# Patient Record
Sex: MALE | Race: WHITE | NOT HISPANIC OR LATINO | Employment: OTHER | URBAN - METROPOLITAN AREA
[De-identification: names, ages, dates, MRNs, and addresses within clinical notes are randomized per-mention and may not be internally consistent; named-entity substitution may affect disease eponyms.]

---

## 2017-03-29 RX ORDER — TESTOSTERONE CYPIONATE 200 MG/ML
300 INJECTION, SOLUTION INTRAMUSCULAR
Qty: 10 ML | Refills: 0 | Status: SHIPPED | OUTPATIENT
Start: 2017-03-29 | End: 2017-05-30 | Stop reason: SDUPTHER

## 2017-05-01 ENCOUNTER — LAB VISIT (OUTPATIENT)
Dept: LAB | Facility: HOSPITAL | Age: 67
End: 2017-05-01
Attending: INTERNAL MEDICINE
Payer: MEDICARE

## 2017-05-01 ENCOUNTER — OFFICE VISIT (OUTPATIENT)
Dept: INTERNAL MEDICINE | Facility: CLINIC | Age: 67
End: 2017-05-01
Payer: MEDICARE

## 2017-05-01 VITALS
OXYGEN SATURATION: 97 % | WEIGHT: 287.69 LBS | HEIGHT: 69 IN | TEMPERATURE: 98 F | DIASTOLIC BLOOD PRESSURE: 107 MMHG | HEART RATE: 58 BPM | BODY MASS INDEX: 42.61 KG/M2 | SYSTOLIC BLOOD PRESSURE: 157 MMHG

## 2017-05-01 DIAGNOSIS — M54.31 BILATERAL SCIATICA: ICD-10-CM

## 2017-05-01 DIAGNOSIS — M54.32 BILATERAL SCIATICA: ICD-10-CM

## 2017-05-01 DIAGNOSIS — M54.31 BILATERAL SCIATICA: Primary | ICD-10-CM

## 2017-05-01 DIAGNOSIS — M54.32 BILATERAL SCIATICA: Primary | ICD-10-CM

## 2017-05-01 LAB
CREAT SERPL-MCNC: 1.2 MG/DL
EST. GFR  (AFRICAN AMERICAN): >60 ML/MIN/1.73 M^2
EST. GFR  (NON AFRICAN AMERICAN): >60 ML/MIN/1.73 M^2

## 2017-05-01 PROCEDURE — 36415 COLL VENOUS BLD VENIPUNCTURE: CPT | Mod: PO

## 2017-05-01 PROCEDURE — 82565 ASSAY OF CREATININE: CPT

## 2017-05-01 PROCEDURE — 1160F RVW MEDS BY RX/DR IN RCRD: CPT | Mod: S$GLB,,, | Performed by: INTERNAL MEDICINE

## 2017-05-01 PROCEDURE — 99213 OFFICE O/P EST LOW 20 MIN: CPT | Mod: S$GLB,,, | Performed by: INTERNAL MEDICINE

## 2017-05-01 PROCEDURE — 3077F SYST BP >= 140 MM HG: CPT | Mod: S$GLB,,, | Performed by: INTERNAL MEDICINE

## 2017-05-01 PROCEDURE — 99999 PR PBB SHADOW E&M-EST. PATIENT-LVL III: CPT | Mod: PBBFAC,,, | Performed by: INTERNAL MEDICINE

## 2017-05-01 PROCEDURE — 99499 UNLISTED E&M SERVICE: CPT | Mod: S$GLB,,, | Performed by: INTERNAL MEDICINE

## 2017-05-01 PROCEDURE — 3080F DIAST BP >= 90 MM HG: CPT | Mod: S$GLB,,, | Performed by: INTERNAL MEDICINE

## 2017-05-01 RX ORDER — AMLODIPINE BESYLATE 5 MG/1
5 TABLET ORAL DAILY
Qty: 30 TABLET | Refills: 11 | Status: SHIPPED | OUTPATIENT
Start: 2017-05-01 | End: 2017-05-30 | Stop reason: SDUPTHER

## 2017-05-01 RX ORDER — HYDROCODONE BITARTRATE AND ACETAMINOPHEN 10; 325 MG/1; MG/1
1 TABLET ORAL EVERY 8 HOURS PRN
Qty: 90 TABLET | Refills: 0 | Status: SHIPPED | OUTPATIENT
Start: 2017-05-01 | End: 2017-09-06 | Stop reason: SDUPTHER

## 2017-05-01 NOTE — MR AVS SNAPSHOT
[facility]  [id number] [place] [address]  Phone: [phone]                  Enoc Collado   2017 1:20 PM   Office Visit    Description:  Male : 1950   Provider:  Frantz Mason MD   Department:  [facility]           Reason for Visit     Hypertension           Diagnoses this Visit        Comments    Bilateral sciatica    -  Primary            To Do List           Future Appointments        Provider Department Dept Phone    2017 4:50 PM LABORATORY, [facility] - Laboratory [phone]    2017 7:00 AM [facility] MRI1 [facility] -  [phone]    2017 8:30 AM LABORATORY, [facility]     2017 2:20 PM Frantz Mason MD [facility] Internal Medicine [phone]    2017 10:00 AM [facility] [phone]      Goals (5 Years of Data)     None       These Medications        Disp Refills Start End    amlodipine (NORVASC) 5 MG tablet 30 tablet 11 2017    Take 1 tablet (5 mg total) by mouth once daily. - Oral    Pharmacy: [facility] - [address] #: [phone]         [facility]     [facility] Care [facility] -  Assistance  Unless otherwise directed by your provider, please contact [facility], our nurse care line that is available for  assistance.     Registered nurses in the [facility] provide: appointment scheduling, clinical advisement, health education, and other advisory services.  Call: [phone] (toll free)               Medications           Message regarding Medications     Verify the changes and/or additions to your medication regime listed below are the same as discussed with your clinician today.  If any of these changes or additions are incorrect, please notify your healthcare provider.        START taking these NEW medications        Refills     "amlodipine (NORVASC) 5 MG tablet 11    Sig: Take 1 tablet (5 mg total) by mouth once daily.    Class: Normal    Route: Oral      STOP taking these medications     hydrocodone-acetaminophen  (LORTAB)  mg per tablet Take 1 tablet by mouth every 8 (eight) hours as needed for Pain.    hydrocodone-acetaminophen 10-325mg (NORCO)  mg Tab            Verify that the below list of medications is an accurate representation of the medications you are currently taking.  If none reported, the list may be blank. If incorrect, please contact your healthcare provider. Carry this list with you in case of emergency.           Current Medications     allopurinol (ZYLOPRIM) 300 MG tablet Take 1 tablet (300 mg total) by mouth once daily.    amlodipine (NORVASC) 5 MG tablet Take 1 tablet (5 mg total) by mouth once daily.    aspirin 325 MG tablet Take 325 mg by mouth once daily.    carvedilol (COREG) 25 MG tablet Take 1 tablet (25 mg total) by mouth 2 (two) times daily with meals.    cyclobenzaprine (FLEXERIL) 10 MG tablet Take 1 tablet (10 mg total) by mouth 3 (three) times daily as needed for Muscle spasms.    desonide (DESOWEN) 0.05 % lotion Apply twice a day as needed    diclofenac (VOLTAREN) 75 MG EC tablet Take 1 tablet (75 mg total) by mouth 2 (two) times daily.    hydrochlorothiazide (HYDRODIURIL) 25 MG tablet Take 1 tablet (25 mg total) by mouth once daily.    lisinopril (PRINIVIL,ZESTRIL) 40 MG tablet Take 1 tablet (40 mg total) by mouth once daily.    pravastatin (PRAVACHOL) 20 MG tablet Take 1 tablet (20 mg total) by mouth once daily.    sildenafil (REVATIO) 20 mg Tab 2-4 tablets as needed one hour prior to intercourse    syringe with needle, safety 3 mL 21 x 1 1/2" Syrg 1 Syringe by Misc.(Non-Drug; Combo Route) route every 21 days.    testosterone cypionate (DEPOTESTOTERONE CYPIONATE) 200 mg/mL injection Inject 1.5 mLs (300 mg total) into the muscle every 21 days.           Clinical Reference Information    " "       Your Vitals Were     BP Pulse Temp Height    157/107 (BP Location: Left arm, Patient Position: Sitting, BP Method: Doppler) 58 97.5 °F (36.4 °C) (Tympanic) 5' 9" (1.753 m)    Weight SpO2 BMI    130.5 kg (287 lb 11.2 oz) 97% 42.49 kg/m2      Blood Pressure          Most Recent Value    BP  (!)  157/107      Allergies as of 5/1/2017     No Known Drug Allergies      Immunizations Administered on Date of Encounter - 5/1/2017     None      Orders Placed During Today's Visit     Future Labs/Procedures Expected by Expires    Creatinine, serum  5/1/2017 6/30/2018    MRI Lumbar Spine W WO Contrast  5/1/2017 5/1/2018      Language Assistance Services     ATTENTION: Language assistance services are available, free of charge. Please call [phone].      ATENCIÓN: Si habla chandan, tiene a jo disposición servicios gratuitos de asistencia lingüística. Llame al [phone].     CHÚ Ý: N?u b?n nói Ti?ng Vi?t, có các d?ch v? h? tr? ngôn ng? mi?n phí dành cho b?n. G?i s? [phone].         [facility] complies with applicable Federal civil rights laws and does not discriminate on the basis of race, color, national origin, age, disability, or sex.        "

## 2017-05-01 NOTE — PROGRESS NOTES
"HPI:  Patient is a 66-year-old man who comes in today with complaints of sharp stabbing pain that originates in his lower back and then goes into both buttocks.  He denies any pain down into the lower quadrant.  Extremities.  He states most the time.  It occurs when he stands up.  It is not always there.  When it does flare up, if he sits back down It usually abates.  He denies any recent injury or trauma.  He's been taking meloxicam and intermittently taken hydrocodone.    Current meds have been verified and updated per the EMR  Exam:BP (!) 157/107 (BP Location: Left arm, Patient Position: Sitting, BP Method: Doppler)  Pulse (!) 58  Temp 97.5 °F (36.4 °C) (Tympanic)   Ht 5' 9" (1.753 m)  Wt 130.5 kg (287 lb 11.2 oz)  SpO2 97%  BMI 42.49 kg/m2  Straight-leg raises are negative.  Reflexes 1+ at both knees  Motor shows equal strength poor dorsal and plantar flexion.  He has equal strength in his thighs with both flexion and extension    Lab Results   Component Value Date    WBC 6.18 12/06/2016    HGB 15.3 12/06/2016    HCT 46.1 12/06/2016     12/06/2016    CHOL 173 12/06/2016    TRIG 129 12/06/2016    HDL 25 (L) 12/06/2016    ALT 60 (H) 12/06/2016    AST 34 12/06/2016     12/06/2016    K 4.5 12/06/2016     12/06/2016    CREATININE 1.2 12/06/2016    BUN 26 (H) 12/06/2016    CO2 25 12/06/2016    TSH 0.551 12/06/2016    PSA 1.8 12/06/2016    HGBA1C 5.3 05/05/2015       Impression:  Hypertension  Back pain with symptoms consistent with sciatica, bilateral, suggestive more of a spinal stenosis problem  Patient Active Problem List   Diagnosis    Essential hypertension    Hyperlipidemia    Hypertrophy of prostate without urinary obstruction and other lower urinary tract symptoms (LUTS)    Obesity, unspecified    Generalized osteoarthrosis, involving multiple sites    Hypogonadism male    Pain in limb    History of gout       Plan:  Orders Placed This Encounter    MRI Lumbar Spine W WO " Contrast    Creatinine, serum    amlodipine (NORVASC) 5 MG tablet     He will have an MRI of his lower lumbar spine.  He was started on Norvasc for his blood pressure.  He'll see me in 3 weeks for rechecking his blood pressure.

## 2017-05-02 ENCOUNTER — TELEPHONE (OUTPATIENT)
Dept: INTERNAL MEDICINE | Facility: CLINIC | Age: 67
End: 2017-05-02

## 2017-05-02 ENCOUNTER — PATIENT MESSAGE (OUTPATIENT)
Dept: INTERNAL MEDICINE | Facility: CLINIC | Age: 67
End: 2017-05-02

## 2017-05-02 ENCOUNTER — HOSPITAL ENCOUNTER (OUTPATIENT)
Dept: RADIOLOGY | Facility: HOSPITAL | Age: 67
Discharge: HOME | End: 2017-05-02
Attending: INTERNAL MEDICINE
Payer: MEDICARE

## 2017-05-02 DIAGNOSIS — M54.32 BILATERAL SCIATICA: Primary | ICD-10-CM

## 2017-05-02 DIAGNOSIS — M54.31 BILATERAL SCIATICA: ICD-10-CM

## 2017-05-02 DIAGNOSIS — M54.32 BILATERAL SCIATICA: ICD-10-CM

## 2017-05-02 DIAGNOSIS — M54.31 BILATERAL SCIATICA: Primary | ICD-10-CM

## 2017-05-02 PROCEDURE — A9585 GADOBUTROL INJECTION: HCPCS | Performed by: INTERNAL MEDICINE

## 2017-05-02 PROCEDURE — 25500020 PHARM REV CODE 255: Performed by: INTERNAL MEDICINE

## 2017-05-02 PROCEDURE — 72158 MRI LUMBAR SPINE W/O & W/DYE: CPT | Mod: TC

## 2017-05-02 RX ORDER — GADOBUTROL 604.72 MG/ML
10 INJECTION INTRAVENOUS
Status: COMPLETED | OUTPATIENT
Start: 2017-05-02 | End: 2017-05-02

## 2017-05-02 RX ADMIN — GADOBUTROL 10 ML: 604.72 INJECTION INTRAVENOUS at 08:05

## 2017-05-02 NOTE — TELEPHONE ENCOUNTER
Your MRI shows some focal nerve root impingements. I think you should consider Epidural Steroid injections. I want you to see Dr Swift to get his opinion.  Referral put in

## 2017-05-09 ENCOUNTER — OFFICE VISIT (OUTPATIENT)
Dept: PAIN MEDICINE | Facility: CLINIC | Age: 67
End: 2017-05-09
Payer: MEDICARE

## 2017-05-09 VITALS
HEIGHT: 69 IN | BODY MASS INDEX: 41.47 KG/M2 | DIASTOLIC BLOOD PRESSURE: 80 MMHG | WEIGHT: 280 LBS | HEART RATE: 65 BPM | RESPIRATION RATE: 18 BRPM | SYSTOLIC BLOOD PRESSURE: 122 MMHG

## 2017-05-09 DIAGNOSIS — M54.16 RIGHT LUMBAR RADICULOPATHY: Primary | ICD-10-CM

## 2017-05-09 DIAGNOSIS — M54.16 LUMBAR RADICULOPATHY: Primary | ICD-10-CM

## 2017-05-09 DIAGNOSIS — M54.16 LEFT LUMBAR RADICULOPATHY: ICD-10-CM

## 2017-05-09 DIAGNOSIS — M47.817 SPONDYLOSIS OF LUMBOSACRAL REGION WITHOUT MYELOPATHY OR RADICULOPATHY: ICD-10-CM

## 2017-05-09 PROCEDURE — 3079F DIAST BP 80-89 MM HG: CPT | Mod: S$GLB,,, | Performed by: ANESTHESIOLOGY

## 2017-05-09 PROCEDURE — 99999 PR PBB SHADOW E&M-EST. PATIENT-LVL III: CPT | Mod: PBBFAC,,, | Performed by: ANESTHESIOLOGY

## 2017-05-09 PROCEDURE — 3074F SYST BP LT 130 MM HG: CPT | Mod: S$GLB,,, | Performed by: ANESTHESIOLOGY

## 2017-05-09 PROCEDURE — 99203 OFFICE O/P NEW LOW 30 MIN: CPT | Mod: S$GLB,,, | Performed by: ANESTHESIOLOGY

## 2017-05-09 PROCEDURE — 1160F RVW MEDS BY RX/DR IN RCRD: CPT | Mod: S$GLB,,, | Performed by: ANESTHESIOLOGY

## 2017-05-09 RX ORDER — GABAPENTIN 300 MG/1
300 CAPSULE ORAL 3 TIMES DAILY
Qty: 60 CAPSULE | Refills: 0 | Status: SHIPPED | OUTPATIENT
Start: 2017-05-09 | End: 2017-05-30 | Stop reason: DRUGHIGH

## 2017-05-09 NOTE — LETTER
May 9, 2017      Frantz Mason MD  [address]  [address]  [facility] [address]           [facility]  [address] [place] [address]  Phone: [phone]  Fax: [phone]          Patient: Enoc Collado   MR Number: [id number]   YOB: 1950   Date of Visit: 5/9/2017       Dear Dr. Frantz Mason:    Thank you for referring Enoc Collado to me for evaluation. Attached you will find relevant portions of my assessment and plan of care.    If you have questions, please do not hesitate to call me. I look forward to following Enoc Collado along with you.    Sincerely,    Rodolfo Swift MD    Enclosure  CC:  No Recipients    If you would like to receive this communication electronically, please contact [email or web address] or [phone] to request more information on [facility] Link access.    For providers and/or their staff who would like to refer a patient to [facility], please contact us through our one-stop-shop provider referral line, [facility], at [phone].    If you feel you have received this communication in error or would no longer like to receive these types of communications, please e-mail [email or web address]

## 2017-05-09 NOTE — PROGRESS NOTES
Chief Pain Complaint:  Lower back pain, buttock and leg pain b/l    History of Present Illness:   This patient is a 66 y.o. male who presents today complaining of the above noted pain/s. The patient describes the pain as follows.    - duration of pain: 3 months   - timing: constant   - character: aching, sharp  - radiating, dermatomal: extends into bilateral lower extremities posteriorly  - antecedent trauma, prior spinal surgery: no prior trauma, no prior spinal surgery   - pertinent negatives: No fever, No chills, No weight loss, No bladder dysfunction, No bowel dysfunction, No extremity weakness, No saddle anesthesia  - pertinent positives: none    - medications, other therapies tried (physical therapy, injections):     >> Tylenol, Norco, flexeril    >> Has NOT previously undergone Physical Therapy    >> Has NOT previously undergone spinal injection/s      Imaging / Labs / Studies (reviewed on 5/9/2017):      Results for orders placed during the hospital encounter of 05/02/17   MRI Lumbar Spine W WO Contrast    Narrative EXAM: [id number] LUMBAR SPINE W WO CONTRAST  CLINICAL HISTORY: Low back pain with radiation to buttocks region.  TECHNIQUE: MR Lumbar spine with contrast. Sagittal T1, T2, STIR. Axial T1, T2. Post contrast Sagittal and Axial T1.  COMPARISON: None available.  FINDINGS:  There is straightening of the lumbar lordosis. There is fatty degenerative Modic type II endplate change at L2-L3 and L3-L4 relating to disc space height loss.  There is also fatty degenerative Modic type II endplate change toward the right at L5-S1 due to disc space height loss. There is an enhancing annular fissure involving the far right lateral L5-S1 disc space.  There is also enhancement of a posterior annular fissure at L3-L4.  This is an expected finding in the setting of annular fissures. The conus medullaris terminates at the level of L1. No abnormal signal within the conus. Intervertebral disc levels are as  follows:  T12-L1 disc: No significant disc pathology. No spinal canal or neural foraminal stenosis.  L1-L2 disc : Minimal circumferential disc bulge.  The thecal sac measures 13 mm AP.  No significant foraminal stenosis.  L2-L3 disc: There is disc desiccation and disc space height loss with fatty degenerative endplate change as well as circumferentially protruding disc material with marginal osteophytes.  The thecal sac measures 12 mm AP.  Disc and osteophytic material encroach into the floors of the exit foramina bilaterally causing mild bilateral foraminal stenosis.  L3-L4 disc: Severe disc space height loss with fatty degenerative endplate change.  There is moderate degenerative facet changes bilaterally.  The thecal sac measures 12 mm AP.  Disc and osteophytic material encroach into the exit foramina, left greater than right.  There is severe left foraminal stenosis could affect the left L3 nerve.  There is mild to moderate right foraminal stenosis.  L4-L5 disc: Partial disc desiccation with a small central disc protrusion.  There is mild degenerative facet hypertrophy bilaterally.  The thecal sac measures 9 mm AP.  There is mild to moderate bilateral foraminal stenosis, right greater than left.  L5-S1 disc: Partial disc desiccation with disc space height loss toward the right where there are marginal osteophytes as well as an annular fissure.  Disc and osteophytic material encroach into the right exit foramen.  There is mild to moderate degenerative facet hypertrophy bilaterally.  There is moderate to severe right foraminal stenosis could affect the right L5 nerve.  Mild left foraminal stenosis.  No significant spinal stenosis.                Review of Systems:  CONSTITUTIONAL: patient denies any fever, chills, or weight loss  SKIN: patient denies any rash or itching  RESPIRATORY: patient denies having any shortness of breath  GASTROINTESTINAL: patient denies having any diarrhea, constipation, or bowel  "incontinence  GENITOURINARY: patient denies having any abnormal bladder function    MUSCULOSKELETAL:  - patient complains of the above noted pain/s (see chief pain complaint)    NEUROLOGICAL:   - pain as above  - strength in Lower extremities is intact, BILATERALLY  - sensation in Lower extremities is abnormal, BILATERALLY  - patient denies any loss of bowel or bladder control      PSYCHIATRIC: patient denies any change in mood    Other:  All other systems reviewed and are negative      Physical Exam:  /80 (BP Location: Right arm, Patient Position: Sitting, BP Method: Automatic)  Pulse 65  Resp 18  Ht 5' 9" (1.753 m)  Wt 127 kg (280 lb)  BMI 41.35 kg/m2 (reviewed on 5/9/2017)  General: alert and oriented, in no apparent distress  Gait: normal gait  Skin: No rashes, No discoloration, No obvious lesions  HEENT: EOMI  Cardiovascular: no significant peripheral edema present  Respiratory: respirations nonlabored    Musculoskeletal:  - Any pain on flexion, extension, rotation:    >> pain on extension and rotation  - Straight Leg Raise:     >> LEFT :: negative    >> RIGHT :: negative    - Any tenderness to palpation across paraspinal muscles, joints, bursae:     >> across lumbar paraspinals    Neuro:  - Extremity Strength:     >> LEFT :: 5/5    >> RIGHT :: 5/5  - Extremity Reflexes:    >> LEFT  :: 2+    >> RIGHT :: 2+  - Sensory (sensation to light touch):    >> LEFT :: intact    >> RIGHT :: intact     Psych:  Mood and affect is appropriate      Assessment:  Lumbar Radiculopathy  Lumbar Spondylosis      Plan:  Patient has b/l buttock pain, pain into the legs posteriorly.  Multilevel degenerative changes throughout the l-spine, detailed above.  I will trial gabapentin and order left L3 TF and right L5 TF CHRISTINA vs b/l L4 TF CHRISTINA.  Imaging / studies reviewed, detailed above.  I discussed in detail the risks, benefits, and alternatives to any and all potential treatment options.  All questions and concerns were fully " addressed today in clinic.      Disclaimer:  This note may have been prepared using voice recognition software, it may have not been extensively proofed, as such there could be errors within the text such as sound alike errors.

## 2017-05-16 ENCOUNTER — TELEPHONE (OUTPATIENT)
Dept: PAIN MEDICINE | Facility: CLINIC | Age: 67
End: 2017-05-16

## 2017-05-16 NOTE — TELEPHONE ENCOUNTER
Patient contacted to confirm procedure.  Reminded of instructions listed below.     1. Procedure is scheduled for 05/17/17 at [facility] at [address] [place], [place] [address] ([place] of [place] [address] [facility] [address]).  2. Nothing by mouth for 6 hours prior to your injection.  If you take blood pressure medication, please continue this, take this with sips of water.  3. If you are running a fever, have an active infection, or if you are taking antibiotics, the day of the procedure, please call and reschedule.  4. You must have someone accompany you to your procedure.  5. If you are on blood thinners (e.g. Coumadin, Plavix, Ticlid, Aggrenox, Xarelto, Aspirin), have you stopped them as previously advised?        Patient verbalized understanding.

## 2017-05-17 ENCOUNTER — HOSPITAL ENCOUNTER (OUTPATIENT)
Facility: HOSPITAL | Age: 67
Discharge: HOME | End: 2017-05-17
Attending: ANESTHESIOLOGY | Admitting: ANESTHESIOLOGY
Payer: MEDICARE

## 2017-05-17 ENCOUNTER — SURGERY (OUTPATIENT)
Age: 67
End: 2017-05-17

## 2017-05-17 ENCOUNTER — HOSPITAL ENCOUNTER (OUTPATIENT)
Dept: RADIOLOGY | Facility: HOSPITAL | Age: 67
Discharge: HOME | End: 2017-05-17
Attending: ANESTHESIOLOGY
Payer: MEDICARE

## 2017-05-17 VITALS
TEMPERATURE: 98 F | OXYGEN SATURATION: 96 % | WEIGHT: 280 LBS | SYSTOLIC BLOOD PRESSURE: 146 MMHG | BODY MASS INDEX: 41.47 KG/M2 | RESPIRATION RATE: 16 BRPM | HEIGHT: 69 IN | HEART RATE: 62 BPM | DIASTOLIC BLOOD PRESSURE: 102 MMHG

## 2017-05-17 DIAGNOSIS — M54.16 LEFT LUMBAR RADICULOPATHY: Primary | ICD-10-CM

## 2017-05-17 DIAGNOSIS — M54.16 LEFT LUMBAR RADICULOPATHY: ICD-10-CM

## 2017-05-17 DIAGNOSIS — M54.16 LUMBAR RADICULOPATHY: ICD-10-CM

## 2017-05-17 DIAGNOSIS — M54.16 RIGHT LUMBAR RADICULOPATHY: ICD-10-CM

## 2017-05-17 PROCEDURE — 25000003 PHARM REV CODE 250: Performed by: ANESTHESIOLOGY

## 2017-05-17 PROCEDURE — 63600175 PHARM REV CODE 636 W HCPCS

## 2017-05-17 PROCEDURE — 64483 NJX AA&/STRD TFRM EPI L/S 1: CPT | Mod: 50,,, | Performed by: ANESTHESIOLOGY

## 2017-05-17 PROCEDURE — 64483 NJX AA&/STRD TFRM EPI L/S 1: CPT | Mod: 50,RT,LT

## 2017-05-17 PROCEDURE — 63600175 PHARM REV CODE 636 W HCPCS: Performed by: ANESTHESIOLOGY

## 2017-05-17 RX ORDER — LIDOCAINE HYDROCHLORIDE 20 MG/ML
INJECTION, SOLUTION INFILTRATION; PERINEURAL
Status: DISCONTINUED | OUTPATIENT
Start: 2017-05-17 | End: 2017-05-17 | Stop reason: HOSPADM

## 2017-05-17 RX ORDER — DEXAMETHASONE SODIUM PHOSPHATE 4 MG/ML
INJECTION, SOLUTION INTRA-ARTICULAR; INTRALESIONAL; INTRAMUSCULAR; INTRAVENOUS; SOFT TISSUE
Status: DISCONTINUED | OUTPATIENT
Start: 2017-05-17 | End: 2017-05-17 | Stop reason: HOSPADM

## 2017-05-17 RX ADMIN — LIDOCAINE HYDROCHLORIDE 10 ML: 20 INJECTION, SOLUTION INFILTRATION; PERINEURAL at 11:05

## 2017-05-17 RX ADMIN — DEXAMETHASONE SODIUM PHOSPHATE 20 MG: 4 INJECTION, SOLUTION INTRA-ARTICULAR; INTRALESIONAL; INTRAMUSCULAR; INTRAVENOUS; SOFT TISSUE at 11:05

## 2017-05-17 NOTE — DISCHARGE SUMMARY
[facility]  Discharge Note       Description of Procedure: Lumbar Transforaminal Epidural Steroid Injection under Fluoroscopic Guidance    Procedure Date: 5/17/2017    Admit Date: 5/17/2017  Discharge Date: 5/17/2017     Attending Physician: Rodolfo Swift   Discharge Provider: Rodolfo Swift    Preoperative Diagnosis:   Active Hospital Problems    Diagnosis  POA    Lumbar radiculopathy [M54.16]  Yes     Priority: High      Resolved Hospital Problems    Diagnosis Date Resolved POA   No resolved problems to display.        Postoperative Diagnosis: as above, same as preoperative diagnosis    Discharged Condition: Stable    Hospital Course: Patient was admitted for an outpatient procedure. The procedure was tolerated well with no complications.    Final Diagnoses: Same as principal problem.    Disposition: Home, self-care.    Follow up/Patient Instructions:  Follow-up in clinic in 2-3 weeks.    Medications: No medications were prescribed today. The patient was advised to resume normal medication regimen without change.  Specific information was provided regarding restarting any anticoagulant/s.    Discharge Procedure Orders (must include Diet, Follow-up, Activity):  Light activity for the remainder of the day, resume normal activity tomorrow. Resume normal diet. Follow-up in clinic in 2-3 weeks.

## 2017-05-17 NOTE — PLAN OF CARE
Problem: Patient Care Overview  Goal: Plan of Care Review  Outcome: Outcome(s) achieved Date Met:  05/17/17  Patient d/c home in stable condition via wheelchair with ride. Verbalized understanding of d/c instructions. Patient voiced no complaints at this time. Patient stood at side of bed, walked steps with no new motor deficits. Neurologically intact.

## 2017-05-17 NOTE — H&P (VIEW-ONLY)
Chief Pain Complaint:  Lower back pain, buttock and leg pain b/l    History of Present Illness:   This patient is a 66 y.o. male who presents today complaining of the above noted pain/s. The patient describes the pain as follows.    - duration of pain: 3 months   - timing: constant   - character: aching, sharp  - radiating, dermatomal: extends into bilateral lower extremities posteriorly  - antecedent trauma, prior spinal surgery: no prior trauma, no prior spinal surgery   - pertinent negatives: No fever, No chills, No weight loss, No bladder dysfunction, No bowel dysfunction, No extremity weakness, No saddle anesthesia  - pertinent positives: none    - medications, other therapies tried (physical therapy, injections):     >> Tylenol, Norco, flexeril    >> Has NOT previously undergone Physical Therapy    >> Has NOT previously undergone spinal injection/s      Imaging / Labs / Studies (reviewed on 5/9/2017):      Results for orders placed during the hospital encounter of 05/02/17   MRI Lumbar Spine W WO Contrast    Narrative EXAM: [id number] LUMBAR SPINE W WO CONTRAST  CLINICAL HISTORY: Low back pain with radiation to buttocks region.  TECHNIQUE: MR Lumbar spine with contrast. Sagittal T1, T2, STIR. Axial T1, T2. Post contrast Sagittal and Axial T1.  COMPARISON: None available.  FINDINGS:  There is straightening of the lumbar lordosis. There is fatty degenerative Modic type II endplate change at L2-L3 and L3-L4 relating to disc space height loss.  There is also fatty degenerative Modic type II endplate change toward the right at L5-S1 due to disc space height loss. There is an enhancing annular fissure involving the far right lateral L5-S1 disc space.  There is also enhancement of a posterior annular fissure at L3-L4.  This is an expected finding in the setting of annular fissures. The conus medullaris terminates at the level of L1. No abnormal signal within the conus. Intervertebral disc levels are as  follows:  T12-L1 disc: No significant disc pathology. No spinal canal or neural foraminal stenosis.  L1-L2 disc : Minimal circumferential disc bulge.  The thecal sac measures 13 mm AP.  No significant foraminal stenosis.  L2-L3 disc: There is disc desiccation and disc space height loss with fatty degenerative endplate change as well as circumferentially protruding disc material with marginal osteophytes.  The thecal sac measures 12 mm AP.  Disc and osteophytic material encroach into the floors of the exit foramina bilaterally causing mild bilateral foraminal stenosis.  L3-L4 disc: Severe disc space height loss with fatty degenerative endplate change.  There is moderate degenerative facet changes bilaterally.  The thecal sac measures 12 mm AP.  Disc and osteophytic material encroach into the exit foramina, left greater than right.  There is severe left foraminal stenosis could affect the left L3 nerve.  There is mild to moderate right foraminal stenosis.  L4-L5 disc: Partial disc desiccation with a small central disc protrusion.  There is mild degenerative facet hypertrophy bilaterally.  The thecal sac measures 9 mm AP.  There is mild to moderate bilateral foraminal stenosis, right greater than left.  L5-S1 disc: Partial disc desiccation with disc space height loss toward the right where there are marginal osteophytes as well as an annular fissure.  Disc and osteophytic material encroach into the right exit foramen.  There is mild to moderate degenerative facet hypertrophy bilaterally.  There is moderate to severe right foraminal stenosis could affect the right L5 nerve.  Mild left foraminal stenosis.  No significant spinal stenosis.                Review of Systems:  CONSTITUTIONAL: patient denies any fever, chills, or weight loss  SKIN: patient denies any rash or itching  RESPIRATORY: patient denies having any shortness of breath  GASTROINTESTINAL: patient denies having any diarrhea, constipation, or bowel  "incontinence  GENITOURINARY: patient denies having any abnormal bladder function    MUSCULOSKELETAL:  - patient complains of the above noted pain/s (see chief pain complaint)    NEUROLOGICAL:   - pain as above  - strength in Lower extremities is intact, BILATERALLY  - sensation in Lower extremities is abnormal, BILATERALLY  - patient denies any loss of bowel or bladder control      PSYCHIATRIC: patient denies any change in mood    Other:  All other systems reviewed and are negative      Physical Exam:  /80 (BP Location: Right arm, Patient Position: Sitting, BP Method: Automatic)  Pulse 65  Resp 18  Ht 5' 9" (1.753 m)  Wt 127 kg (280 lb)  BMI 41.35 kg/m2 (reviewed on 5/9/2017)  General: alert and oriented, in no apparent distress  Gait: normal gait  Skin: No rashes, No discoloration, No obvious lesions  HEENT: EOMI  Cardiovascular: no significant peripheral edema present  Respiratory: respirations nonlabored    Musculoskeletal:  - Any pain on flexion, extension, rotation:    >> pain on extension and rotation  - Straight Leg Raise:     >> LEFT :: negative    >> RIGHT :: negative    - Any tenderness to palpation across paraspinal muscles, joints, bursae:     >> across lumbar paraspinals    Neuro:  - Extremity Strength:     >> LEFT :: 5/5    >> RIGHT :: 5/5  - Extremity Reflexes:    >> LEFT  :: 2+    >> RIGHT :: 2+  - Sensory (sensation to light touch):    >> LEFT :: intact    >> RIGHT :: intact     Psych:  Mood and affect is appropriate      Assessment:  Lumbar Radiculopathy  Lumbar Spondylosis      Plan:  Patient has b/l buttock pain, pain into the legs posteriorly.  Multilevel degenerative changes throughout the l-spine, detailed above.  I will trial gabapentin and order left L3 TF and right L5 TF CHRISTINA vs b/l L4 TF CHRISTINA.  Imaging / studies reviewed, detailed above.  I discussed in detail the risks, benefits, and alternatives to any and all potential treatment options.  All questions and concerns were fully " addressed today in clinic.      Disclaimer:  This note may have been prepared using voice recognition software, it may have not been extensively proofed, as such there could be errors within the text such as sound alike errors.

## 2017-05-17 NOTE — OP NOTE
"Procedure: Lumbar Transforaminal Epidural Steroid Injection under Fluoroscopic Guidance (supraneural approach)    Level: L4/5     Side: Bilateral    PROCEDURE DATE: 5/17/2017    Pre-operative Diagnosis: Lumbar Radiculopathy  Post-operative Diagnosis: Lumbar Radiculopathy    Provider: Rodolfo Swift MD  Assistant(s): None    Anesthesia: Local, No Sedation    >> 0 mg of VERSED    >> 0 mcg of FENTANYL     Indication: Low back pain with radiculopathy consistent with distribution of targeted nerve. Symptoms unresponsive to conservative treatments. Fluoroscopy was used to optimize visualization of needle placement and to maximize safety.     Procedure Description / Technique:  The patient was seen and identified in the preoperative area. Risks, benefits, complications, and alternatives were discussed with the patient. The patient agreed to proceed with the procedure and signed the consent. The site and side of the procedure was identified and marked. An IV was not placed for this procedure. The patient was taken to the procedural suite.    The patient was positioned in prone orientation on procedure table and a pillow was placed under the abdomen to reduce lumbar lordosis. A time out was performed prior to any intervention. The procedure, site, side, and allergies were stated and agreed to by all present. The lumbosacral area was widely prepped with ChloraPrep. The procedural site was draped in usual sterile fashion. Vital signs were closely monitored throughout this procedure. Conscious sedation was not used for this procedure.    The target area was visualized under fluoroscopy. The cephalocaudal angle of the fluoroscope was adjusted as to align the vertebral end plates. The fluoroscopic arm was rotated ipsilaterally to an angle of approximately 30 degrees until the "hay dog" outline came into view and the tip of the inferior superior articular process pointed towards the midline, 6:00 position of the above pedicle. " "A 22 gauge 5 inch spinal needle was directed towards the "chin" of the "hay dog" (adjacent to the pars interarticularis and inferior to the pedicle). The needle was advanced until OS was met at the inferior border of the pedicle / pars interface. The needle was adjusted so that it would pass inferior to the osseous border. The fluoroscope was then placed in the lateral position and the needle was slowly advanced until it rested in the posterior 1/3rd of the vertebral foramen. AP fluoroscopy was checked and the needle tip rested at the 6:00 position under the pedicle. No paresthesia was elicited during needle placement. With the needle tip in its final position, gentle aspiration was negative for blood and CSF. Omnipaque 240 (1 to 2 mL) was injected under live fluoroscopy. Microbore tubing was used for injection. There was no pain or paresthesia on injection. The contrast clearly delineated the targeted nerve root on AP fluoroscopy. No vascular uptake was seen. A solution containing 2 mL of 1% PF Lidocaine and 2 mL of Dexamethasone (10 mg/mL) was mixed and 2 mL was injected slowly at each level targeted. There was minimal resistance on injection. No pain or paresthesia was elicited on injection. The stylet was replaced and the needle was withdrawn intact. This procedure was performed for each of the above indicated levels.     Description of Findings: Not applicable    Prosthetic devices, grafts, tissues, or devices implanted: None    Specimen Removed: No    Estimated Blood Loss: minimal    COMPLICATIONS: None    DISPOSITION / PLANS: The patient was transferred to the recovery area in a stable condition for observation. The patient was reexamined prior to discharge. There was no evidence of acute neurologic injury following the procedure.  Patient was discharged from the recovery room after meeting discharge criteria. Home discharge instructions were given to the patient by the staff.    "

## 2017-05-22 ENCOUNTER — LAB VISIT (OUTPATIENT)
Dept: LAB | Facility: HOSPITAL | Age: 67
End: 2017-05-22
Attending: INTERNAL MEDICINE
Payer: MEDICARE

## 2017-05-22 DIAGNOSIS — E29.1 HYPOGONADISM MALE: ICD-10-CM

## 2017-05-22 DIAGNOSIS — Z87.39 HISTORY OF GOUT: ICD-10-CM

## 2017-05-22 DIAGNOSIS — I10 ESSENTIAL HYPERTENSION: ICD-10-CM

## 2017-05-22 LAB
ALBUMIN SERPL BCP-MCNC: 3.9 G/DL
ALP SERPL-CCNC: 68 U/L
ALT SERPL W/O P-5'-P-CCNC: 48 U/L
ANION GAP SERPL CALC-SCNC: 7 MMOL/L
AST SERPL-CCNC: 35 U/L
BILIRUB SERPL-MCNC: 1.1 MG/DL
BUN SERPL-MCNC: 26 MG/DL
CALCIUM SERPL-MCNC: 9.9 MG/DL
CHLORIDE SERPL-SCNC: 101 MMOL/L
CHOLEST/HDLC SERPL: 5.6 {RATIO}
CO2 SERPL-SCNC: 30 MMOL/L
CREAT SERPL-MCNC: 1.1 MG/DL
EST. GFR  (AFRICAN AMERICAN): >60 ML/MIN/1.73 M^2
EST. GFR  (NON AFRICAN AMERICAN): >60 ML/MIN/1.73 M^2
GLUCOSE SERPL-MCNC: 87 MG/DL
HDL/CHOLESTEROL RATIO: 17.9 %
HDLC SERPL-MCNC: 162 MG/DL
HDLC SERPL-MCNC: 29 MG/DL
LDLC SERPL CALC-MCNC: 104.2 MG/DL
NONHDLC SERPL-MCNC: 133 MG/DL
POTASSIUM SERPL-SCNC: 4.8 MMOL/L
PROT SERPL-MCNC: 7.3 G/DL
SODIUM SERPL-SCNC: 138 MMOL/L
TRIGL SERPL-MCNC: 144 MG/DL
URATE SERPL-MCNC: 6.1 MG/DL

## 2017-05-22 PROCEDURE — 84550 ASSAY OF BLOOD/URIC ACID: CPT

## 2017-05-22 PROCEDURE — 80061 LIPID PANEL: CPT

## 2017-05-22 PROCEDURE — 80053 COMPREHEN METABOLIC PANEL: CPT

## 2017-05-22 PROCEDURE — 36415 COLL VENOUS BLD VENIPUNCTURE: CPT | Mod: PO

## 2017-05-22 PROCEDURE — 84270 ASSAY OF SEX HORMONE GLOBUL: CPT

## 2017-05-26 LAB
ALBUMIN SERPL-MCNC: 4.4 G/DL (ref 3.6–5.1)
SHBG SERPL-SCNC: 23 NMOL/L (ref 22–77)
TESTOST FREE SERPL-MCNC: 68.6 PG/ML (ref 46–224)
TESTOST SERPL-MCNC: 393 NG/DL (ref 250–1100)
TESTOSTERONE.FREE+WB SERPL-MCNC: 138.1 NG/DL (ref 110–575)

## 2017-05-30 ENCOUNTER — OFFICE VISIT (OUTPATIENT)
Dept: INTERNAL MEDICINE | Facility: CLINIC | Age: 67
End: 2017-05-30
Payer: MEDICARE

## 2017-05-30 VITALS
WEIGHT: 296.06 LBS | BODY MASS INDEX: 43.85 KG/M2 | HEART RATE: 66 BPM | OXYGEN SATURATION: 99 % | SYSTOLIC BLOOD PRESSURE: 136 MMHG | TEMPERATURE: 98 F | DIASTOLIC BLOOD PRESSURE: 88 MMHG | HEIGHT: 69 IN

## 2017-05-30 DIAGNOSIS — Z87.39 HISTORY OF GOUT: ICD-10-CM

## 2017-05-30 DIAGNOSIS — I10 ESSENTIAL HYPERTENSION: Primary | ICD-10-CM

## 2017-05-30 DIAGNOSIS — Z12.5 SCREENING FOR PROSTATE CANCER: ICD-10-CM

## 2017-05-30 DIAGNOSIS — E78.00 PURE HYPERCHOLESTEROLEMIA: ICD-10-CM

## 2017-05-30 DIAGNOSIS — E29.1 HYPOGONADISM MALE: ICD-10-CM

## 2017-05-30 PROCEDURE — 99213 OFFICE O/P EST LOW 20 MIN: CPT | Mod: S$GLB,,, | Performed by: INTERNAL MEDICINE

## 2017-05-30 PROCEDURE — 99999 PR PBB SHADOW E&M-EST. PATIENT-LVL III: CPT | Mod: PBBFAC,,, | Performed by: INTERNAL MEDICINE

## 2017-05-30 PROCEDURE — 99499 UNLISTED E&M SERVICE: CPT | Mod: S$GLB,,, | Performed by: INTERNAL MEDICINE

## 2017-05-30 RX ORDER — TESTOSTERONE CYPIONATE 200 MG/ML
300 INJECTION, SOLUTION INTRAMUSCULAR
Qty: 10 ML | Refills: 0 | Status: SHIPPED | OUTPATIENT
Start: 2017-05-30 | End: 2017-06-26 | Stop reason: SDUPTHER

## 2017-05-30 RX ORDER — GABAPENTIN 300 MG/1
300 CAPSULE ORAL NIGHTLY
Qty: 180 CAPSULE | Refills: 3 | Status: SHIPPED | OUTPATIENT
Start: 2017-05-30 | End: 2017-10-04 | Stop reason: SDUPTHER

## 2017-05-30 RX ORDER — PRAVASTATIN SODIUM 20 MG/1
20 TABLET ORAL DAILY
Qty: 90 TABLET | Refills: 3 | Status: SHIPPED | OUTPATIENT
Start: 2017-05-30 | End: 2018-03-14 | Stop reason: SDUPTHER

## 2017-05-30 RX ORDER — LISINOPRIL 40 MG/1
40 TABLET ORAL DAILY
Qty: 90 TABLET | Refills: 3 | Status: SHIPPED | OUTPATIENT
Start: 2017-05-30 | End: 2018-05-30 | Stop reason: SDUPTHER

## 2017-05-30 RX ORDER — CARVEDILOL 25 MG/1
25 TABLET ORAL 2 TIMES DAILY WITH MEALS
Qty: 180 TABLET | Refills: 3 | Status: SHIPPED | OUTPATIENT
Start: 2017-05-30 | End: 2018-03-14 | Stop reason: SDUPTHER

## 2017-05-30 RX ORDER — CYCLOBENZAPRINE HCL 10 MG
10 TABLET ORAL 3 TIMES DAILY PRN
Qty: 180 TABLET | Refills: 3 | Status: SHIPPED | OUTPATIENT
Start: 2017-05-30 | End: 2022-01-20

## 2017-05-30 RX ORDER — GABAPENTIN 300 MG/1
300 CAPSULE ORAL NIGHTLY
COMMUNITY
End: 2017-05-30 | Stop reason: SDUPTHER

## 2017-05-30 RX ORDER — AMLODIPINE BESYLATE 5 MG/1
5 TABLET ORAL DAILY
Qty: 90 TABLET | Refills: 3 | Status: SHIPPED | OUTPATIENT
Start: 2017-05-30 | End: 2018-03-14 | Stop reason: SDUPTHER

## 2017-05-30 RX ORDER — SILDENAFIL CITRATE 20 MG/1
TABLET ORAL
Qty: 40 TABLET | Refills: 11 | Status: SHIPPED | OUTPATIENT
Start: 2017-05-30 | End: 2019-01-21 | Stop reason: SDUPTHER

## 2017-05-30 RX ORDER — HYDROCHLOROTHIAZIDE 25 MG/1
25 TABLET ORAL DAILY
Qty: 90 TABLET | Refills: 3 | Status: SHIPPED | OUTPATIENT
Start: 2017-05-30 | End: 2018-05-30 | Stop reason: SDUPTHER

## 2017-05-30 RX ORDER — DICLOFENAC SODIUM 75 MG/1
75 TABLET, DELAYED RELEASE ORAL 2 TIMES DAILY
Qty: 180 TABLET | Refills: 3 | Status: SHIPPED | OUTPATIENT
Start: 2017-05-30 | End: 2018-03-14 | Stop reason: SDUPTHER

## 2017-05-30 RX ORDER — ALLOPURINOL 300 MG/1
300 TABLET ORAL DAILY
Qty: 90 TABLET | Refills: 3 | Status: SHIPPED | OUTPATIENT
Start: 2017-05-30 | End: 2018-05-30 | Stop reason: SDUPTHER

## 2017-05-30 NOTE — PROGRESS NOTES
"HPI:  Patient is 66-year-old man who comes in today for follow-up of his hypertension, lipids.  He's had a couple steroid injections for his lumbar sciatica this done very well.  At this time, he is doing fine and has no other problems or complaints.  His blood pressures been well-controlled.    Current meds have been verified and updated per the EMR  Exam:/88   Pulse 66   Temp 98.4 °F (36.9 °C) (Tympanic)   Ht 5' 9" (1.753 m)   Wt 134.3 kg (296 lb 1.2 oz)   SpO2 99%   BMI 43.72 kg/m²   Exam deferred    Lab Results   Component Value Date    WBC 6.18 12/06/2016    HGB 15.3 12/06/2016    HCT 46.1 12/06/2016     12/06/2016    CHOL 162 05/22/2017    TRIG 144 05/22/2017    HDL 29 (L) 05/22/2017    ALT 48 (H) 05/22/2017    AST 35 05/22/2017     05/22/2017    K 4.8 05/22/2017     05/22/2017    CREATININE 1.1 05/22/2017    BUN 26 (H) 05/22/2017    CO2 30 (H) 05/22/2017    TSH 0.551 12/06/2016    PSA 1.8 12/06/2016    HGBA1C 5.3 05/05/2015    testosterone levels were normal    Impression:  Multiple problems below, stable  Patient Active Problem List   Diagnosis    Essential hypertension    Hyperlipidemia    Hypertrophy of prostate without urinary obstruction and other lower urinary tract symptoms (LUTS)    Obesity, unspecified    Generalized osteoarthrosis, involving multiple sites    Hypogonadism male    History of gout    Lumbar radiculopathy       Plan:  Orders Placed This Encounter    Comprehensive metabolic panel    Lipid panel    TSH    CBC auto differential    PSA, Screening    Uric acid    Testosterone Panel     His medications remain the same.  He'll be seen again in 6 months with above lab work.    "

## 2017-06-08 ENCOUNTER — OFFICE VISIT (OUTPATIENT)
Dept: PAIN MEDICINE | Facility: CLINIC | Age: 67
End: 2017-06-08
Payer: MEDICARE

## 2017-06-08 VITALS
HEIGHT: 69 IN | BODY MASS INDEX: 41.47 KG/M2 | HEART RATE: 69 BPM | DIASTOLIC BLOOD PRESSURE: 94 MMHG | WEIGHT: 280 LBS | SYSTOLIC BLOOD PRESSURE: 133 MMHG

## 2017-06-08 DIAGNOSIS — M47.817 SPONDYLOSIS OF LUMBOSACRAL REGION WITHOUT MYELOPATHY OR RADICULOPATHY: ICD-10-CM

## 2017-06-08 DIAGNOSIS — M54.16 LUMBAR RADICULOPATHY: Primary | ICD-10-CM

## 2017-06-08 DIAGNOSIS — M51.36 DDD (DEGENERATIVE DISC DISEASE), LUMBAR: ICD-10-CM

## 2017-06-08 DIAGNOSIS — M54.16 RIGHT LUMBAR RADICULOPATHY: ICD-10-CM

## 2017-06-08 DIAGNOSIS — M54.16 LEFT LUMBAR RADICULOPATHY: ICD-10-CM

## 2017-06-08 PROCEDURE — 99999 PR PBB SHADOW E&M-EST. PATIENT-LVL III: CPT | Mod: PBBFAC,,, | Performed by: PHYSICIAN ASSISTANT

## 2017-06-08 PROCEDURE — 99214 OFFICE O/P EST MOD 30 MIN: CPT | Mod: S$GLB,,, | Performed by: PHYSICIAN ASSISTANT

## 2017-06-08 NOTE — PROGRESS NOTES
Chief Pain Complaint:  Lower back pain, Buttock Pain (R>L    History of Present Illness:   This patient is a 66 y.o. male who presents today complaining of the above noted pain/s. The patient describes the pain as follows.    - duration of pain: ~ 3 months   - timing: intermittant (only with walking Mostly)   - character: shocking pain light  - radiating, dermatomal: numbness in right foot and toes at times   - antecedent trauma, prior spinal surgery: no prior trauma, no prior spinal surgery   - pertinent negatives: No fever, No chills, No weight loss, No bladder dysfunction, No bowel dysfunction, No extremity weakness, No saddle anesthesia  - pertinent positives: none    - medications, other therapies tried (physical therapy, injections):     >> Tylenol, Norco, flexeril    >> Has NOT previously undergone Physical Therapy    >> Has previously undergone injections: bilateral L4/L5 TF CHRISTINA on 5-17-17      Imaging / Labs / Studies (reviewed on 6/8/2017):    5/02/17 MRI Lumbar Spine W WO Contrast    Narrative CLINICAL HISTORY: Low back pain with radiation to buttocks region.  TECHNIQUE: MR Lumbar spine with contrast. Sagittal T1, T2, STIR. Axial T1, T2. Post contrast Sagittal and Axial T1.  COMPARISON: None available.  FINDINGS:  There is straightening of the lumbar lordosis. There is fatty degenerative Modic type II endplate change at L2-L3 and L3-L4 relating to disc space height loss.  There is also fatty degenerative Modic type II endplate change toward the right at L5-S1 due to disc space height loss. There is an enhancing annular fissure involving the far right lateral L5-S1 disc space.  There is also enhancement of a posterior annular fissure at L3-L4.  This is an expected finding in the setting of annular fissures. The conus medullaris terminates at the level of L1. No abnormal signal within the conus. Intervertebral disc levels are as follows:  T12-L1 disc: No significant disc pathology. No spinal canal or neural  foraminal stenosis.  L1-L2 disc : Minimal circumferential disc bulge.  The thecal sac measures 13 mm AP.  No significant foraminal stenosis.  L2-L3 disc: There is disc desiccation and disc space height loss with fatty degenerative endplate change as well as circumferentially protruding disc material with marginal osteophytes.  The thecal sac measures 12 mm AP.  Disc and osteophytic material encroach into the floors of the exit foramina bilaterally causing mild bilateral foraminal stenosis.  L3-L4 disc: Severe disc space height loss with fatty degenerative endplate change.  There is moderate degenerative facet changes bilaterally.  The thecal sac measures 12 mm AP.  Disc and osteophytic material encroach into the exit foramina, left greater than right.  There is severe left foraminal stenosis could affect the left L3 nerve.  There is mild to moderate right foraminal stenosis.  L4-L5 disc: Partial disc desiccation with a small central disc protrusion.  There is mild degenerative facet hypertrophy bilaterally.  The thecal sac measures 9 mm AP.  There is mild to moderate bilateral foraminal stenosis, right greater than left.  L5-S1 disc: Partial disc desiccation with disc space height loss toward the right where there are marginal osteophytes as well as an annular fissure.  Disc and osteophytic material encroach into the right exit foramen.  There is mild to moderate degenerative facet hypertrophy bilaterally.  There is moderate to severe right foraminal stenosis could affect the right L5 nerve.  Mild left foraminal stenosis.  No significant spinal stenosis.       Review of Systems:  CONSTITUTIONAL: patient denies any fever, chills, or weight loss  SKIN: patient denies any rash or itching  RESPIRATORY: patient denies having any shortness of breath  GASTROINTESTINAL: patient denies having any diarrhea, constipation, or bowel incontinence  GENITOURINARY: patient denies having any abnormal bladder  "function    MUSCULOSKELETAL:  - patient complains of the above noted pain/s (see chief pain complaint)    NEUROLOGICAL:   - pain as above  - strength in Lower extremities is intact, BILATERALLY  - sensation in Lower extremities is abnormal, BILATERALLY  - patient denies any loss of bowel or bladder control      PSYCHIATRIC: patient denies any change in mood    Other:  All other systems reviewed and are negative      Physical Exam:    Vitals:  BP (!) 133/94   Pulse 69   Ht 5' 9" (1.753 m)   Wt 127 kg (280 lb)   BMI 41.35 kg/m²    (reviewed on 6/8/2017)    General: alert and oriented, in no apparent distress  Gait: normal gait  Skin: No rashes, No discoloration, No obvious lesions  HEENT: EOMI  Cardiovascular: no significant peripheral edema present  Respiratory: respirations nonlabored    Musculoskeletal:  - Any pain on flexion, extension, rotation:    >> pain on extension and rotation  - Straight Leg Raise:     >> LEFT :: negative    >> RIGHT :: negative  - Any tenderness to palpation across paraspinal muscles, joints, bursae:     >> across lumbar paraspinals    Neuro:  - Extremity Strength:     >> LEFT :: 5/5    >> RIGHT :: 5/5  - Extremity Reflexes:    >> LEFT  :: 2+    >> RIGHT :: 2+  - Sensory (sensation to light touch):    >> LEFT :: intact    >> RIGHT :: intact     Psych:  Mood and affect is appropriate      Assessment:  Lumbar Radiculopathy  Lumbar Spondylosis      Plan:  Patient returns for follow-up. He complains of bilateral buttock pain and pain into the legs posteriorly. MRI shows multilevel degenerative changes throughout the l-spine, detailed above.   - S/p bilateral L4/L5 TF CHRISTINA on 5-17-17 with excellent relief. He is only reporting 2/10 pain today.   - Continue gabapentin 600mg QHS. He does not need a refill yet.  - Can consider repeating bilateral L4 TF CHRISTINA for returning pain. (Also, left L3 + right L5 TF CHRISTINA was mentioned previously).  RTC PRN. I discussed the risks, benefits, and alternatives " to potential treatment options. All questions and concerns were fully addressed today in clinic. Dr. Swift was consulted regarding the patient plan and agrees.          >> Pain Disability Index:  6/8/2017 :: 18    >>Opioid Risk Tool:  6/8/2017 :: 0

## 2017-06-26 RX ORDER — TESTOSTERONE CYPIONATE 200 MG/ML
300 INJECTION, SOLUTION INTRAMUSCULAR
Qty: 10 ML | Refills: 0 | Status: SHIPPED | OUTPATIENT
Start: 2017-06-26 | End: 2018-01-11 | Stop reason: SDUPTHER

## 2017-09-05 ENCOUNTER — TELEPHONE (OUTPATIENT)
Dept: PAIN MEDICINE | Facility: CLINIC | Age: 67
End: 2017-09-05

## 2017-09-05 NOTE — TELEPHONE ENCOUNTER
----- Message from Silva Patel sent at 9/5/2017  4:42 PM CDT -----  Contact: Annel/wife  Annel called and stated she left a message earlier today about setting up his procedure for his second set of shots. She stated she hasn't heard back as of yet. She can be contacted at [phone].    Thanks,  Silva

## 2017-09-06 ENCOUNTER — OFFICE VISIT (OUTPATIENT)
Dept: PAIN MEDICINE | Facility: CLINIC | Age: 67
End: 2017-09-06
Payer: MEDICARE

## 2017-09-06 VITALS
RESPIRATION RATE: 16 BRPM | WEIGHT: 280 LBS | HEIGHT: 69 IN | BODY MASS INDEX: 41.47 KG/M2 | DIASTOLIC BLOOD PRESSURE: 84 MMHG | SYSTOLIC BLOOD PRESSURE: 126 MMHG

## 2017-09-06 DIAGNOSIS — M47.817 SPONDYLOSIS OF LUMBOSACRAL REGION WITHOUT MYELOPATHY OR RADICULOPATHY: ICD-10-CM

## 2017-09-06 DIAGNOSIS — M51.36 DDD (DEGENERATIVE DISC DISEASE), LUMBAR: ICD-10-CM

## 2017-09-06 DIAGNOSIS — M48.061 LUMBAR FORAMINAL STENOSIS: ICD-10-CM

## 2017-09-06 DIAGNOSIS — M54.16 LUMBAR RADICULOPATHY: Primary | ICD-10-CM

## 2017-09-06 PROCEDURE — 99214 OFFICE O/P EST MOD 30 MIN: CPT | Mod: S$GLB,,, | Performed by: PHYSICIAN ASSISTANT

## 2017-09-06 PROCEDURE — 99999 PR PBB SHADOW E&M-EST. PATIENT-LVL III: CPT | Mod: PBBFAC,,, | Performed by: PHYSICIAN ASSISTANT

## 2017-09-06 PROCEDURE — 3008F BODY MASS INDEX DOCD: CPT | Mod: S$GLB,,, | Performed by: PHYSICIAN ASSISTANT

## 2017-09-06 PROCEDURE — 1159F MED LIST DOCD IN RCRD: CPT | Mod: S$GLB,,, | Performed by: PHYSICIAN ASSISTANT

## 2017-09-06 PROCEDURE — 3079F DIAST BP 80-89 MM HG: CPT | Mod: S$GLB,,, | Performed by: PHYSICIAN ASSISTANT

## 2017-09-06 PROCEDURE — 3074F SYST BP LT 130 MM HG: CPT | Mod: S$GLB,,, | Performed by: PHYSICIAN ASSISTANT

## 2017-09-06 PROCEDURE — 1125F AMNT PAIN NOTED PAIN PRSNT: CPT | Mod: S$GLB,,, | Performed by: PHYSICIAN ASSISTANT

## 2017-09-06 RX ORDER — HYDROCODONE BITARTRATE AND ACETAMINOPHEN 10; 325 MG/1; MG/1
1 TABLET ORAL EVERY 8 HOURS PRN
Qty: 90 TABLET | Refills: 0 | Status: SHIPPED | OUTPATIENT
Start: 2017-09-06 | End: 2019-03-22 | Stop reason: SDUPTHER

## 2017-09-06 NOTE — PROGRESS NOTES
Chief Pain Complaint:  Lower back pain, Buttock Pain (R>L    History of Present Illness:   This patient is a 67 y.o. male who presents today complaining of the above noted pain/s. The patient describes the pain as follows.    - duration of pain:  pain for months   - timing: intermittant (only with walking Mostly)   - character: shocking pain light  - radiating, dermatomal: numbness in right foot and toes at times   - antecedent trauma, prior spinal surgery: no prior trauma, no prior spinal surgery   - pertinent negatives: No fever, No chills, No weight loss, No bladder dysfunction, No bowel dysfunction, No extremity weakness, No saddle anesthesia  - pertinent positives: none    - medications, other therapies tried (physical therapy, injections):     >> Tylenol, Norco, flexeril    >> Has NOT previously undergone Physical Therapy    >> Has previously undergone injections: bilateral L4/L5 TF CHRISTINA on 5-17-17 with great relief      Imaging / Labs / Studies (reviewed on 9/6/2017):    5/02/17 MRI Lumbar Spine W WO Contrast    Narrative CLINICAL HISTORY: Low back pain with radiation to buttocks region.  TECHNIQUE: MR Lumbar spine with contrast. Sagittal T1, T2, STIR. Axial T1, T2. Post contrast Sagittal and Axial T1.  COMPARISON: None available.  FINDINGS:  There is straightening of the lumbar lordosis. There is fatty degenerative Modic type II endplate change at L2-L3 and L3-L4 relating to disc space height loss.  There is also fatty degenerative Modic type II endplate change toward the right at L5-S1 due to disc space height loss. There is an enhancing annular fissure involving the far right lateral L5-S1 disc space.  There is also enhancement of a posterior annular fissure at L3-L4.  This is an expected finding in the setting of annular fissures. The conus medullaris terminates at the level of L1. No abnormal signal within the conus. Intervertebral disc levels are as follows:  T12-L1 disc: No significant disc pathology.  No spinal canal or neural foraminal stenosis.  L1-L2 disc : Minimal circumferential disc bulge.  The thecal sac measures 13 mm AP.  No significant foraminal stenosis.  L2-L3 disc: There is disc desiccation and disc space height loss with fatty degenerative endplate change as well as circumferentially protruding disc material with marginal osteophytes.  The thecal sac measures 12 mm AP.  Disc and osteophytic material encroach into the floors of the exit foramina bilaterally causing mild bilateral foraminal stenosis.  L3-L4 disc: Severe disc space height loss with fatty degenerative endplate change.  There is moderate degenerative facet changes bilaterally.  The thecal sac measures 12 mm AP.  Disc and osteophytic material encroach into the exit foramina, left greater than right.  There is severe left foraminal stenosis could affect the left L3 nerve.  There is mild to moderate right foraminal stenosis.  L4-L5 disc: Partial disc desiccation with a small central disc protrusion.  There is mild degenerative facet hypertrophy bilaterally.  The thecal sac measures 9 mm AP.  There is mild to moderate bilateral foraminal stenosis, right greater than left.  L5-S1 disc: Partial disc desiccation with disc space height loss toward the right where there are marginal osteophytes as well as an annular fissure.  Disc and osteophytic material encroach into the right exit foramen.  There is mild to moderate degenerative facet hypertrophy bilaterally.  There is moderate to severe right foraminal stenosis could affect the right L5 nerve.  Mild left foraminal stenosis.  No significant spinal stenosis.       Review of Systems:  CONSTITUTIONAL: patient denies any fever, chills, or weight loss  SKIN: patient denies any rash or itching  RESPIRATORY: patient denies having any shortness of breath  GASTROINTESTINAL: patient denies having any diarrhea, constipation, or bowel incontinence  GENITOURINARY: patient denies having any abnormal bladder  "function    MUSCULOSKELETAL:  - patient complains of the above noted pain/s (see chief pain complaint)    NEUROLOGICAL:   - pain as above  - strength in Lower extremities is intact, BILATERALLY  - sensation in Lower extremities is abnormal, BILATERALLY  - patient denies any loss of bowel or bladder control      PSYCHIATRIC: patient denies any change in mood    Other:  All other systems reviewed and are negative      Physical Exam:  Vitals:  /84 (BP Location: Right arm, Patient Position: Sitting, BP Method: Large (Automatic))   Resp 16   Ht 5' 9" (1.753 m)   Wt 127 kg (280 lb)   BMI 41.35 kg/m²    (reviewed on 9/6/2017)    General: alert and oriented, in no apparent distress  Gait: normal gait  Skin: No rashes, No discoloration, No obvious lesions  HEENT: EOMI  Cardiovascular: no significant peripheral edema present  Respiratory: respirations nonlabored    Musculoskeletal:  - Any pain on flexion, extension, rotation:    >> pain on extension and rotation  - Straight Leg Raise:     >> LEFT :: negative    >> RIGHT :: negative  - Any tenderness to palpation across paraspinal muscles, joints, bursae:     >> across lumbar paraspinals    Neuro:  - Extremity Strength:     >> LEFT :: 5/5    >> RIGHT :: 5/5  - Extremity Reflexes:    >> LEFT  :: 2+    >> RIGHT :: 2+  - Sensory (sensation to light touch):    >> LEFT :: intact    >> RIGHT :: intact     Psych:  Mood and affect is appropriate      Assessment:  Lumbar Radiculopathy  Lumbar Spondylosis      Plan:  Patient returns for follow-up. He complains of bilateral buttock pain and pain into the legs posteriorly. MRI shows multilevel degenerative changes throughout the l-spine, detailed above. He had a bilateral L4/L5 TF CHRISTINA on 5-17-17 with excellent relief for >3 months.   - Schedule bilateral L4/L5 TF CHRISTINA.   - Continue gabapentin 600mg QD. He had been taking it at night, but it keeps him up. He will try to take it in the morning.  - Will give him a Rx of Norco 10mg " #90 PRN. He was given this Rx in May from Dr. Mason, but he would like to have some before he goes on a hunting trip in November.  - LA  reviewed and appropriate. Only pain medication listed was filled on 5-5-17 from Dr. Mason.  RTC PRN. I discussed the risks, benefits, and alternatives to potential treatment options. All questions and concerns were fully addressed today in clinic. Dr. Swift was consulted regarding the patient plan and agrees.          >> Pain Disability Index:  6/8/2017 :: 18  9/6/2017 :: 58    >>Opioid Risk Tool:  6/8/2017 :: 0

## 2017-09-07 ENCOUNTER — TELEPHONE (OUTPATIENT)
Dept: PAIN MEDICINE | Facility: CLINIC | Age: 67
End: 2017-09-07

## 2017-09-07 NOTE — TELEPHONE ENCOUNTER
----- Message from Jennifer Batista sent at 9/7/2017 11:26 AM CDT -----  Contact: Patient  Patient is returning a call, please call them back at [phone]. Thank you

## 2017-09-07 NOTE — TELEPHONE ENCOUNTER
Spoke with patient who confirmed his injection. I informed him that I would contact him to confirm the day before. He acknowledged.

## 2017-09-19 DIAGNOSIS — M54.16 LUMBAR RADICULOPATHY: Primary | ICD-10-CM

## 2017-09-20 ENCOUNTER — HOSPITAL ENCOUNTER (OUTPATIENT)
Facility: HOSPITAL | Age: 67
Discharge: HOME | End: 2017-09-20
Attending: ANESTHESIOLOGY | Admitting: ANESTHESIOLOGY
Payer: MEDICARE

## 2017-09-20 ENCOUNTER — HOSPITAL ENCOUNTER (OUTPATIENT)
Dept: RADIOLOGY | Facility: HOSPITAL | Age: 67
Discharge: HOME | End: 2017-09-20
Attending: PHYSICIAN ASSISTANT
Payer: MEDICARE

## 2017-09-20 VITALS
HEART RATE: 61 BPM | DIASTOLIC BLOOD PRESSURE: 90 MMHG | RESPIRATION RATE: 16 BRPM | TEMPERATURE: 98 F | OXYGEN SATURATION: 97 % | BODY MASS INDEX: 40.09 KG/M2 | SYSTOLIC BLOOD PRESSURE: 148 MMHG | HEIGHT: 70 IN | WEIGHT: 280 LBS

## 2017-09-20 DIAGNOSIS — M54.16 LUMBAR RADICULOPATHY: ICD-10-CM

## 2017-09-20 DIAGNOSIS — M54.16 LUMBAR RADICULOPATHY: Primary | ICD-10-CM

## 2017-09-20 PROCEDURE — 64483 NJX AA&/STRD TFRM EPI L/S 1: CPT | Mod: 50

## 2017-09-20 PROCEDURE — 64483 NJX AA&/STRD TFRM EPI L/S 1: CPT | Mod: 50,,, | Performed by: ANESTHESIOLOGY

## 2017-09-20 PROCEDURE — 63600175 PHARM REV CODE 636 W HCPCS

## 2017-09-20 PROCEDURE — 25000003 PHARM REV CODE 250: Performed by: ANESTHESIOLOGY

## 2017-09-20 PROCEDURE — 63600175 PHARM REV CODE 636 W HCPCS: Performed by: ANESTHESIOLOGY

## 2017-09-20 PROCEDURE — 25000003 PHARM REV CODE 250

## 2017-09-20 RX ORDER — LIDOCAINE HYDROCHLORIDE 20 MG/ML
INJECTION, SOLUTION INFILTRATION; PERINEURAL
Status: DISCONTINUED | OUTPATIENT
Start: 2017-09-20 | End: 2017-09-20 | Stop reason: HOSPADM

## 2017-09-20 RX ORDER — DEXAMETHASONE SODIUM PHOSPHATE 4 MG/ML
INJECTION, SOLUTION INTRA-ARTICULAR; INTRALESIONAL; INTRAMUSCULAR; INTRAVENOUS; SOFT TISSUE
Status: DISCONTINUED | OUTPATIENT
Start: 2017-09-20 | End: 2017-09-20 | Stop reason: HOSPADM

## 2017-09-20 NOTE — OP NOTE
"Procedure: Lumbar Transforaminal Epidural Steroid Injection under Fluoroscopic Guidance (supraneural approach)    Level: L4/5     Side: Bilateral    PROCEDURE DATE: 9/20/2017    Pre-operative Diagnosis: Lumbar Radiculopathy  Post-operative Diagnosis: Lumbar Radiculopathy    Provider: Rodolfo Swift MD  Assistant(s): None    Anesthesia: Local, No Sedation    >> 0 mg of VERSED    >> 0 mcg of FENTANYL     Indication: Low back pain with radiculopathy consistent with distribution of targeted nerve. Symptoms unresponsive to conservative treatments. Fluoroscopy was used to optimize visualization of needle placement and to maximize safety.     Procedure Description / Technique:  The patient was seen and identified in the preoperative area. Risks, benefits, complications, and alternatives were discussed with the patient. The patient agreed to proceed with the procedure and signed the consent. The site and side of the procedure was identified and marked. An IV was not placed for this procedure. The patient was taken to the procedural suite.    The patient was positioned in prone orientation on procedure table and a pillow was placed under the abdomen to reduce lumbar lordosis. A time out was performed prior to any intervention. The procedure, site, side, and allergies were stated and agreed to by all present. The lumbosacral area was widely prepped with ChloraPrep. The procedural site was draped in usual sterile fashion. Vital signs were closely monitored throughout this procedure. Conscious sedation was not used for this procedure.    The target area was visualized under fluoroscopy. The cephalocaudal angle of the fluoroscope was adjusted as to align the vertebral end plates. The fluoroscopic arm was rotated ipsilaterally to an angle of approximately 30 degrees until the "hay dog" outline came into view and the tip of the inferior superior articular process pointed towards the midline, 6:00 position of the above pedicle. " "A 22 gauge 5 inch spinal needle was directed towards the "chin" of the "hay dog" (adjacent to the pars interarticularis and inferior to the pedicle). The needle was advanced until OS was met at the inferior border of the pedicle / pars interface. The needle was adjusted so that it would pass inferior to the osseous border. The fluoroscope was then placed in the lateral position and the needle was slowly advanced until it rested in the posterior 1/3rd of the vertebral foramen. AP fluoroscopy was checked and the needle tip rested at the 6:00 position under the pedicle. No paresthesia was elicited during needle placement. With the needle tip in its final position, gentle aspiration was negative for blood and CSF. Omnipaque 240 (1 to 2 mL) was injected under live fluoroscopy. Microbore tubing was used for injection. There was no pain or paresthesia on injection. The contrast clearly delineated the targeted nerve root on AP fluoroscopy. No vascular uptake was seen. A solution containing 3 mL of 1% PF Lidocaine and 1.5 mL of Dexamethasone (10 mg/mL) was mixed and 2 mL was injected slowly at each level targeted. There was minimal resistance on injection. No pain or paresthesia was elicited on injection. The stylet was replaced and the needle was withdrawn intact. This procedure was performed for each of the above indicated levels.     Description of Findings: Not applicable    Prosthetic devices, grafts, tissues, or devices implanted: None    Specimen Removed: No    Estimated Blood Loss: minimal    COMPLICATIONS: None    DISPOSITION / PLANS: The patient was transferred to the recovery area in a stable condition for observation. The patient was reexamined prior to discharge. There was no evidence of acute neurologic injury following the procedure.  Patient was discharged from the recovery room after meeting discharge criteria. Home discharge instructions were given to the patient by the staff.    "

## 2017-09-20 NOTE — PLAN OF CARE
Problem: Patient Care Overview  Goal: Plan of Care Review  Outcome: Outcome(s) achieved Date Met: 09/20/17  Patient discharged home in stable condition via wheelchair with ride. Verbalized understanding of discharge instructions. Patient voiced no complaints at this time. Patient stood at side of bed, walked steps with no new motor or sensory deficits. Neurologically intact.

## 2017-09-20 NOTE — DISCHARGE SUMMARY
[facility]  Discharge Note       Description of Procedure: Lumbar Transforaminal Epidural Steroid Injection under Fluoroscopic Guidance    Procedure Date: 9/20/2017    Admit Date: 9/20/2017  Discharge Date: 9/20/2017     Attending Physician: Rodolfo Swift   Discharge Provider: Rodolfo Swift    Preoperative Diagnosis:   Active Hospital Problems    Diagnosis  POA    Lumbar radiculopathy [M54.16]  Yes     Priority: Low      Resolved Hospital Problems    Diagnosis Date Resolved POA   No resolved problems to display.        Postoperative Diagnosis: as above, same as preoperative diagnosis    Discharged Condition: Stable    Hospital Course: Patient was admitted for an outpatient procedure. The procedure was tolerated well with no complications.    Final Diagnoses: Same as principal problem.    Disposition: Home, self-care.    Follow up/Patient Instructions:  Follow-up in clinic in 2-3 weeks.    Medications: No medications were prescribed today. The patient was advised to resume normal medication regimen without change.  Specific information was provided regarding restarting any anticoagulant/s.    Discharge Procedure Orders (must include Diet, Follow-up, Activity):  Light activity for the remainder of the day, resume normal activity tomorrow. Resume normal diet. Follow-up in clinic in 2-3 weeks.

## 2017-09-20 NOTE — H&P (VIEW-ONLY)
Chief Pain Complaint:  Lower back pain, Buttock Pain (R>L    History of Present Illness:   This patient is a 67 y.o. male who presents today complaining of the above noted pain/s. The patient describes the pain as follows.    - duration of pain:  pain for months   - timing: intermittant (only with walking Mostly)   - character: shocking pain light  - radiating, dermatomal: numbness in right foot and toes at times   - antecedent trauma, prior spinal surgery: no prior trauma, no prior spinal surgery   - pertinent negatives: No fever, No chills, No weight loss, No bladder dysfunction, No bowel dysfunction, No extremity weakness, No saddle anesthesia  - pertinent positives: none    - medications, other therapies tried (physical therapy, injections):     >> Tylenol, Norco, flexeril    >> Has NOT previously undergone Physical Therapy    >> Has previously undergone injections: bilateral L4/L5 TF CHRISTINA on 5-17-17 with great relief      Imaging / Labs / Studies (reviewed on 9/6/2017):    5/02/17 MRI Lumbar Spine W WO Contrast    Narrative CLINICAL HISTORY: Low back pain with radiation to buttocks region.  TECHNIQUE: MR Lumbar spine with contrast. Sagittal T1, T2, STIR. Axial T1, T2. Post contrast Sagittal and Axial T1.  COMPARISON: None available.  FINDINGS:  There is straightening of the lumbar lordosis. There is fatty degenerative Modic type II endplate change at L2-L3 and L3-L4 relating to disc space height loss.  There is also fatty degenerative Modic type II endplate change toward the right at L5-S1 due to disc space height loss. There is an enhancing annular fissure involving the far right lateral L5-S1 disc space.  There is also enhancement of a posterior annular fissure at L3-L4.  This is an expected finding in the setting of annular fissures. The conus medullaris terminates at the level of L1. No abnormal signal within the conus. Intervertebral disc levels are as follows:  T12-L1 disc: No significant disc pathology.  No spinal canal or neural foraminal stenosis.  L1-L2 disc : Minimal circumferential disc bulge.  The thecal sac measures 13 mm AP.  No significant foraminal stenosis.  L2-L3 disc: There is disc desiccation and disc space height loss with fatty degenerative endplate change as well as circumferentially protruding disc material with marginal osteophytes.  The thecal sac measures 12 mm AP.  Disc and osteophytic material encroach into the floors of the exit foramina bilaterally causing mild bilateral foraminal stenosis.  L3-L4 disc: Severe disc space height loss with fatty degenerative endplate change.  There is moderate degenerative facet changes bilaterally.  The thecal sac measures 12 mm AP.  Disc and osteophytic material encroach into the exit foramina, left greater than right.  There is severe left foraminal stenosis could affect the left L3 nerve.  There is mild to moderate right foraminal stenosis.  L4-L5 disc: Partial disc desiccation with a small central disc protrusion.  There is mild degenerative facet hypertrophy bilaterally.  The thecal sac measures 9 mm AP.  There is mild to moderate bilateral foraminal stenosis, right greater than left.  L5-S1 disc: Partial disc desiccation with disc space height loss toward the right where there are marginal osteophytes as well as an annular fissure.  Disc and osteophytic material encroach into the right exit foramen.  There is mild to moderate degenerative facet hypertrophy bilaterally.  There is moderate to severe right foraminal stenosis could affect the right L5 nerve.  Mild left foraminal stenosis.  No significant spinal stenosis.       Review of Systems:  CONSTITUTIONAL: patient denies any fever, chills, or weight loss  SKIN: patient denies any rash or itching  RESPIRATORY: patient denies having any shortness of breath  GASTROINTESTINAL: patient denies having any diarrhea, constipation, or bowel incontinence  GENITOURINARY: patient denies having any abnormal bladder  "function    MUSCULOSKELETAL:  - patient complains of the above noted pain/s (see chief pain complaint)    NEUROLOGICAL:   - pain as above  - strength in Lower extremities is intact, BILATERALLY  - sensation in Lower extremities is abnormal, BILATERALLY  - patient denies any loss of bowel or bladder control      PSYCHIATRIC: patient denies any change in mood    Other:  All other systems reviewed and are negative      Physical Exam:  Vitals:  /84 (BP Location: Right arm, Patient Position: Sitting, BP Method: Large (Automatic))   Resp 16   Ht 5' 9" (1.753 m)   Wt 127 kg (280 lb)   BMI 41.35 kg/m²    (reviewed on 9/6/2017)    General: alert and oriented, in no apparent distress  Gait: normal gait  Skin: No rashes, No discoloration, No obvious lesions  HEENT: EOMI  Cardiovascular: no significant peripheral edema present  Respiratory: respirations nonlabored    Musculoskeletal:  - Any pain on flexion, extension, rotation:    >> pain on extension and rotation  - Straight Leg Raise:     >> LEFT :: negative    >> RIGHT :: negative  - Any tenderness to palpation across paraspinal muscles, joints, bursae:     >> across lumbar paraspinals    Neuro:  - Extremity Strength:     >> LEFT :: 5/5    >> RIGHT :: 5/5  - Extremity Reflexes:    >> LEFT  :: 2+    >> RIGHT :: 2+  - Sensory (sensation to light touch):    >> LEFT :: intact    >> RIGHT :: intact     Psych:  Mood and affect is appropriate      Assessment:  Lumbar Radiculopathy  Lumbar Spondylosis      Plan:  Patient returns for follow-up. He complains of bilateral buttock pain and pain into the legs posteriorly. MRI shows multilevel degenerative changes throughout the l-spine, detailed above. He had a bilateral L4/L5 TF CHRISTINA on 5-17-17 with excellent relief for >3 months.   - Schedule bilateral L4/L5 TF CHRISTINA.   - Continue gabapentin 600mg QD. He had been taking it at night, but it keeps him up. He will try to take it in the morning.  - Will give him a Rx of Norco 10mg " #90 PRN. He was given this Rx in May from Dr. Maosn, but he would like to have some before he goes on a hunting trip in November.  - LA  reviewed and appropriate. Only pain medication listed was filled on 5-5-17 from Dr. Mason.  RTC PRN. I discussed the risks, benefits, and alternatives to potential treatment options. All questions and concerns were fully addressed today in clinic. Dr. Swift was consulted regarding the patient plan and agrees.          >> Pain Disability Index:  6/8/2017 :: 18  9/6/2017 :: 58    >>Opioid Risk Tool:  6/8/2017 :: 0

## 2017-09-20 NOTE — INTERVAL H&P NOTE
The patient has been examined and the H&P has been reviewed:    I concur with the findings and no changes have occurred since H&P was written.    Anesthesia/Surgery risks, benefits and alternative options discussed and understood by patient/family.          Active Hospital Problems    Diagnosis  POA    Lumbar radiculopathy [M54.16]  Yes     Priority: Low      Resolved Hospital Problems    Diagnosis Date Resolved POA   No resolved problems to display.

## 2017-10-04 RX ORDER — GABAPENTIN 300 MG/1
CAPSULE ORAL
Qty: 270 CAPSULE | Refills: 3 | Status: SHIPPED | OUTPATIENT
Start: 2017-10-04 | End: 2018-03-21 | Stop reason: SDUPTHER

## 2017-11-20 ENCOUNTER — LAB VISIT (OUTPATIENT)
Dept: LAB | Facility: HOSPITAL | Age: 67
End: 2017-11-20
Attending: INTERNAL MEDICINE
Payer: MEDICARE

## 2017-11-20 DIAGNOSIS — E29.1 HYPOGONADISM MALE: ICD-10-CM

## 2017-11-20 DIAGNOSIS — Z12.5 SCREENING FOR PROSTATE CANCER: ICD-10-CM

## 2017-11-20 DIAGNOSIS — I10 ESSENTIAL HYPERTENSION: ICD-10-CM

## 2017-11-20 DIAGNOSIS — Z87.39 HISTORY OF GOUT: ICD-10-CM

## 2017-11-20 LAB
ALBUMIN SERPL BCP-MCNC: 3.9 G/DL
ALP SERPL-CCNC: 63 U/L
ALT SERPL W/O P-5'-P-CCNC: 63 U/L
ANION GAP SERPL CALC-SCNC: 8 MMOL/L
AST SERPL-CCNC: 49 U/L
BASOPHILS # BLD AUTO: 0.07 K/UL
BASOPHILS NFR BLD: 1 %
BILIRUB SERPL-MCNC: 0.7 MG/DL
BUN SERPL-MCNC: 37 MG/DL
CALCIUM SERPL-MCNC: 9.4 MG/DL
CHLORIDE SERPL-SCNC: 104 MMOL/L
CHOLEST SERPL-MCNC: 167 MG/DL
CHOLEST/HDLC SERPL: 7.6 {RATIO}
CO2 SERPL-SCNC: 25 MMOL/L
COMPLEXED PSA SERPL-MCNC: 2.6 NG/ML
CREAT SERPL-MCNC: 1.4 MG/DL
DIFFERENTIAL METHOD: ABNORMAL
EOSINOPHIL # BLD AUTO: 0.3 K/UL
EOSINOPHIL NFR BLD: 3.6 %
ERYTHROCYTE [DISTWIDTH] IN BLOOD BY AUTOMATED COUNT: 13.8 %
EST. GFR  (AFRICAN AMERICAN): >60 ML/MIN/1.73 M^2
EST. GFR  (NON AFRICAN AMERICAN): 52 ML/MIN/1.73 M^2
GLUCOSE SERPL-MCNC: 90 MG/DL
HCT VFR BLD AUTO: 45.3 %
HDLC SERPL-MCNC: 22 MG/DL
HDLC SERPL: 13.2 %
HGB BLD-MCNC: 15.2 G/DL
IMM GRANULOCYTES # BLD AUTO: 0.02 K/UL
IMM GRANULOCYTES NFR BLD AUTO: 0.3 %
LDLC SERPL CALC-MCNC: 117.2 MG/DL
LYMPHOCYTES # BLD AUTO: 1.6 K/UL
LYMPHOCYTES NFR BLD: 21.9 %
MCH RBC QN AUTO: 31.5 PG
MCHC RBC AUTO-ENTMCNC: 33.6 G/DL
MCV RBC AUTO: 94 FL
MONOCYTES # BLD AUTO: 0.8 K/UL
MONOCYTES NFR BLD: 10.6 %
NEUTROPHILS # BLD AUTO: 4.5 K/UL
NEUTROPHILS NFR BLD: 62.6 %
NONHDLC SERPL-MCNC: 145 MG/DL
NRBC BLD-RTO: 0 /100 WBC
PLATELET # BLD AUTO: 272 K/UL
PMV BLD AUTO: 11.5 FL
POTASSIUM SERPL-SCNC: 4.9 MMOL/L
PROT SERPL-MCNC: 7.3 G/DL
RBC # BLD AUTO: 4.82 M/UL
SODIUM SERPL-SCNC: 137 MMOL/L
TRIGL SERPL-MCNC: 139 MG/DL
TSH SERPL DL<=0.005 MIU/L-ACNC: 0.65 UIU/ML
URATE SERPL-MCNC: 6.8 MG/DL
WBC # BLD AUTO: 7.17 K/UL

## 2017-11-20 PROCEDURE — 36415 COLL VENOUS BLD VENIPUNCTURE: CPT | Mod: PO

## 2017-11-20 PROCEDURE — 84270 ASSAY OF SEX HORMONE GLOBUL: CPT

## 2017-11-20 PROCEDURE — 80053 COMPREHEN METABOLIC PANEL: CPT

## 2017-11-20 PROCEDURE — 80061 LIPID PANEL: CPT

## 2017-11-20 PROCEDURE — 85025 COMPLETE CBC W/AUTO DIFF WBC: CPT

## 2017-11-20 PROCEDURE — 84153 ASSAY OF PSA TOTAL: CPT

## 2017-11-20 PROCEDURE — 84550 ASSAY OF BLOOD/URIC ACID: CPT

## 2017-11-20 PROCEDURE — 84443 ASSAY THYROID STIM HORMONE: CPT

## 2017-11-25 LAB
ALBUMIN SERPL-MCNC: 4.6 G/DL (ref 3.6–5.1)
SHBG SERPL-SCNC: 21 NMOL/L (ref 22–77)
TESTOST FREE SERPL-MCNC: 119.1 PG/ML (ref 46–224)
TESTOST SERPL-MCNC: 604 NG/DL (ref 250–1100)
TESTOSTERONE.FREE+WB SERPL-MCNC: 250 NG/DL (ref 110–575)

## 2017-11-30 ENCOUNTER — OFFICE VISIT (OUTPATIENT)
Dept: INTERNAL MEDICINE | Facility: CLINIC | Age: 67
End: 2017-11-30
Payer: MEDICARE

## 2017-11-30 VITALS
BODY MASS INDEX: 40.05 KG/M2 | TEMPERATURE: 97 F | WEIGHT: 279.75 LBS | HEART RATE: 53 BPM | SYSTOLIC BLOOD PRESSURE: 118 MMHG | HEIGHT: 70 IN | DIASTOLIC BLOOD PRESSURE: 72 MMHG

## 2017-11-30 DIAGNOSIS — E29.1 HYPOGONADISM MALE: ICD-10-CM

## 2017-11-30 DIAGNOSIS — M54.16 LUMBAR RADICULOPATHY: ICD-10-CM

## 2017-11-30 DIAGNOSIS — I10 ESSENTIAL HYPERTENSION: ICD-10-CM

## 2017-11-30 DIAGNOSIS — Z12.5 SCREENING FOR PROSTATE CANCER: ICD-10-CM

## 2017-11-30 DIAGNOSIS — Z00.00 ROUTINE GENERAL MEDICAL EXAMINATION AT A HEALTH CARE FACILITY: Primary | ICD-10-CM

## 2017-11-30 DIAGNOSIS — E78.00 PURE HYPERCHOLESTEROLEMIA: ICD-10-CM

## 2017-11-30 DIAGNOSIS — Z87.39 HISTORY OF GOUT: ICD-10-CM

## 2017-11-30 DIAGNOSIS — N40.0 BENIGN PROSTATIC HYPERPLASIA, UNSPECIFIED WHETHER LOWER URINARY TRACT SYMPTOMS PRESENT: ICD-10-CM

## 2017-11-30 PROCEDURE — 99397 PER PM REEVAL EST PAT 65+ YR: CPT | Mod: 25,S$GLB,, | Performed by: INTERNAL MEDICINE

## 2017-11-30 PROCEDURE — 99499 UNLISTED E&M SERVICE: CPT | Mod: S$GLB,,, | Performed by: INTERNAL MEDICINE

## 2017-11-30 PROCEDURE — 99999 PR PBB SHADOW E&M-EST. PATIENT-LVL III: CPT | Mod: PBBFAC,,, | Performed by: INTERNAL MEDICINE

## 2017-11-30 PROCEDURE — 90732 PPSV23 VACC 2 YRS+ SUBQ/IM: CPT | Mod: S$GLB,,, | Performed by: INTERNAL MEDICINE

## 2017-11-30 PROCEDURE — G0009 ADMIN PNEUMOCOCCAL VACCINE: HCPCS | Mod: S$GLB,,, | Performed by: INTERNAL MEDICINE

## 2017-11-30 NOTE — PROGRESS NOTES
"HPI:  Patient is a 67-year-old man comes in today for follow-up of his hypertension, lipids, hypergonadism, morbid obesity and for his annual physical. Pt continues to have sciatica problems. He has been seen by two different surgeons. Has been recommended he lose a lot of weight prior to having surgery.   He o/w is doing well.     Current MEDS: medcard review, verified and update  Allergies: Per the electronic medical record    Past Medical History:   Diagnosis Date    BPH (benign prostatic hyperplasia)     Generalized osteoarthrosis, involving multiple sites     History of gout     Hyperlipidemia     Hypertension     Hypogonadism male     Morbid obesity with BMI of 40.0-44.9, adult        Past Surgical History:   Procedure Laterality Date    EYE SURGERY      KNEE SCOPE      open globe      right eye 1980    ROTATOR CUFF REPAIR         SHx: per the electronic medical record    FHx: recorded in the electronic medical record    ROS:    denies any chest pains or shortness of breath. Denies any nausea, vomiting or diarrhea. Denies any fever, chills or sweats. Denies any change in weight, voice, stool, skin or hair. Denies any dysuria, dyspepsia or dysphagia. Denies any change in vision, hearing or headaches. Denies any swollen lymph nodes or loss of memory.    PE:  /72   Pulse (!) 53   Temp 97.2 °F (36.2 °C) (Tympanic)   Ht 5' 10" (1.778 m)   Wt 126.9 kg (279 lb 12.2 oz)   BMI 40.14 kg/m²   Gen: Well-developed, well-nourished, male, in no acute distress, oriented x3  HEENT: neck is supple, no adenopathy, carotids 2+ equal without bruits, thyroid exam normal size without nodules.  CHEST: clear to auscultation and percussion  CVS: regular rate and rhythm without significant murmur, gallop, or rubs  ABD: soft, benign, no rebound no guarding, no distention.  Bowel sounds are normal.     nontender.  No palpable masses.  No organomegaly and no audible bruits.  RECTAL: no masses.  Prostate 30  Grams " without nodules.  EXT: no clubbing, cyanosis, or edema  LYMPH: no cervical, inguinal, or axillary adenopathy  FEET: no loss of sensation.  No ulcers or pressure sores.  NEURO: gait normal.  Cranial nerves II- XII intact. No nystagmus.  Speech normal.   Gross motor and sensory unremarkable.    Lab Results   Component Value Date    WBC 7.17 11/20/2017    HGB 15.2 11/20/2017    HCT 45.3 11/20/2017     11/20/2017    CHOL 167 11/20/2017    TRIG 139 11/20/2017    HDL 22 (L) 11/20/2017    ALT 63 (H) 11/20/2017    AST 49 (H) 11/20/2017     11/20/2017    K 4.9 11/20/2017     11/20/2017    CREATININE 1.4 11/20/2017    BUN 37 (H) 11/20/2017    CO2 25 11/20/2017    TSH 0.650 11/20/2017    PSA 2.6 11/20/2017    HGBA1C 5.3 05/05/2015       Impression:  Stable problems below  Patient Active Problem List   Diagnosis    Essential hypertension    Hyperlipidemia    Obesity, unspecified    Generalized osteoarthrosis, involving multiple sites    Hypogonadism male    History of gout    Lumbar radiculopathy    BPH (benign prostatic hyperplasia)       Plan:   Orders Placed This Encounter    (In Office Administered) Pneumococcal Polysaccharide Vaccine (23 Valent) (SQ/IM)    Comprehensive metabolic panel    Lipid panel    TSH    Testosterone Panel    Uric acid     Meds the same. See me in six mths. Given pneumovax 23

## 2017-12-04 NOTE — PROGRESS NOTES
Patient, Enoc Collado (MRN #[id number]), presented with a recorded BMI of 40.14 kg/m^2 consistent with the definition of morbid obesity (ICD-10 E66.01). The patient's morbid obesity was monitored, evaluated, addressed and/or treated. This addendum to the medical record is made on 12/03/2017.

## 2018-01-11 RX ORDER — TESTOSTERONE CYPIONATE 200 MG/ML
300 INJECTION, SOLUTION INTRAMUSCULAR
Qty: 10 ML | Refills: 0 | Status: SHIPPED | OUTPATIENT
Start: 2018-01-11 | End: 2018-03-21 | Stop reason: SDUPTHER

## 2018-03-14 ENCOUNTER — PES CALL (OUTPATIENT)
Dept: ADMINISTRATIVE | Facility: CLINIC | Age: 68
End: 2018-03-14

## 2018-03-14 RX ORDER — PRAVASTATIN SODIUM 20 MG/1
TABLET ORAL
Qty: 90 TABLET | Refills: 3 | Status: SHIPPED | OUTPATIENT
Start: 2018-03-14 | End: 2018-12-19 | Stop reason: SDUPTHER

## 2018-03-14 RX ORDER — CARVEDILOL 25 MG/1
TABLET ORAL
Qty: 180 TABLET | Refills: 3 | Status: SHIPPED | OUTPATIENT
Start: 2018-03-14 | End: 2018-12-19 | Stop reason: SDUPTHER

## 2018-03-14 RX ORDER — DICLOFENAC SODIUM 75 MG/1
TABLET, DELAYED RELEASE ORAL
Qty: 180 TABLET | Refills: 3 | Status: SHIPPED | OUTPATIENT
Start: 2018-03-14 | End: 2018-12-19 | Stop reason: SDUPTHER

## 2018-03-14 RX ORDER — AMLODIPINE BESYLATE 5 MG/1
TABLET ORAL
Qty: 90 TABLET | Refills: 3 | Status: SHIPPED | OUTPATIENT
Start: 2018-03-14 | End: 2018-12-19 | Stop reason: SDUPTHER

## 2018-03-21 ENCOUNTER — OFFICE VISIT (OUTPATIENT)
Dept: INTERNAL MEDICINE | Facility: CLINIC | Age: 68
End: 2018-03-21
Payer: MEDICARE

## 2018-03-21 VITALS
RESPIRATION RATE: 16 BRPM | DIASTOLIC BLOOD PRESSURE: 76 MMHG | BODY MASS INDEX: 38.72 KG/M2 | HEART RATE: 56 BPM | SYSTOLIC BLOOD PRESSURE: 110 MMHG | TEMPERATURE: 98 F | HEIGHT: 69 IN | WEIGHT: 261.44 LBS

## 2018-03-21 DIAGNOSIS — E66.01 SEVERE OBESITY (BMI 35.0-39.9): ICD-10-CM

## 2018-03-21 DIAGNOSIS — E78.00 PURE HYPERCHOLESTEROLEMIA: ICD-10-CM

## 2018-03-21 DIAGNOSIS — N40.0 BENIGN PROSTATIC HYPERPLASIA, UNSPECIFIED WHETHER LOWER URINARY TRACT SYMPTOMS PRESENT: ICD-10-CM

## 2018-03-21 DIAGNOSIS — N18.30 CKD (CHRONIC KIDNEY DISEASE) STAGE 3, GFR 30-59 ML/MIN: ICD-10-CM

## 2018-03-21 DIAGNOSIS — I10 ESSENTIAL HYPERTENSION: ICD-10-CM

## 2018-03-21 DIAGNOSIS — Z87.39 HISTORY OF GOUT: ICD-10-CM

## 2018-03-21 DIAGNOSIS — M54.16 LUMBAR RADICULOPATHY: ICD-10-CM

## 2018-03-21 DIAGNOSIS — Z00.00 ENCOUNTER FOR PREVENTIVE HEALTH EXAMINATION: Primary | ICD-10-CM

## 2018-03-21 DIAGNOSIS — E29.1 HYPOGONADISM MALE: ICD-10-CM

## 2018-03-21 PROCEDURE — 99999 PR PBB SHADOW E&M-EST. PATIENT-LVL IV: CPT | Mod: PBBFAC,,, | Performed by: NURSE PRACTITIONER

## 2018-03-21 PROCEDURE — 99499 UNLISTED E&M SERVICE: CPT | Mod: S$GLB,,, | Performed by: NURSE PRACTITIONER

## 2018-03-21 PROCEDURE — G0439 PPPS, SUBSEQ VISIT: HCPCS | Mod: S$GLB,,, | Performed by: NURSE PRACTITIONER

## 2018-03-21 RX ORDER — TESTOSTERONE CYPIONATE 200 MG/ML
300 INJECTION, SOLUTION INTRAMUSCULAR
Qty: 10 ML | Refills: 1 | Status: SHIPPED | OUTPATIENT
Start: 2018-03-21 | End: 2018-04-02 | Stop reason: SDUPTHER

## 2018-03-21 RX ORDER — GABAPENTIN 300 MG/1
CAPSULE ORAL
Qty: 270 CAPSULE | Refills: 3
Start: 2018-03-21 | End: 2018-11-26 | Stop reason: SDUPTHER

## 2018-03-21 NOTE — PROGRESS NOTES
"Enoc Collado presented for a  Medicare AWV and comprehensive Health Risk Assessment today. The following components were reviewed and updated:    · Medical history  · Family History  · Social history  · Allergies and Current Medications  · Health Risk Assessment  · Health Maintenance  · Care Team     ** See Completed Assessments for Annual Wellness Visit within the encounter summary.**       The following assessments were completed:  · Living Situation  · CAGE  · Depression Screening  · Timed Get Up and Go  · Whisper Test  · Cognitive Function Screening  · Nutrition Screening  · ADL Screening  · PAQ Screening    Vitals:    03/21/18 1023   BP: 110/76   Pulse: (!) 56   Resp: 16   Temp: 98 °F (36.7 °C)   Weight: 118.6 kg (261 lb 7.5 oz)   Height: 5' 9" (1.753 m)   PF: 98 L/min     Body mass index is 38.61 kg/m².  Physical Exam   Constitutional: He is oriented to person, place, and time. He appears well-developed and well-nourished. No distress.   HENT:   Head: Normocephalic and atraumatic.   Nose: Nose normal.   Mouth/Throat: Oropharynx is clear and moist. No oropharyngeal exudate.   Eyes: Conjunctivae are normal.   Neck: Normal range of motion. Neck supple. No JVD present. No tracheal deviation present. No thyromegaly present.   No carotid bruits   Cardiovascular: Normal rate, regular rhythm, normal heart sounds and intact distal pulses.  Exam reveals no gallop and no friction rub.    No murmur heard.  Pulmonary/Chest: Effort normal and breath sounds normal. No respiratory distress. He has no wheezes. He has no rales. He exhibits no tenderness.   Abdominal: Soft. Bowel sounds are normal. He exhibits no distension and no mass. There is no tenderness. There is no rebound and no guarding. No hernia.   No abd bruits   Musculoskeletal: Normal range of motion. He exhibits no edema or tenderness.   Lymphadenopathy:     He has no cervical adenopathy.   Neurological: He is alert and oriented to person, place, and time. No " cranial nerve deficit.   Skin: Skin is warm and dry. He is not diaphoretic.   Psychiatric: He has a normal mood and affect. His behavior is normal.         Diagnoses and health risks identified today and associated recommendations/orders:    1. Encounter for preventive health examination  Screenings performed, as noted above.  Personal preventative testing needs reviewed.     2. Essential hypertension  Monitored/treated on meds, continue the same tx, stable    3. Severe obesity (BMI 35.0-39.9)  Discussed weight loss and exercise, has already lost 40 lbs     4. CKD (chronic kidney disease) stage 3, GFR 30-59 ml/min  Monitored/treated on meds, continue the same tx, stable    5. Lumbar radiculopathy  Monitored/treated on meds, continue the same tx, stable, no pain today, says weight loss has helped    6. Pure hypercholesterolemia  Monitored/treated on meds, continue the same tx, stable    7. Benign prostatic hyperplasia, unspecified whether lower urinary tract symptoms present  Monitored/treated on meds, continue the same tx, stable    8. Hypogonadism male  Monitored/treated on meds, continue the same tx, stable    9. History of gout  Monitored/treated on meds, continue the same tx, stable      Provided Enoc with a 5-10 year written screening schedule and personal prevention plan. Recommendations were developed using the USPSTF age appropriate recommendations. Education, counseling, and referrals were provided as needed. After Visit Summary printed and given to patient which includes a list of additional screenings\tests needed.    No Follow-up on file.    Miguel Angel Phillip NP    I offered to discuss end of life issues, including information on how to make advance directives that the patient could use to name someone who would make medical decisions on their behalf if they became too ill to make themselves.    ___Patient declined  _X_Patient is interested, I provided paper work and offered to discuss.

## 2018-03-21 NOTE — PATIENT INSTRUCTIONS
Counseling and Referral of Other Preventative  (Italic type indicates deductible and co-insurance are waived)    Patient Name: Enoc Collado  Today's Date: 3/21/2018    Health Maintenance       Date Due Completion Date    Influenza Vaccine 08/01/2017 ---    PROSTATE-SPECIFIC ANTIGEN 11/20/2018 11/20/2017    Lipid Panel 11/20/2018 11/20/2017    Colonoscopy 02/05/2020 2/5/2015    Override on 4/27/2009: Done (per [facility] records recommendation every 10yrs)    TETANUS VACCINE 01/13/2027 1/13/2017        No orders of the defined types were placed in this encounter.    The following information is provided to all patients.  This information is to help you find resources for any of the problems found today that may be affecting your health:                Living healthy guide: www.[facility].[place].[facility]      Understanding Diabetes: www.diabetes.org      Eating healthy: www.cdc.gov/healthyweight      [facility] home safety checklist: www.cdc.gov/steadi/patient.html      Agency on Aging: www.goea.[place]      Alcoholics anonymous (AA): www.aa.org      Physical Activity: www.marifer.nih.gov/ur0jcsu      Tobacco use: www.quitwithusla.org       Counseling and Referral of Other Preventative  (Italic type indicates deductible and co-insurance are waived)    Patient Name: Enoc Collado  Today's Date: 3/21/2018    Health Maintenance       Date Due Completion Date    Influenza Vaccine 08/01/2017 ---    PROSTATE-SPECIFIC ANTIGEN 11/20/2018 11/20/2017    Lipid Panel 11/20/2018 11/20/2017    Colonoscopy 02/05/2020 2/5/2015    Override on 4/27/2009: Done (per [facility] records recommendation every 10yrs)    TETANUS VACCINE 01/13/2027 1/13/2017        No orders of the defined types were placed in this encounter.    The following information is provided to all patients.  This information is to help you find resources for any of the problems found today that may be affecting your health:                Living healthy guide: www.[facility].[place]       Understanding Diabetes: www.diabetes.org      Eating healthy: www.cdc.gov/healthyweight      CDC home safety checklist: www.cdc.gov/steadi/patient.html      Agency on Aging: www.goea.[place].[facility]      Alcoholics anonymous (AA): www.aa.org      Physical Activity: www.marifer.nih.gov/yg5gmzd      Tobacco use: www.quitwithusla.org

## 2018-03-21 NOTE — PROGRESS NOTES
"Subjective:      Patient ID: Enoc Collado is a 67 y.o. male.    Chief Complaint: Follow-up (AWV)    HPI:    Past Medical History:   Diagnosis Date    BPH (benign prostatic hyperplasia)     Generalized osteoarthrosis, involving multiple sites     History of gout     Hyperlipidemia     Hypertension     Hypogonadism male     Morbid obesity with BMI of 40.0-44.9, adult        Past Surgical History:   Procedure Laterality Date    EYE SURGERY      JOINT REPLACEMENT      KNEE SCOPE      open globe      right eye 1980    ROTATOR CUFF REPAIR      SHOULDER SURGERY Right        Lab Results   Component Value Date    WBC 7.17 11/20/2017    HGB 15.2 11/20/2017    HCT 45.3 11/20/2017     11/20/2017    CHOL 167 11/20/2017    TRIG 139 11/20/2017    HDL 22 (L) 11/20/2017    ALT 63 (H) 11/20/2017    AST 49 (H) 11/20/2017     11/20/2017    K 4.9 11/20/2017     11/20/2017    CREATININE 1.4 11/20/2017    BUN 37 (H) 11/20/2017    CO2 25 11/20/2017    TSH 0.650 11/20/2017    PSA 2.6 11/20/2017    HGBA1C 5.3 05/05/2015       /76   Pulse (!) 56   Temp 98 °F (36.7 °C)   Resp 16   Ht 5' 9" (1.753 m)   Wt 118.6 kg (261 lb 7.5 oz)   PF 98 L/min   BMI 38.61 kg/m²       Review of Systems   Objective:     Physical Exam  Assessment:      1. Encounter for preventive health examination    2. Essential hypertension    3. Severe obesity (BMI 35.0-39.9)    4. CKD (chronic kidney disease) stage 3, GFR 30-59 ml/min    5. Lumbar radiculopathy    6. Pure hypercholesterolemia    7. Benign prostatic hyperplasia, unspecified whether lower urinary tract symptoms present    8. Hypogonadism male    9. History of gout      Plan:   Encounter for preventive health examination    Essential hypertension    Severe obesity (BMI 35.0-39.9)    CKD (chronic kidney disease) stage 3, GFR 30-59 ml/min    Lumbar radiculopathy    Pure hypercholesterolemia    Benign prostatic hyperplasia, unspecified whether lower urinary tract " "symptoms present    Hypogonadism male    History of gout    Other orders  -     testosterone cypionate (DEPOTESTOTERONE CYPIONATE) 200 mg/mL injection; Inject 1.5 mLs (300 mg total) into the muscle every 21 days.  Dispense: 10 mL; Refill: 1  -     gabapentin (NEURONTIN) 300 MG capsule; Take two in the morning  Dispense: 270 capsule; Refill: 3          Current Outpatient Prescriptions:     allopurinol (ZYLOPRIM) 300 MG tablet, Take 1 tablet (300 mg total) by mouth once daily., Disp: 90 tablet, Rfl: 3    amLODIPine (NORVASC) 5 MG tablet, TAKE 1 TABLET EVERY DAY, Disp: 90 tablet, Rfl: 3    aspirin 325 MG tablet, Take 325 mg by mouth once daily., Disp: , Rfl:     carvedilol (COREG) 25 MG tablet, TAKE 1 TABLET TWICE DAILY WITH MEALS, Disp: 180 tablet, Rfl: 3    cyclobenzaprine (FLEXERIL) 10 MG tablet, Take 1 tablet (10 mg total) by mouth 3 (three) times daily as needed for Muscle spasms., Disp: 180 tablet, Rfl: 3    diclofenac (VOLTAREN) 75 MG EC tablet, TAKE 1 TABLET TWICE DAILY, Disp: 180 tablet, Rfl: 3    gabapentin (NEURONTIN) 300 MG capsule, Take two in the morning, Disp: 270 capsule, Rfl: 3    hydrochlorothiazide (HYDRODIURIL) 25 MG tablet, Take 1 tablet (25 mg total) by mouth once daily., Disp: 90 tablet, Rfl: 3    hydrocodone-acetaminophen 10-325mg (NORCO)  mg Tab, Take 1 tablet by mouth every 8 (eight) hours as needed for Pain., Disp: 90 tablet, Rfl: 0    lisinopril (PRINIVIL,ZESTRIL) 40 MG tablet, Take 1 tablet (40 mg total) by mouth once daily., Disp: 90 tablet, Rfl: 3    pravastatin (PRAVACHOL) 20 MG tablet, TAKE 1 TABLET EVERY DAY, Disp: 90 tablet, Rfl: 3    sildenafil (REVATIO) 20 mg Tab, 2-4 tablets as needed one hour prior to intercourse, Disp: 40 tablet, Rfl: 11    syringe with needle, safety 3 mL 21 gauge x 1 1/2" Syrg, 1 Syringe by Misc.(Non-Drug; Combo Route) route every 21 days., Disp: 10 Syringe, Rfl: 1    testosterone cypionate (DEPOTESTOTERONE CYPIONATE) 200 mg/mL injection, " Inject 1.5 mLs (300 mg total) into the muscle every 21 days., Disp: 10 mL, Rfl: 1

## 2018-04-02 RX ORDER — TESTOSTERONE CYPIONATE 200 MG/ML
300 INJECTION, SOLUTION INTRAMUSCULAR
Qty: 10 ML | Refills: 1 | Status: SHIPPED | OUTPATIENT
Start: 2018-04-02 | End: 2018-09-13 | Stop reason: SDUPTHER

## 2018-05-23 ENCOUNTER — LAB VISIT (OUTPATIENT)
Dept: LAB | Facility: HOSPITAL | Age: 68
End: 2018-05-23
Attending: INTERNAL MEDICINE
Payer: MEDICARE

## 2018-05-23 DIAGNOSIS — I10 ESSENTIAL HYPERTENSION: ICD-10-CM

## 2018-05-23 DIAGNOSIS — E29.1 HYPOGONADISM MALE: ICD-10-CM

## 2018-05-23 DIAGNOSIS — Z87.39 HISTORY OF GOUT: ICD-10-CM

## 2018-05-23 LAB
ALBUMIN SERPL BCP-MCNC: 3.7 G/DL
ALP SERPL-CCNC: 74 U/L
ALT SERPL W/O P-5'-P-CCNC: 31 U/L
ANION GAP SERPL CALC-SCNC: 9 MMOL/L
AST SERPL-CCNC: 20 U/L
BILIRUB SERPL-MCNC: 0.5 MG/DL
BUN SERPL-MCNC: 24 MG/DL
CALCIUM SERPL-MCNC: 9.6 MG/DL
CHLORIDE SERPL-SCNC: 105 MMOL/L
CHOLEST SERPL-MCNC: 138 MG/DL
CHOLEST/HDLC SERPL: 4.2 {RATIO}
CO2 SERPL-SCNC: 22 MMOL/L
CREAT SERPL-MCNC: 1 MG/DL
EST. GFR  (AFRICAN AMERICAN): >60 ML/MIN/1.73 M^2
EST. GFR  (NON AFRICAN AMERICAN): >60 ML/MIN/1.73 M^2
GLUCOSE SERPL-MCNC: 104 MG/DL
HDLC SERPL-MCNC: 33 MG/DL
HDLC SERPL: 23.9 %
LDLC SERPL CALC-MCNC: 93.4 MG/DL
NONHDLC SERPL-MCNC: 105 MG/DL
POTASSIUM SERPL-SCNC: 4.8 MMOL/L
PROT SERPL-MCNC: 6.7 G/DL
SODIUM SERPL-SCNC: 136 MMOL/L
T4 FREE SERPL-MCNC: 0.94 NG/DL
TRIGL SERPL-MCNC: 58 MG/DL
TSH SERPL DL<=0.005 MIU/L-ACNC: 0.28 UIU/ML
URATE SERPL-MCNC: 4.9 MG/DL

## 2018-05-23 PROCEDURE — 84550 ASSAY OF BLOOD/URIC ACID: CPT

## 2018-05-23 PROCEDURE — 80053 COMPREHEN METABOLIC PANEL: CPT

## 2018-05-23 PROCEDURE — 84439 ASSAY OF FREE THYROXINE: CPT

## 2018-05-23 PROCEDURE — 80061 LIPID PANEL: CPT

## 2018-05-23 PROCEDURE — 84443 ASSAY THYROID STIM HORMONE: CPT

## 2018-05-23 PROCEDURE — 82040 ASSAY OF SERUM ALBUMIN: CPT

## 2018-05-27 LAB
ALBUMIN SERPL-MCNC: 4 G/DL (ref 3.6–5.1)
SHBG SERPL-SCNC: 17 NMOL/L (ref 22–77)
TESTOST FREE SERPL-MCNC: 188.4 PG/ML (ref 46–224)
TESTOST SERPL-MCNC: 734 NG/DL (ref 250–1100)
TESTOSTERONE.FREE+WB SERPL-MCNC: 346.4 NG/DL (ref 110–575)

## 2018-05-29 ENCOUNTER — PATIENT OUTREACH (OUTPATIENT)
Dept: ADMINISTRATIVE | Facility: HOSPITAL | Age: 68
End: 2018-05-29

## 2018-05-30 RX ORDER — LISINOPRIL 40 MG/1
TABLET ORAL
Qty: 90 TABLET | Refills: 3 | Status: SHIPPED | OUTPATIENT
Start: 2018-05-30 | End: 2019-03-05 | Stop reason: SDUPTHER

## 2018-05-30 RX ORDER — HYDROCHLOROTHIAZIDE 25 MG/1
TABLET ORAL
Qty: 90 TABLET | Refills: 3 | Status: SHIPPED | OUTPATIENT
Start: 2018-05-30 | End: 2019-03-05 | Stop reason: SDUPTHER

## 2018-05-30 RX ORDER — ALLOPURINOL 300 MG/1
TABLET ORAL
Qty: 90 TABLET | Refills: 3 | Status: SHIPPED | OUTPATIENT
Start: 2018-05-30 | End: 2019-03-05 | Stop reason: SDUPTHER

## 2018-06-05 ENCOUNTER — OFFICE VISIT (OUTPATIENT)
Dept: INTERNAL MEDICINE | Facility: CLINIC | Age: 68
End: 2018-06-05
Payer: MEDICARE

## 2018-06-05 VITALS
OXYGEN SATURATION: 97 % | HEART RATE: 60 BPM | HEIGHT: 68 IN | TEMPERATURE: 99 F | WEIGHT: 248.88 LBS | BODY MASS INDEX: 37.72 KG/M2 | SYSTOLIC BLOOD PRESSURE: 121 MMHG | DIASTOLIC BLOOD PRESSURE: 77 MMHG

## 2018-06-05 DIAGNOSIS — I10 ESSENTIAL HYPERTENSION: ICD-10-CM

## 2018-06-05 DIAGNOSIS — E29.1 HYPOGONADISM MALE: ICD-10-CM

## 2018-06-05 DIAGNOSIS — E78.00 PURE HYPERCHOLESTEROLEMIA: Primary | ICD-10-CM

## 2018-06-05 DIAGNOSIS — Z12.5 PROSTATE CANCER SCREENING: ICD-10-CM

## 2018-06-05 DIAGNOSIS — Z87.39 HISTORY OF GOUT: ICD-10-CM

## 2018-06-05 PROCEDURE — 3078F DIAST BP <80 MM HG: CPT | Mod: CPTII,S$GLB,, | Performed by: INTERNAL MEDICINE

## 2018-06-05 PROCEDURE — 99999 PR PBB SHADOW E&M-EST. PATIENT-LVL III: CPT | Mod: PBBFAC,,, | Performed by: INTERNAL MEDICINE

## 2018-06-05 PROCEDURE — 3074F SYST BP LT 130 MM HG: CPT | Mod: CPTII,S$GLB,, | Performed by: INTERNAL MEDICINE

## 2018-06-05 PROCEDURE — 99499 UNLISTED E&M SERVICE: CPT | Mod: S$PBB,,, | Performed by: INTERNAL MEDICINE

## 2018-06-05 PROCEDURE — 99213 OFFICE O/P EST LOW 20 MIN: CPT | Mod: S$GLB,,, | Performed by: INTERNAL MEDICINE

## 2018-06-05 NOTE — PROGRESS NOTES
"HPI:  Patient is a 67-year-old man who comes today for follow-up of his hypertension lipids.  He has been doing much better.  He has lost about 50 lb of weight.  His back problems had responded well to radiofrequency ablation therapy.    Current meds have been verified and updated per the EMR  Exam:/77 (BP Location: Left arm, Patient Position: Sitting, BP Method: Medium (Automatic))   Pulse 60   Temp 99 °F (37.2 °C) (Tympanic)   Ht 5' 8" (1.727 m)   Wt 112.9 kg (248 lb 14.4 oz)   SpO2 97%   BMI 37.85 kg/m²   Exam deferred    Lab Results   Component Value Date    WBC 7.17 11/20/2017    HGB 15.2 11/20/2017    HCT 45.3 11/20/2017     11/20/2017    CHOL 138 05/23/2018    TRIG 58 05/23/2018    HDL 33 (L) 05/23/2018    ALT 31 05/23/2018    AST 20 05/23/2018     05/23/2018    K 4.8 05/23/2018     05/23/2018    CREATININE 1.0 05/23/2018    BUN 24 (H) 05/23/2018    CO2 22 (L) 05/23/2018    TSH 0.280 (L) 05/23/2018    PSA 2.6 11/20/2017    HGBA1C 5.3 05/05/2015    Testosterone levels were normal      Impression:  Multiple medical problems below test, stable  Patient Active Problem List   Diagnosis    Essential hypertension    Hyperlipidemia    Severe obesity (BMI 35.0-39.9)    Generalized osteoarthrosis, involving multiple sites    Hypogonadism male    History of gout    Lumbar radiculopathy    BPH (benign prostatic hyperplasia)    CKD (chronic kidney disease) stage 3, GFR 30-59 ml/min       Plan:  Orders Placed This Encounter    Comprehensive metabolic panel    Lipid panel    TSH    CBC auto differential    Testosterone Panel    PSA, Screening    he will see me in 6 months with above lab work.  Medications remain the same    "

## 2018-09-13 ENCOUNTER — PATIENT MESSAGE (OUTPATIENT)
Dept: INTERNAL MEDICINE | Facility: CLINIC | Age: 68
End: 2018-09-13

## 2018-09-13 RX ORDER — TESTOSTERONE CYPIONATE 200 MG/ML
300 INJECTION, SOLUTION INTRAMUSCULAR
Qty: 10 ML | Refills: 1 | Status: SHIPPED | OUTPATIENT
Start: 2018-09-13 | End: 2018-12-06 | Stop reason: SDUPTHER

## 2018-11-26 ENCOUNTER — PATIENT MESSAGE (OUTPATIENT)
Dept: CARDIOLOGY | Facility: CLINIC | Age: 68
End: 2018-11-26

## 2018-11-26 RX ORDER — GABAPENTIN 300 MG/1
CAPSULE ORAL
Qty: 270 CAPSULE | Refills: 3 | Status: SHIPPED | OUTPATIENT
Start: 2018-11-26 | End: 2018-11-29 | Stop reason: SDUPTHER

## 2018-11-26 RX ORDER — GABAPENTIN 300 MG/1
CAPSULE ORAL
Qty: 270 CAPSULE | Refills: 3
Start: 2018-11-26 | End: 2018-11-26 | Stop reason: SDUPTHER

## 2018-11-29 ENCOUNTER — LAB VISIT (OUTPATIENT)
Dept: LAB | Facility: HOSPITAL | Age: 68
End: 2018-11-29
Attending: INTERNAL MEDICINE
Payer: MEDICARE

## 2018-11-29 ENCOUNTER — TELEPHONE (OUTPATIENT)
Dept: INTERNAL MEDICINE | Facility: CLINIC | Age: 68
End: 2018-11-29

## 2018-11-29 DIAGNOSIS — I10 ESSENTIAL HYPERTENSION: ICD-10-CM

## 2018-11-29 DIAGNOSIS — Z12.5 PROSTATE CANCER SCREENING: ICD-10-CM

## 2018-11-29 DIAGNOSIS — E29.1 HYPOGONADISM MALE: ICD-10-CM

## 2018-11-29 LAB
ALBUMIN SERPL BCP-MCNC: 4 G/DL
ALP SERPL-CCNC: 64 U/L
ALT SERPL W/O P-5'-P-CCNC: 38 U/L
ANION GAP SERPL CALC-SCNC: 9 MMOL/L
AST SERPL-CCNC: 33 U/L
BASOPHILS # BLD AUTO: 0.08 K/UL
BASOPHILS NFR BLD: 1.1 %
BILIRUB SERPL-MCNC: 1.4 MG/DL
BUN SERPL-MCNC: 33 MG/DL
CALCIUM SERPL-MCNC: 9.4 MG/DL
CHLORIDE SERPL-SCNC: 105 MMOL/L
CHOLEST SERPL-MCNC: 195 MG/DL
CHOLEST/HDLC SERPL: 5.9 {RATIO}
CO2 SERPL-SCNC: 24 MMOL/L
COMPLEXED PSA SERPL-MCNC: 2.1 NG/ML
CREAT SERPL-MCNC: 1 MG/DL
DIFFERENTIAL METHOD: ABNORMAL
EOSINOPHIL # BLD AUTO: 0.3 K/UL
EOSINOPHIL NFR BLD: 3.9 %
ERYTHROCYTE [DISTWIDTH] IN BLOOD BY AUTOMATED COUNT: 13.7 %
EST. GFR  (AFRICAN AMERICAN): >60 ML/MIN/1.73 M^2
EST. GFR  (NON AFRICAN AMERICAN): >60 ML/MIN/1.73 M^2
GLUCOSE SERPL-MCNC: 89 MG/DL
HCT VFR BLD AUTO: 47.1 %
HDLC SERPL-MCNC: 33 MG/DL
HDLC SERPL: 16.9 %
HGB BLD-MCNC: 15.8 G/DL
IMM GRANULOCYTES # BLD AUTO: 0.02 K/UL
IMM GRANULOCYTES NFR BLD AUTO: 0.3 %
LDLC SERPL CALC-MCNC: 136.6 MG/DL
LYMPHOCYTES # BLD AUTO: 1.9 K/UL
LYMPHOCYTES NFR BLD: 25.1 %
MCH RBC QN AUTO: 32 PG
MCHC RBC AUTO-ENTMCNC: 33.5 G/DL
MCV RBC AUTO: 95 FL
MONOCYTES # BLD AUTO: 0.7 K/UL
MONOCYTES NFR BLD: 9.2 %
NEUTROPHILS # BLD AUTO: 4.6 K/UL
NEUTROPHILS NFR BLD: 60.4 %
NONHDLC SERPL-MCNC: 162 MG/DL
NRBC BLD-RTO: 0 /100 WBC
PLATELET # BLD AUTO: 302 K/UL
PMV BLD AUTO: 10.8 FL
POTASSIUM SERPL-SCNC: 4.4 MMOL/L
PROT SERPL-MCNC: 7.4 G/DL
RBC # BLD AUTO: 4.94 M/UL
SODIUM SERPL-SCNC: 138 MMOL/L
TRIGL SERPL-MCNC: 127 MG/DL
TSH SERPL DL<=0.005 MIU/L-ACNC: 0.76 UIU/ML
WBC # BLD AUTO: 7.52 K/UL

## 2018-11-29 PROCEDURE — 84153 ASSAY OF PSA TOTAL: CPT | Mod: HCNC

## 2018-11-29 PROCEDURE — 82040 ASSAY OF SERUM ALBUMIN: CPT | Mod: HCNC,59

## 2018-11-29 PROCEDURE — 84443 ASSAY THYROID STIM HORMONE: CPT | Mod: HCNC

## 2018-11-29 PROCEDURE — 80061 LIPID PANEL: CPT | Mod: HCNC

## 2018-11-29 PROCEDURE — 36415 COLL VENOUS BLD VENIPUNCTURE: CPT | Mod: HCNC,PO

## 2018-11-29 PROCEDURE — 85025 COMPLETE CBC W/AUTO DIFF WBC: CPT | Mod: HCNC

## 2018-11-29 PROCEDURE — 84270 ASSAY OF SEX HORMONE GLOBUL: CPT | Mod: HCNC

## 2018-11-29 PROCEDURE — 80053 COMPREHEN METABOLIC PANEL: CPT | Mod: HCNC

## 2018-11-29 RX ORDER — GABAPENTIN 300 MG/1
CAPSULE ORAL
Qty: 270 CAPSULE | Refills: 3 | Status: SHIPPED | OUTPATIENT
Start: 2018-11-29 | End: 2019-03-18 | Stop reason: SDUPTHER

## 2018-11-29 NOTE — TELEPHONE ENCOUNTER
----- Message from Yajaira Cali sent at 11/29/2018 11:03 AM CST -----  Need gavapenton called in to [facility]  Patient is out completely have not taken any today  Annel/wife [phone]

## 2018-12-05 LAB
ALBUMIN SERPL-MCNC: 4.5 G/DL (ref 3.6–5.1)
SHBG SERPL-SCNC: 25 NMOL/L (ref 22–77)
TESTOST FREE SERPL-MCNC: 329 PG/ML (ref 46–224)
TESTOST SERPL-MCNC: 1445 NG/DL (ref 250–1100)
TESTOSTERONE.FREE+WB SERPL-MCNC: 676.5 NG/DL (ref 110–575)

## 2018-12-06 ENCOUNTER — OFFICE VISIT (OUTPATIENT)
Dept: INTERNAL MEDICINE | Facility: CLINIC | Age: 68
End: 2018-12-06
Payer: MEDICARE

## 2018-12-06 VITALS
RESPIRATION RATE: 20 BRPM | SYSTOLIC BLOOD PRESSURE: 154 MMHG | BODY MASS INDEX: 41.6 KG/M2 | WEIGHT: 274.5 LBS | DIASTOLIC BLOOD PRESSURE: 88 MMHG | HEART RATE: 71 BPM | OXYGEN SATURATION: 95 % | HEIGHT: 68 IN | TEMPERATURE: 99 F

## 2018-12-06 DIAGNOSIS — Z00.00 ROUTINE GENERAL MEDICAL EXAMINATION AT A HEALTH CARE FACILITY: Primary | ICD-10-CM

## 2018-12-06 DIAGNOSIS — M54.16 LUMBAR RADICULOPATHY: ICD-10-CM

## 2018-12-06 DIAGNOSIS — Z87.39 HISTORY OF GOUT: ICD-10-CM

## 2018-12-06 DIAGNOSIS — E29.1 HYPOGONADISM MALE: ICD-10-CM

## 2018-12-06 DIAGNOSIS — E78.00 PURE HYPERCHOLESTEROLEMIA: ICD-10-CM

## 2018-12-06 DIAGNOSIS — M15.9 GENERALIZED OSTEOARTHROSIS, INVOLVING MULTIPLE SITES: ICD-10-CM

## 2018-12-06 DIAGNOSIS — M51.16 LUMBAR DISC DISEASE WITH RADICULOPATHY: ICD-10-CM

## 2018-12-06 DIAGNOSIS — I10 ESSENTIAL HYPERTENSION: ICD-10-CM

## 2018-12-06 DIAGNOSIS — E66.01 MORBID OBESITY WITH BMI OF 40.0-44.9, ADULT: ICD-10-CM

## 2018-12-06 DIAGNOSIS — N40.0 BENIGN PROSTATIC HYPERPLASIA, UNSPECIFIED WHETHER LOWER URINARY TRACT SYMPTOMS PRESENT: ICD-10-CM

## 2018-12-06 PROCEDURE — 3079F DIAST BP 80-89 MM HG: CPT | Mod: CPTII,HCNC,S$GLB, | Performed by: INTERNAL MEDICINE

## 2018-12-06 PROCEDURE — 3077F SYST BP >= 140 MM HG: CPT | Mod: CPTII,HCNC,S$GLB, | Performed by: INTERNAL MEDICINE

## 2018-12-06 PROCEDURE — 99397 PER PM REEVAL EST PAT 65+ YR: CPT | Mod: HCNC,S$GLB,, | Performed by: INTERNAL MEDICINE

## 2018-12-06 PROCEDURE — 99999 PR PBB SHADOW E&M-EST. PATIENT-LVL III: CPT | Mod: PBBFAC,HCNC,, | Performed by: INTERNAL MEDICINE

## 2018-12-06 RX ORDER — TESTOSTERONE CYPIONATE 200 MG/ML
200 INJECTION, SOLUTION INTRAMUSCULAR
Qty: 10 ML | Refills: 1 | Status: SHIPPED | OUTPATIENT
Start: 2018-12-06 | End: 2019-06-13 | Stop reason: SDUPTHER

## 2018-12-06 NOTE — PROGRESS NOTES
"HPI:  Patient is a 68-year-old man who comes today for follow-up of his hypertension, lipids, hypogonadism, and for his annual physical.  He has been doing fairly well.  He has had very minimal back problems since having radiofrequency ablation procedures done about 6-8 months ago.  He does not monitor his blood pressure at home.    Current MEDS: medcard review, verified and update  Allergies: Per the electronic medical record    Past Medical History:   Diagnosis Date    BPH (benign prostatic hyperplasia)     Generalized osteoarthrosis, involving multiple sites     History of gout     Hyperlipidemia     Hypertension     Hypogonadism male     Lumbar disc disease with radiculopathy     Morbid obesity with BMI of 40.0-44.9, adult        Past Surgical History:   Procedure Laterality Date    Colonoscopy N/A 2/5/2015    Performed by Johan Mckeon [facility] at [facility] ENDO    EYE SURGERY      JOINT REPLACEMENT      KNEE SCOPE      open globe      right eye 1980    ROTATOR CUFF REPAIR      SHOULDER SURGERY Right        SHx: per the electronic medical record    FHx: recorded in the electronic medical record    ROS:    denies any chest pains or shortness of breath. Denies any nausea, vomiting or diarrhea. Denies any fever, chills or sweats. Denies any change in weight, voice, stool, skin or hair. Denies any dysuria, dyspepsia or dysphagia. Denies any change in vision, hearing or headaches. Denies any swollen lymph nodes or loss of memory.    PE:  BP (!) 154/88 (BP Location: Left arm, Patient Position: Sitting, BP Method: Large (Manual))   Pulse 71   Temp 98.7 °F (37.1 °C) (Tympanic)   Resp 20   Ht 5' 8" (1.727 m)   Wt 124.5 kg (274 lb 7.6 oz)   SpO2 95%   BMI 41.73 kg/m²   Gen: Well-developed, well-nourished, male, in no acute distress, oriented x3  HEENT: neck is supple, no adenopathy, carotids 2+ equal without bruits, thyroid exam normal size without nodules.  CHEST: clear to auscultation and " percussion  CVS: regular rate and rhythm without significant murmur, gallop, or rubs  ABD: soft, benign, no rebound no guarding, no distention.  Bowel sounds are normal.     nontender.  No palpable masses.  No organomegaly and no audible bruits.  RECTAL: no masses.  Prostate 30  Grams without nodules.  EXT: no clubbing, cyanosis, or edema  LYMPH: no cervical, inguinal, or axillary adenopathy  FEET: no loss of sensation.  No ulcers or pressure sores.  NEURO: gait normal.  Cranial nerves II- XII intact. No nystagmus.  Speech normal.   Gross motor and sensory unremarkable.    Lab Results   Component Value Date    WBC 7.52 11/29/2018    HGB 15.8 11/29/2018    HCT 47.1 11/29/2018     11/29/2018    CHOL 195 11/29/2018    TRIG 127 11/29/2018    HDL 33 (L) 11/29/2018    ALT 38 11/29/2018    AST 33 11/29/2018     11/29/2018    K 4.4 11/29/2018     11/29/2018    CREATININE 1.0 11/29/2018    BUN 33 (H) 11/29/2018    CO2 24 11/29/2018    TSH 0.763 11/29/2018    PSA 2.1 11/29/2018    HGBA1C 5.3 05/05/2015    Total testosterone was 1445    Impression:  Hypogonadism, on too much testosterone replacement therapy  Hypertension, most likely elevated due to his excessive testosterone levels.  Other medical problems below, stable  Patient Active Problem List   Diagnosis    Essential hypertension    Hyperlipidemia    Generalized osteoarthrosis, involving multiple sites    Hypogonadism male    History of gout    Lumbar radiculopathy    BPH (benign prostatic hyperplasia)       Plan:   Orders Placed This Encounter    Testosterone Panel    testosterone cypionate (DEPOTESTOTERONE CYPIONATE) 200 mg/mL injection    He will decrease the testosterone to 200 mg every 3 weeks.  He will be seen again in 3 months with a testosterone lab work.  He will begin to check his blood pressures weekly.

## 2018-12-19 RX ORDER — PRAVASTATIN SODIUM 20 MG/1
TABLET ORAL
Qty: 90 TABLET | Refills: 3 | Status: SHIPPED | OUTPATIENT
Start: 2018-12-19 | End: 2019-10-03 | Stop reason: SDUPTHER

## 2018-12-19 RX ORDER — DICLOFENAC SODIUM 75 MG/1
TABLET, DELAYED RELEASE ORAL
Qty: 180 TABLET | Refills: 3 | Status: SHIPPED | OUTPATIENT
Start: 2018-12-19 | End: 2019-06-13

## 2018-12-19 RX ORDER — AMLODIPINE BESYLATE 5 MG/1
TABLET ORAL
Qty: 90 TABLET | Refills: 3 | Status: SHIPPED | OUTPATIENT
Start: 2018-12-19 | End: 2019-10-03 | Stop reason: SDUPTHER

## 2018-12-19 RX ORDER — CARVEDILOL 25 MG/1
TABLET ORAL
Qty: 180 TABLET | Refills: 3 | Status: SHIPPED | OUTPATIENT
Start: 2018-12-19 | End: 2019-10-03 | Stop reason: SDUPTHER

## 2019-01-04 ENCOUNTER — TELEPHONE (OUTPATIENT)
Dept: INTERNAL MEDICINE | Facility: CLINIC | Age: 69
End: 2019-01-04

## 2019-01-04 NOTE — TELEPHONE ENCOUNTER
The only testing needs is the testosterone blood work.  The order is in there from December the 6th.  Please book that lab

## 2019-01-04 NOTE — TELEPHONE ENCOUNTER
----- Message from Katty Romeo sent at 1/4/2019 10:47 AM CST -----  pls input physical bloodwork order & lynn for 5/30...[phone] ([place])

## 2019-01-21 RX ORDER — SILDENAFIL CITRATE 20 MG/1
TABLET ORAL
Qty: 40 TABLET | Refills: 11 | Status: SHIPPED | OUTPATIENT
Start: 2019-01-21 | End: 2020-07-06 | Stop reason: SDUPTHER

## 2019-03-05 RX ORDER — HYDROCHLOROTHIAZIDE 25 MG/1
TABLET ORAL
Qty: 90 TABLET | Refills: 3 | Status: SHIPPED | OUTPATIENT
Start: 2019-03-05 | End: 2019-12-18 | Stop reason: SDUPTHER

## 2019-03-05 RX ORDER — LISINOPRIL 40 MG/1
TABLET ORAL
Qty: 90 TABLET | Refills: 3 | Status: SHIPPED | OUTPATIENT
Start: 2019-03-05 | End: 2019-12-18 | Stop reason: SDUPTHER

## 2019-03-05 RX ORDER — ALLOPURINOL 300 MG/1
TABLET ORAL
Qty: 90 TABLET | Refills: 3 | Status: SHIPPED | OUTPATIENT
Start: 2019-03-05 | End: 2019-12-18 | Stop reason: SDUPTHER

## 2019-03-15 ENCOUNTER — APPOINTMENT (OUTPATIENT)
Dept: LAB | Facility: HOSPITAL | Age: 69
End: 2019-03-15
Attending: INTERNAL MEDICINE
Payer: MEDICARE

## 2019-03-18 RX ORDER — GABAPENTIN 300 MG/1
CAPSULE ORAL
Qty: 270 CAPSULE | Refills: 3 | Status: SHIPPED | OUTPATIENT
Start: 2019-03-18 | End: 2019-03-18 | Stop reason: SDUPTHER

## 2019-03-18 RX ORDER — GABAPENTIN 300 MG/1
CAPSULE ORAL
Qty: 270 CAPSULE | Refills: 3 | Status: SHIPPED | OUTPATIENT
Start: 2019-03-18 | End: 2019-03-25 | Stop reason: SDUPTHER

## 2019-03-22 ENCOUNTER — OFFICE VISIT (OUTPATIENT)
Dept: INTERNAL MEDICINE | Facility: CLINIC | Age: 69
End: 2019-03-22
Payer: MEDICARE

## 2019-03-22 VITALS
HEIGHT: 69 IN | WEIGHT: 280 LBS | OXYGEN SATURATION: 97 % | HEART RATE: 83 BPM | BODY MASS INDEX: 41.47 KG/M2 | RESPIRATION RATE: 16 BRPM | TEMPERATURE: 98 F

## 2019-03-22 VITALS
HEIGHT: 69 IN | WEIGHT: 281.75 LBS | OXYGEN SATURATION: 97 % | HEART RATE: 83 BPM | SYSTOLIC BLOOD PRESSURE: 120 MMHG | DIASTOLIC BLOOD PRESSURE: 78 MMHG | TEMPERATURE: 98 F | BODY MASS INDEX: 41.73 KG/M2 | RESPIRATION RATE: 16 BRPM

## 2019-03-22 DIAGNOSIS — E78.00 PURE HYPERCHOLESTEROLEMIA: ICD-10-CM

## 2019-03-22 DIAGNOSIS — Z87.39 HISTORY OF GOUT: ICD-10-CM

## 2019-03-22 DIAGNOSIS — M51.16 LUMBAR DISC DISEASE WITH RADICULOPATHY: ICD-10-CM

## 2019-03-22 DIAGNOSIS — M15.9 GENERALIZED OSTEOARTHROSIS, INVOLVING MULTIPLE SITES: ICD-10-CM

## 2019-03-22 DIAGNOSIS — E66.01 MORBID OBESITY WITH BMI OF 40.0-44.9, ADULT: ICD-10-CM

## 2019-03-22 DIAGNOSIS — E29.1 HYPOGONADISM MALE: ICD-10-CM

## 2019-03-22 DIAGNOSIS — Z13.6 SCREENING FOR AAA (ABDOMINAL AORTIC ANEURYSM): ICD-10-CM

## 2019-03-22 DIAGNOSIS — I10 ESSENTIAL HYPERTENSION: ICD-10-CM

## 2019-03-22 DIAGNOSIS — I10 ESSENTIAL HYPERTENSION: Primary | ICD-10-CM

## 2019-03-22 DIAGNOSIS — N40.0 BENIGN PROSTATIC HYPERPLASIA, UNSPECIFIED WHETHER LOWER URINARY TRACT SYMPTOMS PRESENT: ICD-10-CM

## 2019-03-22 DIAGNOSIS — Z00.00 ENCOUNTER FOR PREVENTIVE HEALTH EXAMINATION: Primary | ICD-10-CM

## 2019-03-22 PROCEDURE — 99213 OFFICE O/P EST LOW 20 MIN: CPT | Mod: HCNC,S$GLB,, | Performed by: INTERNAL MEDICINE

## 2019-03-22 PROCEDURE — 3078F DIAST BP <80 MM HG: CPT | Mod: HCNC,CPTII,S$GLB, | Performed by: INTERNAL MEDICINE

## 2019-03-22 PROCEDURE — 99999 PR PBB SHADOW E&M-EST. PATIENT-LVL III: ICD-10-PCS | Mod: PBBFAC,HCNC,, | Performed by: NURSE PRACTITIONER

## 2019-03-22 PROCEDURE — 3078F PR MOST RECENT DIASTOLIC BLOOD PRESSURE < 80 MM HG: ICD-10-PCS | Mod: HCNC,CPTII,S$GLB, | Performed by: NURSE PRACTITIONER

## 2019-03-22 PROCEDURE — 99999 PR PBB SHADOW E&M-EST. PATIENT-LVL III: CPT | Mod: PBBFAC,HCNC,, | Performed by: NURSE PRACTITIONER

## 2019-03-22 PROCEDURE — 3074F SYST BP LT 130 MM HG: CPT | Mod: HCNC,CPTII,S$GLB, | Performed by: INTERNAL MEDICINE

## 2019-03-22 PROCEDURE — 99213 PR OFFICE/OUTPT VISIT, EST, LEVL III, 20-29 MIN: ICD-10-PCS | Mod: HCNC,S$GLB,, | Performed by: INTERNAL MEDICINE

## 2019-03-22 PROCEDURE — 3074F PR MOST RECENT SYSTOLIC BLOOD PRESSURE < 130 MM HG: ICD-10-PCS | Mod: HCNC,CPTII,S$GLB, | Performed by: INTERNAL MEDICINE

## 2019-03-22 PROCEDURE — 99999 PR PBB SHADOW E&M-EST. PATIENT-LVL III: ICD-10-PCS | Mod: PBBFAC,HCNC,, | Performed by: INTERNAL MEDICINE

## 2019-03-22 PROCEDURE — 99999 PR PBB SHADOW E&M-EST. PATIENT-LVL III: CPT | Mod: PBBFAC,HCNC,, | Performed by: INTERNAL MEDICINE

## 2019-03-22 PROCEDURE — G0439 PPPS, SUBSEQ VISIT: HCPCS | Mod: HCNC,S$GLB,, | Performed by: NURSE PRACTITIONER

## 2019-03-22 PROCEDURE — 1101F PR PT FALLS ASSESS DOC 0-1 FALLS W/OUT INJ PAST YR: ICD-10-PCS | Mod: HCNC,CPTII,S$GLB, | Performed by: INTERNAL MEDICINE

## 2019-03-22 PROCEDURE — 3078F DIAST BP <80 MM HG: CPT | Mod: HCNC,CPTII,S$GLB, | Performed by: NURSE PRACTITIONER

## 2019-03-22 PROCEDURE — G0439 PR MEDICARE ANNUAL WELLNESS SUBSEQUENT VISIT: ICD-10-PCS | Mod: HCNC,S$GLB,, | Performed by: NURSE PRACTITIONER

## 2019-03-22 PROCEDURE — 1101F PT FALLS ASSESS-DOCD LE1/YR: CPT | Mod: HCNC,CPTII,S$GLB, | Performed by: INTERNAL MEDICINE

## 2019-03-22 PROCEDURE — 3074F PR MOST RECENT SYSTOLIC BLOOD PRESSURE < 130 MM HG: ICD-10-PCS | Mod: HCNC,CPTII,S$GLB, | Performed by: NURSE PRACTITIONER

## 2019-03-22 PROCEDURE — 3078F PR MOST RECENT DIASTOLIC BLOOD PRESSURE < 80 MM HG: ICD-10-PCS | Mod: HCNC,CPTII,S$GLB, | Performed by: INTERNAL MEDICINE

## 2019-03-22 PROCEDURE — 3074F SYST BP LT 130 MM HG: CPT | Mod: HCNC,CPTII,S$GLB, | Performed by: NURSE PRACTITIONER

## 2019-03-22 RX ORDER — HYDROCODONE BITARTRATE AND ACETAMINOPHEN 10; 325 MG/1; MG/1
1 TABLET ORAL EVERY 8 HOURS PRN
Qty: 90 TABLET | Refills: 0 | Status: SHIPPED | OUTPATIENT
Start: 2019-03-22 | End: 2019-12-31 | Stop reason: SDUPTHER

## 2019-03-22 NOTE — PATIENT INSTRUCTIONS
Counseling and Referral of Other Preventative  (Italic type indicates deductible and co-insurance are waived)    Patient Name: Enoc Collado  Today's Date: 3/22/2019    Health Maintenance       Date Due Completion Date    Abdominal Aortic Aneurysm Screening 07/05/2016 ---    Influenza Vaccine 05/10/2019 (Originally 8/1/2018) ---    PROSTATE-SPECIFIC ANTIGEN 11/29/2019 11/29/2018    Lipid Panel 11/29/2019 11/29/2018    Colonoscopy 02/05/2020 2/5/2015    Override on 4/27/2009: Done (per [facility] records recommendation every 10yrs)    TETANUS VACCINE 01/13/2027 1/13/2017        Orders Placed This Encounter   Procedures    US Abdominal Aorta    Hypertension Digital Medicine (HDMP) Enrollment Order     The following information is provided to all patients.  This information is to help you find resources for any of the problems found today that may be affecting your health:                Living healthy guide: www.[facility].[place].gov      Understanding Diabetes: www.diabetes.org      Eating healthy: www.cdc.gov/healthyweight      CDC home safety checklist: www.cdc.gov/steadi/patient.html      Agency on Aging: www.goea.[place].[facility]      Alcoholics anonymous (AA): www.aa.org      Physical Activity: www.marifer.nih.gov/et3xxuo      Tobacco use: www.quitwithusla.org

## 2019-03-22 NOTE — PROGRESS NOTES
"Enoc Collado presented for a  Medicare AWV and comprehensive Health Risk Assessment today. The following components were reviewed and updated:    · Medical history  · Family History  · Social history  · Allergies and Current Medications  · Health Risk Assessment  · Health Maintenance  · Care Team     ** See Completed Assessments for Annual Wellness Visit within the encounter summary.**       The following assessments were completed:  · Living Situation  · CAGE  · Depression Screening  · Timed Get Up and Go  · Whisper Test  · Cognitive Function Screening  · Nutrition Screening  · ADL Screening  · PAQ Screening    Vitals:    03/22/19 0842   BP: 120/78   Pulse: 83   Resp: 16   Temp: 98.3 °F (36.8 °C)   SpO2: 97%   Weight: 127.8 kg (281 lb 12 oz)   Height: 5' 8.5" (1.74 m)     Body mass index is 42.21 kg/m².  Physical Exam   Constitutional: He is oriented to person, place, and time. He appears well-developed and well-nourished. No distress.   HENT:   Head: Normocephalic and atraumatic.   Mouth/Throat: Oropharynx is clear and moist. No oropharyngeal exudate.   Bilateral TMs normal pearly gray   Eyes: Conjunctivae are normal.   Neck: Normal range of motion. Neck supple. No JVD present. No tracheal deviation present. No thyromegaly present.   No carotid bruits   Cardiovascular: Normal rate, regular rhythm, normal heart sounds and intact distal pulses. Exam reveals no gallop and no friction rub.   No murmur heard.  Pulmonary/Chest: Effort normal and breath sounds normal. No respiratory distress. He has no wheezes. He has no rales. He exhibits no tenderness.   Abdominal: Soft. Bowel sounds are normal. He exhibits no distension and no mass. There is no tenderness. There is no rebound and no guarding.   No abd bruits   Musculoskeletal: Normal range of motion. He exhibits no edema or tenderness.   Lymphadenopathy:     He has no cervical adenopathy.   Neurological: He is alert and oriented to person, place, and time. No cranial " nerve deficit.   Skin: Skin is warm and dry. He is not diaphoretic.   Psychiatric: He has a normal mood and affect. His behavior is normal.         Diagnoses and health risks identified today and associated recommendations/orders:    1. Screening for AAA (abdominal aortic aneurysm)  - US Abdominal Aorta; Future    2. Encounter for preventive health examination  Screenings performed, as noted above.  Personal preventative testing needs reviewed.     3. Essential hypertension  Monitored/treated on meds, continue the same tx, stable    4. Pure hypercholesterolemia  Monitored/treated on meds, continue the same tx, stable    5. Benign prostatic hyperplasia, unspecified whether lower urinary tract symptoms present  Monitored/treated on meds, continue the same tx, stable    6. Hypogonadism male  Monitored/treated on meds, continue the same tx, stable    7. Morbid obesity with BMI of 40.0-44.9, adult  Monitored/treated on meds, continue the same tx, stable, discussed diet and exercise    8. Generalized osteoarthrosis, involving multiple sites  Monitored/treated on meds, continue the same tx, stable    9. History of gout  Monitored/treated on meds, continue the same tx, stable    10. Lumbar disc disease with radiculopathy  Monitored/treated on meds, continue the same tx, stable      Provided Enoc with a 5-10 year written screening schedule and personal prevention plan. Recommendations were developed using the USPSTF age appropriate recommendations. Education, counseling, and referrals were provided as needed. After Visit Summary printed and given to patient which includes a list of additional screenings\tests needed.    No Follow-up on file.    Miguel Angel Phillip NP

## 2019-03-22 NOTE — PROGRESS NOTES
"HPI:  Patient is a 68-year-old man who comes today for follow-up of his testosterone levels.  We decreased the dosage due to his levels being excessively high 3 months ago.  He is doing well.  He has no complaints.    Current meds have been verified and updated per the EMR  Exam:Pulse 83   Temp 98.3 °F (36.8 °C)   Resp 16   Ht 5' 8.5" (1.74 m)   Wt 127 kg (279 lb 15.8 oz)   SpO2 97%   BMI 41.95 kg/m²   Exam deferred    Lab Results   Component Value Date    WBC 7.52 11/29/2018    HGB 15.8 11/29/2018    HCT 47.1 11/29/2018     11/29/2018    CHOL 195 11/29/2018    TRIG 127 11/29/2018    HDL 33 (L) 11/29/2018    ALT 38 11/29/2018    AST 33 11/29/2018     11/29/2018    K 4.4 11/29/2018     11/29/2018    CREATININE 1.0 11/29/2018    BUN 33 (H) 11/29/2018    CO2 24 11/29/2018    TSH 0.763 11/29/2018    PSA 2.1 11/29/2018    HGBA1C 5.3 05/05/2015    Repeat testosterone level was 355, markedly improved    Impression:  Hypogonadism, on adequate dosage  Patient Active Problem List   Diagnosis    Essential hypertension    Hyperlipidemia    Generalized osteoarthrosis, involving multiple sites    Hypogonadism male    History of gout    BPH (benign prostatic hyperplasia)    Lumbar disc disease with radiculopathy    Morbid obesity with BMI of 40.0-44.9, adult       Plan:  Orders Placed This Encounter    US Abdominal Aorta    Lipid panel    Comprehensive metabolic panel    TSH    CBC auto differential    Testosterone Panel    HYDROcodone-acetaminophen (NORCO)  mg per tablet    He will see me in 3 months with above lab work.  He has also will have an ultrasound done for screening of abdominal aortic aneurysm.      This note is generated with speech recognition software and is subject to transcription error and sound alike phrases that may be missed by proofreading.    "

## 2019-03-25 RX ORDER — GABAPENTIN 300 MG/1
CAPSULE ORAL
Qty: 270 CAPSULE | Refills: 3 | Status: SHIPPED | OUTPATIENT
Start: 2019-03-25 | End: 2019-12-31 | Stop reason: SDUPTHER

## 2019-05-23 ENCOUNTER — PATIENT OUTREACH (OUTPATIENT)
Dept: ADMINISTRATIVE | Facility: HOSPITAL | Age: 69
End: 2019-05-23

## 2019-05-30 ENCOUNTER — HOSPITAL ENCOUNTER (OUTPATIENT)
Dept: RADIOLOGY | Facility: HOSPITAL | Age: 69
Discharge: HOME | End: 2019-05-30
Attending: INTERNAL MEDICINE
Payer: MEDICARE

## 2019-05-30 DIAGNOSIS — Z13.6 SCREENING FOR AAA (ABDOMINAL AORTIC ANEURYSM): ICD-10-CM

## 2019-05-30 PROCEDURE — 76775 US EXAM ABDO BACK WALL LIM: CPT | Mod: TC,HCNC

## 2019-05-30 PROCEDURE — 76775 US EXAM ABDO BACK WALL LIM: CPT | Mod: 26,HCNC,, | Performed by: RADIOLOGY

## 2019-05-30 PROCEDURE — 76775 US ABDOMINAL AORTA: ICD-10-PCS | Mod: 26,HCNC,, | Performed by: RADIOLOGY

## 2019-06-13 ENCOUNTER — OFFICE VISIT (OUTPATIENT)
Dept: INTERNAL MEDICINE | Facility: CLINIC | Age: 69
End: 2019-06-13
Payer: MEDICARE

## 2019-06-13 VITALS
HEART RATE: 60 BPM | WEIGHT: 281.06 LBS | TEMPERATURE: 99 F | DIASTOLIC BLOOD PRESSURE: 74 MMHG | SYSTOLIC BLOOD PRESSURE: 122 MMHG | BODY MASS INDEX: 41.63 KG/M2 | HEIGHT: 69 IN | OXYGEN SATURATION: 97 %

## 2019-06-13 DIAGNOSIS — E29.1 HYPOGONADISM MALE: ICD-10-CM

## 2019-06-13 DIAGNOSIS — I10 ESSENTIAL HYPERTENSION: Primary | ICD-10-CM

## 2019-06-13 DIAGNOSIS — Z87.39 HISTORY OF GOUT: ICD-10-CM

## 2019-06-13 DIAGNOSIS — E78.00 PURE HYPERCHOLESTEROLEMIA: ICD-10-CM

## 2019-06-13 DIAGNOSIS — E66.01 MORBID OBESITY WITH BMI OF 40.0-44.9, ADULT: ICD-10-CM

## 2019-06-13 DIAGNOSIS — N18.30 CKD (CHRONIC KIDNEY DISEASE), STAGE III: ICD-10-CM

## 2019-06-13 PROCEDURE — 3074F SYST BP LT 130 MM HG: CPT | Mod: HCNC,CPTII,S$GLB, | Performed by: INTERNAL MEDICINE

## 2019-06-13 PROCEDURE — 3078F PR MOST RECENT DIASTOLIC BLOOD PRESSURE < 80 MM HG: ICD-10-PCS | Mod: HCNC,CPTII,S$GLB, | Performed by: INTERNAL MEDICINE

## 2019-06-13 PROCEDURE — 99999 PR PBB SHADOW E&M-EST. PATIENT-LVL III: ICD-10-PCS | Mod: PBBFAC,HCNC,, | Performed by: INTERNAL MEDICINE

## 2019-06-13 PROCEDURE — 99499 UNLISTED E&M SERVICE: CPT | Mod: HCNC,S$GLB,, | Performed by: INTERNAL MEDICINE

## 2019-06-13 PROCEDURE — 3074F PR MOST RECENT SYSTOLIC BLOOD PRESSURE < 130 MM HG: ICD-10-PCS | Mod: HCNC,CPTII,S$GLB, | Performed by: INTERNAL MEDICINE

## 2019-06-13 PROCEDURE — 1101F PR PT FALLS ASSESS DOC 0-1 FALLS W/OUT INJ PAST YR: ICD-10-PCS | Mod: HCNC,CPTII,S$GLB, | Performed by: INTERNAL MEDICINE

## 2019-06-13 PROCEDURE — 3078F DIAST BP <80 MM HG: CPT | Mod: HCNC,CPTII,S$GLB, | Performed by: INTERNAL MEDICINE

## 2019-06-13 PROCEDURE — 99213 PR OFFICE/OUTPT VISIT, EST, LEVL III, 20-29 MIN: ICD-10-PCS | Mod: HCNC,S$GLB,, | Performed by: INTERNAL MEDICINE

## 2019-06-13 PROCEDURE — 1101F PT FALLS ASSESS-DOCD LE1/YR: CPT | Mod: HCNC,CPTII,S$GLB, | Performed by: INTERNAL MEDICINE

## 2019-06-13 PROCEDURE — 99999 PR PBB SHADOW E&M-EST. PATIENT-LVL III: CPT | Mod: PBBFAC,HCNC,, | Performed by: INTERNAL MEDICINE

## 2019-06-13 PROCEDURE — 99213 OFFICE O/P EST LOW 20 MIN: CPT | Mod: HCNC,S$GLB,, | Performed by: INTERNAL MEDICINE

## 2019-06-13 PROCEDURE — 99499 RISK ADDL DX/OHS AUDIT: ICD-10-PCS | Mod: HCNC,S$GLB,, | Performed by: INTERNAL MEDICINE

## 2019-06-13 RX ORDER — TESTOSTERONE CYPIONATE 200 MG/ML
300 INJECTION, SOLUTION INTRAMUSCULAR
Qty: 10 ML | Refills: 1 | Status: SHIPPED | OUTPATIENT
Start: 2019-06-13 | End: 2019-09-19

## 2019-06-13 NOTE — PROGRESS NOTES
"HPI:  Patient is a 68-year-old man who comes today for follow-up of his hypogonadism, hypertension, and lipids.  He is doing fairly well.  He has no complaints at this time    Current meds have been verified and updated per the EMR  Exam:/74 (BP Location: Right arm, Patient Position: Sitting, BP Method: Large (Manual))   Pulse 60   Temp 98.7 °F (37.1 °C) (Tympanic)   Ht 5' 8.5" (1.74 m)   Wt 127.5 kg (281 lb 1.4 oz)   SpO2 97%   BMI 42.12 kg/m²   Exam deferred    Lab Results   Component Value Date    WBC 6.43 05/30/2019    HGB 14.9 05/30/2019    HCT 46.6 05/30/2019     05/30/2019    CHOL 172 05/30/2019    TRIG 165 (H) 05/30/2019    HDL 26 (L) 05/30/2019    ALT 47 (H) 05/30/2019    AST 39 05/30/2019     05/30/2019    K 4.7 05/30/2019     05/30/2019    CREATININE 1.4 05/30/2019    BUN 49 (H) 05/30/2019    CO2 20 (L) 05/30/2019    TSH 0.401 05/30/2019    PSA 2.1 11/29/2018    HGBA1C 5.3 05/05/2015       Impression:  Hypogonadism, slightly under dosed  Chronic kidney disease, stage III, he needs to stop the anti-inflammatories  Other medical problems below, stable  Patient Active Problem List   Diagnosis    Essential hypertension    Hyperlipidemia    Generalized osteoarthrosis, involving multiple sites    Hypogonadism male    History of gout    BPH (benign prostatic hyperplasia)    Lumbar disc disease with radiculopathy    Morbid obesity with BMI of 40.0-44.9, adult    CKD (chronic kidney disease), stage III       Plan:  Orders Placed This Encounter    Basic metabolic panel    Testosterone Panel    CBC auto differential    testosterone cypionate (DEPOTESTOTERONE CYPIONATE) 200 mg/mL injection    syringe with needle, safety 3 mL 21 gauge x 1 1/2" Syrg     He will stop diclofenac and use Tylenol for his arthritis.  He will increase the Depo testosterone to 0.5 mL every 3 weeks.  He will be seen again in 3 months.    This note is generated with speech recognition software and is " subject to transcription error and sound alike phrases that may be missed by proofreading.

## 2019-07-01 ENCOUNTER — OFFICE VISIT (OUTPATIENT)
Dept: INTERNAL MEDICINE | Facility: CLINIC | Age: 69
End: 2019-07-01
Payer: MEDICARE

## 2019-07-01 VITALS
BODY MASS INDEX: 42.45 KG/M2 | OXYGEN SATURATION: 96 % | HEART RATE: 77 BPM | WEIGHT: 286.63 LBS | HEIGHT: 69 IN | SYSTOLIC BLOOD PRESSURE: 112 MMHG | DIASTOLIC BLOOD PRESSURE: 60 MMHG | TEMPERATURE: 99 F

## 2019-07-01 DIAGNOSIS — M17.12 PRIMARY OSTEOARTHRITIS OF ONE KNEE, LEFT: ICD-10-CM

## 2019-07-01 DIAGNOSIS — M19.012 PRIMARY OSTEOARTHRITIS, LEFT SHOULDER: Primary | ICD-10-CM

## 2019-07-01 PROCEDURE — 99213 PR OFFICE/OUTPT VISIT, EST, LEVL III, 20-29 MIN: ICD-10-PCS | Mod: 25,HCNC,S$GLB, | Performed by: INTERNAL MEDICINE

## 2019-07-01 PROCEDURE — 3074F PR MOST RECENT SYSTOLIC BLOOD PRESSURE < 130 MM HG: ICD-10-PCS | Mod: HCNC,CPTII,S$GLB, | Performed by: INTERNAL MEDICINE

## 2019-07-01 PROCEDURE — 99999 PR PBB SHADOW E&M-EST. PATIENT-LVL III: CPT | Mod: PBBFAC,HCNC,, | Performed by: INTERNAL MEDICINE

## 2019-07-01 PROCEDURE — 99999 PR PBB SHADOW E&M-EST. PATIENT-LVL III: ICD-10-PCS | Mod: PBBFAC,HCNC,, | Performed by: INTERNAL MEDICINE

## 2019-07-01 PROCEDURE — 3078F PR MOST RECENT DIASTOLIC BLOOD PRESSURE < 80 MM HG: ICD-10-PCS | Mod: HCNC,CPTII,S$GLB, | Performed by: INTERNAL MEDICINE

## 2019-07-01 PROCEDURE — 3078F DIAST BP <80 MM HG: CPT | Mod: HCNC,CPTII,S$GLB, | Performed by: INTERNAL MEDICINE

## 2019-07-01 PROCEDURE — 1101F PT FALLS ASSESS-DOCD LE1/YR: CPT | Mod: HCNC,CPTII,S$GLB, | Performed by: INTERNAL MEDICINE

## 2019-07-01 PROCEDURE — 20610 DRAIN/INJ JOINT/BURSA W/O US: CPT | Mod: HCNC,LT,S$GLB, | Performed by: INTERNAL MEDICINE

## 2019-07-01 PROCEDURE — 1101F PR PT FALLS ASSESS DOC 0-1 FALLS W/OUT INJ PAST YR: ICD-10-PCS | Mod: HCNC,CPTII,S$GLB, | Performed by: INTERNAL MEDICINE

## 2019-07-01 PROCEDURE — 3074F SYST BP LT 130 MM HG: CPT | Mod: HCNC,CPTII,S$GLB, | Performed by: INTERNAL MEDICINE

## 2019-07-01 PROCEDURE — 20610 PR DRAIN/INJECT LARGE JOINT/BURSA: ICD-10-PCS | Mod: HCNC,LT,S$GLB, | Performed by: INTERNAL MEDICINE

## 2019-07-01 PROCEDURE — 99213 OFFICE O/P EST LOW 20 MIN: CPT | Mod: 25,HCNC,S$GLB, | Performed by: INTERNAL MEDICINE

## 2019-07-01 RX ORDER — METHYLPREDNISOLONE ACETATE 80 MG/ML
80 INJECTION, SUSPENSION INTRA-ARTICULAR; INTRALESIONAL; INTRAMUSCULAR; SOFT TISSUE ONCE
Status: COMPLETED | OUTPATIENT
Start: 2019-07-01 | End: 2019-07-01

## 2019-07-01 RX ADMIN — METHYLPREDNISOLONE ACETATE 80 MG: 80 INJECTION, SUSPENSION INTRA-ARTICULAR; INTRALESIONAL; INTRAMUSCULAR; SOFT TISSUE at 03:07

## 2019-07-01 NOTE — PROGRESS NOTES
"HPI:  Patient is a 68-year-old man who comes in today for problems with his arthritis.  He had to be taken off of the diclofenac due to problems with his kidney function.  Since then he has had significant increased problems with pain in his left knee and left shoulder.  He does not like taking hydrocodone.  He is amenable to taking shot of steroid and see how he responds    Current meds have been verified and updated per the EMR  Exam:/60   Pulse 77   Temp 98.6 °F (37 °C) (Tympanic)   Ht 5' 9" (1.753 m)   Wt 130 kg (286 lb 9.6 oz)   SpO2 96%   BMI 42.32 kg/m²   Left shoulder has significant loss of range of motion due to osteoarthritis.  Left knee with pain on range of motion as well.  No signs of infection.  No signs of inflammation        Impression:  Osteoarthritis of multiple joints  Patient Active Problem List   Diagnosis    Essential hypertension    Hyperlipidemia    Generalized osteoarthrosis, involving multiple sites    Hypogonadism male    History of gout    BPH (benign prostatic hyperplasia)    Lumbar disc disease with radiculopathy    Morbid obesity with BMI of 40.0-44.9, adult    CKD (chronic kidney disease), stage III       Plan:  Orders Placed This Encounter    methylPREDNISolone acetate injection 80 mg    methylPREDNISolone acetate injection 80 mg    Both the left shoulder in the left knee were injected with 1 cc of Depo-Medrol 80 and 2 cc of Sensorcaine.      This note is generated with speech recognition software and is subject to transcription error and sound alike phrases that may be missed by proofreading.    "

## 2019-09-13 ENCOUNTER — LAB VISIT (OUTPATIENT)
Dept: LAB | Facility: HOSPITAL | Age: 69
End: 2019-09-13
Attending: INTERNAL MEDICINE
Payer: MEDICARE

## 2019-09-13 DIAGNOSIS — E29.1 HYPOGONADISM MALE: ICD-10-CM

## 2019-09-13 DIAGNOSIS — I10 ESSENTIAL HYPERTENSION: ICD-10-CM

## 2019-09-13 LAB
ANION GAP SERPL CALC-SCNC: 9 MMOL/L (ref 8–16)
BASOPHILS # BLD AUTO: 0.07 K/UL (ref 0–0.2)
BASOPHILS NFR BLD: 1 % (ref 0–1.9)
BUN SERPL-MCNC: 33 MG/DL (ref 8–23)
CALCIUM SERPL-MCNC: 9.7 MG/DL (ref 8.7–10.5)
CHLORIDE SERPL-SCNC: 103 MMOL/L (ref 95–110)
CO2 SERPL-SCNC: 27 MMOL/L (ref 23–29)
CREAT SERPL-MCNC: 1.3 MG/DL (ref 0.5–1.4)
DIFFERENTIAL METHOD: ABNORMAL
EOSINOPHIL # BLD AUTO: 0.1 K/UL (ref 0–0.5)
EOSINOPHIL NFR BLD: 1.2 % (ref 0–8)
ERYTHROCYTE [DISTWIDTH] IN BLOOD BY AUTOMATED COUNT: 13.9 % (ref 11.5–14.5)
EST. GFR  (AFRICAN AMERICAN): >60 ML/MIN/1.73 M^2
EST. GFR  (NON AFRICAN AMERICAN): 56.1 ML/MIN/1.73 M^2
GLUCOSE SERPL-MCNC: 104 MG/DL (ref 70–110)
HCT VFR BLD AUTO: 52.5 % (ref 40–54)
HGB BLD-MCNC: 16.6 G/DL (ref 14–18)
IMM GRANULOCYTES # BLD AUTO: 0.04 K/UL (ref 0–0.04)
IMM GRANULOCYTES NFR BLD AUTO: 0.5 % (ref 0–0.5)
LYMPHOCYTES # BLD AUTO: 2 K/UL (ref 1–4.8)
LYMPHOCYTES NFR BLD: 26.9 % (ref 18–48)
MCH RBC QN AUTO: 31.6 PG (ref 27–31)
MCHC RBC AUTO-ENTMCNC: 31.6 G/DL (ref 32–36)
MCV RBC AUTO: 100 FL (ref 82–98)
MONOCYTES # BLD AUTO: 0.7 K/UL (ref 0.3–1)
MONOCYTES NFR BLD: 9.8 % (ref 4–15)
NEUTROPHILS # BLD AUTO: 4.5 K/UL (ref 1.8–7.7)
NEUTROPHILS NFR BLD: 60.6 % (ref 38–73)
NRBC BLD-RTO: 0 /100 WBC
PLATELET # BLD AUTO: 280 K/UL (ref 150–350)
PMV BLD AUTO: 10.9 FL (ref 9.2–12.9)
POTASSIUM SERPL-SCNC: 5.1 MMOL/L (ref 3.5–5.1)
RBC # BLD AUTO: 5.25 M/UL (ref 4.6–6.2)
SODIUM SERPL-SCNC: 139 MMOL/L (ref 136–145)
WBC # BLD AUTO: 7.36 K/UL (ref 3.9–12.7)

## 2019-09-13 PROCEDURE — 36415 COLL VENOUS BLD VENIPUNCTURE: CPT | Mod: HCNC,PO

## 2019-09-13 PROCEDURE — 85025 COMPLETE CBC W/AUTO DIFF WBC: CPT | Mod: HCNC

## 2019-09-13 PROCEDURE — 80048 BASIC METABOLIC PNL TOTAL CA: CPT | Mod: HCNC

## 2019-09-13 PROCEDURE — 82040 ASSAY OF SERUM ALBUMIN: CPT | Mod: HCNC

## 2019-09-18 ENCOUNTER — OFFICE VISIT (OUTPATIENT)
Dept: INTERNAL MEDICINE | Facility: CLINIC | Age: 69
End: 2019-09-18
Payer: MEDICARE

## 2019-09-18 VITALS
SYSTOLIC BLOOD PRESSURE: 118 MMHG | DIASTOLIC BLOOD PRESSURE: 78 MMHG | TEMPERATURE: 98 F | BODY MASS INDEX: 41.76 KG/M2 | HEIGHT: 69 IN | HEART RATE: 64 BPM | WEIGHT: 281.94 LBS | RESPIRATION RATE: 19 BRPM | OXYGEN SATURATION: 95 %

## 2019-09-18 DIAGNOSIS — E29.1 HYPOGONADISM MALE: ICD-10-CM

## 2019-09-18 DIAGNOSIS — Z00.00 ROUTINE GENERAL MEDICAL EXAMINATION AT A HEALTH CARE FACILITY: Primary | ICD-10-CM

## 2019-09-18 DIAGNOSIS — N18.30 CKD (CHRONIC KIDNEY DISEASE), STAGE III: ICD-10-CM

## 2019-09-18 DIAGNOSIS — Z12.5 PROSTATE CANCER SCREENING: ICD-10-CM

## 2019-09-18 DIAGNOSIS — E78.00 PURE HYPERCHOLESTEROLEMIA: ICD-10-CM

## 2019-09-18 DIAGNOSIS — N40.0 BENIGN PROSTATIC HYPERPLASIA, UNSPECIFIED WHETHER LOWER URINARY TRACT SYMPTOMS PRESENT: ICD-10-CM

## 2019-09-18 DIAGNOSIS — I10 ESSENTIAL HYPERTENSION: ICD-10-CM

## 2019-09-18 DIAGNOSIS — E66.01 MORBID OBESITY WITH BMI OF 40.0-44.9, ADULT: ICD-10-CM

## 2019-09-18 DIAGNOSIS — Z87.39 HISTORY OF GOUT: ICD-10-CM

## 2019-09-18 LAB
ALBUMIN SERPL-MCNC: 4.4 G/DL (ref 3.6–5.1)
SHBG SERPL-SCNC: 27 NMOL/L (ref 22–77)
TESTOST FREE SERPL-MCNC: 36.6 PG/ML (ref 46–224)
TESTOST SERPL-MCNC: 249 NG/DL (ref 250–1100)
TESTOSTERONE.FREE+WB SERPL-MCNC: 73.7 NG/DL (ref 110–575)

## 2019-09-18 PROCEDURE — 3078F PR MOST RECENT DIASTOLIC BLOOD PRESSURE < 80 MM HG: ICD-10-PCS | Mod: HCNC,CPTII,S$GLB, | Performed by: INTERNAL MEDICINE

## 2019-09-18 PROCEDURE — 3078F DIAST BP <80 MM HG: CPT | Mod: HCNC,CPTII,S$GLB, | Performed by: INTERNAL MEDICINE

## 2019-09-18 PROCEDURE — 1101F PR PT FALLS ASSESS DOC 0-1 FALLS W/OUT INJ PAST YR: ICD-10-PCS | Mod: HCNC,CPTII,S$GLB, | Performed by: INTERNAL MEDICINE

## 2019-09-18 PROCEDURE — 3074F SYST BP LT 130 MM HG: CPT | Mod: HCNC,CPTII,S$GLB, | Performed by: INTERNAL MEDICINE

## 2019-09-18 PROCEDURE — 99213 PR OFFICE/OUTPT VISIT, EST, LEVL III, 20-29 MIN: ICD-10-PCS | Mod: HCNC,S$GLB,, | Performed by: INTERNAL MEDICINE

## 2019-09-18 PROCEDURE — 99999 PR PBB SHADOW E&M-EST. PATIENT-LVL III: CPT | Mod: PBBFAC,HCNC,, | Performed by: INTERNAL MEDICINE

## 2019-09-18 PROCEDURE — 99213 OFFICE O/P EST LOW 20 MIN: CPT | Mod: HCNC,S$GLB,, | Performed by: INTERNAL MEDICINE

## 2019-09-18 PROCEDURE — 3074F PR MOST RECENT SYSTOLIC BLOOD PRESSURE < 130 MM HG: ICD-10-PCS | Mod: HCNC,CPTII,S$GLB, | Performed by: INTERNAL MEDICINE

## 2019-09-18 PROCEDURE — 1101F PT FALLS ASSESS-DOCD LE1/YR: CPT | Mod: HCNC,CPTII,S$GLB, | Performed by: INTERNAL MEDICINE

## 2019-09-18 PROCEDURE — 99999 PR PBB SHADOW E&M-EST. PATIENT-LVL III: ICD-10-PCS | Mod: PBBFAC,HCNC,, | Performed by: INTERNAL MEDICINE

## 2019-09-18 NOTE — PROGRESS NOTES
"HPI:  Patient is a 69-year-old man who comes today for recheck of his chronic kidney disease.  He has been off the anti-inflammatories now for 3-4 months.  His creatinine has improved.  He is now 1.3.  He states the steroid injections in his joints did help.  Unfortunately, he still has a lot of back issue problems which is unrelated to his shoulder and knee.    Current meds have been verified and updated per the EMR  Exam:/78 (BP Location: Right arm, Patient Position: Sitting, BP Method: Large (Automatic))   Pulse 64   Temp 98.2 °F (36.8 °C) (Tympanic)   Resp 19   Ht 5' 9" (1.753 m)   Wt 127.9 kg (281 lb 15.5 oz)   SpO2 95%   BMI 41.64 kg/m²   Carotids 2+ equal without bruits  Chest clear  Cardiovascular regular rate and rhythm without murmur gallop or rub    Lab Results   Component Value Date    WBC 7.36 09/13/2019    HGB 16.6 09/13/2019    HCT 52.5 09/13/2019     09/13/2019    CHOL 172 05/30/2019    TRIG 165 (H) 05/30/2019    HDL 26 (L) 05/30/2019    ALT 47 (H) 05/30/2019    AST 39 05/30/2019     09/13/2019    K 5.1 09/13/2019     09/13/2019    CREATININE 1.3 09/13/2019    BUN 33 (H) 09/13/2019    CO2 27 09/13/2019    TSH 0.401 05/30/2019    PSA 2.1 11/29/2018    HGBA1C 5.3 05/05/2015       Impression:  Multiple medical problems below, stable  Patient Active Problem List   Diagnosis    Essential hypertension    Hyperlipidemia    Generalized osteoarthrosis, involving multiple sites    Hypogonadism male    History of gout    BPH (benign prostatic hyperplasia)    Lumbar disc disease with radiculopathy    Morbid obesity with BMI of 40.0-44.9, adult    CKD (chronic kidney disease), stage III       Plan:  Orders Placed This Encounter    Basic metabolic panel    CBC auto differential    Testosterone Panel    TSH    PSA, Screening    Lipid panel     He will have above lab work and see me in December.  His medications remain the same    This note is generated with speech " recognition software and is subject to transcription error and sound alike phrases that may be missed by proofreading.

## 2019-09-19 ENCOUNTER — PATIENT MESSAGE (OUTPATIENT)
Dept: INTERNAL MEDICINE | Facility: CLINIC | Age: 69
End: 2019-09-19

## 2019-09-19 RX ORDER — TESTOSTERONE CYPIONATE 200 MG/ML
300 INJECTION, SOLUTION INTRAMUSCULAR
Qty: 10 ML | Refills: 1 | Status: SHIPPED | OUTPATIENT
Start: 2019-09-19 | End: 2019-12-31 | Stop reason: SDUPTHER

## 2019-10-03 RX ORDER — PRAVASTATIN SODIUM 20 MG/1
20 TABLET ORAL DAILY
Qty: 90 TABLET | Refills: 3 | Status: SHIPPED | OUTPATIENT
Start: 2019-10-03 | End: 2019-12-31 | Stop reason: SDUPTHER

## 2019-10-03 RX ORDER — AMLODIPINE BESYLATE 5 MG/1
5 TABLET ORAL DAILY
Qty: 90 TABLET | Refills: 3 | Status: SHIPPED | OUTPATIENT
Start: 2019-10-03 | End: 2019-12-31 | Stop reason: SDUPTHER

## 2019-10-03 RX ORDER — CARVEDILOL 25 MG/1
25 TABLET ORAL 2 TIMES DAILY WITH MEALS
Qty: 180 TABLET | Refills: 3 | Status: SHIPPED | OUTPATIENT
Start: 2019-10-03 | End: 2019-12-31 | Stop reason: SDUPTHER

## 2019-10-03 NOTE — TELEPHONE ENCOUNTER
Last visit 9-18-19  Last refill Prevastatin 12-19-18  Last refill Carvedilol 12-19-18  Last refill amlodiopine 12-19-18

## 2019-12-12 ENCOUNTER — LAB VISIT (OUTPATIENT)
Dept: LAB | Facility: HOSPITAL | Age: 69
End: 2019-12-12
Attending: INTERNAL MEDICINE
Payer: MEDICARE

## 2019-12-12 DIAGNOSIS — Z12.5 PROSTATE CANCER SCREENING: ICD-10-CM

## 2019-12-12 DIAGNOSIS — I10 ESSENTIAL HYPERTENSION: ICD-10-CM

## 2019-12-12 DIAGNOSIS — E29.1 HYPOGONADISM MALE: ICD-10-CM

## 2019-12-12 LAB
ANION GAP SERPL CALC-SCNC: 11 MMOL/L (ref 8–16)
BASOPHILS # BLD AUTO: 0.04 K/UL (ref 0–0.2)
BASOPHILS NFR BLD: 0.7 % (ref 0–1.9)
BUN SERPL-MCNC: 20 MG/DL (ref 8–23)
CALCIUM SERPL-MCNC: 9.8 MG/DL (ref 8.7–10.5)
CHLORIDE SERPL-SCNC: 104 MMOL/L (ref 95–110)
CHOLEST SERPL-MCNC: 173 MG/DL (ref 120–199)
CHOLEST/HDLC SERPL: 5.6 {RATIO} (ref 2–5)
CO2 SERPL-SCNC: 25 MMOL/L (ref 23–29)
CREAT SERPL-MCNC: 1.1 MG/DL (ref 0.5–1.4)
DIFFERENTIAL METHOD: ABNORMAL
EOSINOPHIL # BLD AUTO: 0.2 K/UL (ref 0–0.5)
EOSINOPHIL NFR BLD: 3 % (ref 0–8)
ERYTHROCYTE [DISTWIDTH] IN BLOOD BY AUTOMATED COUNT: 14.5 % (ref 11.5–14.5)
EST. GFR  (AFRICAN AMERICAN): >60 ML/MIN/1.73 M^2
EST. GFR  (NON AFRICAN AMERICAN): >60 ML/MIN/1.73 M^2
GLUCOSE SERPL-MCNC: 96 MG/DL (ref 70–110)
HCT VFR BLD AUTO: 52.4 % (ref 40–54)
HDLC SERPL-MCNC: 31 MG/DL (ref 40–75)
HDLC SERPL: 17.9 % (ref 20–50)
HGB BLD-MCNC: 16.4 G/DL (ref 14–18)
IMM GRANULOCYTES # BLD AUTO: 0.01 K/UL (ref 0–0.04)
IMM GRANULOCYTES NFR BLD AUTO: 0.2 % (ref 0–0.5)
LDLC SERPL CALC-MCNC: 120.6 MG/DL (ref 63–159)
LYMPHOCYTES # BLD AUTO: 1.7 K/UL (ref 1–4.8)
LYMPHOCYTES NFR BLD: 27.2 % (ref 18–48)
MCH RBC QN AUTO: 30.8 PG (ref 27–31)
MCHC RBC AUTO-ENTMCNC: 31.3 G/DL (ref 32–36)
MCV RBC AUTO: 99 FL (ref 82–98)
MONOCYTES # BLD AUTO: 0.6 K/UL (ref 0.3–1)
MONOCYTES NFR BLD: 10 % (ref 4–15)
NEUTROPHILS # BLD AUTO: 3.6 K/UL (ref 1.8–7.7)
NEUTROPHILS NFR BLD: 58.9 % (ref 38–73)
NONHDLC SERPL-MCNC: 142 MG/DL
NRBC BLD-RTO: 0 /100 WBC
PLATELET # BLD AUTO: 265 K/UL (ref 150–350)
PMV BLD AUTO: 10.7 FL (ref 9.2–12.9)
POTASSIUM SERPL-SCNC: 4.6 MMOL/L (ref 3.5–5.1)
RBC # BLD AUTO: 5.32 M/UL (ref 4.6–6.2)
SODIUM SERPL-SCNC: 140 MMOL/L (ref 136–145)
T4 FREE SERPL-MCNC: 1.06 NG/DL (ref 0.71–1.51)
TRIGL SERPL-MCNC: 107 MG/DL (ref 30–150)
TSH SERPL DL<=0.005 MIU/L-ACNC: 0.29 UIU/ML (ref 0.4–4)
WBC # BLD AUTO: 6.1 K/UL (ref 3.9–12.7)

## 2019-12-12 PROCEDURE — 36415 COLL VENOUS BLD VENIPUNCTURE: CPT | Mod: HCNC,PO

## 2019-12-12 PROCEDURE — 84443 ASSAY THYROID STIM HORMONE: CPT | Mod: HCNC

## 2019-12-12 PROCEDURE — 80061 LIPID PANEL: CPT | Mod: HCNC

## 2019-12-12 PROCEDURE — 82040 ASSAY OF SERUM ALBUMIN: CPT | Mod: HCNC

## 2019-12-12 PROCEDURE — 84439 ASSAY OF FREE THYROXINE: CPT | Mod: HCNC

## 2019-12-12 PROCEDURE — 84153 ASSAY OF PSA TOTAL: CPT | Mod: HCNC

## 2019-12-12 PROCEDURE — 85025 COMPLETE CBC W/AUTO DIFF WBC: CPT | Mod: HCNC

## 2019-12-12 PROCEDURE — 80048 BASIC METABOLIC PNL TOTAL CA: CPT | Mod: HCNC

## 2019-12-13 LAB — COMPLEXED PSA SERPL-MCNC: 2.1 NG/ML (ref 0–4)

## 2019-12-18 LAB
ALBUMIN SERPL-MCNC: 4.3 G/DL (ref 3.6–5.1)
SHBG SERPL-SCNC: 39 NMOL/L (ref 22–77)
TESTOST FREE SERPL-MCNC: 18.7 PG/ML (ref 46–224)
TESTOST SERPL-MCNC: 174 NG/DL (ref 250–1100)
TESTOSTERONE.FREE+WB SERPL-MCNC: 36.9 NG/DL (ref 110–575)

## 2019-12-18 RX ORDER — LISINOPRIL 40 MG/1
TABLET ORAL
Qty: 90 TABLET | Refills: 0 | Status: SHIPPED | OUTPATIENT
Start: 2019-12-18 | End: 2019-12-31 | Stop reason: SDUPTHER

## 2019-12-18 RX ORDER — ALLOPURINOL 300 MG/1
TABLET ORAL
Qty: 90 TABLET | Refills: 0 | Status: SHIPPED | OUTPATIENT
Start: 2019-12-18 | End: 2019-12-31 | Stop reason: SDUPTHER

## 2019-12-18 RX ORDER — HYDROCHLOROTHIAZIDE 25 MG/1
TABLET ORAL
Qty: 90 TABLET | Refills: 0 | Status: SHIPPED | OUTPATIENT
Start: 2019-12-18 | End: 2020-02-27

## 2019-12-31 ENCOUNTER — OFFICE VISIT (OUTPATIENT)
Dept: INTERNAL MEDICINE | Facility: CLINIC | Age: 69
End: 2019-12-31
Payer: MEDICARE

## 2019-12-31 VITALS
TEMPERATURE: 99 F | WEIGHT: 291.25 LBS | HEIGHT: 69 IN | DIASTOLIC BLOOD PRESSURE: 76 MMHG | SYSTOLIC BLOOD PRESSURE: 112 MMHG | HEART RATE: 64 BPM | BODY MASS INDEX: 43.14 KG/M2 | OXYGEN SATURATION: 94 %

## 2019-12-31 DIAGNOSIS — E29.1 HYPOGONADISM MALE: ICD-10-CM

## 2019-12-31 DIAGNOSIS — M15.9 GENERALIZED OSTEOARTHROSIS, INVOLVING MULTIPLE SITES: ICD-10-CM

## 2019-12-31 DIAGNOSIS — M51.16 LUMBAR DISC DISEASE WITH RADICULOPATHY: ICD-10-CM

## 2019-12-31 DIAGNOSIS — E78.00 PURE HYPERCHOLESTEROLEMIA: ICD-10-CM

## 2019-12-31 DIAGNOSIS — I10 ESSENTIAL HYPERTENSION: ICD-10-CM

## 2019-12-31 DIAGNOSIS — Z00.00 ROUTINE GENERAL MEDICAL EXAMINATION AT A HEALTH CARE FACILITY: Primary | ICD-10-CM

## 2019-12-31 DIAGNOSIS — Z86.010 HISTORY OF COLONIC POLYPS: ICD-10-CM

## 2019-12-31 DIAGNOSIS — E66.01 MORBID OBESITY WITH BMI OF 40.0-44.9, ADULT: ICD-10-CM

## 2019-12-31 DIAGNOSIS — M19.012 OSTEOARTHRITIS OF LEFT SHOULDER, UNSPECIFIED OSTEOARTHRITIS TYPE: ICD-10-CM

## 2019-12-31 DIAGNOSIS — N18.30 CKD (CHRONIC KIDNEY DISEASE), STAGE III: ICD-10-CM

## 2019-12-31 DIAGNOSIS — Z87.39 HISTORY OF GOUT: ICD-10-CM

## 2019-12-31 DIAGNOSIS — M17.12 OSTEOARTHRITIS OF LEFT KNEE, UNSPECIFIED OSTEOARTHRITIS TYPE: ICD-10-CM

## 2019-12-31 PROCEDURE — 3078F DIAST BP <80 MM HG: CPT | Mod: HCNC,CPTII,S$GLB, | Performed by: INTERNAL MEDICINE

## 2019-12-31 PROCEDURE — 99397 PR PREVENTIVE VISIT,EST,65 & OVER: ICD-10-PCS | Mod: 25,HCNC,S$GLB, | Performed by: INTERNAL MEDICINE

## 2019-12-31 PROCEDURE — 99397 PER PM REEVAL EST PAT 65+ YR: CPT | Mod: 25,HCNC,S$GLB, | Performed by: INTERNAL MEDICINE

## 2019-12-31 PROCEDURE — 3074F SYST BP LT 130 MM HG: CPT | Mod: HCNC,CPTII,S$GLB, | Performed by: INTERNAL MEDICINE

## 2019-12-31 PROCEDURE — 3078F PR MOST RECENT DIASTOLIC BLOOD PRESSURE < 80 MM HG: ICD-10-PCS | Mod: HCNC,CPTII,S$GLB, | Performed by: INTERNAL MEDICINE

## 2019-12-31 PROCEDURE — 20610 PR DRAIN/INJECT LARGE JOINT/BURSA: ICD-10-PCS | Mod: HCNC,LT,S$GLB, | Performed by: INTERNAL MEDICINE

## 2019-12-31 PROCEDURE — 3074F PR MOST RECENT SYSTOLIC BLOOD PRESSURE < 130 MM HG: ICD-10-PCS | Mod: HCNC,CPTII,S$GLB, | Performed by: INTERNAL MEDICINE

## 2019-12-31 PROCEDURE — 99999 PR PBB SHADOW E&M-EST. PATIENT-LVL III: CPT | Mod: PBBFAC,HCNC,, | Performed by: INTERNAL MEDICINE

## 2019-12-31 PROCEDURE — 99999 PR PBB SHADOW E&M-EST. PATIENT-LVL III: ICD-10-PCS | Mod: PBBFAC,HCNC,, | Performed by: INTERNAL MEDICINE

## 2019-12-31 PROCEDURE — 20610 DRAIN/INJ JOINT/BURSA W/O US: CPT | Mod: HCNC,LT,S$GLB, | Performed by: INTERNAL MEDICINE

## 2019-12-31 RX ORDER — AMLODIPINE BESYLATE 5 MG/1
5 TABLET ORAL DAILY
Qty: 90 TABLET | Refills: 3 | Status: SHIPPED | OUTPATIENT
Start: 2019-12-31 | End: 2020-08-24

## 2019-12-31 RX ORDER — HYDROCODONE BITARTRATE AND ACETAMINOPHEN 10; 325 MG/1; MG/1
1 TABLET ORAL EVERY 8 HOURS PRN
Qty: 90 TABLET | Refills: 0 | Status: SHIPPED | OUTPATIENT
Start: 2019-12-31 | End: 2020-08-24 | Stop reason: SDUPTHER

## 2019-12-31 RX ORDER — CARVEDILOL 25 MG/1
25 TABLET ORAL 2 TIMES DAILY WITH MEALS
Qty: 180 TABLET | Refills: 3 | Status: SHIPPED | OUTPATIENT
Start: 2019-12-31 | End: 2020-08-24 | Stop reason: SDUPTHER

## 2019-12-31 RX ORDER — ALLOPURINOL 300 MG/1
300 TABLET ORAL DAILY
Qty: 90 TABLET | Refills: 3 | Status: SHIPPED | OUTPATIENT
Start: 2019-12-31 | End: 2020-08-24 | Stop reason: SDUPTHER

## 2019-12-31 RX ORDER — GABAPENTIN 300 MG/1
CAPSULE ORAL
Qty: 270 CAPSULE | Refills: 3 | Status: SHIPPED | OUTPATIENT
Start: 2019-12-31 | End: 2020-08-24 | Stop reason: SDUPTHER

## 2019-12-31 RX ORDER — METHYLPREDNISOLONE ACETATE 80 MG/ML
80 INJECTION, SUSPENSION INTRA-ARTICULAR; INTRALESIONAL; INTRAMUSCULAR; SOFT TISSUE ONCE
Status: COMPLETED | OUTPATIENT
Start: 2019-12-31 | End: 2019-12-31

## 2019-12-31 RX ORDER — LISINOPRIL 40 MG/1
40 TABLET ORAL DAILY
Qty: 90 TABLET | Refills: 3 | Status: SHIPPED | OUTPATIENT
Start: 2019-12-31 | End: 2020-08-24 | Stop reason: SDUPTHER

## 2019-12-31 RX ORDER — TESTOSTERONE CYPIONATE 200 MG/ML
300 INJECTION, SOLUTION INTRAMUSCULAR
Qty: 10 ML | Refills: 1 | Status: SHIPPED | OUTPATIENT
Start: 2019-12-31 | End: 2020-04-06 | Stop reason: SDUPTHER

## 2019-12-31 RX ORDER — PRAVASTATIN SODIUM 20 MG/1
20 TABLET ORAL DAILY
Qty: 90 TABLET | Refills: 3 | Status: SHIPPED | OUTPATIENT
Start: 2019-12-31 | End: 2020-08-24 | Stop reason: SDUPTHER

## 2019-12-31 RX ADMIN — METHYLPREDNISOLONE ACETATE 80 MG: 80 INJECTION, SUSPENSION INTRA-ARTICULAR; INTRALESIONAL; INTRAMUSCULAR; SOFT TISSUE at 11:12

## 2019-12-31 NOTE — PROGRESS NOTES
"HPI:  Patient is a 69-year-old man who comes today for follow-up of his hypertension, lipids, hypogonadism, and for his annual physical.  He has been having more problems with his left shoulder and left knee.  He had them injected last July and did very well.  He would like to get on rejected today.  He also would like to reconsider surgical options for his lumbar disc disease. He was last evaluated about 2 and half years ago.  He is willing to consider surgical options.  Patient has no other new complaints at this time.      Current MEDS: medcard review, verified and update  Allergies: Per the electronic medical record    Past Medical History:   Diagnosis Date    BPH (benign prostatic hyperplasia)     CKD (chronic kidney disease), stage III     Generalized osteoarthrosis, involving multiple sites     History of gout     Hyperlipidemia     Hypertension     Hypogonadism male     Lumbar disc disease with radiculopathy     Morbid obesity with BMI of 40.0-44.9, adult        Past Surgical History:   Procedure Laterality Date    EYE SURGERY      JOINT REPLACEMENT      KNEE SCOPE      open globe      right eye 1980    ROTATOR CUFF REPAIR      SHOULDER SURGERY Right        SHx: per the electronic medical record    FHx: recorded in the electronic medical record    ROS:    denies any chest pains or shortness of breath. Denies any nausea, vomiting or diarrhea. Denies any fever, chills or sweats. Denies any change in weight, voice, stool, skin or hair. Denies any dysuria, dyspepsia or dysphagia. Denies any change in vision, hearing or headaches. Denies any swollen lymph nodes or loss of memory.    PE:  /76 (BP Location: Right arm)   Pulse 64   Temp 98.6 °F (37 °C) (Tympanic)   Ht 5' 9" (1.753 m)   Wt 132.1 kg (291 lb 3.6 oz)   SpO2 (!) 94%   BMI 43.01 kg/m²   Gen: Well-developed, well-nourished, male, in no acute distress, oriented x3  HEENT: neck is supple, no adenopathy, carotids 2+ equal without " bruits, thyroid exam normal size without nodules.  CHEST: clear to auscultation and percussion  CVS: regular rate and rhythm without significant murmur, gallop, or rubs  ABD: soft, benign, no rebound no guarding, no distention.  Bowel sounds are normal.     nontender.  No palpable masses.  No organomegaly and no audible bruits.  RECTAL:  Deferred.  EXT: no clubbing, cyanosis, or edema  LYMPH: no cervical, inguinal, or axillary adenopathy  FEET: no loss of sensation.  No ulcers or pressure sores.  NEURO: gait normal.  Cranial nerves II- XII intact. No nystagmus.  Speech normal.   Gross motor and sensory unremarkable.    Lab Results   Component Value Date    WBC 6.10 12/12/2019    HGB 16.4 12/12/2019    HCT 52.4 12/12/2019     12/12/2019    CHOL 173 12/12/2019    TRIG 107 12/12/2019    HDL 31 (L) 12/12/2019    ALT 47 (H) 05/30/2019    AST 39 05/30/2019     12/12/2019    K 4.6 12/12/2019     12/12/2019    CREATININE 1.1 12/12/2019    BUN 20 12/12/2019    CO2 25 12/12/2019    TSH 0.292 (L) 12/12/2019    PSA 2.1 12/12/2019    HGBA1C 5.3 05/05/2015       Impression:  Osteoarthritis of the left shoulder and left knee  Other medical problems below, stable  Patient Active Problem List   Diagnosis    Essential hypertension    Hyperlipidemia    Generalized osteoarthrosis, involving multiple sites    Hypogonadism male    History of gout    BPH (benign prostatic hyperplasia)    Lumbar disc disease with radiculopathy    Morbid obesity with BMI of 40.0-44.9, adult    CKD (chronic kidney disease), stage III       Plan:   Orders Placed This Encounter    MRI Lumbar Spine Without Contrast    Comprehensive metabolic panel    Lipid panel    TSH    CBC auto differential    Testosterone Panel    HYDROcodone-acetaminophen (NORCO)  mg per tablet    gabapentin (NEURONTIN) 300 MG capsule    testosterone cypionate (DEPOTESTOTERONE CYPIONATE) 200 mg/mL injection    allopurinol (ZYLOPRIM) 300 MG tablet     amLODIPine (NORVASC) 5 MG tablet    carvedilol (COREG) 25 MG tablet    lisinopril (PRINIVIL,ZESTRIL) 40 MG tablet    pravastatin (PRAVACHOL) 20 MG tablet    Case request GI: COLONOSCOPY    The left shoulder and left knee were both injected with 2 cc of Marcaine and 1 cc of Depo-Medrol 80.  Patient will have a repeat MRI done.  He will otherwise see me in 6 months with above lab work.  He is due for his colonoscopy screening.    This note is generated with speech recognition software and is subject to transcription error and sound alike phrases that may be missed by proofreading.

## 2020-01-02 ENCOUNTER — TELEPHONE (OUTPATIENT)
Dept: ENDOSCOPY | Facility: HOSPITAL | Age: 70
End: 2020-01-02

## 2020-01-07 ENCOUNTER — TELEPHONE (OUTPATIENT)
Dept: INTERNAL MEDICINE | Facility: CLINIC | Age: 70
End: 2020-01-07

## 2020-01-07 RX ORDER — ALPRAZOLAM 0.5 MG/1
0.5 TABLET ORAL 3 TIMES DAILY PRN
Qty: 20 TABLET | Refills: 0 | Status: SHIPPED | OUTPATIENT
Start: 2020-01-07 | End: 2020-08-24

## 2020-01-07 NOTE — TELEPHONE ENCOUNTER
Patient called to state that he is having a MRI tomorrow and he is nervous. He is asking if you can send him Xanax to [facility].

## 2020-01-07 NOTE — TELEPHONE ENCOUNTER
----- Message from Yajaira Clai sent at 1/7/2020 10:31 AM CST -----  Pt has mri scd for tomorrow would like to know if dr Mason would call one xanax to [facility]  He is very nervous

## 2020-01-08 ENCOUNTER — PATIENT MESSAGE (OUTPATIENT)
Dept: INTERNAL MEDICINE | Facility: CLINIC | Age: 70
End: 2020-01-08

## 2020-01-08 ENCOUNTER — HOSPITAL ENCOUNTER (OUTPATIENT)
Dept: RADIOLOGY | Facility: HOSPITAL | Age: 70
Discharge: HOME | End: 2020-01-08
Attending: INTERNAL MEDICINE
Payer: MEDICARE

## 2020-01-08 DIAGNOSIS — M51.16 LUMBAR DISC DISEASE WITH RADICULOPATHY: ICD-10-CM

## 2020-01-08 DIAGNOSIS — M54.16 LUMBAR RADICULOPATHY: Primary | ICD-10-CM

## 2020-01-08 DIAGNOSIS — M48.061 SPINAL STENOSIS OF LUMBAR REGION WITHOUT NEUROGENIC CLAUDICATION: ICD-10-CM

## 2020-01-08 PROCEDURE — 72148 MRI LUMBAR SPINE W/O DYE: CPT | Mod: TC,HCNC

## 2020-01-08 NOTE — TELEPHONE ENCOUNTER
Your MRI shows significant degenerative changes in the lower lumbar spine.  He has areas where it is encroaching upon some nerve root as well as causing narrowing of the spinal cord itself.  I think he should see a neurosurgeon to get an opinion about surgery.  Referral put in

## 2020-01-14 ENCOUNTER — OFFICE VISIT (OUTPATIENT)
Dept: NEUROSURGERY | Facility: CLINIC | Age: 70
End: 2020-01-14
Payer: MEDICARE

## 2020-01-14 VITALS
SYSTOLIC BLOOD PRESSURE: 137 MMHG | DIASTOLIC BLOOD PRESSURE: 88 MMHG | HEIGHT: 69 IN | WEIGHT: 286.63 LBS | RESPIRATION RATE: 18 BRPM | BODY MASS INDEX: 42.45 KG/M2

## 2020-01-14 DIAGNOSIS — M51.36 DEGENERATIVE DISC DISEASE, LUMBAR: Primary | ICD-10-CM

## 2020-01-14 DIAGNOSIS — M48.062 LUMBAR STENOSIS WITH NEUROGENIC CLAUDICATION: ICD-10-CM

## 2020-01-14 PROCEDURE — 1159F MED LIST DOCD IN RCRD: CPT | Mod: HCNC,S$GLB,, | Performed by: NEUROLOGICAL SURGERY

## 2020-01-14 PROCEDURE — 3288F FALL RISK ASSESSMENT DOCD: CPT | Mod: HCNC,CPTII,S$GLB, | Performed by: NEUROLOGICAL SURGERY

## 2020-01-14 PROCEDURE — 1125F AMNT PAIN NOTED PAIN PRSNT: CPT | Mod: HCNC,S$GLB,, | Performed by: NEUROLOGICAL SURGERY

## 2020-01-14 PROCEDURE — 1100F PTFALLS ASSESS-DOCD GE2>/YR: CPT | Mod: HCNC,CPTII,S$GLB, | Performed by: NEUROLOGICAL SURGERY

## 2020-01-14 PROCEDURE — 99999 PR PBB SHADOW E&M-EST. PATIENT-LVL III: CPT | Mod: PBBFAC,HCNC,, | Performed by: NEUROLOGICAL SURGERY

## 2020-01-14 PROCEDURE — 3075F SYST BP GE 130 - 139MM HG: CPT | Mod: HCNC,CPTII,S$GLB, | Performed by: NEUROLOGICAL SURGERY

## 2020-01-14 PROCEDURE — 3079F DIAST BP 80-89 MM HG: CPT | Mod: HCNC,CPTII,S$GLB, | Performed by: NEUROLOGICAL SURGERY

## 2020-01-14 PROCEDURE — 1100F PR PT FALLS ASSESS DOC 2+ FALLS/FALL W/INJURY/YR: ICD-10-PCS | Mod: HCNC,CPTII,S$GLB, | Performed by: NEUROLOGICAL SURGERY

## 2020-01-14 PROCEDURE — 99203 OFFICE O/P NEW LOW 30 MIN: CPT | Mod: HCNC,S$GLB,, | Performed by: NEUROLOGICAL SURGERY

## 2020-01-14 PROCEDURE — 99999 PR PBB SHADOW E&M-EST. PATIENT-LVL III: ICD-10-PCS | Mod: PBBFAC,HCNC,, | Performed by: NEUROLOGICAL SURGERY

## 2020-01-14 PROCEDURE — 1125F PR PAIN SEVERITY QUANTIFIED, PAIN PRESENT: ICD-10-PCS | Mod: HCNC,S$GLB,, | Performed by: NEUROLOGICAL SURGERY

## 2020-01-14 PROCEDURE — 3075F PR MOST RECENT SYSTOLIC BLOOD PRESS GE 130-139MM HG: ICD-10-PCS | Mod: HCNC,CPTII,S$GLB, | Performed by: NEUROLOGICAL SURGERY

## 2020-01-14 PROCEDURE — 3079F PR MOST RECENT DIASTOLIC BLOOD PRESSURE 80-89 MM HG: ICD-10-PCS | Mod: HCNC,CPTII,S$GLB, | Performed by: NEUROLOGICAL SURGERY

## 2020-01-14 PROCEDURE — 99203 PR OFFICE/OUTPT VISIT, NEW, LEVL III, 30-44 MIN: ICD-10-PCS | Mod: HCNC,S$GLB,, | Performed by: NEUROLOGICAL SURGERY

## 2020-01-14 PROCEDURE — 1159F PR MEDICATION LIST DOCUMENTED IN MEDICAL RECORD: ICD-10-PCS | Mod: HCNC,S$GLB,, | Performed by: NEUROLOGICAL SURGERY

## 2020-01-14 PROCEDURE — 3288F PR FALLS RISK ASSESSMENT DOCUMENTED: ICD-10-PCS | Mod: HCNC,CPTII,S$GLB, | Performed by: NEUROLOGICAL SURGERY

## 2020-01-14 NOTE — LETTER
January 14, 2020      Frantz Mason MD  [address]  [address]  [place] [address]           [facility]  [address] [address] [facility] [place] [address]  Phone: [phone]  Fax: [phone]          Patient: Enoc Collado   MR Number: [id number]   YOB: 1950   Date of Visit: 1/14/2020       Dear Dr. Frantz Mason:    Thank you for referring Enoc Collado to me for evaluation. Attached you will find relevant portions of my assessment and plan of care.    If you have questions, please do not hesitate to call me. I look forward to following Enoc Collado along with you.    Sincerely,    Juan David Helm MD    Enclosure  CC:  No Recipients    If you would like to receive this communication electronically, please contact [email or web address] or [phone] to request more information on [facility] access.    For providers and/or their staff who would like to refer a patient to [facility], please contact us through our one-stop-shop provider referral line, [facility] , at [phone].    If you feel you have received this communication in error or would no longer like to receive these types of communications, please e-mail [email or web address]

## 2020-01-14 NOTE — PROGRESS NOTES
Subjective:      Patient ID: Enoc Collado is a 69 y.o. male.    Chief Complaint: Lumbar Spine Pain (L-Spine) ( Lumbar radiculopathy)    Here for eval lower back pain new patient with an MRI of the lumbar spine for review    States he has problems for years with no inciting event  Begins in back and radiates into pelvis   No leg pain   No n/t   Has tried PT, weight loss,  as well as CHRISTINA without relief   Tried neurontin and hydrocodone with some relief   Previously has seen a spine surgeon who did not recommend surgery at that time    Has had a right knee replacement in past   States he needs a L knee replacement   Ambulates unassisted  Denies any bowel bladder symptoms  State that the symptoms affect his ADLs  No previous spine surgery    Review of Systems   Constitutional: Negative for activity change, appetite change and chills.   HENT: Negative for hearing loss, sore throat and tinnitus.    Eyes: Negative for pain, discharge and itching.   Cardiovascular: Negative for chest pain.   Gastrointestinal: Negative for abdominal pain.   Endocrine: Negative for cold intolerance and heat intolerance.   Genitourinary: Negative for difficulty urinating and dysuria.   Musculoskeletal: Positive for back pain and gait problem.   Allergic/Immunologic: Negative for environmental allergies.   Neurological: Positive for weakness. Negative for dizziness, tremors, light-headedness and headaches.   Hematological: Negative for adenopathy.   Psychiatric/Behavioral: Negative for agitation, behavioral problems and confusion.         Objective:       Neurosurgery Physical Exam  Ortho/SPM Exam    Nursing note and vitals reviewed  Gen:Oriented to person, place, and time.             Appears stated age   Skin: no rashes or lesions identified   Head:Normocephalic and atraumatic.  Nose: Nose normal.    Eyes: EOM are normal. Pupils are equal, round, and reactive to light.   Neck: Neck supple. No masses or lesions palpated  Cardiovascular:  Intact distal pulses.    Abdominal: Soft.   Neurological: Alert and oriented to person, place, and time.  No cranial nerve deficit.  Coordination normal. Normal muscle tone  Psychiatric: Normal mood and affect. Behavior is normal.      Back:   Tenderness bilaterally Paraspinal muscle spasms     Pain with flexion and extention    Limited secondary to pain Range of motion    Neg  Straight leg raise     Motor   Right Right Left Left  Level Group   5  5  L2 Hip flexor (Psoas)   5  5  L3 Leg extension (Quads)   5  5  L4 Dorsiflexion & foot inversion (Tibialis Anterior)   5  5  L5 Great toe extension ( EHL)   5  5  S1 Foot eversion (Gastroc, PL & PB)     Sensation  NL Decreased (R/L/BL) Level Sensation    X  L2 Anterio-medial thigh   X  L3 Medial thigh around knee   X  L4 Medial foot   X  L5 Dorsum foot   X  S1 Lateral foot     Reflex  2+  Patellar tendon (L4)   2+  Achilles tendon (S1)     MRI lumbar spine  Desiccation disc with circumferential disc bulge at the L1-2 and L2-3 levels.  Marked disc space narrowing at the L3-L4 level with degenerative changes of the vertebral endplates and moderate central spinal stenosis.  Desiccation disc at the L4-L5 level with bilateral degenerative changes of the facet joints, moderate central spinal stenosis, bilateral bony neural foraminal narrowing and possible L4 nerve root impingement.    Assessment:     1. Degenerative disc disease, lumbar    2. Lumbar stenosis with neurogenic claudication      Plan:     Degenerative disc disease, lumbar    Lumbar stenosis with neurogenic claudication        Patient has degenerative disc disease at multiple levels with a loss of the normal lordotic curvature of the lumbar spine.  There are no focal areas of stenosis or foraminal compromise identified.  Given the alignment of his spine in order to treat him surgically he would need a very large operation involving both anterior as well as posterior approach.  I have explained that this would be in  intensive procedure and at this time would not recommended based on his MRI findings.  He agrees with me and understands the nature of the treatment recommended  He states that this is what the spine surgeon told him several years ago when he went in for evaluation  Will continue conservative management  Recommended weight loss  If his symptoms change can repeat imaging and see him back if he has any other concerns or questions    Thank you for the referral   Please call with any questions    Juan David Helm MD  Neurosurgery

## 2020-01-21 NOTE — PROGRESS NOTES
Patient, Enoc Collado (MRN #[id number]), presented with a recorded BMI of 42.32 kg/m^2 consistent with the definition of morbid obesity (ICD-10 E66.01). The patient's morbid obesity was monitored, evaluated, addressed and/or treated. This addendum to the medical record is made on 01/21/2020.

## 2020-02-27 RX ORDER — HYDROCHLOROTHIAZIDE 25 MG/1
TABLET ORAL
Qty: 90 TABLET | Refills: 3 | Status: SHIPPED | OUTPATIENT
Start: 2020-02-27 | End: 2020-08-24 | Stop reason: SDUPTHER

## 2020-03-02 DIAGNOSIS — I10 ESSENTIAL HYPERTENSION: Primary | ICD-10-CM

## 2020-03-04 ENCOUNTER — PATIENT OUTREACH (OUTPATIENT)
Dept: ADMINISTRATIVE | Facility: OTHER | Age: 70
End: 2020-03-04

## 2020-03-09 ENCOUNTER — OFFICE VISIT (OUTPATIENT)
Dept: CARDIOLOGY | Facility: CLINIC | Age: 70
End: 2020-03-09
Payer: MEDICARE

## 2020-03-09 ENCOUNTER — HOSPITAL ENCOUNTER (OUTPATIENT)
Dept: CARDIOLOGY | Facility: HOSPITAL | Age: 70
Discharge: HOME | End: 2020-03-09
Attending: INTERNAL MEDICINE
Payer: MEDICARE

## 2020-03-09 VITALS
OXYGEN SATURATION: 94 % | DIASTOLIC BLOOD PRESSURE: 80 MMHG | BODY MASS INDEX: 42.29 KG/M2 | HEIGHT: 69 IN | HEART RATE: 62 BPM | SYSTOLIC BLOOD PRESSURE: 120 MMHG | WEIGHT: 285.5 LBS

## 2020-03-09 DIAGNOSIS — E78.00 PURE HYPERCHOLESTEROLEMIA: ICD-10-CM

## 2020-03-09 DIAGNOSIS — I10 ESSENTIAL HYPERTENSION: ICD-10-CM

## 2020-03-09 DIAGNOSIS — I10 ESSENTIAL HYPERTENSION: Primary | ICD-10-CM

## 2020-03-09 DIAGNOSIS — R07.9 CHEST PAIN, MODERATE CORONARY ARTERY RISK: ICD-10-CM

## 2020-03-09 PROCEDURE — 3079F PR MOST RECENT DIASTOLIC BLOOD PRESSURE 80-89 MM HG: ICD-10-PCS | Mod: HCNC,CPTII,S$GLB, | Performed by: INTERNAL MEDICINE

## 2020-03-09 PROCEDURE — 93010 EKG 12-LEAD: ICD-10-PCS | Mod: HCNC,,, | Performed by: INTERNAL MEDICINE

## 2020-03-09 PROCEDURE — 1159F MED LIST DOCD IN RCRD: CPT | Mod: HCNC,S$GLB,, | Performed by: INTERNAL MEDICINE

## 2020-03-09 PROCEDURE — 3288F PR FALLS RISK ASSESSMENT DOCUMENTED: ICD-10-PCS | Mod: HCNC,CPTII,S$GLB, | Performed by: INTERNAL MEDICINE

## 2020-03-09 PROCEDURE — 3288F FALL RISK ASSESSMENT DOCD: CPT | Mod: HCNC,CPTII,S$GLB, | Performed by: INTERNAL MEDICINE

## 2020-03-09 PROCEDURE — 99999 PR PBB SHADOW E&M-EST. PATIENT-LVL III: ICD-10-PCS | Mod: PBBFAC,HCNC,, | Performed by: INTERNAL MEDICINE

## 2020-03-09 PROCEDURE — 3074F SYST BP LT 130 MM HG: CPT | Mod: HCNC,CPTII,S$GLB, | Performed by: INTERNAL MEDICINE

## 2020-03-09 PROCEDURE — 99205 OFFICE O/P NEW HI 60 MIN: CPT | Mod: 25,HCNC,S$GLB, | Performed by: INTERNAL MEDICINE

## 2020-03-09 PROCEDURE — 1100F PTFALLS ASSESS-DOCD GE2>/YR: CPT | Mod: HCNC,CPTII,S$GLB, | Performed by: INTERNAL MEDICINE

## 2020-03-09 PROCEDURE — 93005 ELECTROCARDIOGRAM TRACING: CPT | Mod: HCNC

## 2020-03-09 PROCEDURE — 99205 PR OFFICE/OUTPT VISIT, NEW, LEVL V, 60-74 MIN: ICD-10-PCS | Mod: 25,HCNC,S$GLB, | Performed by: INTERNAL MEDICINE

## 2020-03-09 PROCEDURE — 3074F PR MOST RECENT SYSTOLIC BLOOD PRESSURE < 130 MM HG: ICD-10-PCS | Mod: HCNC,CPTII,S$GLB, | Performed by: INTERNAL MEDICINE

## 2020-03-09 PROCEDURE — 1159F PR MEDICATION LIST DOCUMENTED IN MEDICAL RECORD: ICD-10-PCS | Mod: HCNC,S$GLB,, | Performed by: INTERNAL MEDICINE

## 2020-03-09 PROCEDURE — 99999 PR PBB SHADOW E&M-EST. PATIENT-LVL III: CPT | Mod: PBBFAC,HCNC,, | Performed by: INTERNAL MEDICINE

## 2020-03-09 PROCEDURE — 1100F PR PT FALLS ASSESS DOC 2+ FALLS/FALL W/INJURY/YR: ICD-10-PCS | Mod: HCNC,CPTII,S$GLB, | Performed by: INTERNAL MEDICINE

## 2020-03-09 PROCEDURE — 3079F DIAST BP 80-89 MM HG: CPT | Mod: HCNC,CPTII,S$GLB, | Performed by: INTERNAL MEDICINE

## 2020-03-09 PROCEDURE — 93010 ELECTROCARDIOGRAM REPORT: CPT | Mod: HCNC,,, | Performed by: INTERNAL MEDICINE

## 2020-03-09 NOTE — PROGRESS NOTES
Subjective:   Patient ID:  Enoc Collado is a 69 y.o. male who presents for follow-up of Chest Pain and Shortness of Breath  Pt with atypical CP- left sided . Pt with also SOB/KHAN.     CRF- HTN, HLP, male/age, lack of exercise, family hx of CAD    Hypertension   This is a chronic problem. The current episode started more than 1 year ago. The problem has been gradually improving since onset. The problem is controlled. Associated symptoms include chest pain and shortness of breath. Pertinent negatives include no palpitations. Past treatments include beta blockers, calcium channel blockers, ACE inhibitors and diuretics. The current treatment provides moderate improvement. There are no compliance problems.    Hyperlipidemia   This is a chronic problem. The current episode started more than 1 year ago. The problem is controlled. Recent lipid tests were reviewed and are variable. Associated symptoms include chest pain and shortness of breath. Current antihyperlipidemic treatment includes statins. The current treatment provides moderate improvement of lipids. There are no compliance problems.    Chest Pain    This is a recurrent problem. The current episode started 1 to 4 weeks ago. The problem occurs intermittently. The problem has been waxing and waning. The pain is present in the lateral region. The pain is mild. The quality of the pain is described as dull. The pain does not radiate. Associated symptoms include shortness of breath. Pertinent negatives include no dizziness or palpitations. The pain is aggravated by nothing. He has tried rest for the symptoms. The treatment provided mild relief.   His past medical history is significant for hyperlipidemia.   Pertinent negatives for past medical history include no muscle weakness.       Review of Systems   Constitution: Negative. Negative for weight gain.   HENT: Negative.    Eyes: Negative.    Cardiovascular: Positive for chest pain. Negative for leg swelling and  palpitations.   Respiratory: Positive for shortness of breath.    Endocrine: Negative.    Hematologic/Lymphatic: Negative.    Skin: Negative.    Musculoskeletal: Negative for muscle weakness.   Gastrointestinal: Negative.    Genitourinary: Negative.    Neurological: Negative.  Negative for dizziness.   Psychiatric/Behavioral: Negative.    Allergic/Immunologic: Negative.      Family History   Problem Relation Age of Onset    Hypertension Father     Cataracts Father     Heart attack Father     Cataracts Mother     Hypertension Mother     Cancer Cousin         colon    Heart disease Brother      Past Medical History:   Diagnosis Date    BPH (benign prostatic hyperplasia)     CKD (chronic kidney disease), stage III     Colon polyps 2015    Generalized osteoarthrosis, involving multiple sites     History of gout     Hyperlipidemia     Hypertension     Hypogonadism male     Lumbar disc disease with radiculopathy     Morbid obesity with BMI of 40.0-44.9, adult      Social History     Socioeconomic History    Marital status:      Spouse name: Not on file    Number of children: Not on file    Years of education: Not on file    Highest education level: Not on file   Occupational History    Occupation: Retired   Social Needs    Financial resource strain: Not on file    Food insecurity:     Worry: Not on file     Inability: Not on file    Transportation needs:     Medical: Not on file     Non-medical: Not on file   Tobacco Use    Smoking status: Never Smoker    Smokeless tobacco: Current User     Types: Snuff   Substance and Sexual Activity    Alcohol use: No    Drug use: No    Sexual activity: Yes     Partners: Female   Lifestyle    Physical activity:     Days per week: Not on file     Minutes per session: Not on file    Stress: Not at all   Relationships    Social connections:     Talks on phone: Not on file     Gets together: Not on file     Attends Jehovah's witness service: Not on file      "Active member of club or organization: Not on file     Attends meetings of clubs or organizations: Not on file     Relationship status: Not on file   Other Topics Concern    Not on file   Social History Narrative    Live w/ spouse. No pets      Current Outpatient Medications on File Prior to Visit   Medication Sig Dispense Refill    allopurinol (ZYLOPRIM) 300 MG tablet Take 1 tablet (300 mg total) by mouth once daily. 90 tablet 3    amLODIPine (NORVASC) 5 MG tablet Take 1 tablet (5 mg total) by mouth once daily. 90 tablet 3    aspirin 325 MG tablet Take 325 mg by mouth once daily.      carvedilol (COREG) 25 MG tablet Take 1 tablet (25 mg total) by mouth 2 (two) times daily with meals. 180 tablet 3    gabapentin (NEURONTIN) 300 MG capsule TAKE 1 CAPSULE EVERY EVENING  AND TAKE 2 CAPSULES AT BEDTIME 270 capsule 3    hydroCHLOROthiazide (HYDRODIURIL) 25 MG tablet TAKE 1 TABLET EVERY DAY 90 tablet 3    HYDROcodone-acetaminophen (NORCO)  mg per tablet Take 1 tablet by mouth every 8 (eight) hours as needed for Pain. 90 tablet 0    lisinopril (PRINIVIL,ZESTRIL) 40 MG tablet Take 1 tablet (40 mg total) by mouth once daily. 90 tablet 3    pravastatin (PRAVACHOL) 20 MG tablet Take 1 tablet (20 mg total) by mouth once daily. 90 tablet 3    sildenafil (REVATIO) 20 mg Tab 2-4 tablets as needed one hour prior to intercourse 40 tablet 11    testosterone cypionate (DEPOTESTOTERONE CYPIONATE) 200 mg/mL injection Inject 1.5 mLs (300 mg total) into the muscle every 14 (fourteen) days. 10 mL 1    ALPRAZolam (XANAX) 0.5 MG tablet Take 1 tablet (0.5 mg total) by mouth 3 (three) times daily as needed for Anxiety. 20 tablet 0    cyclobenzaprine (FLEXERIL) 10 MG tablet Take 1 tablet (10 mg total) by mouth 3 (three) times daily as needed for Muscle spasms. (Patient not taking: Reported on 3/9/2020) 180 tablet 3    syringe with needle, safety 3 mL 21 gauge x 1 1/2" Syrg 1 Syringe by Misc.(Non-Drug; Combo Route) route " every 21 days. 10 Syringe 1     No current facility-administered medications on file prior to visit.      Review of patient's allergies indicates:   Allergen Reactions    No known drug allergies        Objective:     Physical Exam   Constitutional: He is oriented to person, place, and time. He appears well-developed and well-nourished.   HENT:   Head: Normocephalic and atraumatic.   Eyes: Pupils are equal, round, and reactive to light. Conjunctivae are normal.   Neck: Normal range of motion. Neck supple.   Cardiovascular: Normal rate, regular rhythm, normal heart sounds and intact distal pulses.   Pulmonary/Chest: Effort normal and breath sounds normal.   Abdominal: Soft. Bowel sounds are normal.   Neurological: He is alert and oriented to person, place, and time.   Skin: Skin is warm and dry.   Psychiatric: He has a normal mood and affect.   Nursing note and vitals reviewed.      Assessment:     1. Essential hypertension    2. Pure hypercholesterolemia        Plan:     Essential hypertension    Pure hypercholesterolemia      Echo  Stress test  Continue coreg, norvasc, hctz, lisinopril -htn  Continue statin-hlp

## 2020-03-20 ENCOUNTER — TELEPHONE (OUTPATIENT)
Dept: CARDIOLOGY | Facility: CLINIC | Age: 70
End: 2020-03-20

## 2020-04-06 RX ORDER — TESTOSTERONE CYPIONATE 200 MG/ML
300 INJECTION, SOLUTION INTRAMUSCULAR
Qty: 10 ML | Refills: 1 | Status: SHIPPED | OUTPATIENT
Start: 2020-04-06 | End: 2020-08-24 | Stop reason: SDUPTHER

## 2020-05-26 ENCOUNTER — HOSPITAL ENCOUNTER (OUTPATIENT)
Dept: CARDIOLOGY | Facility: HOSPITAL | Age: 70
Discharge: HOME | End: 2020-05-26
Attending: INTERNAL MEDICINE
Payer: MEDICARE

## 2020-05-26 ENCOUNTER — HOSPITAL ENCOUNTER (OUTPATIENT)
Dept: PULMONOLOGY | Facility: HOSPITAL | Age: 70
Discharge: HOME | End: 2020-05-26
Attending: INTERNAL MEDICINE
Payer: MEDICARE

## 2020-05-26 ENCOUNTER — HOSPITAL ENCOUNTER (OUTPATIENT)
Dept: RADIOLOGY | Facility: HOSPITAL | Age: 70
Discharge: HOME | End: 2020-05-26
Attending: INTERNAL MEDICINE
Payer: MEDICARE

## 2020-05-26 VITALS
SYSTOLIC BLOOD PRESSURE: 99 MMHG | BODY MASS INDEX: 40.73 KG/M2 | BODY MASS INDEX: 40.73 KG/M2 | HEART RATE: 62 BPM | DIASTOLIC BLOOD PRESSURE: 66 MMHG | WEIGHT: 275 LBS | WEIGHT: 275 LBS | HEIGHT: 69 IN | DIASTOLIC BLOOD PRESSURE: 68 MMHG | HEART RATE: 57 BPM | HEIGHT: 69 IN | SYSTOLIC BLOOD PRESSURE: 108 MMHG

## 2020-05-26 DIAGNOSIS — R07.9 CHEST PAIN, MODERATE CORONARY ARTERY RISK: ICD-10-CM

## 2020-05-26 PROCEDURE — 93306 TTE W/DOPPLER COMPLETE: CPT | Mod: 26,HCNC,, | Performed by: INTERNAL MEDICINE

## 2020-05-26 PROCEDURE — C8929 TTE W OR WO FOL WCON,DOPPLER: HCPCS | Mod: HCNC

## 2020-05-26 PROCEDURE — A9502 TC99M TETROFOSMIN: HCPCS | Mod: HCNC

## 2020-05-26 PROCEDURE — 93306 ECHO (CUPID ONLY): ICD-10-PCS | Mod: 26,HCNC,, | Performed by: INTERNAL MEDICINE

## 2020-05-26 PROCEDURE — 63600175 PHARM REV CODE 636 W HCPCS: Mod: HCNC

## 2020-05-26 PROCEDURE — 63600175 PHARM REV CODE 636 W HCPCS: Mod: HCNC | Performed by: INTERNAL MEDICINE

## 2020-05-26 RX ADMIN — HUMAN ALBUMIN MICROSPHERES AND PERFLUTREN 0.11 MG: 10; .22 INJECTION, SOLUTION INTRAVENOUS at 09:05

## 2020-05-27 ENCOUNTER — TELEPHONE (OUTPATIENT)
Dept: ENDOSCOPY | Facility: HOSPITAL | Age: 70
End: 2020-05-27

## 2020-05-27 ENCOUNTER — PATIENT MESSAGE (OUTPATIENT)
Dept: ENDOSCOPY | Facility: HOSPITAL | Age: 70
End: 2020-05-27

## 2020-05-27 DIAGNOSIS — Z12.11 COLON CANCER SCREENING: Primary | ICD-10-CM

## 2020-05-27 LAB
CV STRESS BASE HR: 55 BPM
DIASTOLIC BLOOD PRESSURE: 68 MMHG
EJECTION FRACTION- HIGH: 65 %
END DIASTOLIC INDEX-HIGH: 158 ML/M2
END DIASTOLIC INDEX-LOW: 94 ML/M2
END SYSTOLIC INDEX-HIGH: 71 ML/M2
END SYSTOLIC INDEX-LOW: 33 ML/M2
NUC REST DIASTOLIC VOLUME INDEX: 130
NUC REST EJECTION FRACTION: 40
NUC REST SYSTOLIC VOLUME INDEX: 77
NUC STRESS DIASTOLIC VOLUME INDEX: 163
NUC STRESS EJECTION FRACTION: 37 %
NUC STRESS SYSTOLIC VOLUME INDEX: 103
OHS CV CPX 85 PERCENT MAX PREDICTED HEART RATE MALE: 129
OHS CV CPX MAX PREDICTED HEART RATE: 152
OHS CV CPX PATIENT IS FEMALE: 0
OHS CV CPX PATIENT IS MALE: 1
OHS CV CPX PEAK DIASTOLIC BLOOD PRESSURE: 71 MMHG
OHS CV CPX PEAK HEAR RATE: 75 BPM
OHS CV CPX PEAK RATE PRESSURE PRODUCT: 8550
OHS CV CPX PEAK SYSTOLIC BLOOD PRESSURE: 114 MMHG
OHS CV CPX PERCENT MAX PREDICTED HEART RATE ACHIEVED: 49
OHS CV CPX RATE PRESSURE PRODUCT PRESENTING: 5445
RETIRED EF AND QEF - SEE NOTES: 53 %
STRESS ECHO TARGET HR: 129 BPM
SYSTOLIC BLOOD PRESSURE: 99 MMHG

## 2020-05-27 NOTE — TELEPHONE ENCOUNTER
COVID Screening     1. Have you had a fever in the last 7 days or have you used fever reducing medicines for a fever in the last 7 days?  no    2. Are you experiencing shortness of breath, cough, muscle aches, loss of taste or loss of smell?  no    3. Are you residing with anyone who has tested positive for Covid?  no    If answered yes to any of the above questions, the pt must be scheduled for an appointment with their PCP.    A message also needs to be sent to the endoscopist to ensure the patient gets rescheduled at a later date.     ENDO screening    1. Have you been admitted overnight to the hospital in the past 3 months? no   If yes, schedule an appointment with PCP before scheduling endoscopic procedure.     2. Have you had a stent placed in the last 12 months? no   If yes, for a screening visit, cancel and message the ordering provider.  The patient will need a new order when the time is appropriate.     3. Have you had a stroke or heart attack in the past 6 months? no   If yes, cancel and refer patient to ordering provider for clearance, also message ordering provider to inform.     4. Have you had any chest pain in the past 3 months? no   If yes, Have you been evaluated by your PCP and/or cardiologist and it was determined to not be heart related? not applicable   If No, Pt needs to be seen by PCP or Cardiologist .  Pt can be scheduled once clearance obtained by either of those providers.     5. Do you take prescription weight loss medications?  no   If yes, must stop for 2 weeks prior to procedure.     6. Have you been diagnosed with diverticulitis within the past 3 months? no   If yes, must have been seen by GI within the last 3 months, if not schedule with GI [facility].    If pt has been seen by GI, schedule procedure 8-12 weeks post antibiotic treatment.     7. Are you on Dialysis? no  If yes, schedule procedure for the day AFTER dialysis.  Appt time should be 9am or later, patient arrival time is 2 hours  "prior.  Nulytely or    miralax prep for all patients with kidney disease.     8. Are you diabetic?  no   If yes, schedule morning appt. Advise pt to hold all diabetic meds day of procedure.     9. If pt is older than 80 years of age and HAS NOT been seen by GI or PCP within the last 6 months, needs appt with GI [facility].   If pt has been seen by the GI provider or PCP within the past 6  months AND meets criteria, schedule procedure AND send message to the endoscopist.     10. Is patient on a "high risk" medication (blood thinner/antiplatelet agent)?  no   If yes, has cardiac clearance been obtained within the last 60 days? N/A   If no, a new clearance needs to be obtained.       I have reviewed the last colonoscopy for recommendations regarding next procedure bowel prep.  yes  I have reviewed medications and allergies.  yes  I have verified the pharmacy information and appropriate prep sent if needed. yes  Prep instructions have been mailed or sent to portal per patient request. yes    If answers yes to any of the following, schedule at [facility] ONLY. If No, OK for either location.     Is BMI over 45?   Any complaints of chest pain, new onset or at rest?  Does pt have an AICD?  Is there a diagnosis of heart failure?  Does patient have an insulin pump?  If procedure for esophageal banding?      "

## 2020-05-28 LAB
AORTIC ROOT ANNULUS: 3.97 CM
ASCENDING AORTA: 2.96 CM
AV INDEX (PROSTH): 0.78
AV MEAN GRADIENT: 3 MMHG
AV PEAK GRADIENT: 6 MMHG
AV VALVE AREA: 3.79 CM2
AV VELOCITY RATIO: 0.77
BSA FOR ECHO PROCEDURE: 2.46 M2
CV ECHO LV RWT: 0.62 CM
DOP CALC AO PEAK VEL: 1.24 M/S
DOP CALC AO VTI: 24.15 CM
DOP CALC LVOT AREA: 4.8 CM2
DOP CALC LVOT DIAMETER: 2.48 CM
DOP CALC LVOT PEAK VEL: 0.95 M/S
DOP CALC LVOT STROKE VOLUME: 91.44 CM3
DOP CALCLVOT PEAK VEL VTI: 18.94 CM
E WAVE DECELERATION TIME: 401.54 MSEC
E/A RATIO: 0.71
E/E' RATIO: 4.88 M/S
ECHO LV POSTERIOR WALL: 1.63 CM (ref 0.6–1.1)
FRACTIONAL SHORTENING: 35 % (ref 28–44)
INTERVENTRICULAR SEPTUM: 1.65 CM (ref 0.6–1.1)
IVRT: 102.76 MSEC
LA MAJOR: 4.79 CM
LA MINOR: 4.07 CM
LA WIDTH: 4.91 CM
LEFT ATRIUM SIZE: 3.08 CM
LEFT ATRIUM VOLUME INDEX: 23.9 ML/M2
LEFT ATRIUM VOLUME: 56.57 CM3
LEFT INTERNAL DIMENSION IN SYSTOLE: 3.42 CM (ref 2.1–4)
LEFT VENTRICLE DIASTOLIC VOLUME INDEX: 55.5 ML/M2
LEFT VENTRICLE DIASTOLIC VOLUME: 131.26 ML
LEFT VENTRICLE MASS INDEX: 167 G/M2
LEFT VENTRICLE SYSTOLIC VOLUME INDEX: 20.3 ML/M2
LEFT VENTRICLE SYSTOLIC VOLUME: 48.12 ML
LEFT VENTRICULAR INTERNAL DIMENSION IN DIASTOLE: 5.23 CM (ref 3.5–6)
LEFT VENTRICULAR MASS: 394.34 G
LV LATERAL E/E' RATIO: 5.57 M/S
LV SEPTAL E/E' RATIO: 4.33 M/S
MV PEAK A VEL: 0.55 M/S
MV PEAK E VEL: 0.39 M/S
PISA TR MAX VEL: 2.5 M/S
RA MAJOR: 5.26 CM
RA PRESSURE: 3 MMHG
SINUS: 3.89 CM
STJ: 3.4 CM
TDI LATERAL: 0.07 M/S
TDI SEPTAL: 0.09 M/S
TDI: 0.08 M/S
TR MAX PG: 25 MMHG
TRICUSPID ANNULAR PLANE SYSTOLIC EXCURSION: 1.65 CM
TV REST PULMONARY ARTERY PRESSURE: 28 MMHG

## 2020-05-29 ENCOUNTER — TELEPHONE (OUTPATIENT)
Dept: CARDIOLOGY | Facility: CLINIC | Age: 70
End: 2020-05-29

## 2020-05-29 NOTE — TELEPHONE ENCOUNTER
Called and verified  and test rev'd. Cm  ----- Message from Sai Knowles MD sent at 2020  5:53 PM CDT -----  Echo with normal systolic function

## 2020-05-29 NOTE — TELEPHONE ENCOUNTER
Called and left v/m to rtc to review test results. Cm  ----- Message from Sai Knowles MD sent at 5/28/2020  5:53 PM CDT -----  Echo with normal systolic function

## 2020-06-01 ENCOUNTER — PATIENT OUTREACH (OUTPATIENT)
Dept: ADMINISTRATIVE | Facility: HOSPITAL | Age: 70
End: 2020-06-01

## 2020-06-01 NOTE — PROGRESS NOTES
Health Maintenance Updated.   Immunizations: Abstracted.  Care Everywhere: Abstracted:Results Updated.    Health Maintenance Due   Topic    Colonoscopy    COLON:DARWIN 6/22/2020.

## 2020-06-03 ENCOUNTER — TELEPHONE (OUTPATIENT)
Dept: CARDIOLOGY | Facility: CLINIC | Age: 70
End: 2020-06-03

## 2020-06-03 NOTE — TELEPHONE ENCOUNTER
V/M to [facility] for test results. Cm  ----- Message from Sai Knowles MD sent at 6/2/2020  6:03 AM CDT -----  Stress test is negative

## 2020-06-03 NOTE — TELEPHONE ENCOUNTER
The patient has been notified of this information and all questions answered. Stress test is negative. Pt verbalizes understanding.

## 2020-06-04 ENCOUNTER — TELEPHONE (OUTPATIENT)
Dept: CARDIOLOGY | Facility: CLINIC | Age: 70
End: 2020-06-04

## 2020-06-04 NOTE — TELEPHONE ENCOUNTER
Verified  Results rev'd. Keep f/u appt and call if needed. Cm  ----- Message from Sai Knowles MD sent at 2020  6:03 AM CDT -----  Stress test is negative

## 2020-06-13 ENCOUNTER — PATIENT OUTREACH (OUTPATIENT)
Dept: ADMINISTRATIVE | Facility: OTHER | Age: 70
End: 2020-06-13

## 2020-06-15 ENCOUNTER — OFFICE VISIT (OUTPATIENT)
Dept: INTERNAL MEDICINE | Facility: CLINIC | Age: 70
End: 2020-06-15
Payer: MEDICARE

## 2020-06-15 VITALS
HEIGHT: 69 IN | HEART RATE: 70 BPM | BODY MASS INDEX: 43.4 KG/M2 | WEIGHT: 293 LBS | SYSTOLIC BLOOD PRESSURE: 138 MMHG | TEMPERATURE: 99 F | OXYGEN SATURATION: 95 % | DIASTOLIC BLOOD PRESSURE: 88 MMHG

## 2020-06-15 DIAGNOSIS — I10 ESSENTIAL HYPERTENSION: ICD-10-CM

## 2020-06-15 DIAGNOSIS — N18.30 CKD (CHRONIC KIDNEY DISEASE), STAGE III: ICD-10-CM

## 2020-06-15 DIAGNOSIS — E78.00 PURE HYPERCHOLESTEROLEMIA: ICD-10-CM

## 2020-06-15 DIAGNOSIS — M15.9 GENERALIZED OSTEOARTHROSIS, INVOLVING MULTIPLE SITES: ICD-10-CM

## 2020-06-15 DIAGNOSIS — Z87.39 HISTORY OF GOUT: ICD-10-CM

## 2020-06-15 DIAGNOSIS — Z00.00 ENCOUNTER FOR PREVENTIVE HEALTH EXAMINATION: Primary | ICD-10-CM

## 2020-06-15 DIAGNOSIS — M51.16 LUMBAR DISC DISEASE WITH RADICULOPATHY: ICD-10-CM

## 2020-06-15 DIAGNOSIS — E66.01 MORBID OBESITY WITH BMI OF 40.0-44.9, ADULT: ICD-10-CM

## 2020-06-15 DIAGNOSIS — N40.0 BENIGN PROSTATIC HYPERPLASIA, UNSPECIFIED WHETHER LOWER URINARY TRACT SYMPTOMS PRESENT: ICD-10-CM

## 2020-06-15 DIAGNOSIS — E29.1 HYPOGONADISM MALE: ICD-10-CM

## 2020-06-15 PROCEDURE — 99499 RISK ADDL DX/OHS AUDIT: ICD-10-PCS | Mod: HCNC,S$GLB,, | Performed by: NURSE PRACTITIONER

## 2020-06-15 PROCEDURE — 99999 PR PBB SHADOW E&M-EST. PATIENT-LVL IV: ICD-10-PCS | Mod: PBBFAC,HCNC,, | Performed by: NURSE PRACTITIONER

## 2020-06-15 PROCEDURE — 3079F DIAST BP 80-89 MM HG: CPT | Mod: HCNC,CPTII,S$GLB, | Performed by: NURSE PRACTITIONER

## 2020-06-15 PROCEDURE — G0439 PPPS, SUBSEQ VISIT: HCPCS | Mod: HCNC,S$GLB,, | Performed by: NURSE PRACTITIONER

## 2020-06-15 PROCEDURE — 3075F PR MOST RECENT SYSTOLIC BLOOD PRESS GE 130-139MM HG: ICD-10-PCS | Mod: HCNC,CPTII,S$GLB, | Performed by: NURSE PRACTITIONER

## 2020-06-15 PROCEDURE — 3075F SYST BP GE 130 - 139MM HG: CPT | Mod: HCNC,CPTII,S$GLB, | Performed by: NURSE PRACTITIONER

## 2020-06-15 PROCEDURE — 3079F PR MOST RECENT DIASTOLIC BLOOD PRESSURE 80-89 MM HG: ICD-10-PCS | Mod: HCNC,CPTII,S$GLB, | Performed by: NURSE PRACTITIONER

## 2020-06-15 PROCEDURE — 99999 PR PBB SHADOW E&M-EST. PATIENT-LVL IV: CPT | Mod: PBBFAC,HCNC,, | Performed by: NURSE PRACTITIONER

## 2020-06-15 PROCEDURE — G0439 PR MEDICARE ANNUAL WELLNESS SUBSEQUENT VISIT: ICD-10-PCS | Mod: HCNC,S$GLB,, | Performed by: NURSE PRACTITIONER

## 2020-06-15 PROCEDURE — 99499 UNLISTED E&M SERVICE: CPT | Mod: HCNC,S$GLB,, | Performed by: NURSE PRACTITIONER

## 2020-06-16 ENCOUNTER — OFFICE VISIT (OUTPATIENT)
Dept: CARDIOLOGY | Facility: CLINIC | Age: 70
End: 2020-06-16
Payer: MEDICARE

## 2020-06-16 VITALS
OXYGEN SATURATION: 97 % | WEIGHT: 288.13 LBS | HEART RATE: 58 BPM | BODY MASS INDEX: 42.67 KG/M2 | HEIGHT: 69 IN | SYSTOLIC BLOOD PRESSURE: 132 MMHG | DIASTOLIC BLOOD PRESSURE: 88 MMHG

## 2020-06-16 DIAGNOSIS — E78.00 PURE HYPERCHOLESTEROLEMIA: ICD-10-CM

## 2020-06-16 DIAGNOSIS — I10 ESSENTIAL HYPERTENSION: Primary | ICD-10-CM

## 2020-06-16 PROCEDURE — 1101F PT FALLS ASSESS-DOCD LE1/YR: CPT | Mod: HCNC,CPTII,S$GLB, | Performed by: INTERNAL MEDICINE

## 2020-06-16 PROCEDURE — 99999 PR PBB SHADOW E&M-EST. PATIENT-LVL III: ICD-10-PCS | Mod: PBBFAC,HCNC,, | Performed by: INTERNAL MEDICINE

## 2020-06-16 PROCEDURE — 1159F MED LIST DOCD IN RCRD: CPT | Mod: HCNC,S$GLB,, | Performed by: INTERNAL MEDICINE

## 2020-06-16 PROCEDURE — 3079F PR MOST RECENT DIASTOLIC BLOOD PRESSURE 80-89 MM HG: ICD-10-PCS | Mod: HCNC,CPTII,S$GLB, | Performed by: INTERNAL MEDICINE

## 2020-06-16 PROCEDURE — 1126F PR PAIN SEVERITY QUANTIFIED, NO PAIN PRESENT: ICD-10-PCS | Mod: HCNC,S$GLB,, | Performed by: INTERNAL MEDICINE

## 2020-06-16 PROCEDURE — 3075F PR MOST RECENT SYSTOLIC BLOOD PRESS GE 130-139MM HG: ICD-10-PCS | Mod: HCNC,CPTII,S$GLB, | Performed by: INTERNAL MEDICINE

## 2020-06-16 PROCEDURE — 99999 PR PBB SHADOW E&M-EST. PATIENT-LVL III: CPT | Mod: PBBFAC,HCNC,, | Performed by: INTERNAL MEDICINE

## 2020-06-16 PROCEDURE — 3075F SYST BP GE 130 - 139MM HG: CPT | Mod: HCNC,CPTII,S$GLB, | Performed by: INTERNAL MEDICINE

## 2020-06-16 PROCEDURE — 99214 OFFICE O/P EST MOD 30 MIN: CPT | Mod: HCNC,S$GLB,, | Performed by: INTERNAL MEDICINE

## 2020-06-16 PROCEDURE — 99214 PR OFFICE/OUTPT VISIT, EST, LEVL IV, 30-39 MIN: ICD-10-PCS | Mod: HCNC,S$GLB,, | Performed by: INTERNAL MEDICINE

## 2020-06-16 PROCEDURE — 1101F PR PT FALLS ASSESS DOC 0-1 FALLS W/OUT INJ PAST YR: ICD-10-PCS | Mod: HCNC,CPTII,S$GLB, | Performed by: INTERNAL MEDICINE

## 2020-06-16 PROCEDURE — 1126F AMNT PAIN NOTED NONE PRSNT: CPT | Mod: HCNC,S$GLB,, | Performed by: INTERNAL MEDICINE

## 2020-06-16 PROCEDURE — 1159F PR MEDICATION LIST DOCUMENTED IN MEDICAL RECORD: ICD-10-PCS | Mod: HCNC,S$GLB,, | Performed by: INTERNAL MEDICINE

## 2020-06-16 PROCEDURE — 3079F DIAST BP 80-89 MM HG: CPT | Mod: HCNC,CPTII,S$GLB, | Performed by: INTERNAL MEDICINE

## 2020-06-16 NOTE — PROGRESS NOTES
Subjective:   Patient ID:  Enoc Collado is a 69 y.o. male who presents for follow-up of No chief complaint on file.  Stress test and echo nml  Patient denies CP, angina or anginal equivalent.    Chest Pain   This is a new problem. The current episode started more than 1 month ago. The problem occurs intermittently. The problem has been resolved. The pain is present in the lateral region. The pain is mild. The quality of the pain is described as dull. The pain does not radiate. Pertinent negatives include no dizziness, palpitations or shortness of breath. The pain is aggravated by nothing. He has tried rest for the symptoms. The treatment provided moderate relief.   His past medical history is significant for hyperlipidemia.   Pertinent negatives for past medical history include no muscle weakness.   Hypertension  This is a chronic problem. The current episode started more than 1 year ago. The problem has been gradually improving since onset. Associated symptoms include chest pain. Pertinent negatives include no palpitations or shortness of breath. Past treatments include beta blockers, calcium channel blockers, diuretics and angiotensin blockers. The current treatment provides moderate improvement. There are no compliance problems.    Hyperlipidemia  This is a chronic problem. The current episode started more than 1 year ago. The problem is controlled. Recent lipid tests were reviewed and are variable. Associated symptoms include chest pain. Pertinent negatives include no shortness of breath. Current antihyperlipidemic treatment includes statins. The current treatment provides moderate improvement of lipids. There are no compliance problems.        Review of Systems   Constitution: Negative. Negative for weight gain.   HENT: Negative.    Eyes: Negative.    Cardiovascular: Positive for chest pain. Negative for leg swelling and palpitations.   Respiratory: Negative.  Negative for shortness of breath.    Endocrine:  Negative.    Hematologic/Lymphatic: Negative.    Skin: Negative.    Musculoskeletal: Negative for muscle weakness.   Gastrointestinal: Negative.    Genitourinary: Negative.    Neurological: Negative.  Negative for dizziness.   Psychiatric/Behavioral: Negative.    Allergic/Immunologic: Negative.      Family History   Problem Relation Age of Onset    Hypertension Father     Cataracts Father     Heart attack Father     Cataracts Mother     Hypertension Mother     Cancer Cousin         colon    Heart disease Brother      Past Medical History:   Diagnosis Date    BPH (benign prostatic hyperplasia)     CKD (chronic kidney disease), stage III     Colon polyps 2015    Generalized osteoarthrosis, involving multiple sites     History of gout     Hyperlipidemia     Hypertension     Hypogonadism male     Lumbar disc disease with radiculopathy     Morbid obesity with BMI of 40.0-44.9, adult      Social History     Socioeconomic History    Marital status:      Spouse name: Not on file    Number of children: Not on file    Years of education: Not on file    Highest education level: Not on file   Occupational History    Occupation: Retired   Social Needs    Financial resource strain: Not on file    Food insecurity     Worry: Not on file     Inability: Not on file    Transportation needs     Medical: Not on file     Non-medical: Not on file   Tobacco Use    Smoking status: Never Smoker    Smokeless tobacco: Current User     Types: Snuff   Substance and Sexual Activity    Alcohol use: No    Drug use: No    Sexual activity: Yes     Partners: Female   Lifestyle    Physical activity     Days per week: Not on file     Minutes per session: Not on file    Stress: Not at all   Relationships    Social connections     Talks on phone: Not on file     Gets together: Not on file     Attends Taoism service: Not on file     Active member of club or organization: Not on file     Attends meetings of clubs  "or organizations: Not on file     Relationship status: Not on file   Other Topics Concern    Not on file   Social History Narrative    Live w/ spouse. No pets      Current Outpatient Medications on File Prior to Visit   Medication Sig Dispense Refill    allopurinol (ZYLOPRIM) 300 MG tablet Take 1 tablet (300 mg total) by mouth once daily. 90 tablet 3    ALPRAZolam (XANAX) 0.5 MG tablet Take 1 tablet (0.5 mg total) by mouth 3 (three) times daily as needed for Anxiety. 20 tablet 0    amLODIPine (NORVASC) 5 MG tablet Take 1 tablet (5 mg total) by mouth once daily. 90 tablet 3    aspirin 325 MG tablet Take 325 mg by mouth once daily.      carvedilol (COREG) 25 MG tablet Take 1 tablet (25 mg total) by mouth 2 (two) times daily with meals. 180 tablet 3    cyclobenzaprine (FLEXERIL) 10 MG tablet Take 1 tablet (10 mg total) by mouth 3 (three) times daily as needed for Muscle spasms. (Patient not taking: Reported on 3/9/2020) 180 tablet 3    gabapentin (NEURONTIN) 300 MG capsule TAKE 1 CAPSULE EVERY EVENING  AND TAKE 2 CAPSULES AT BEDTIME 270 capsule 3    hydroCHLOROthiazide (HYDRODIURIL) 25 MG tablet TAKE 1 TABLET EVERY DAY 90 tablet 3    HYDROcodone-acetaminophen (NORCO)  mg per tablet Take 1 tablet by mouth every 8 (eight) hours as needed for Pain. 90 tablet 0    lisinopril (PRINIVIL,ZESTRIL) 40 MG tablet Take 1 tablet (40 mg total) by mouth once daily. 90 tablet 3    pravastatin (PRAVACHOL) 20 MG tablet Take 1 tablet (20 mg total) by mouth once daily. 90 tablet 3    sildenafil (REVATIO) 20 mg Tab 2-4 tablets as needed one hour prior to intercourse 40 tablet 11    syringe with needle, safety 3 mL 21 gauge x 1 1/2" Syrg 1 Syringe by Misc.(Non-Drug; Combo Route) route every 21 days. 10 Syringe 1    testosterone cypionate (DEPOTESTOTERONE CYPIONATE) 200 mg/mL injection Inject 1.5 mLs (300 mg total) into the muscle every 14 (fourteen) days. 10 mL 1     No current facility-administered medications on file " prior to visit.      Review of patient's allergies indicates:   Allergen Reactions    No known drug allergies        Objective:     Physical Exam   Constitutional: He is oriented to person, place, and time. He appears well-developed and well-nourished.   HENT:   Head: Normocephalic and atraumatic.   Eyes: Pupils are equal, round, and reactive to light. Conjunctivae are normal.   Neck: Normal range of motion. Neck supple.   Cardiovascular: Normal rate, regular rhythm, normal heart sounds and intact distal pulses.   Pulmonary/Chest: Effort normal and breath sounds normal.   Abdominal: Soft. Bowel sounds are normal.   Neurological: He is alert and oriented to person, place, and time.   Skin: Skin is warm and dry.   Psychiatric: He has a normal mood and affect.   Nursing note and vitals reviewed.      Assessment:     1. Essential hypertension    2. Pure hypercholesterolemia        Plan:     Essential hypertension    Pure hypercholesterolemia      Continue coreg, norvasc, hctz, lisinopril -htn  Continue statin-hlp  PPI and NSAID for CP

## 2020-06-16 NOTE — PROGRESS NOTES
"Enoc Collado presented for a  Medicare AWV and comprehensive Health Risk Assessment today. The following components were reviewed and updated:    · Medical history  · Family History  · Social history  · Allergies and Current Medications  · Health Risk Assessment  · Health Maintenance  · Care Team     ** See Completed Assessments for Annual Wellness Visit within the encounter summary.**       The following assessments were completed:  · Living Situation  · CAGE  · Depression Screening  · Timed Get Up and Go  · Whisper Test  · Cognitive Function Screening  · Nutrition Screening  · ADL Screening  · PAQ Screening    Vitals:    06/15/20 1505   BP: 138/88   BP Location: Left arm   Patient Position: Sitting   BP Method: Large (Manual)   Pulse: 70   Temp: 99 °F (37.2 °C)   TempSrc: Tympanic   SpO2: 95%   Weight: 132.9 kg (292 lb 15.9 oz)   Height: 5' 9" (1.753 m)     Body mass index is 43.27 kg/m².  Physical Exam  Constitutional:       General: He is not in acute distress.     Appearance: Normal appearance. He is well-developed. He is obese. He is not diaphoretic.   HENT:      Head: Normocephalic and atraumatic.      Right Ear: Tympanic membrane normal.      Left Ear: Tympanic membrane normal.      Nose: Nose normal.      Mouth/Throat:      Mouth: Mucous membranes are moist.      Pharynx: No posterior oropharyngeal erythema.   Eyes:      Conjunctiva/sclera: Conjunctivae normal.   Neck:      Musculoskeletal: Normal range of motion.      Vascular: No carotid bruit.   Cardiovascular:      Rate and Rhythm: Normal rate and regular rhythm.      Heart sounds: Normal heart sounds. No murmur. No friction rub. No gallop.    Pulmonary:      Effort: Pulmonary effort is normal. No respiratory distress.      Breath sounds: Normal breath sounds. No wheezing or rales.   Chest:      Chest wall: No tenderness.   Abdominal:      General: Bowel sounds are normal.      Comments: obese   Musculoskeletal: Normal range of motion.         General: " No tenderness.   Skin:     General: Skin is warm and dry.   Neurological:      Mental Status: He is alert and oriented to person, place, and time.      Cranial Nerves: No cranial nerve deficit.   Psychiatric:         Behavior: Behavior normal.           Diagnoses and health risks identified today and associated recommendations/orders:    1. Encounter for preventive health examination  Screenings performed, as noted above.  Personal preventative testing needs reviewed.     2. Lumbar disc disease with radiculopathy  Monitored/treated on meds, continue the same tx, stable    3. Pure hypercholesterolemia  Monitored/treated on meds, continue the same tx, stable, last .6    4. Essential hypertension  Monitored/treated on meds, continue the same tx, stable    5. CKD   Monitored/treated on meds, continue the same tx, improved, llast GFR > 60 with creat 1.1    6. Benign prostatic hyperplasia, unspecified whether lower urinary tract symptoms present  Monitored/treated on meds, continue the same tx, stable    7. Morbid obesity with BMI of 40.0-44.9, adult  Monitored/treated on meds, continue the same tx, stable    8. Hypogonadism male  Monitored/treated on meds, continue the same tx, stable    9. History of gout  Monitored/treated on meds, continue the same tx, stable    10. Generalized osteoarthrosis, involving multiple sites  Monitored/treated on meds, continue the same tx, wants knees injected, will see his pcp 7/7/20      Provided Enoc with a 5-10 year written screening schedule and personal prevention plan. Recommendations were developed using the USPSTF age appropriate recommendations. Education, counseling, and referrals were provided as needed. After Visit Summary printed and given to patient which includes a list of additional screenings\tests needed.    No follow-ups on file.    Miguel Angel Phililp NP

## 2020-06-16 NOTE — PATIENT INSTRUCTIONS
Counseling and Referral of Other Preventative  (Italic type indicates deductible and co-insurance are waived)    Patient Name: Enoc Collado  Today's Date: 6/16/2020    Health Maintenance       Date Due Completion Date    Shingles Vaccine (2 of 3) 02/03/2016 12/9/2015    Colorectal Cancer Screening 06/23/2020 (Originally 2/5/2020) 2/5/2015    Influenza Vaccine (Season Ended) 09/01/2020 ---    PROSTATE-SPECIFIC ANTIGEN 12/12/2020 12/12/2019    Lipid Panel 12/12/2020 12/12/2019    TETANUS VACCINE 01/13/2027 1/13/2017        No orders of the defined types were placed in this encounter.    The following information is provided to all patients.  This information is to help you find resources for any of the problems found today that may be affecting your health:                Living healthy guide: www.[facility].[place].gov      Understanding Diabetes: www.diabetes.org      Eating healthy: www.cdc.gov/healthyweight      [facility] home safety checklist: www.cdc.gov/steadi/patient.html      Agency on Aging: www.goea.[place].[facility]      Alcoholics anonymous (AA): www.aa.org      Physical Activity: www.marifer.nih.gov/bl0jqdo      Tobacco use: www.quitwithusla.org

## 2020-06-20 ENCOUNTER — LAB VISIT (OUTPATIENT)
Dept: URGENT CARE | Facility: CLINIC | Age: 70
End: 2020-06-20
Payer: MEDICARE

## 2020-06-20 DIAGNOSIS — Z12.11 COLON CANCER SCREENING: ICD-10-CM

## 2020-06-20 PROBLEM — Z86.010 PERSONAL HISTORY OF COLONIC POLYPS: Status: ACTIVE | Noted: 2020-06-20

## 2020-06-20 PROCEDURE — U0003 INFECTIOUS AGENT DETECTION BY NUCLEIC ACID (DNA OR RNA); SEVERE ACUTE RESPIRATORY SYNDROME CORONAVIRUS 2 (SARS-COV-2) (CORONAVIRUS DISEASE [COVID-19]), AMPLIFIED PROBE TECHNIQUE, MAKING USE OF HIGH THROUGHPUT TECHNOLOGIES AS DESCRIBED BY CMS-2020-01-R: HCPCS | Mod: HCNC

## 2020-06-21 LAB — SARS-COV-2 RNA RESP QL NAA+PROBE: NOT DETECTED

## 2020-06-22 ENCOUNTER — ANESTHESIA EVENT (OUTPATIENT)
Dept: ENDOSCOPY | Facility: HOSPITAL | Age: 70
End: 2020-06-22
Payer: MEDICARE

## 2020-06-22 ENCOUNTER — ANESTHESIA (OUTPATIENT)
Dept: ENDOSCOPY | Facility: HOSPITAL | Age: 70
End: 2020-06-22
Payer: MEDICARE

## 2020-06-22 ENCOUNTER — HOSPITAL ENCOUNTER (OUTPATIENT)
Facility: HOSPITAL | Age: 70
Discharge: HOME | End: 2020-06-22
Attending: INTERNAL MEDICINE | Admitting: INTERNAL MEDICINE
Payer: MEDICARE

## 2020-06-22 VITALS
TEMPERATURE: 98 F | OXYGEN SATURATION: 97 % | DIASTOLIC BLOOD PRESSURE: 86 MMHG | RESPIRATION RATE: 18 BRPM | SYSTOLIC BLOOD PRESSURE: 130 MMHG | HEART RATE: 68 BPM

## 2020-06-22 DIAGNOSIS — Z86.010 PERSONAL HISTORY OF COLONIC POLYPS: Primary | ICD-10-CM

## 2020-06-22 PROCEDURE — 88305 TISSUE EXAM BY PATHOLOGIST: CPT | Mod: 26,HCNC,, | Performed by: PATHOLOGY

## 2020-06-22 PROCEDURE — 25000003 PHARM REV CODE 250: Mod: HCNC | Performed by: NURSE ANESTHETIST, CERTIFIED REGISTERED

## 2020-06-22 PROCEDURE — 45385 COLONOSCOPY W/LESION REMOVAL: CPT | Mod: PT,HCNC,, | Performed by: INTERNAL MEDICINE

## 2020-06-22 PROCEDURE — 88305 TISSUE EXAM BY PATHOLOGIST: CPT | Mod: HCNC | Performed by: PATHOLOGY

## 2020-06-22 PROCEDURE — 45380 PR COLONOSCOPY,BIOPSY: ICD-10-PCS | Mod: 59,HCNC,, | Performed by: INTERNAL MEDICINE

## 2020-06-22 PROCEDURE — 88305 TISSUE EXAM BY PATHOLOGIST: ICD-10-PCS | Mod: 26,HCNC,, | Performed by: PATHOLOGY

## 2020-06-22 PROCEDURE — 27201012 HC FORCEPS, HOT/COLD, DISP: Mod: HCNC | Performed by: INTERNAL MEDICINE

## 2020-06-22 PROCEDURE — 45385 PR COLONOSCOPY,REMV LESN,SNARE: ICD-10-PCS | Mod: PT,HCNC,, | Performed by: INTERNAL MEDICINE

## 2020-06-22 PROCEDURE — 37000008 HC ANESTHESIA 1ST 15 MINUTES: Mod: HCNC | Performed by: INTERNAL MEDICINE

## 2020-06-22 PROCEDURE — 37000009 HC ANESTHESIA EA ADD 15 MINS: Mod: HCNC | Performed by: INTERNAL MEDICINE

## 2020-06-22 PROCEDURE — 63600175 PHARM REV CODE 636 W HCPCS: Mod: HCNC | Performed by: NURSE ANESTHETIST, CERTIFIED REGISTERED

## 2020-06-22 PROCEDURE — 45380 COLONOSCOPY AND BIOPSY: CPT | Mod: 59,HCNC,, | Performed by: INTERNAL MEDICINE

## 2020-06-22 PROCEDURE — 45380 COLONOSCOPY AND BIOPSY: CPT | Mod: HCNC | Performed by: INTERNAL MEDICINE

## 2020-06-22 PROCEDURE — 45385 COLONOSCOPY W/LESION REMOVAL: CPT | Mod: HCNC | Performed by: INTERNAL MEDICINE

## 2020-06-22 PROCEDURE — 27201089 HC SNARE, DISP (ANY): Mod: HCNC | Performed by: INTERNAL MEDICINE

## 2020-06-22 RX ORDER — PROPOFOL 10 MG/ML
VIAL (ML) INTRAVENOUS
Status: DISCONTINUED | OUTPATIENT
Start: 2020-06-22 | End: 2020-06-22

## 2020-06-22 RX ORDER — LIDOCAINE HYDROCHLORIDE 10 MG/ML
INJECTION, SOLUTION EPIDURAL; INFILTRATION; INTRACAUDAL; PERINEURAL
Status: DISCONTINUED | OUTPATIENT
Start: 2020-06-22 | End: 2020-06-22

## 2020-06-22 RX ORDER — SODIUM CHLORIDE, SODIUM LACTATE, POTASSIUM CHLORIDE, CALCIUM CHLORIDE 600; 310; 30; 20 MG/100ML; MG/100ML; MG/100ML; MG/100ML
INJECTION, SOLUTION INTRAVENOUS CONTINUOUS PRN
Status: DISCONTINUED | OUTPATIENT
Start: 2020-06-22 | End: 2020-06-22

## 2020-06-22 RX ADMIN — SODIUM CHLORIDE, SODIUM LACTATE, POTASSIUM CHLORIDE, AND CALCIUM CHLORIDE: 600; 310; 30; 20 INJECTION, SOLUTION INTRAVENOUS at 07:06

## 2020-06-22 RX ADMIN — PROPOFOL 20 MG: 10 INJECTION, EMULSION INTRAVENOUS at 07:06

## 2020-06-22 RX ADMIN — PROPOFOL 50 MG: 10 INJECTION, EMULSION INTRAVENOUS at 07:06

## 2020-06-22 RX ADMIN — PROPOFOL 30 MG: 10 INJECTION, EMULSION INTRAVENOUS at 07:06

## 2020-06-22 RX ADMIN — LIDOCAINE HYDROCHLORIDE 50 MG: 10 INJECTION, SOLUTION EPIDURAL; INFILTRATION; INTRACAUDAL; PERINEURAL at 07:06

## 2020-06-22 NOTE — ANESTHESIA POSTPROCEDURE EVALUATION
Anesthesia Post Evaluation    Patient: Enoc Collado    Procedure(s) Performed: Procedure(s) (LRB):  COLONOSCOPY (N/A)    Final Anesthesia Type: MAC    Patient location during evaluation: GI PACU  Patient participation: Yes- Able to Participate  Level of consciousness: awake and alert and oriented  Post-procedure vital signs: reviewed and stable  Pain management: adequate  Airway patency: patent  WISAM mitigation strategies: Multimodal analgesia  PONV status at discharge: No PONV  Anesthetic complications: no      Cardiovascular status: hemodynamically stable  Respiratory status: unassisted, spontaneous ventilation and room air  Hydration status: euvolemic  Follow-up not needed.          Vitals Value Taken Time   /86 06/22/20 0806   Temp 36.7 °C (98 °F) 06/22/20 0806   Pulse 68 06/22/20 0806   Resp 18 06/22/20 0806   SpO2 97 % 06/22/20 0806         Event Time   Out of Recovery 08:16:17         Pain/Cecilia Score: Cecilia Score: 10 (6/22/2020  8:16 AM)

## 2020-06-22 NOTE — DISCHARGE INSTRUCTIONS

## 2020-06-22 NOTE — ANESTHESIA PREPROCEDURE EVALUATION
06/22/2020  Enoc Collado is a 69 y.o., male.    Anesthesia Evaluation    I have reviewed the Patient Summary Reports.    I have reviewed the Nursing Notes. I have reviewed the NPO Status.   I have reviewed the Medications.     Review of Systems  Anesthesia Hx:  No problems with previous Anesthesia    Social:  Former Smoker, No Alcohol Use    Hematology/Oncology:  Hematology Normal   Oncology Normal     EENT/Dental:EENT/Dental Normal   Cardiovascular:   Exercise tolerance: good Hypertension, well controlled    Pulmonary:  Pulmonary Normal    Renal/:   Chronic Renal Disease, CRI    Hepatic/GI:  Hepatic/GI Normal Bowel Prep.    Musculoskeletal:  Spine Disorders: lumbar Chronic Pain    Neurological:  Neurology Normal    Endocrine:  Endocrine Normal    Dermatological:  Skin Normal    Psych:  Psychiatric Normal           Physical Exam  General:  Well nourished, Obesity    Airway/Jaw/Neck:  Airway Findings: Mouth Opening: Normal Tongue: Normal  General Airway Assessment: Adult  Mallampati: II  TM Distance: Normal, at least 6 cm  Jaw/Neck Findings:  Neck ROM: Normal ROM      Dental:  Dental Findings:   Chest/Lungs:  Chest/Lungs Findings: Normal Respiratory Rate     Heart/Vascular:  Heart Findings: Rate: Normal        Mental Status:  Mental Status Findings:  Cooperative, Alert and Oriented         Anesthesia Plan  Type of Anesthesia, risks & benefits discussed:  Anesthesia Type:  MAC  Patient's Preference:   Intra-op Monitoring Plan: standard ASA monitors  Intra-op Monitoring Plan Comments:   Post Op Pain Control Plan:   Post Op Pain Control Plan Comments:   Induction:   IV  Beta Blocker:  Patient is on a Beta-Blocker and has received one dose within the past 24 hours (No further documentation required).       Informed Consent: Patient understands risks and agrees with Anesthesia plan.  Questions answered.  Anesthesia consent signed with patient.  ASA Score: 2     Day of Surgery Review of History & Physical: I have interviewed and examined the patient. I have reviewed the patient's H&P dated:  There are no significant changes.          Ready For Surgery From Anesthesia Perspective.

## 2020-06-22 NOTE — PLAN OF CARE
Dr Conroy came to bedside and discussed findings. NO N/V,  no abdominal pain, no GI bleeding, and vitals stable.  Pt discharged from unit.

## 2020-06-22 NOTE — ANESTHESIA RELEASE NOTE
Anesthesia Release from PACU Note    Patient: Enoc Collado    Procedure(s) Performed: Procedure(s) (LRB):  COLONOSCOPY (N/A)    Anesthesia type: MAC    Post pain: Adequate analgesia    Post assessment: no apparent anesthetic complications and tolerated procedure well    Last Vitals:   Visit Vitals  /81   Pulse 63   Temp 36.6 °C (97.9 °F)   Resp 18   SpO2 96%       Post vital signs: stable    Level of consciousness: awake, alert  and oriented    Nausea/Vomiting: no nausea/no vomiting    Complications: none    Airway Patency: patent    Respiratory: unassisted, spontaneous ventilation, room air    Cardiovascular: stable and blood pressure at baseline    Hydration: euvolemic

## 2020-06-22 NOTE — H&P
Short Stay Endoscopy History and Physical    PCP - Frantz Mason MD    Procedure - Colonoscopy  ASA - 2  Mallampati - per anesthesia  History of Anesthesia problems - no  Family history Anesthesia problems -  no     HPI:  This is a 69 y.o. male here for evaluation of :   Active Hospital Problems    Diagnosis  POA    *Personal history of colonic polyps [Z86.010]  Not Applicable      Resolved Hospital Problems   No resolved problems to display.         Health Maintenance       Date Due Completion Date    Shingles Vaccine (2 of 3) 02/03/2016 12/9/2015    Colorectal Cancer Screening 06/23/2020 (Originally 2/5/2020) 2/5/2015    Influenza Vaccine (Season Ended) 09/01/2020 ---    PROSTATE-SPECIFIC ANTIGEN 12/12/2020 12/12/2019    Lipid Panel 12/12/2020 12/12/2019    TETANUS VACCINE 01/13/2027 1/13/2017          Screening - no  History of polyps - yes  Diarrhea - no  Anemia - no  Blood in stools - no  Abdominal pain - no  Family History of Colon Cancer- no  Other - no    ROS:  CONSTITUTIONAL: Denies weight change,  fatigue, fevers, chills, night sweats.  CARDIOVASCULAR: Denies chest pain, shortness of breath, orthopnea and edema.  RESPIRATORY: Denies cough, hemoptysis, dyspnea, and wheezing.  GI: See HPI.    Medical History:   Past Medical History:   Diagnosis Date    BPH (benign prostatic hyperplasia)     CKD (chronic kidney disease), stage III     Colon polyps 2015    Generalized osteoarthrosis, involving multiple sites     History of gout     Hyperlipidemia     Hypertension     Hypogonadism male     Lumbar disc disease with radiculopathy     Morbid obesity with BMI of 40.0-44.9, adult        Surgical History:   Past Surgical History:   Procedure Laterality Date    EYE SURGERY      JOINT REPLACEMENT      KNEE SCOPE      open globe      right eye 1980    ROTATOR CUFF REPAIR      SHOULDER SURGERY Right        Family History:   Family History   Problem Relation Age of Onset    Hypertension Father      Cataracts Father     Heart attack Father     Cataracts Mother     Hypertension Mother     Cancer Cousin         colon    Heart disease Brother        Social History:   Social History     Tobacco Use    Smoking status: Never Smoker    Smokeless tobacco: Current User     Types: Snuff   Substance Use Topics    Alcohol use: No    Drug use: No       Allergies:   Review of patient's allergies indicates:   Allergen Reactions    No known drug allergies        Medications:   No current facility-administered medications on file prior to encounter.      Current Outpatient Medications on File Prior to Encounter   Medication Sig Dispense Refill    allopurinol (ZYLOPRIM) 300 MG tablet Take 1 tablet (300 mg total) by mouth once daily. 90 tablet 3    ALPRAZolam (XANAX) 0.5 MG tablet Take 1 tablet (0.5 mg total) by mouth 3 (three) times daily as needed for Anxiety. 20 tablet 0    amLODIPine (NORVASC) 5 MG tablet Take 1 tablet (5 mg total) by mouth once daily. 90 tablet 3    aspirin 325 MG tablet Take 325 mg by mouth once daily.      carvedilol (COREG) 25 MG tablet Take 1 tablet (25 mg total) by mouth 2 (two) times daily with meals. 180 tablet 3    cyclobenzaprine (FLEXERIL) 10 MG tablet Take 1 tablet (10 mg total) by mouth 3 (three) times daily as needed for Muscle spasms. (Patient not taking: Reported on 6/16/2020) 180 tablet 3    gabapentin (NEURONTIN) 300 MG capsule TAKE 1 CAPSULE EVERY EVENING  AND TAKE 2 CAPSULES AT BEDTIME 270 capsule 3    hydroCHLOROthiazide (HYDRODIURIL) 25 MG tablet TAKE 1 TABLET EVERY DAY 90 tablet 3    HYDROcodone-acetaminophen (NORCO)  mg per tablet Take 1 tablet by mouth every 8 (eight) hours as needed for Pain. 90 tablet 0    lisinopril (PRINIVIL,ZESTRIL) 40 MG tablet Take 1 tablet (40 mg total) by mouth once daily. 90 tablet 3    pravastatin (PRAVACHOL) 20 MG tablet Take 1 tablet (20 mg total) by mouth once daily. 90 tablet 3    sildenafil (REVATIO) 20 mg Tab 2-4 tablets as  "needed one hour prior to intercourse 40 tablet 11    sodium,potassium,mag sulfates (SUPREP BOWEL PREP KIT) 17.5-3.13-1.6 gram SolR Use as directed 354 mL 0    syringe with needle, safety 3 mL 21 gauge x 1 1/2" Syrg 1 Syringe by Misc.(Non-Drug; Combo Route) route every 21 days. 10 Syringe 1    testosterone cypionate (DEPOTESTOTERONE CYPIONATE) 200 mg/mL injection Inject 1.5 mLs (300 mg total) into the muscle every 14 (fourteen) days. 10 mL 1       Physical Exam:  Vital Signs: There were no vitals filed for this visit.  General Appearance: Well appearing in no acute distress  ENT: OP clear  Chest: CTA B  CV: RRR, no m/r/g  Abd: s/nt/nd/nabs  Ext: no edema    Labs:Reviewed    IMP:  Active Hospital Problems    Diagnosis  POA    *Personal history of colonic polyps [Z86.010]  Not Applicable      Resolved Hospital Problems   No resolved problems to display.         Plan:   I have explained the risks and benefits of colonoscopy to the patient including but not limited to bleeding, perforation, infection, and death. The patient wishes to proceed.    "

## 2020-06-22 NOTE — PLAN OF CARE
Dr Collado came to bedside and discussed findings. NO N/V,  no abdominal pain, no GI bleeding, and vitals stable.  Pt discharged from unit.

## 2020-06-22 NOTE — [employer] PATIENT INSTRUCTIONS
Discharge Summary/Instructions after an Endoscopic Procedure  Patient Name: Enoc Collado  Patient MRN: [id number]  Patient YOB: 1950 Monday, June 22, 2020 Montserrat Conroy MD  RESTRICTIONS:  During your procedure today, you received medications for sedation.  These   medications may affect your judgment, balance and coordination.  Therefore,   for 24 hours, you have the following restrictions:   - DO NOT drive a car, operate machinery, make legal/financial decisions,   sign important papers or drink alcohol.    ACTIVITY:  Today: no heavy lifting, straining or running due to procedural   sedation/anesthesia.  The following day: return to full activity including work.  DIET:  Eat and drink normally unless instructed otherwise.     TREATMENT FOR COMMON SIDE EFFECTS:  - Mild abdominal pain, nausea, belching, bloating or excessive gas:  rest,   eat lightly and use a heating pad.  - Sore Throat: treat with throat lozenges and/or gargle with warm salt   water.  - Because air was used during the procedure, expelling large amounts of air   from your rectum or belching is normal.  - If a bowel prep was taken, you may not have a bowel movement for 1-3 days.    This is normal.  SYMPTOMS TO WATCH FOR AND REPORT TO YOUR PHYSICIAN:  1. Abdominal pain or bloating, other than gas cramps.  2. Chest pain.  3. Back pain.  4. Signs of infection such as: chills or fever occurring within 24 hours   after the procedure.  5. Rectal bleeding, which would show as bright red, maroon, or black stools.   (A tablespoon of blood from the rectum is not serious, especially if   hemorrhoids are present.)  6. Vomiting.  7. Weakness or dizziness.  GO DIRECTLY TO THE NEAREST EMERGENCY ROOM IF YOU HAVE ANY OF THE FOLLOWING:      Difficulty breathing              Chills and/or fever over 101 F   Persistent vomiting and/or vomiting blood   Severe abdominal pain   Severe chest pain   Black, tarry stools   Bleeding- more than one  tablespoon   Any other symptom or condition that you feel may need urgent attention  Your doctor recommends these additional instructions:  If any biopsies were taken, your doctors clinic will contact you in 1 to 2   weeks with any results.  - Discharge patient to home (via wheelchair).   - High fiber diet.   - Continue present medications.   - Await pathology results.   - Repeat colonoscopy in 5 years for surveillance.   - Telephone GI clinic for pathology results in 2 weeks.   - Patient has a contact number available for emergencies.  The signs and   symptoms of potential delayed complications were discussed with the   patient.  Return to normal activities tomorrow.  Written discharge   instructions were provided to the patient.  For questions, problems or results please call your physician Montserrat Conroy MD at Work:  [phone]  If you have any questions about the above instructions, call the GI   department at [phone] or call the endoscopy unit at [phone]   from 7am until 3 pm.  [facility] [place], EMERGENCY ROOM PHONE NUMBER:   [phone]  IF A COMPLICATION OR EMERGENCY SITUATION ARISES AND YOU ARE UNABLE TO REACH   YOUR PHYSICIAN - GO DIRECTLY TO THE EMERGENCY ROOM.  I have read or have had read to me these discharge instructions for my   procedure and have received a written copy.  I understand these   instructions and will follow-up with my physician if I have any questions.     __________________________________       _____________________________________  Nurse Signature                                          Patient/Designated   Responsible Party Signature  MD Montserrat Duncan MD  6/22/2020 7:46:55 AM  [employer]

## 2020-06-22 NOTE — DISCHARGE SUMMARY
" Endoscopy Discharge Summary      Admit Date: 6/22/2020    Discharge Date and Time:  6/22/2020 7:47 AM    Attending Physician: Montserrat Cnoroy MD     Discharge Physician: Montserrat Conroy MD     Principal Admitting Diagnoses: Personal history of colonic polyps         Discharge Diagnosis: The encounter diagnosis was Personal history of colonic polyps.     Discharged Condition: Good    Indication for Admission: Personal history of colonic polyps     Hospital Course: Patient was admitted for an inpatient procedure and tolerated the procedure well with no complications.    Significant Diagnostic Studies: Colonoscopy with polypectomy    Estimated Blood Loss: 1 ml.    Discussed with: patient.    Disposition: Home.    Follow Up/Patient Instructions:   Current Discharge Medication List      CONTINUE these medications which have NOT CHANGED    Details   allopurinol (ZYLOPRIM) 300 MG tablet Take 1 tablet (300 mg total) by mouth once daily.  Qty: 90 tablet, Refills: 3      amLODIPine (NORVASC) 5 MG tablet Take 1 tablet (5 mg total) by mouth once daily.  Qty: 90 tablet, Refills: 3      aspirin 325 MG tablet Take 325 mg by mouth once daily.      carvedilol (COREG) 25 MG tablet Take 1 tablet (25 mg total) by mouth 2 (two) times daily with meals.  Qty: 180 tablet, Refills: 3      gabapentin (NEURONTIN) 300 MG capsule TAKE 1 CAPSULE EVERY EVENING  AND TAKE 2 CAPSULES AT BEDTIME  Qty: 270 capsule, Refills: 3      hydroCHLOROthiazide (HYDRODIURIL) 25 MG tablet TAKE 1 TABLET EVERY DAY  Qty: 90 tablet, Refills: 3      lisinopril (PRINIVIL,ZESTRIL) 40 MG tablet Take 1 tablet (40 mg total) by mouth once daily.  Qty: 90 tablet, Refills: 3      pravastatin (PRAVACHOL) 20 MG tablet Take 1 tablet (20 mg total) by mouth once daily.  Qty: 90 tablet, Refills: 3      sildenafil (REVATIO) 20 mg Tab 2-4 tablets as needed one hour prior to intercourse  Qty: 40 tablet, Refills: 11      syringe with needle, safety 3 mL 21 gauge x 1 1/2" Syrg " 1 Syringe by Misc.(Non-Drug; Combo Route) route every 21 days.  Qty: 10 Syringe, Refills: 1      testosterone cypionate (DEPOTESTOTERONE CYPIONATE) 200 mg/mL injection Inject 1.5 mLs (300 mg total) into the muscle every 14 (fourteen) days.  Qty: 10 mL, Refills: 1    Comments: [facility] [phone]      ALPRAZolam (XANAX) 0.5 MG tablet Take 1 tablet (0.5 mg total) by mouth 3 (three) times daily as needed for Anxiety.  Qty: 20 tablet, Refills: 0      cyclobenzaprine (FLEXERIL) 10 MG tablet Take 1 tablet (10 mg total) by mouth 3 (three) times daily as needed for Muscle spasms.  Qty: 180 tablet, Refills: 3      HYDROcodone-acetaminophen (NORCO)  mg per tablet Take 1 tablet by mouth every 8 (eight) hours as needed for Pain.  Qty: 90 tablet, Refills: 0    Comments: Quantity prescribed more than 7 day supply? Yes, quantity medically necessary, Dx M54.5         STOP taking these medications       sodium,potassium,mag sulfates (SUPREP BOWEL PREP KIT) 17.5-3.13-1.6 gram SolR Comments:   Reason for Stopping:               No discharge procedures on file.        Montserrat Conroy MD  Gastroenterology and Hepatology

## 2020-06-24 LAB
FINAL PATHOLOGIC DIAGNOSIS: NORMAL
GROSS: NORMAL

## 2020-07-06 DIAGNOSIS — N52.9 ERECTILE DYSFUNCTION, UNSPECIFIED ERECTILE DYSFUNCTION TYPE: Primary | ICD-10-CM

## 2020-07-06 RX ORDER — SILDENAFIL CITRATE 20 MG/1
TABLET ORAL
Qty: 40 TABLET | Refills: 11 | Status: SHIPPED | OUTPATIENT
Start: 2020-07-06 | End: 2023-10-26 | Stop reason: SDUPTHER

## 2020-08-17 ENCOUNTER — LAB VISIT (OUTPATIENT)
Dept: LAB | Facility: HOSPITAL | Age: 70
End: 2020-08-17
Attending: INTERNAL MEDICINE
Payer: MEDICARE

## 2020-08-17 DIAGNOSIS — I10 ESSENTIAL HYPERTENSION: ICD-10-CM

## 2020-08-17 DIAGNOSIS — E29.1 HYPOGONADISM MALE: ICD-10-CM

## 2020-08-17 LAB
ALBUMIN SERPL BCP-MCNC: 3.9 G/DL (ref 3.5–5.2)
ALP SERPL-CCNC: 73 U/L (ref 55–135)
ALT SERPL W/O P-5'-P-CCNC: 55 U/L (ref 10–44)
ANION GAP SERPL CALC-SCNC: 8 MMOL/L (ref 8–16)
AST SERPL-CCNC: 37 U/L (ref 10–40)
BASOPHILS # BLD AUTO: 0.05 K/UL (ref 0–0.2)
BASOPHILS NFR BLD: 0.7 % (ref 0–1.9)
BILIRUB SERPL-MCNC: 0.8 MG/DL (ref 0.1–1)
BUN SERPL-MCNC: 18 MG/DL (ref 8–23)
CALCIUM SERPL-MCNC: 9.3 MG/DL (ref 8.7–10.5)
CHLORIDE SERPL-SCNC: 106 MMOL/L (ref 95–110)
CHOLEST SERPL-MCNC: 165 MG/DL (ref 120–199)
CHOLEST/HDLC SERPL: 5.9 {RATIO} (ref 2–5)
CO2 SERPL-SCNC: 26 MMOL/L (ref 23–29)
CREAT SERPL-MCNC: 1 MG/DL (ref 0.5–1.4)
DIFFERENTIAL METHOD: ABNORMAL
EOSINOPHIL # BLD AUTO: 0.3 K/UL (ref 0–0.5)
EOSINOPHIL NFR BLD: 4.2 % (ref 0–8)
ERYTHROCYTE [DISTWIDTH] IN BLOOD BY AUTOMATED COUNT: 15 % (ref 11.5–14.5)
EST. GFR  (AFRICAN AMERICAN): >60 ML/MIN/1.73 M^2
EST. GFR  (NON AFRICAN AMERICAN): >60 ML/MIN/1.73 M^2
GLUCOSE SERPL-MCNC: 107 MG/DL (ref 70–110)
HCT VFR BLD AUTO: 48.6 % (ref 40–54)
HDLC SERPL-MCNC: 28 MG/DL (ref 40–75)
HDLC SERPL: 17 % (ref 20–50)
HGB BLD-MCNC: 16 G/DL (ref 14–18)
IMM GRANULOCYTES # BLD AUTO: 0.03 K/UL (ref 0–0.04)
IMM GRANULOCYTES NFR BLD AUTO: 0.4 % (ref 0–0.5)
LDLC SERPL CALC-MCNC: 100.4 MG/DL (ref 63–159)
LYMPHOCYTES # BLD AUTO: 1.7 K/UL (ref 1–4.8)
LYMPHOCYTES NFR BLD: 24.8 % (ref 18–48)
MCH RBC QN AUTO: 32.4 PG (ref 27–31)
MCHC RBC AUTO-ENTMCNC: 32.9 G/DL (ref 32–36)
MCV RBC AUTO: 98 FL (ref 82–98)
MONOCYTES # BLD AUTO: 0.6 K/UL (ref 0.3–1)
MONOCYTES NFR BLD: 8.4 % (ref 4–15)
NEUTROPHILS # BLD AUTO: 4.3 K/UL (ref 1.8–7.7)
NEUTROPHILS NFR BLD: 61.5 % (ref 38–73)
NONHDLC SERPL-MCNC: 137 MG/DL
NRBC BLD-RTO: 0 /100 WBC
PLATELET # BLD AUTO: 243 K/UL (ref 150–350)
PMV BLD AUTO: 11.1 FL (ref 9.2–12.9)
POTASSIUM SERPL-SCNC: 4.2 MMOL/L (ref 3.5–5.1)
PROT SERPL-MCNC: 7.1 G/DL (ref 6–8.4)
RBC # BLD AUTO: 4.94 M/UL (ref 4.6–6.2)
SODIUM SERPL-SCNC: 140 MMOL/L (ref 136–145)
T4 FREE SERPL-MCNC: 0.92 NG/DL (ref 0.71–1.51)
TRIGL SERPL-MCNC: 183 MG/DL (ref 30–150)
TSH SERPL DL<=0.005 MIU/L-ACNC: 0.39 UIU/ML (ref 0.4–4)
WBC # BLD AUTO: 6.94 K/UL (ref 3.9–12.7)

## 2020-08-17 PROCEDURE — 82040 ASSAY OF SERUM ALBUMIN: CPT | Mod: HCNC,59

## 2020-08-17 PROCEDURE — 84439 ASSAY OF FREE THYROXINE: CPT | Mod: HCNC

## 2020-08-17 PROCEDURE — 84443 ASSAY THYROID STIM HORMONE: CPT | Mod: HCNC

## 2020-08-17 PROCEDURE — 80053 COMPREHEN METABOLIC PANEL: CPT | Mod: HCNC

## 2020-08-17 PROCEDURE — 36415 COLL VENOUS BLD VENIPUNCTURE: CPT | Mod: HCNC,PO

## 2020-08-17 PROCEDURE — 80061 LIPID PANEL: CPT | Mod: HCNC

## 2020-08-17 PROCEDURE — 85025 COMPLETE CBC W/AUTO DIFF WBC: CPT | Mod: HCNC

## 2020-08-23 LAB
ALBUMIN SERPL-MCNC: 4.2 G/DL (ref 3.6–5.1)
SHBG SERPL-SCNC: 37 NMOL/L (ref 22–77)
TESTOST FREE SERPL-MCNC: 29.9 PG/ML (ref 46–224)
TESTOST SERPL-MCNC: 258 NG/DL (ref 250–1100)
TESTOSTERONE.FREE+WB SERPL-MCNC: 57.5 NG/DL (ref 110–575)

## 2020-08-24 ENCOUNTER — OFFICE VISIT (OUTPATIENT)
Dept: INTERNAL MEDICINE | Facility: CLINIC | Age: 70
End: 2020-08-24
Payer: MEDICARE

## 2020-08-24 VITALS
DIASTOLIC BLOOD PRESSURE: 98 MMHG | WEIGHT: 297.38 LBS | HEART RATE: 72 BPM | BODY MASS INDEX: 44.05 KG/M2 | SYSTOLIC BLOOD PRESSURE: 134 MMHG | TEMPERATURE: 99 F | HEIGHT: 69 IN

## 2020-08-24 DIAGNOSIS — N18.30 CKD (CHRONIC KIDNEY DISEASE), STAGE III: ICD-10-CM

## 2020-08-24 DIAGNOSIS — M17.12 PRIMARY OSTEOARTHRITIS OF LEFT KNEE: ICD-10-CM

## 2020-08-24 DIAGNOSIS — E29.1 HYPOGONADISM MALE: ICD-10-CM

## 2020-08-24 DIAGNOSIS — E66.01 MORBID OBESITY WITH BMI OF 40.0-44.9, ADULT: ICD-10-CM

## 2020-08-24 DIAGNOSIS — E78.00 PURE HYPERCHOLESTEROLEMIA: Primary | ICD-10-CM

## 2020-08-24 DIAGNOSIS — Z86.010 PERSONAL HISTORY OF COLONIC POLYPS: ICD-10-CM

## 2020-08-24 DIAGNOSIS — Z87.39 HISTORY OF GOUT: ICD-10-CM

## 2020-08-24 DIAGNOSIS — M19.012 PRIMARY OSTEOARTHRITIS OF LEFT SHOULDER: ICD-10-CM

## 2020-08-24 DIAGNOSIS — Z12.5 PROSTATE CANCER SCREENING: ICD-10-CM

## 2020-08-24 DIAGNOSIS — I10 ESSENTIAL HYPERTENSION: ICD-10-CM

## 2020-08-24 PROCEDURE — 1126F AMNT PAIN NOTED NONE PRSNT: CPT | Mod: HCNC,S$GLB,, | Performed by: INTERNAL MEDICINE

## 2020-08-24 PROCEDURE — 1126F PR PAIN SEVERITY QUANTIFIED, NO PAIN PRESENT: ICD-10-PCS | Mod: HCNC,S$GLB,, | Performed by: INTERNAL MEDICINE

## 2020-08-24 PROCEDURE — 99999 PR PBB SHADOW E&M-EST. PATIENT-LVL III: ICD-10-PCS | Mod: PBBFAC,HCNC,, | Performed by: INTERNAL MEDICINE

## 2020-08-24 PROCEDURE — 20610 DRAIN/INJ JOINT/BURSA W/O US: CPT | Mod: HCNC,LT,S$GLB, | Performed by: INTERNAL MEDICINE

## 2020-08-24 PROCEDURE — 99214 PR OFFICE/OUTPT VISIT, EST, LEVL IV, 30-39 MIN: ICD-10-PCS | Mod: 25,HCNC,S$GLB, | Performed by: INTERNAL MEDICINE

## 2020-08-24 PROCEDURE — 1101F PR PT FALLS ASSESS DOC 0-1 FALLS W/OUT INJ PAST YR: ICD-10-PCS | Mod: HCNC,CPTII,S$GLB, | Performed by: INTERNAL MEDICINE

## 2020-08-24 PROCEDURE — 1159F PR MEDICATION LIST DOCUMENTED IN MEDICAL RECORD: ICD-10-PCS | Mod: HCNC,S$GLB,, | Performed by: INTERNAL MEDICINE

## 2020-08-24 PROCEDURE — 99499 UNLISTED E&M SERVICE: CPT | Mod: S$GLB,,, | Performed by: INTERNAL MEDICINE

## 2020-08-24 PROCEDURE — 3080F DIAST BP >= 90 MM HG: CPT | Mod: HCNC,CPTII,S$GLB, | Performed by: INTERNAL MEDICINE

## 2020-08-24 PROCEDURE — 99999 PR PBB SHADOW E&M-EST. PATIENT-LVL III: CPT | Mod: PBBFAC,HCNC,, | Performed by: INTERNAL MEDICINE

## 2020-08-24 PROCEDURE — 99214 OFFICE O/P EST MOD 30 MIN: CPT | Mod: 25,HCNC,S$GLB, | Performed by: INTERNAL MEDICINE

## 2020-08-24 PROCEDURE — 20610 PR DRAIN/INJECT LARGE JOINT/BURSA: ICD-10-PCS | Mod: HCNC,LT,S$GLB, | Performed by: INTERNAL MEDICINE

## 2020-08-24 PROCEDURE — 3080F PR MOST RECENT DIASTOLIC BLOOD PRESSURE >= 90 MM HG: ICD-10-PCS | Mod: HCNC,CPTII,S$GLB, | Performed by: INTERNAL MEDICINE

## 2020-08-24 PROCEDURE — 99499 RISK ADDL DX/OHS AUDIT: ICD-10-PCS | Mod: S$GLB,,, | Performed by: INTERNAL MEDICINE

## 2020-08-24 PROCEDURE — 3075F PR MOST RECENT SYSTOLIC BLOOD PRESS GE 130-139MM HG: ICD-10-PCS | Mod: HCNC,CPTII,S$GLB, | Performed by: INTERNAL MEDICINE

## 2020-08-24 PROCEDURE — 3075F SYST BP GE 130 - 139MM HG: CPT | Mod: HCNC,CPTII,S$GLB, | Performed by: INTERNAL MEDICINE

## 2020-08-24 PROCEDURE — 3008F BODY MASS INDEX DOCD: CPT | Mod: HCNC,CPTII,S$GLB, | Performed by: INTERNAL MEDICINE

## 2020-08-24 PROCEDURE — 1159F MED LIST DOCD IN RCRD: CPT | Mod: HCNC,S$GLB,, | Performed by: INTERNAL MEDICINE

## 2020-08-24 PROCEDURE — 1101F PT FALLS ASSESS-DOCD LE1/YR: CPT | Mod: HCNC,CPTII,S$GLB, | Performed by: INTERNAL MEDICINE

## 2020-08-24 PROCEDURE — 3008F PR BODY MASS INDEX (BMI) DOCUMENTED: ICD-10-PCS | Mod: HCNC,CPTII,S$GLB, | Performed by: INTERNAL MEDICINE

## 2020-08-24 RX ORDER — GABAPENTIN 300 MG/1
CAPSULE ORAL
Qty: 270 CAPSULE | Refills: 3 | Status: SHIPPED | OUTPATIENT
Start: 2020-08-24 | End: 2021-03-10 | Stop reason: SDUPTHER

## 2020-08-24 RX ORDER — METHYLPREDNISOLONE ACETATE 80 MG/ML
80 INJECTION, SUSPENSION INTRA-ARTICULAR; INTRALESIONAL; INTRAMUSCULAR; SOFT TISSUE
Status: COMPLETED | OUTPATIENT
Start: 2020-08-24 | End: 2020-08-24

## 2020-08-24 RX ORDER — PRAVASTATIN SODIUM 20 MG/1
20 TABLET ORAL DAILY
Qty: 90 TABLET | Refills: 3 | Status: SHIPPED | OUTPATIENT
Start: 2020-08-24 | End: 2021-03-10 | Stop reason: SDUPTHER

## 2020-08-24 RX ORDER — DICLOFENAC SODIUM 75 MG/1
TABLET, DELAYED RELEASE ORAL
Qty: 30 TABLET | Refills: 5
Start: 2020-08-24 | End: 2021-03-17 | Stop reason: SDUPTHER

## 2020-08-24 RX ORDER — CARVEDILOL 25 MG/1
25 TABLET ORAL 2 TIMES DAILY WITH MEALS
Qty: 180 TABLET | Refills: 3 | Status: SHIPPED | OUTPATIENT
Start: 2020-08-24 | End: 2021-03-10 | Stop reason: SDUPTHER

## 2020-08-24 RX ORDER — HYDROCHLOROTHIAZIDE 25 MG/1
25 TABLET ORAL DAILY
Qty: 90 TABLET | Refills: 3 | Status: SHIPPED | OUTPATIENT
Start: 2020-08-24 | End: 2021-03-10 | Stop reason: SDUPTHER

## 2020-08-24 RX ORDER — AMLODIPINE BESYLATE 10 MG/1
10 TABLET ORAL DAILY
Qty: 90 TABLET | Refills: 3 | Status: SHIPPED | OUTPATIENT
Start: 2020-08-24 | End: 2021-03-10 | Stop reason: SDUPTHER

## 2020-08-24 RX ORDER — LISINOPRIL 40 MG/1
40 TABLET ORAL DAILY
Qty: 90 TABLET | Refills: 3 | Status: SHIPPED | OUTPATIENT
Start: 2020-08-24 | End: 2021-03-10 | Stop reason: SDUPTHER

## 2020-08-24 RX ORDER — ALLOPURINOL 300 MG/1
300 TABLET ORAL DAILY
Qty: 90 TABLET | Refills: 3 | Status: SHIPPED | OUTPATIENT
Start: 2020-08-24 | End: 2021-03-10 | Stop reason: SDUPTHER

## 2020-08-24 RX ORDER — HYDROCODONE BITARTRATE AND ACETAMINOPHEN 10; 325 MG/1; MG/1
1 TABLET ORAL EVERY 8 HOURS PRN
Qty: 90 TABLET | Refills: 0 | Status: SHIPPED | OUTPATIENT
Start: 2020-08-24 | End: 2020-09-21 | Stop reason: SDUPTHER

## 2020-08-24 RX ORDER — TESTOSTERONE CYPIONATE 200 MG/ML
300 INJECTION, SOLUTION INTRAMUSCULAR
Qty: 10 ML | Refills: 1 | Status: SHIPPED | OUTPATIENT
Start: 2020-08-24 | End: 2020-11-23 | Stop reason: SDUPTHER

## 2020-08-24 RX ADMIN — METHYLPREDNISOLONE ACETATE 80 MG: 80 INJECTION, SUSPENSION INTRA-ARTICULAR; INTRALESIONAL; INTRAMUSCULAR; SOFT TISSUE at 04:08

## 2020-08-24 NOTE — PROGRESS NOTES
"HPI:  Patient is 70-year-old man who comes today for follow-up of his hypertension, lipids, obesity and hypogonadism.  Patient states overall he has been doing okay.  He has been having more problems with arthritis in his left knee and left shoulder.  He like to get them injected today.  The last time was 9 months ago.  He has no other complaints    Current meds have been verified and updated per the EMR  Exam:BP (!) 134/98 (BP Location: Left arm)   Pulse 72   Temp 99.3 °F (37.4 °C) (Temporal)   Ht 5' 9" (1.753 m)   Wt 134.9 kg (297 lb 6.4 oz)   BMI 43.92 kg/m²   Carotids 2+ equal without bruits  Chest clear  Cardiovascular regular rate and rhythm without murmur gallop or rub  Left shoulder his has pain with range of motion.  No warmth erythema.  He has a positive impingement sign  Left knee has crepitus.  There is no effusion.  There is no warmth erythema.  He has pain with range of motion    Lab Results   Component Value Date    WBC 6.94 08/17/2020    HGB 16.0 08/17/2020    HCT 48.6 08/17/2020     08/17/2020    CHOL 165 08/17/2020    TRIG 183 (H) 08/17/2020    HDL 28 (L) 08/17/2020    ALT 55 (H) 08/17/2020    AST 37 08/17/2020     08/17/2020    K 4.2 08/17/2020     08/17/2020    CREATININE 1.0 08/17/2020    BUN 18 08/17/2020    CO2 26 08/17/2020    TSH 0.392 (L) 08/17/2020    PSA 2.1 12/12/2019    HGBA1C 5.3 05/05/2015       Impression:  Osteoarthritis above his left shoulder and left knee.  Hypertension not to goal  Other medical problems below, stable  Patient Active Problem List   Diagnosis    Essential hypertension    Hyperlipidemia    Generalized osteoarthrosis, involving multiple sites    Hypogonadism male    History of gout    BPH (benign prostatic hyperplasia)    Lumbar disc disease with radiculopathy    Morbid obesity with BMI of 40.0-44.9, adult    CKD (chronic kidney disease), stage III    Personal history of colonic polyps       Plan:  Orders Placed This Encounter    " Comprehensive metabolic panel    Lipid Panel    TSH    CBC auto differential    PSA, Screening    Testosterone Panel    Uric acid    amLODIPine (NORVASC) 10 MG tablet    hydroCHLOROthiazide (HYDRODIURIL) 25 MG tablet    gabapentin (NEURONTIN) 300 MG capsule    allopurinoL (ZYLOPRIM) 300 MG tablet    carvediloL (COREG) 25 MG tablet    lisinopriL (PRINIVIL,ZESTRIL) 40 MG tablet    pravastatin (PRAVACHOL) 20 MG tablet    HYDROcodone-acetaminophen (NORCO)  mg per tablet    testosterone cypionate (DEPOTESTOTERONE CYPIONATE) 200 mg/mL injection    diclofenac (VOLTAREN) 75 MG EC tablet    methylPREDNISolone acetate injection 80 mg    methylPREDNISolone acetate injection 80 mg     Patient was given shot in both the left shoulder and left knee with 80 mg of Depo-Medrol and 2 cc of Marcaine.  He will increase the amlodipine to 10 mg today.  He was restarted on diclofenac 75 mg take half a pill twice a day for his arthritis.  Patient will otherwise see me in 6 months with the other lab work above.    This note is generated with speech recognition software and is subject to transcription error and sound alike phrases that may be missed by proofreading.

## 2020-08-31 ENCOUNTER — TELEPHONE (OUTPATIENT)
Dept: INTERNAL MEDICINE | Facility: CLINIC | Age: 70
End: 2020-08-31

## 2020-08-31 NOTE — TELEPHONE ENCOUNTER
----- Message from Rafael Plummer sent at 8/31/2020  1:54 PM CDT -----  Pt's wife would like return call, states pt's prescription for Testosterone and Hydrocodone were sent to [facility] pharmacy, but needs to go to [facility]. Please call back at [phone].  Twan Trammell

## 2020-09-21 RX ORDER — HYDROCODONE BITARTRATE AND ACETAMINOPHEN 10; 325 MG/1; MG/1
1 TABLET ORAL EVERY 8 HOURS PRN
Qty: 90 TABLET | Refills: 0 | Status: SHIPPED | OUTPATIENT
Start: 2020-09-21 | End: 2021-09-15 | Stop reason: SDUPTHER

## 2020-09-29 ENCOUNTER — PATIENT MESSAGE (OUTPATIENT)
Dept: OTHER | Facility: OTHER | Age: 70
End: 2020-09-29

## 2020-11-11 ENCOUNTER — PATIENT OUTREACH (OUTPATIENT)
Dept: ADMINISTRATIVE | Facility: HOSPITAL | Age: 70
End: 2020-11-11

## 2020-11-11 NOTE — PROGRESS NOTES
Working HTN Report       Called pt for home BP Reading, No answer, L/M       Bridgette AMBROCIO LPN Care Coordinator  Care Coordination Department  [facility]  [phone]

## 2020-11-23 RX ORDER — TESTOSTERONE CYPIONATE 200 MG/ML
300 INJECTION, SOLUTION INTRAMUSCULAR
Qty: 10 ML | Refills: 1 | Status: SHIPPED | OUTPATIENT
Start: 2020-11-23 | End: 2021-03-10 | Stop reason: SDUPTHER

## 2020-12-11 ENCOUNTER — PATIENT MESSAGE (OUTPATIENT)
Dept: OTHER | Facility: OTHER | Age: 70
End: 2020-12-11

## 2021-01-26 ENCOUNTER — OFFICE VISIT (OUTPATIENT)
Dept: INTERNAL MEDICINE | Facility: CLINIC | Age: 71
End: 2021-01-26
Payer: MEDICARE

## 2021-01-26 VITALS
BODY MASS INDEX: 40.88 KG/M2 | HEART RATE: 64 BPM | OXYGEN SATURATION: 96 % | SYSTOLIC BLOOD PRESSURE: 124 MMHG | DIASTOLIC BLOOD PRESSURE: 86 MMHG | HEIGHT: 69 IN | WEIGHT: 276 LBS | TEMPERATURE: 98 F

## 2021-01-26 DIAGNOSIS — R19.7 DIARRHEA, UNSPECIFIED TYPE: Primary | ICD-10-CM

## 2021-01-26 PROCEDURE — 1126F AMNT PAIN NOTED NONE PRSNT: CPT | Mod: S$GLB,,, | Performed by: INTERNAL MEDICINE

## 2021-01-26 PROCEDURE — 99999 PR PBB SHADOW E&M-EST. PATIENT-LVL IV: CPT | Mod: PBBFAC,,, | Performed by: INTERNAL MEDICINE

## 2021-01-26 PROCEDURE — 3008F BODY MASS INDEX DOCD: CPT | Mod: CPTII,S$GLB,, | Performed by: INTERNAL MEDICINE

## 2021-01-26 PROCEDURE — 1101F PR PT FALLS ASSESS DOC 0-1 FALLS W/OUT INJ PAST YR: ICD-10-PCS | Mod: CPTII,S$GLB,, | Performed by: INTERNAL MEDICINE

## 2021-01-26 PROCEDURE — 3008F PR BODY MASS INDEX (BMI) DOCUMENTED: ICD-10-PCS | Mod: CPTII,S$GLB,, | Performed by: INTERNAL MEDICINE

## 2021-01-26 PROCEDURE — 99999 PR PBB SHADOW E&M-EST. PATIENT-LVL IV: ICD-10-PCS | Mod: PBBFAC,,, | Performed by: INTERNAL MEDICINE

## 2021-01-26 PROCEDURE — 1101F PT FALLS ASSESS-DOCD LE1/YR: CPT | Mod: CPTII,S$GLB,, | Performed by: INTERNAL MEDICINE

## 2021-01-26 PROCEDURE — 1159F PR MEDICATION LIST DOCUMENTED IN MEDICAL RECORD: ICD-10-PCS | Mod: S$GLB,,, | Performed by: INTERNAL MEDICINE

## 2021-01-26 PROCEDURE — 99213 OFFICE O/P EST LOW 20 MIN: CPT | Mod: S$GLB,,, | Performed by: INTERNAL MEDICINE

## 2021-01-26 PROCEDURE — 1159F MED LIST DOCD IN RCRD: CPT | Mod: S$GLB,,, | Performed by: INTERNAL MEDICINE

## 2021-01-26 PROCEDURE — 3288F FALL RISK ASSESSMENT DOCD: CPT | Mod: CPTII,S$GLB,, | Performed by: INTERNAL MEDICINE

## 2021-01-26 PROCEDURE — 99213 PR OFFICE/OUTPT VISIT, EST, LEVL III, 20-29 MIN: ICD-10-PCS | Mod: S$GLB,,, | Performed by: INTERNAL MEDICINE

## 2021-01-26 PROCEDURE — 3079F DIAST BP 80-89 MM HG: CPT | Mod: CPTII,S$GLB,, | Performed by: INTERNAL MEDICINE

## 2021-01-26 PROCEDURE — 1126F PR PAIN SEVERITY QUANTIFIED, NO PAIN PRESENT: ICD-10-PCS | Mod: S$GLB,,, | Performed by: INTERNAL MEDICINE

## 2021-01-26 PROCEDURE — 3074F SYST BP LT 130 MM HG: CPT | Mod: CPTII,S$GLB,, | Performed by: INTERNAL MEDICINE

## 2021-01-26 PROCEDURE — 3079F PR MOST RECENT DIASTOLIC BLOOD PRESSURE 80-89 MM HG: ICD-10-PCS | Mod: CPTII,S$GLB,, | Performed by: INTERNAL MEDICINE

## 2021-01-26 PROCEDURE — 3288F PR FALLS RISK ASSESSMENT DOCUMENTED: ICD-10-PCS | Mod: CPTII,S$GLB,, | Performed by: INTERNAL MEDICINE

## 2021-01-26 PROCEDURE — 1157F ADVNC CARE PLAN IN RCRD: CPT | Mod: S$GLB,,, | Performed by: INTERNAL MEDICINE

## 2021-01-26 PROCEDURE — 1157F PR ADVANCE CARE PLAN OR EQUIV PRESENT IN MEDICAL RECORD: ICD-10-PCS | Mod: S$GLB,,, | Performed by: INTERNAL MEDICINE

## 2021-01-26 PROCEDURE — 3074F PR MOST RECENT SYSTOLIC BLOOD PRESSURE < 130 MM HG: ICD-10-PCS | Mod: CPTII,S$GLB,, | Performed by: INTERNAL MEDICINE

## 2021-01-26 RX ORDER — LOPERAMIDE HCL 2 MG
2 TABLET ORAL 4 TIMES DAILY PRN
Qty: 30 TABLET | Refills: 0 | Status: SHIPPED | OUTPATIENT
Start: 2021-01-26 | End: 2021-02-05

## 2021-01-29 ENCOUNTER — LAB VISIT (OUTPATIENT)
Dept: LAB | Facility: HOSPITAL | Age: 71
End: 2021-01-29
Attending: INTERNAL MEDICINE
Payer: MEDICARE

## 2021-01-29 DIAGNOSIS — R19.7 DIARRHEA, UNSPECIFIED TYPE: ICD-10-CM

## 2021-01-29 LAB
C DIFF GDH STL QL: NEGATIVE
C DIFF TOX A+B STL QL IA: NEGATIVE

## 2021-01-29 PROCEDURE — 89055 LEUKOCYTE ASSESSMENT FECAL: CPT

## 2021-01-29 PROCEDURE — 87324 CLOSTRIDIUM AG IA: CPT

## 2021-01-29 PROCEDURE — 87045 FECES CULTURE AEROBIC BACT: CPT

## 2021-01-29 PROCEDURE — 89125 SPECIMEN FAT STAIN: CPT

## 2021-01-29 PROCEDURE — 87209 SMEAR COMPLEX STAIN: CPT

## 2021-01-29 PROCEDURE — 87449 NOS EACH ORGANISM AG IA: CPT

## 2021-01-29 PROCEDURE — 87329 GIARDIA AG IA: CPT

## 2021-01-29 PROCEDURE — 87046 STOOL CULTR AEROBIC BACT EA: CPT | Mod: 59

## 2021-01-29 PROCEDURE — 87427 SHIGA-LIKE TOXIN AG IA: CPT | Mod: 59

## 2021-01-30 ENCOUNTER — PATIENT MESSAGE (OUTPATIENT)
Dept: INTERNAL MEDICINE | Facility: CLINIC | Age: 71
End: 2021-01-30

## 2021-01-30 LAB — WBC #/AREA STL HPF: NORMAL /[HPF]

## 2021-01-31 LAB
CRYPTOSP AG STL QL IA: NEGATIVE
G LAMBLIA AG STL QL IA: NEGATIVE

## 2021-02-01 LAB
E COLI SXT1 STL QL IA: NEGATIVE
E COLI SXT2 STL QL IA: NEGATIVE
FAT STL SUDAN IV STN: NORMAL

## 2021-02-02 ENCOUNTER — PATIENT MESSAGE (OUTPATIENT)
Dept: INTERNAL MEDICINE | Facility: CLINIC | Age: 71
End: 2021-02-02

## 2021-02-02 DIAGNOSIS — R19.7 DIARRHEA, UNSPECIFIED TYPE: Primary | ICD-10-CM

## 2021-02-02 LAB
BACTERIA STL CULT: NORMAL
O+P STL MICRO: NORMAL

## 2021-02-03 ENCOUNTER — PATIENT MESSAGE (OUTPATIENT)
Dept: HEPATOLOGY | Facility: CLINIC | Age: 71
End: 2021-02-03

## 2021-02-08 ENCOUNTER — HOSPITAL ENCOUNTER (OUTPATIENT)
Dept: RADIOLOGY | Facility: HOSPITAL | Age: 71
Discharge: HOME | End: 2021-02-08
Attending: INTERNAL MEDICINE
Payer: MEDICARE

## 2021-02-08 ENCOUNTER — TELEPHONE (OUTPATIENT)
Dept: ENDOSCOPY | Facility: HOSPITAL | Age: 71
End: 2021-02-08

## 2021-02-08 ENCOUNTER — OFFICE VISIT (OUTPATIENT)
Dept: GASTROENTEROLOGY | Facility: CLINIC | Age: 71
End: 2021-02-08
Payer: MEDICARE

## 2021-02-08 VITALS
HEART RATE: 76 BPM | WEIGHT: 278.69 LBS | DIASTOLIC BLOOD PRESSURE: 80 MMHG | BODY MASS INDEX: 41.28 KG/M2 | HEIGHT: 69 IN | SYSTOLIC BLOOD PRESSURE: 122 MMHG

## 2021-02-08 DIAGNOSIS — R19.7 DIARRHEA, UNSPECIFIED TYPE: ICD-10-CM

## 2021-02-08 DIAGNOSIS — R19.7 DIARRHEA IN ADULT PATIENT: ICD-10-CM

## 2021-02-08 DIAGNOSIS — R19.7 DIARRHEA IN ADULT PATIENT: Primary | ICD-10-CM

## 2021-02-08 PROCEDURE — 74019 XR ABDOMEN FLAT AND ERECT: ICD-10-PCS | Mod: 26,,, | Performed by: RADIOLOGY

## 2021-02-08 PROCEDURE — 1157F PR ADVANCE CARE PLAN OR EQUIV PRESENT IN MEDICAL RECORD: ICD-10-PCS | Mod: S$GLB,,, | Performed by: INTERNAL MEDICINE

## 2021-02-08 PROCEDURE — 1101F PT FALLS ASSESS-DOCD LE1/YR: CPT | Mod: CPTII,S$GLB,, | Performed by: INTERNAL MEDICINE

## 2021-02-08 PROCEDURE — 3288F PR FALLS RISK ASSESSMENT DOCUMENTED: ICD-10-PCS | Mod: CPTII,S$GLB,, | Performed by: INTERNAL MEDICINE

## 2021-02-08 PROCEDURE — 3008F BODY MASS INDEX DOCD: CPT | Mod: CPTII,S$GLB,, | Performed by: INTERNAL MEDICINE

## 2021-02-08 PROCEDURE — 3074F SYST BP LT 130 MM HG: CPT | Mod: CPTII,S$GLB,, | Performed by: INTERNAL MEDICINE

## 2021-02-08 PROCEDURE — 1126F PR PAIN SEVERITY QUANTIFIED, NO PAIN PRESENT: ICD-10-PCS | Mod: S$GLB,,, | Performed by: INTERNAL MEDICINE

## 2021-02-08 PROCEDURE — 1159F PR MEDICATION LIST DOCUMENTED IN MEDICAL RECORD: ICD-10-PCS | Mod: S$GLB,,, | Performed by: INTERNAL MEDICINE

## 2021-02-08 PROCEDURE — 1126F AMNT PAIN NOTED NONE PRSNT: CPT | Mod: S$GLB,,, | Performed by: INTERNAL MEDICINE

## 2021-02-08 PROCEDURE — 1157F ADVNC CARE PLAN IN RCRD: CPT | Mod: S$GLB,,, | Performed by: INTERNAL MEDICINE

## 2021-02-08 PROCEDURE — 1101F PR PT FALLS ASSESS DOC 0-1 FALLS W/OUT INJ PAST YR: ICD-10-PCS | Mod: CPTII,S$GLB,, | Performed by: INTERNAL MEDICINE

## 2021-02-08 PROCEDURE — 99999 PR PBB SHADOW E&M-EST. PATIENT-LVL IV: ICD-10-PCS | Mod: PBBFAC,,, | Performed by: INTERNAL MEDICINE

## 2021-02-08 PROCEDURE — 99214 OFFICE O/P EST MOD 30 MIN: CPT | Mod: S$GLB,,, | Performed by: INTERNAL MEDICINE

## 2021-02-08 PROCEDURE — 3288F FALL RISK ASSESSMENT DOCD: CPT | Mod: CPTII,S$GLB,, | Performed by: INTERNAL MEDICINE

## 2021-02-08 PROCEDURE — 1159F MED LIST DOCD IN RCRD: CPT | Mod: S$GLB,,, | Performed by: INTERNAL MEDICINE

## 2021-02-08 PROCEDURE — 99999 PR PBB SHADOW E&M-EST. PATIENT-LVL IV: CPT | Mod: PBBFAC,,, | Performed by: INTERNAL MEDICINE

## 2021-02-08 PROCEDURE — 3074F PR MOST RECENT SYSTOLIC BLOOD PRESSURE < 130 MM HG: ICD-10-PCS | Mod: CPTII,S$GLB,, | Performed by: INTERNAL MEDICINE

## 2021-02-08 PROCEDURE — 74019 RADEX ABDOMEN 2 VIEWS: CPT | Mod: 26,,, | Performed by: RADIOLOGY

## 2021-02-08 PROCEDURE — 3079F PR MOST RECENT DIASTOLIC BLOOD PRESSURE 80-89 MM HG: ICD-10-PCS | Mod: CPTII,S$GLB,, | Performed by: INTERNAL MEDICINE

## 2021-02-08 PROCEDURE — 3008F PR BODY MASS INDEX (BMI) DOCUMENTED: ICD-10-PCS | Mod: CPTII,S$GLB,, | Performed by: INTERNAL MEDICINE

## 2021-02-08 PROCEDURE — 3079F DIAST BP 80-89 MM HG: CPT | Mod: CPTII,S$GLB,, | Performed by: INTERNAL MEDICINE

## 2021-02-08 PROCEDURE — 99214 PR OFFICE/OUTPT VISIT, EST, LEVL IV, 30-39 MIN: ICD-10-PCS | Mod: S$GLB,,, | Performed by: INTERNAL MEDICINE

## 2021-02-08 PROCEDURE — 74019 RADEX ABDOMEN 2 VIEWS: CPT | Mod: TC

## 2021-02-08 RX ORDER — LOPERAMIDE HYDROCHLORIDE 2 MG/1
CAPSULE ORAL
COMMUNITY
Start: 2021-01-26 | End: 2021-10-13

## 2021-02-08 RX ORDER — SODIUM, POTASSIUM,MAG SULFATES 17.5-3.13G
1 SOLUTION, RECONSTITUTED, ORAL ORAL DAILY
Qty: 1 KIT | Refills: 0 | Status: ON HOLD | OUTPATIENT
Start: 2021-02-08 | End: 2021-02-10 | Stop reason: HOSPADM

## 2021-02-10 ENCOUNTER — ANESTHESIA EVENT (OUTPATIENT)
Dept: ENDOSCOPY | Facility: HOSPITAL | Age: 71
End: 2021-02-10
Payer: MEDICARE

## 2021-02-10 ENCOUNTER — HOSPITAL ENCOUNTER (OUTPATIENT)
Facility: HOSPITAL | Age: 71
Discharge: HOME | End: 2021-02-10
Attending: INTERNAL MEDICINE | Admitting: INTERNAL MEDICINE
Payer: MEDICARE

## 2021-02-10 ENCOUNTER — ANESTHESIA (OUTPATIENT)
Dept: ENDOSCOPY | Facility: HOSPITAL | Age: 71
End: 2021-02-10
Payer: MEDICARE

## 2021-02-10 VITALS
HEIGHT: 69 IN | TEMPERATURE: 98 F | WEIGHT: 274.06 LBS | DIASTOLIC BLOOD PRESSURE: 70 MMHG | RESPIRATION RATE: 17 BRPM | SYSTOLIC BLOOD PRESSURE: 132 MMHG | OXYGEN SATURATION: 96 % | HEART RATE: 66 BPM | BODY MASS INDEX: 40.59 KG/M2

## 2021-02-10 DIAGNOSIS — K51.50 LEFT SIDED COLITIS WITHOUT COMPLICATIONS: ICD-10-CM

## 2021-02-10 DIAGNOSIS — K29.60 EROSIVE GASTRITIS: ICD-10-CM

## 2021-02-10 DIAGNOSIS — K57.30 DIVERTICULOSIS OF COLON: ICD-10-CM

## 2021-02-10 DIAGNOSIS — R19.7 DIARRHEA IN ADULT PATIENT: Primary | ICD-10-CM

## 2021-02-10 DIAGNOSIS — N40.0 BENIGN PROSTATIC HYPERPLASIA WITHOUT LOWER URINARY TRACT SYMPTOMS: ICD-10-CM

## 2021-02-10 LAB — SARS-COV-2 RDRP RESP QL NAA+PROBE: NEGATIVE

## 2021-02-10 PROCEDURE — 88305 TISSUE EXAM BY PATHOLOGIST: CPT | Performed by: PATHOLOGY

## 2021-02-10 PROCEDURE — 88305 TISSUE EXAM BY PATHOLOGIST: ICD-10-PCS | Mod: 26,,, | Performed by: PATHOLOGY

## 2021-02-10 PROCEDURE — 45380 COLONOSCOPY AND BIOPSY: CPT | Mod: ,,, | Performed by: INTERNAL MEDICINE

## 2021-02-10 PROCEDURE — 43239 PR EGD, FLEX, W/BIOPSY, SGL/MULTI: ICD-10-PCS | Mod: 51,,, | Performed by: INTERNAL MEDICINE

## 2021-02-10 PROCEDURE — 43239 EGD BIOPSY SINGLE/MULTIPLE: CPT | Mod: 51,,, | Performed by: INTERNAL MEDICINE

## 2021-02-10 PROCEDURE — 45380 PR COLONOSCOPY,BIOPSY: ICD-10-PCS | Mod: ,,, | Performed by: INTERNAL MEDICINE

## 2021-02-10 PROCEDURE — U0002 COVID-19 LAB TEST NON-CDC: HCPCS

## 2021-02-10 PROCEDURE — 88342 IMHCHEM/IMCYTCHM 1ST ANTB: CPT | Mod: 26,,, | Performed by: PATHOLOGY

## 2021-02-10 PROCEDURE — 88305 TISSUE EXAM BY PATHOLOGIST: CPT | Mod: 26,,, | Performed by: PATHOLOGY

## 2021-02-10 PROCEDURE — 37000009 HC ANESTHESIA EA ADD 15 MINS: Performed by: INTERNAL MEDICINE

## 2021-02-10 PROCEDURE — 63600175 PHARM REV CODE 636 W HCPCS: Performed by: INTERNAL MEDICINE

## 2021-02-10 PROCEDURE — 63600175 PHARM REV CODE 636 W HCPCS: Performed by: NURSE ANESTHETIST, CERTIFIED REGISTERED

## 2021-02-10 PROCEDURE — 43239 EGD BIOPSY SINGLE/MULTIPLE: CPT | Performed by: INTERNAL MEDICINE

## 2021-02-10 PROCEDURE — 25000003 PHARM REV CODE 250: Performed by: NURSE ANESTHETIST, CERTIFIED REGISTERED

## 2021-02-10 PROCEDURE — 27201012 HC FORCEPS, HOT/COLD, DISP: Performed by: INTERNAL MEDICINE

## 2021-02-10 PROCEDURE — 37000008 HC ANESTHESIA 1ST 15 MINUTES: Performed by: INTERNAL MEDICINE

## 2021-02-10 PROCEDURE — 88342 IMHCHEM/IMCYTCHM 1ST ANTB: CPT | Performed by: PATHOLOGY

## 2021-02-10 PROCEDURE — 88342 CHG IMMUNOCYTOCHEMISTRY: ICD-10-PCS | Mod: 26,,, | Performed by: PATHOLOGY

## 2021-02-10 PROCEDURE — 45380 COLONOSCOPY AND BIOPSY: CPT | Performed by: INTERNAL MEDICINE

## 2021-02-10 RX ORDER — PROPOFOL 10 MG/ML
VIAL (ML) INTRAVENOUS
Status: DISCONTINUED | OUTPATIENT
Start: 2021-02-10 | End: 2021-02-10

## 2021-02-10 RX ORDER — LIDOCAINE HYDROCHLORIDE 10 MG/ML
INJECTION, SOLUTION EPIDURAL; INFILTRATION; INTRACAUDAL; PERINEURAL
Status: DISCONTINUED | OUTPATIENT
Start: 2021-02-10 | End: 2021-02-10

## 2021-02-10 RX ORDER — SODIUM CHLORIDE, SODIUM LACTATE, POTASSIUM CHLORIDE, CALCIUM CHLORIDE 600; 310; 30; 20 MG/100ML; MG/100ML; MG/100ML; MG/100ML
INJECTION, SOLUTION INTRAVENOUS CONTINUOUS
Status: DISCONTINUED | OUTPATIENT
Start: 2021-02-10 | End: 2021-02-10 | Stop reason: HOSPADM

## 2021-02-10 RX ORDER — SODIUM CHLORIDE 0.9 % (FLUSH) 0.9 %
10 SYRINGE (ML) INJECTION
Status: DISCONTINUED | OUTPATIENT
Start: 2021-02-10 | End: 2021-02-10 | Stop reason: HOSPADM

## 2021-02-10 RX ADMIN — PROPOFOL 30 MG: 10 INJECTION, EMULSION INTRAVENOUS at 10:02

## 2021-02-10 RX ADMIN — PROPOFOL 150 MG: 10 INJECTION, EMULSION INTRAVENOUS at 09:02

## 2021-02-10 RX ADMIN — PROPOFOL 40 MG: 10 INJECTION, EMULSION INTRAVENOUS at 09:02

## 2021-02-10 RX ADMIN — LIDOCAINE HYDROCHLORIDE 50 MG: 10 INJECTION, SOLUTION EPIDURAL; INFILTRATION; INTRACAUDAL; PERINEURAL at 09:02

## 2021-02-10 RX ADMIN — SODIUM CHLORIDE, SODIUM LACTATE, POTASSIUM CHLORIDE, AND CALCIUM CHLORIDE: 600; 310; 30; 20 INJECTION, SOLUTION INTRAVENOUS at 08:02

## 2021-02-10 RX ADMIN — PROPOFOL 60 MG: 10 INJECTION, EMULSION INTRAVENOUS at 09:02

## 2021-02-10 RX ADMIN — PROPOFOL 20 MG: 10 INJECTION, EMULSION INTRAVENOUS at 10:02

## 2021-02-15 LAB
COMMENT: NORMAL
FINAL PATHOLOGIC DIAGNOSIS: NORMAL
GROSS: NORMAL
Lab: NORMAL

## 2021-02-19 DIAGNOSIS — K52.832 LYMPHOCYTIC COLITIS: ICD-10-CM

## 2021-02-19 DIAGNOSIS — B96.81 HELICOBACTER POSITIVE GASTRITIS: Primary | ICD-10-CM

## 2021-02-19 DIAGNOSIS — K29.70 HELICOBACTER POSITIVE GASTRITIS: Primary | ICD-10-CM

## 2021-02-19 RX ORDER — PANTOPRAZOLE SODIUM 40 MG/1
40 TABLET, DELAYED RELEASE ORAL DAILY
Qty: 30 TABLET | Refills: 0 | Status: SHIPPED | OUTPATIENT
Start: 2021-02-19 | End: 2021-03-10 | Stop reason: SDUPTHER

## 2021-02-19 RX ORDER — BISMUTH SUBCITRATE POTASSIUM, METRONIDAZOLE AND TETRACYCLINE HYDROCHLORIDE 140; 125; 125 MG/1; MG/1; MG/1
3 CAPSULE ORAL
Qty: 120 CAPSULE | Refills: 0 | Status: SHIPPED | OUTPATIENT
Start: 2021-02-19 | End: 2021-03-01

## 2021-02-24 ENCOUNTER — TELEPHONE (OUTPATIENT)
Dept: GASTROENTEROLOGY | Facility: CLINIC | Age: 71
End: 2021-02-24

## 2021-03-01 ENCOUNTER — PES CALL (OUTPATIENT)
Dept: ADMINISTRATIVE | Facility: CLINIC | Age: 71
End: 2021-03-01

## 2021-03-03 ENCOUNTER — LAB VISIT (OUTPATIENT)
Dept: LAB | Facility: HOSPITAL | Age: 71
End: 2021-03-03
Attending: INTERNAL MEDICINE
Payer: MEDICARE

## 2021-03-03 ENCOUNTER — OFFICE VISIT (OUTPATIENT)
Dept: OPHTHALMOLOGY | Facility: CLINIC | Age: 71
End: 2021-03-03
Payer: MEDICARE

## 2021-03-03 ENCOUNTER — OFFICE VISIT (OUTPATIENT)
Dept: GASTROENTEROLOGY | Facility: CLINIC | Age: 71
End: 2021-03-03
Payer: MEDICARE

## 2021-03-03 VITALS
BODY MASS INDEX: 41.41 KG/M2 | DIASTOLIC BLOOD PRESSURE: 74 MMHG | WEIGHT: 279.56 LBS | SYSTOLIC BLOOD PRESSURE: 112 MMHG | HEIGHT: 69 IN | OXYGEN SATURATION: 97 % | HEART RATE: 70 BPM

## 2021-03-03 DIAGNOSIS — A04.8 H. PYLORI INFECTION: Primary | ICD-10-CM

## 2021-03-03 DIAGNOSIS — Z87.39 HISTORY OF GOUT: ICD-10-CM

## 2021-03-03 DIAGNOSIS — R19.7 DIARRHEA IN ADULT PATIENT: ICD-10-CM

## 2021-03-03 DIAGNOSIS — Z12.5 PROSTATE CANCER SCREENING: ICD-10-CM

## 2021-03-03 DIAGNOSIS — K52.832 LYMPHOCYTIC COLITIS: ICD-10-CM

## 2021-03-03 DIAGNOSIS — E29.1 HYPOGONADISM MALE: ICD-10-CM

## 2021-03-03 DIAGNOSIS — I10 ESSENTIAL HYPERTENSION: ICD-10-CM

## 2021-03-03 DIAGNOSIS — H17.9 CORNEAL SCAR, RIGHT EYE: Primary | ICD-10-CM

## 2021-03-03 LAB
ALBUMIN SERPL BCP-MCNC: 3.7 G/DL (ref 3.5–5.2)
ALP SERPL-CCNC: 72 U/L (ref 55–135)
ALT SERPL W/O P-5'-P-CCNC: 38 U/L (ref 10–44)
ANION GAP SERPL CALC-SCNC: 11 MMOL/L (ref 8–16)
AST SERPL-CCNC: 29 U/L (ref 10–40)
BASOPHILS # BLD AUTO: 0.07 K/UL (ref 0–0.2)
BASOPHILS NFR BLD: 1.1 % (ref 0–1.9)
BILIRUB SERPL-MCNC: 0.6 MG/DL (ref 0.1–1)
BUN SERPL-MCNC: 14 MG/DL (ref 8–23)
CALCIUM SERPL-MCNC: 9 MG/DL (ref 8.7–10.5)
CHLORIDE SERPL-SCNC: 105 MMOL/L (ref 95–110)
CHOLEST SERPL-MCNC: 163 MG/DL (ref 120–199)
CHOLEST/HDLC SERPL: 6.5 {RATIO} (ref 2–5)
CO2 SERPL-SCNC: 27 MMOL/L (ref 23–29)
COMPLEXED PSA SERPL-MCNC: 2.8 NG/ML (ref 0–4)
CREAT SERPL-MCNC: 1.1 MG/DL (ref 0.5–1.4)
DIFFERENTIAL METHOD: ABNORMAL
EOSINOPHIL # BLD AUTO: 0.3 K/UL (ref 0–0.5)
EOSINOPHIL NFR BLD: 4.6 % (ref 0–8)
ERYTHROCYTE [DISTWIDTH] IN BLOOD BY AUTOMATED COUNT: 14.5 % (ref 11.5–14.5)
EST. GFR  (AFRICAN AMERICAN): >60 ML/MIN/1.73 M^2
EST. GFR  (NON AFRICAN AMERICAN): >60 ML/MIN/1.73 M^2
GLUCOSE SERPL-MCNC: 91 MG/DL (ref 70–110)
HCT VFR BLD AUTO: 49.7 % (ref 40–54)
HDLC SERPL-MCNC: 25 MG/DL (ref 40–75)
HDLC SERPL: 15.3 % (ref 20–50)
HGB BLD-MCNC: 16.1 G/DL (ref 14–18)
IMM GRANULOCYTES # BLD AUTO: 0.03 K/UL (ref 0–0.04)
IMM GRANULOCYTES NFR BLD AUTO: 0.5 % (ref 0–0.5)
LDLC SERPL CALC-MCNC: 113.4 MG/DL (ref 63–159)
LYMPHOCYTES # BLD AUTO: 1.5 K/UL (ref 1–4.8)
LYMPHOCYTES NFR BLD: 23.8 % (ref 18–48)
MCH RBC QN AUTO: 32.2 PG (ref 27–31)
MCHC RBC AUTO-ENTMCNC: 32.4 G/DL (ref 32–36)
MCV RBC AUTO: 99 FL (ref 82–98)
MONOCYTES # BLD AUTO: 0.6 K/UL (ref 0.3–1)
MONOCYTES NFR BLD: 8.7 % (ref 4–15)
NEUTROPHILS # BLD AUTO: 3.9 K/UL (ref 1.8–7.7)
NEUTROPHILS NFR BLD: 61.3 % (ref 38–73)
NONHDLC SERPL-MCNC: 138 MG/DL
NRBC BLD-RTO: 0 /100 WBC
PLATELET # BLD AUTO: 319 K/UL (ref 150–350)
PMV BLD AUTO: 10.4 FL (ref 9.2–12.9)
POTASSIUM SERPL-SCNC: 4.2 MMOL/L (ref 3.5–5.1)
PROT SERPL-MCNC: 6.9 G/DL (ref 6–8.4)
RBC # BLD AUTO: 5 M/UL (ref 4.6–6.2)
SODIUM SERPL-SCNC: 143 MMOL/L (ref 136–145)
TRIGL SERPL-MCNC: 123 MG/DL (ref 30–150)
TSH SERPL DL<=0.005 MIU/L-ACNC: 0.54 UIU/ML (ref 0.4–4)
URATE SERPL-MCNC: 5.7 MG/DL (ref 3.4–7)
WBC # BLD AUTO: 6.34 K/UL (ref 3.9–12.7)

## 2021-03-03 PROCEDURE — 3288F PR FALLS RISK ASSESSMENT DOCUMENTED: ICD-10-PCS | Mod: CPTII,S$GLB,, | Performed by: INTERNAL MEDICINE

## 2021-03-03 PROCEDURE — 1100F PTFALLS ASSESS-DOCD GE2>/YR: CPT | Mod: CPTII,S$GLB,, | Performed by: INTERNAL MEDICINE

## 2021-03-03 PROCEDURE — 1100F PR PT FALLS ASSESS DOC 2+ FALLS/FALL W/INJURY/YR: ICD-10-PCS | Mod: CPTII,S$GLB,, | Performed by: INTERNAL MEDICINE

## 2021-03-03 PROCEDURE — 3008F BODY MASS INDEX DOCD: CPT | Mod: CPTII,S$GLB,, | Performed by: INTERNAL MEDICINE

## 2021-03-03 PROCEDURE — 3078F DIAST BP <80 MM HG: CPT | Mod: CPTII,S$GLB,, | Performed by: INTERNAL MEDICINE

## 2021-03-03 PROCEDURE — 80061 LIPID PANEL: CPT | Performed by: INTERNAL MEDICINE

## 2021-03-03 PROCEDURE — 1159F PR MEDICATION LIST DOCUMENTED IN MEDICAL RECORD: ICD-10-PCS | Mod: S$GLB,,, | Performed by: INTERNAL MEDICINE

## 2021-03-03 PROCEDURE — 1126F PR PAIN SEVERITY QUANTIFIED, NO PAIN PRESENT: ICD-10-PCS | Mod: S$GLB,,, | Performed by: INTERNAL MEDICINE

## 2021-03-03 PROCEDURE — 3078F PR MOST RECENT DIASTOLIC BLOOD PRESSURE < 80 MM HG: ICD-10-PCS | Mod: CPTII,S$GLB,, | Performed by: INTERNAL MEDICINE

## 2021-03-03 PROCEDURE — 99213 PR OFFICE/OUTPT VISIT, EST, LEVL III, 20-29 MIN: ICD-10-PCS | Mod: S$GLB,,, | Performed by: INTERNAL MEDICINE

## 2021-03-03 PROCEDURE — 1159F MED LIST DOCD IN RCRD: CPT | Mod: S$GLB,,, | Performed by: INTERNAL MEDICINE

## 2021-03-03 PROCEDURE — 92002 INTRM OPH EXAM NEW PATIENT: CPT | Mod: S$GLB,,, | Performed by: OPTOMETRIST

## 2021-03-03 PROCEDURE — 1157F ADVNC CARE PLAN IN RCRD: CPT | Mod: S$GLB,,, | Performed by: OPTOMETRIST

## 2021-03-03 PROCEDURE — 1157F PR ADVANCE CARE PLAN OR EQUIV PRESENT IN MEDICAL RECORD: ICD-10-PCS | Mod: S$GLB,,, | Performed by: INTERNAL MEDICINE

## 2021-03-03 PROCEDURE — 1126F AMNT PAIN NOTED NONE PRSNT: CPT | Mod: S$GLB,,, | Performed by: INTERNAL MEDICINE

## 2021-03-03 PROCEDURE — 3288F FALL RISK ASSESSMENT DOCD: CPT | Mod: CPTII,S$GLB,, | Performed by: INTERNAL MEDICINE

## 2021-03-03 PROCEDURE — 3074F PR MOST RECENT SYSTOLIC BLOOD PRESSURE < 130 MM HG: ICD-10-PCS | Mod: CPTII,S$GLB,, | Performed by: INTERNAL MEDICINE

## 2021-03-03 PROCEDURE — 3008F PR BODY MASS INDEX (BMI) DOCUMENTED: ICD-10-PCS | Mod: CPTII,S$GLB,, | Performed by: INTERNAL MEDICINE

## 2021-03-03 PROCEDURE — 80053 COMPREHEN METABOLIC PANEL: CPT | Performed by: INTERNAL MEDICINE

## 2021-03-03 PROCEDURE — 99999 PR PBB SHADOW E&M-EST. PATIENT-LVL III: CPT | Mod: PBBFAC,,, | Performed by: OPTOMETRIST

## 2021-03-03 PROCEDURE — 99999 PR PBB SHADOW E&M-EST. PATIENT-LVL IV: ICD-10-PCS | Mod: PBBFAC,,, | Performed by: INTERNAL MEDICINE

## 2021-03-03 PROCEDURE — 84443 ASSAY THYROID STIM HORMONE: CPT | Performed by: INTERNAL MEDICINE

## 2021-03-03 PROCEDURE — 99999 PR PBB SHADOW E&M-EST. PATIENT-LVL III: ICD-10-PCS | Mod: PBBFAC,,, | Performed by: OPTOMETRIST

## 2021-03-03 PROCEDURE — 92002 PR EYE EXAM, NEW PATIENT,INTERMED: ICD-10-PCS | Mod: S$GLB,,, | Performed by: OPTOMETRIST

## 2021-03-03 PROCEDURE — 84403 ASSAY OF TOTAL TESTOSTERONE: CPT | Performed by: INTERNAL MEDICINE

## 2021-03-03 PROCEDURE — 3074F SYST BP LT 130 MM HG: CPT | Mod: CPTII,S$GLB,, | Performed by: INTERNAL MEDICINE

## 2021-03-03 PROCEDURE — 1157F ADVNC CARE PLAN IN RCRD: CPT | Mod: S$GLB,,, | Performed by: INTERNAL MEDICINE

## 2021-03-03 PROCEDURE — 36415 COLL VENOUS BLD VENIPUNCTURE: CPT | Mod: PO

## 2021-03-03 PROCEDURE — 1157F PR ADVANCE CARE PLAN OR EQUIV PRESENT IN MEDICAL RECORD: ICD-10-PCS | Mod: S$GLB,,, | Performed by: OPTOMETRIST

## 2021-03-03 PROCEDURE — 84153 ASSAY OF PSA TOTAL: CPT | Performed by: INTERNAL MEDICINE

## 2021-03-03 PROCEDURE — 84550 ASSAY OF BLOOD/URIC ACID: CPT | Performed by: INTERNAL MEDICINE

## 2021-03-03 PROCEDURE — 99213 OFFICE O/P EST LOW 20 MIN: CPT | Mod: S$GLB,,, | Performed by: INTERNAL MEDICINE

## 2021-03-03 PROCEDURE — 99999 PR PBB SHADOW E&M-EST. PATIENT-LVL IV: CPT | Mod: PBBFAC,,, | Performed by: INTERNAL MEDICINE

## 2021-03-03 PROCEDURE — 85025 COMPLETE CBC W/AUTO DIFF WBC: CPT | Performed by: INTERNAL MEDICINE

## 2021-03-03 RX ORDER — BISMUTH SUBCITRATE POTASSIUM, METRONIDAZOLE AND TETRACYCLINE HYDROCHLORIDE 140; 125; 125 MG/1; MG/1; MG/1
3 CAPSULE ORAL
Qty: 120 CAPSULE | Refills: 0 | Status: SHIPPED | OUTPATIENT
Start: 2021-03-03 | End: 2021-03-13

## 2021-03-08 ENCOUNTER — PATIENT MESSAGE (OUTPATIENT)
Dept: HEPATOLOGY | Facility: CLINIC | Age: 71
End: 2021-03-08

## 2021-03-10 ENCOUNTER — OFFICE VISIT (OUTPATIENT)
Dept: INTERNAL MEDICINE | Facility: CLINIC | Age: 71
End: 2021-03-10
Payer: MEDICARE

## 2021-03-10 VITALS
TEMPERATURE: 99 F | HEART RATE: 70 BPM | DIASTOLIC BLOOD PRESSURE: 80 MMHG | WEIGHT: 285.69 LBS | HEIGHT: 69 IN | BODY MASS INDEX: 42.32 KG/M2 | SYSTOLIC BLOOD PRESSURE: 118 MMHG | OXYGEN SATURATION: 95 %

## 2021-03-10 DIAGNOSIS — Z00.00 ROUTINE GENERAL MEDICAL EXAMINATION AT A HEALTH CARE FACILITY: Primary | ICD-10-CM

## 2021-03-10 DIAGNOSIS — E78.00 PURE HYPERCHOLESTEROLEMIA: ICD-10-CM

## 2021-03-10 DIAGNOSIS — B96.81 HELICOBACTER POSITIVE GASTRITIS: ICD-10-CM

## 2021-03-10 DIAGNOSIS — Z87.39 HISTORY OF GOUT: ICD-10-CM

## 2021-03-10 DIAGNOSIS — M51.16 LUMBAR DISC DISEASE WITH RADICULOPATHY: ICD-10-CM

## 2021-03-10 DIAGNOSIS — K29.70 HELICOBACTER POSITIVE GASTRITIS: ICD-10-CM

## 2021-03-10 DIAGNOSIS — R19.7 DIARRHEA IN ADULT PATIENT: ICD-10-CM

## 2021-03-10 DIAGNOSIS — E29.1 HYPOGONADISM MALE: ICD-10-CM

## 2021-03-10 DIAGNOSIS — N18.31 STAGE 3A CHRONIC KIDNEY DISEASE: ICD-10-CM

## 2021-03-10 DIAGNOSIS — Z86.010 PERSONAL HISTORY OF COLONIC POLYPS: ICD-10-CM

## 2021-03-10 DIAGNOSIS — M15.9 GENERALIZED OSTEOARTHROSIS, INVOLVING MULTIPLE SITES: ICD-10-CM

## 2021-03-10 DIAGNOSIS — E66.01 MORBID OBESITY WITH BMI OF 40.0-44.9, ADULT: ICD-10-CM

## 2021-03-10 DIAGNOSIS — I10 ESSENTIAL HYPERTENSION: ICD-10-CM

## 2021-03-10 LAB
ALBUMIN SERPL-MCNC: 4 G/DL (ref 3.6–5.1)
SHBG SERPL-SCNC: 33 NMOL/L (ref 22–77)
TESTOST FREE SERPL-MCNC: 122.4 PG/ML (ref 46–224)
TESTOST SERPL-MCNC: 792 NG/DL (ref 250–1100)
TESTOSTERONE.FREE+WB SERPL-MCNC: 225.2 NG/DL (ref 110–575)

## 2021-03-10 PROCEDURE — 3074F PR MOST RECENT SYSTOLIC BLOOD PRESSURE < 130 MM HG: ICD-10-PCS | Mod: CPTII,S$GLB,, | Performed by: INTERNAL MEDICINE

## 2021-03-10 PROCEDURE — 1126F PR PAIN SEVERITY QUANTIFIED, NO PAIN PRESENT: ICD-10-PCS | Mod: S$GLB,,, | Performed by: INTERNAL MEDICINE

## 2021-03-10 PROCEDURE — 1101F PR PT FALLS ASSESS DOC 0-1 FALLS W/OUT INJ PAST YR: ICD-10-PCS | Mod: CPTII,S$GLB,, | Performed by: INTERNAL MEDICINE

## 2021-03-10 PROCEDURE — 1126F AMNT PAIN NOTED NONE PRSNT: CPT | Mod: S$GLB,,, | Performed by: INTERNAL MEDICINE

## 2021-03-10 PROCEDURE — 99397 PER PM REEVAL EST PAT 65+ YR: CPT | Mod: S$GLB,,, | Performed by: INTERNAL MEDICINE

## 2021-03-10 PROCEDURE — 1101F PT FALLS ASSESS-DOCD LE1/YR: CPT | Mod: CPTII,S$GLB,, | Performed by: INTERNAL MEDICINE

## 2021-03-10 PROCEDURE — 3079F DIAST BP 80-89 MM HG: CPT | Mod: CPTII,S$GLB,, | Performed by: INTERNAL MEDICINE

## 2021-03-10 PROCEDURE — 1157F ADVNC CARE PLAN IN RCRD: CPT | Mod: S$GLB,,, | Performed by: INTERNAL MEDICINE

## 2021-03-10 PROCEDURE — 3074F SYST BP LT 130 MM HG: CPT | Mod: CPTII,S$GLB,, | Performed by: INTERNAL MEDICINE

## 2021-03-10 PROCEDURE — 1157F PR ADVANCE CARE PLAN OR EQUIV PRESENT IN MEDICAL RECORD: ICD-10-PCS | Mod: S$GLB,,, | Performed by: INTERNAL MEDICINE

## 2021-03-10 PROCEDURE — 3008F PR BODY MASS INDEX (BMI) DOCUMENTED: ICD-10-PCS | Mod: CPTII,S$GLB,, | Performed by: INTERNAL MEDICINE

## 2021-03-10 PROCEDURE — 3288F PR FALLS RISK ASSESSMENT DOCUMENTED: ICD-10-PCS | Mod: CPTII,S$GLB,, | Performed by: INTERNAL MEDICINE

## 2021-03-10 PROCEDURE — 99397 PR PREVENTIVE VISIT,EST,65 & OVER: ICD-10-PCS | Mod: S$GLB,,, | Performed by: INTERNAL MEDICINE

## 2021-03-10 PROCEDURE — 3288F FALL RISK ASSESSMENT DOCD: CPT | Mod: CPTII,S$GLB,, | Performed by: INTERNAL MEDICINE

## 2021-03-10 PROCEDURE — 99999 PR PBB SHADOW E&M-EST. PATIENT-LVL III: CPT | Mod: PBBFAC,,, | Performed by: INTERNAL MEDICINE

## 2021-03-10 PROCEDURE — 3079F PR MOST RECENT DIASTOLIC BLOOD PRESSURE 80-89 MM HG: ICD-10-PCS | Mod: CPTII,S$GLB,, | Performed by: INTERNAL MEDICINE

## 2021-03-10 PROCEDURE — 99999 PR PBB SHADOW E&M-EST. PATIENT-LVL III: ICD-10-PCS | Mod: PBBFAC,,, | Performed by: INTERNAL MEDICINE

## 2021-03-10 PROCEDURE — 3008F BODY MASS INDEX DOCD: CPT | Mod: CPTII,S$GLB,, | Performed by: INTERNAL MEDICINE

## 2021-03-10 RX ORDER — TESTOSTERONE CYPIONATE 200 MG/ML
300 INJECTION, SOLUTION INTRAMUSCULAR
Qty: 10 ML | Refills: 1 | Status: SHIPPED | OUTPATIENT
Start: 2021-03-10 | End: 2021-09-14 | Stop reason: SDUPTHER

## 2021-03-10 RX ORDER — PANTOPRAZOLE SODIUM 40 MG/1
40 TABLET, DELAYED RELEASE ORAL DAILY
Qty: 90 TABLET | Refills: 3 | Status: SHIPPED | OUTPATIENT
Start: 2021-03-10 | End: 2022-03-16

## 2021-03-10 RX ORDER — PRAVASTATIN SODIUM 20 MG/1
20 TABLET ORAL DAILY
Qty: 90 TABLET | Refills: 3 | Status: SHIPPED | OUTPATIENT
Start: 2021-03-10 | End: 2022-01-25

## 2021-03-10 RX ORDER — CARVEDILOL 25 MG/1
25 TABLET ORAL 2 TIMES DAILY WITH MEALS
Qty: 180 TABLET | Refills: 3 | Status: SHIPPED | OUTPATIENT
Start: 2021-03-10 | End: 2022-01-25

## 2021-03-10 RX ORDER — GABAPENTIN 300 MG/1
CAPSULE ORAL
Qty: 270 CAPSULE | Refills: 3 | Status: SHIPPED | OUTPATIENT
Start: 2021-03-10 | End: 2022-01-25

## 2021-03-10 RX ORDER — AMLODIPINE BESYLATE 10 MG/1
10 TABLET ORAL DAILY
Qty: 90 TABLET | Refills: 3 | Status: SHIPPED | OUTPATIENT
Start: 2021-03-10 | End: 2022-01-25

## 2021-03-10 RX ORDER — LISINOPRIL 40 MG/1
40 TABLET ORAL DAILY
Qty: 90 TABLET | Refills: 3 | Status: SHIPPED | OUTPATIENT
Start: 2021-03-10 | End: 2022-01-25

## 2021-03-10 RX ORDER — HYDROCHLOROTHIAZIDE 25 MG/1
25 TABLET ORAL DAILY
Qty: 90 TABLET | Refills: 3 | Status: SHIPPED | OUTPATIENT
Start: 2021-03-10 | End: 2022-01-25

## 2021-03-10 RX ORDER — ALLOPURINOL 300 MG/1
300 TABLET ORAL DAILY
Qty: 90 TABLET | Refills: 3 | Status: SHIPPED | OUTPATIENT
Start: 2021-03-10 | End: 2022-01-25

## 2021-03-17 ENCOUNTER — PATIENT MESSAGE (OUTPATIENT)
Dept: INTERNAL MEDICINE | Facility: CLINIC | Age: 71
End: 2021-03-17

## 2021-03-17 RX ORDER — DICLOFENAC SODIUM 75 MG/1
TABLET, DELAYED RELEASE ORAL
Qty: 90 TABLET | Refills: 3 | Status: SHIPPED | OUTPATIENT
Start: 2021-03-17 | End: 2021-06-16 | Stop reason: SDUPTHER

## 2021-03-29 ENCOUNTER — LAB VISIT (OUTPATIENT)
Dept: LAB | Facility: HOSPITAL | Age: 71
End: 2021-03-29
Attending: INTERNAL MEDICINE
Payer: MEDICARE

## 2021-03-29 DIAGNOSIS — A04.8 H. PYLORI INFECTION: ICD-10-CM

## 2021-03-29 DIAGNOSIS — R19.7 DIARRHEA IN ADULT PATIENT: ICD-10-CM

## 2021-03-29 PROCEDURE — 87338 HPYLORI STOOL AG IA: CPT | Performed by: INTERNAL MEDICINE

## 2021-03-29 PROCEDURE — 87449 NOS EACH ORGANISM AG IA: CPT | Performed by: INTERNAL MEDICINE

## 2021-03-29 PROCEDURE — 87324 CLOSTRIDIUM AG IA: CPT | Performed by: INTERNAL MEDICINE

## 2021-03-29 PROCEDURE — 89055 LEUKOCYTE ASSESSMENT FECAL: CPT | Performed by: INTERNAL MEDICINE

## 2021-03-30 LAB
C DIFF GDH STL QL: NEGATIVE
C DIFF TOX A+B STL QL IA: NEGATIVE
WBC #/AREA STL HPF: NORMAL /[HPF]

## 2021-04-01 ENCOUNTER — PATIENT MESSAGE (OUTPATIENT)
Dept: GASTROENTEROLOGY | Facility: CLINIC | Age: 71
End: 2021-04-01

## 2021-04-04 LAB — H PYLORI AG STL QL IA: NOT DETECTED

## 2021-05-10 ENCOUNTER — TELEPHONE (OUTPATIENT)
Dept: GASTROENTEROLOGY | Facility: CLINIC | Age: 71
End: 2021-05-10

## 2021-06-16 ENCOUNTER — PATIENT MESSAGE (OUTPATIENT)
Dept: INTERNAL MEDICINE | Facility: CLINIC | Age: 71
End: 2021-06-16

## 2021-06-16 RX ORDER — DICLOFENAC SODIUM 75 MG/1
TABLET, DELAYED RELEASE ORAL
Qty: 90 TABLET | Refills: 3 | Status: SHIPPED | OUTPATIENT
Start: 2021-06-16 | End: 2021-06-21 | Stop reason: SDUPTHER

## 2021-06-21 ENCOUNTER — PES CALL (OUTPATIENT)
Dept: ADMINISTRATIVE | Facility: CLINIC | Age: 71
End: 2021-06-21

## 2021-06-21 RX ORDER — DICLOFENAC SODIUM 75 MG/1
TABLET, DELAYED RELEASE ORAL
Qty: 90 TABLET | Refills: 3 | Status: SHIPPED | OUTPATIENT
Start: 2021-06-21 | End: 2021-06-28 | Stop reason: SDUPTHER

## 2021-06-28 RX ORDER — DICLOFENAC SODIUM 75 MG/1
TABLET, DELAYED RELEASE ORAL
Qty: 90 TABLET | Refills: 3 | Status: SHIPPED | OUTPATIENT
Start: 2021-06-28 | End: 2021-09-15

## 2021-08-10 ENCOUNTER — PES CALL (OUTPATIENT)
Dept: ADMINISTRATIVE | Facility: CLINIC | Age: 71
End: 2021-08-10

## 2021-09-08 ENCOUNTER — LAB VISIT (OUTPATIENT)
Dept: LAB | Facility: HOSPITAL | Age: 71
End: 2021-09-08
Attending: INTERNAL MEDICINE
Payer: MEDICARE

## 2021-09-08 DIAGNOSIS — I10 ESSENTIAL HYPERTENSION: ICD-10-CM

## 2021-09-08 DIAGNOSIS — E29.1 HYPOGONADISM MALE: ICD-10-CM

## 2021-09-08 PROCEDURE — 84403 ASSAY OF TOTAL TESTOSTERONE: CPT | Performed by: INTERNAL MEDICINE

## 2021-09-08 PROCEDURE — 84443 ASSAY THYROID STIM HORMONE: CPT | Performed by: INTERNAL MEDICINE

## 2021-09-08 PROCEDURE — 85025 COMPLETE CBC W/AUTO DIFF WBC: CPT | Performed by: INTERNAL MEDICINE

## 2021-09-08 PROCEDURE — 80053 COMPREHEN METABOLIC PANEL: CPT | Performed by: INTERNAL MEDICINE

## 2021-09-08 PROCEDURE — 80061 LIPID PANEL: CPT | Performed by: INTERNAL MEDICINE

## 2021-09-09 LAB
ALBUMIN SERPL BCP-MCNC: 4.1 G/DL (ref 3.5–5.2)
ALP SERPL-CCNC: 56 U/L (ref 55–135)
ALT SERPL W/O P-5'-P-CCNC: 47 U/L (ref 10–44)
ANION GAP SERPL CALC-SCNC: 15 MMOL/L (ref 8–16)
AST SERPL-CCNC: 35 U/L (ref 10–40)
BASOPHILS # BLD AUTO: 0.05 K/UL (ref 0–0.2)
BASOPHILS NFR BLD: 0.8 % (ref 0–1.9)
BILIRUB SERPL-MCNC: 0.8 MG/DL (ref 0.1–1)
BUN SERPL-MCNC: 46 MG/DL (ref 8–23)
CALCIUM SERPL-MCNC: 9.5 MG/DL (ref 8.7–10.5)
CHLORIDE SERPL-SCNC: 107 MMOL/L (ref 95–110)
CHOLEST SERPL-MCNC: 149 MG/DL (ref 120–199)
CHOLEST/HDLC SERPL: 6.8 {RATIO} (ref 2–5)
CO2 SERPL-SCNC: 18 MMOL/L (ref 23–29)
CREAT SERPL-MCNC: 1.4 MG/DL (ref 0.5–1.4)
DIFFERENTIAL METHOD: ABNORMAL
EOSINOPHIL # BLD AUTO: 0.3 K/UL (ref 0–0.5)
EOSINOPHIL NFR BLD: 4.7 % (ref 0–8)
ERYTHROCYTE [DISTWIDTH] IN BLOOD BY AUTOMATED COUNT: 15.2 % (ref 11.5–14.5)
EST. GFR  (AFRICAN AMERICAN): 58.4 ML/MIN/1.73 M^2
EST. GFR  (NON AFRICAN AMERICAN): 50.5 ML/MIN/1.73 M^2
GLUCOSE SERPL-MCNC: 81 MG/DL (ref 70–110)
HCT VFR BLD AUTO: 47.3 % (ref 40–54)
HDLC SERPL-MCNC: 22 MG/DL (ref 40–75)
HDLC SERPL: 14.8 % (ref 20–50)
HGB BLD-MCNC: 15.3 G/DL (ref 14–18)
IMM GRANULOCYTES # BLD AUTO: 0.04 K/UL (ref 0–0.04)
IMM GRANULOCYTES NFR BLD AUTO: 0.6 % (ref 0–0.5)
LDLC SERPL CALC-MCNC: 97.2 MG/DL (ref 63–159)
LYMPHOCYTES # BLD AUTO: 1.5 K/UL (ref 1–4.8)
LYMPHOCYTES NFR BLD: 22.3 % (ref 18–48)
MCH RBC QN AUTO: 32.6 PG (ref 27–31)
MCHC RBC AUTO-ENTMCNC: 32.3 G/DL (ref 32–36)
MCV RBC AUTO: 101 FL (ref 82–98)
MONOCYTES # BLD AUTO: 0.7 K/UL (ref 0.3–1)
MONOCYTES NFR BLD: 10.5 % (ref 4–15)
NEUTROPHILS # BLD AUTO: 4 K/UL (ref 1.8–7.7)
NEUTROPHILS NFR BLD: 61.1 % (ref 38–73)
NONHDLC SERPL-MCNC: 127 MG/DL
NRBC BLD-RTO: 0 /100 WBC
PLATELET # BLD AUTO: 293 K/UL (ref 150–450)
PMV BLD AUTO: 10.5 FL (ref 9.2–12.9)
POTASSIUM SERPL-SCNC: 4.5 MMOL/L (ref 3.5–5.1)
PROT SERPL-MCNC: 7 G/DL (ref 6–8.4)
RBC # BLD AUTO: 4.69 M/UL (ref 4.6–6.2)
SODIUM SERPL-SCNC: 140 MMOL/L (ref 136–145)
TRIGL SERPL-MCNC: 149 MG/DL (ref 30–150)
TSH SERPL DL<=0.005 MIU/L-ACNC: 0.96 UIU/ML (ref 0.4–4)
WBC # BLD AUTO: 6.59 K/UL (ref 3.9–12.7)

## 2021-09-14 RX ORDER — TESTOSTERONE CYPIONATE 200 MG/ML
300 INJECTION, SOLUTION INTRAMUSCULAR
Qty: 10 ML | Refills: 1 | Status: SHIPPED | OUTPATIENT
Start: 2021-09-14 | End: 2022-03-16 | Stop reason: SDUPTHER

## 2021-09-15 ENCOUNTER — TELEPHONE (OUTPATIENT)
Dept: ORTHOPEDICS | Facility: CLINIC | Age: 71
End: 2021-09-15

## 2021-09-15 ENCOUNTER — OFFICE VISIT (OUTPATIENT)
Dept: INTERNAL MEDICINE | Facility: CLINIC | Age: 71
End: 2021-09-15
Payer: MEDICARE

## 2021-09-15 ENCOUNTER — TELEPHONE (OUTPATIENT)
Dept: NEUROSURGERY | Facility: CLINIC | Age: 71
End: 2021-09-15

## 2021-09-15 VITALS
BODY MASS INDEX: 41.69 KG/M2 | OXYGEN SATURATION: 95 % | WEIGHT: 281.5 LBS | HEART RATE: 55 BPM | SYSTOLIC BLOOD PRESSURE: 124 MMHG | HEIGHT: 69 IN | DIASTOLIC BLOOD PRESSURE: 80 MMHG | TEMPERATURE: 98 F

## 2021-09-15 DIAGNOSIS — E66.01 MORBID OBESITY WITH BMI OF 40.0-44.9, ADULT: ICD-10-CM

## 2021-09-15 DIAGNOSIS — M48.062 SPINAL STENOSIS OF LUMBAR REGION WITH NEUROGENIC CLAUDICATION: ICD-10-CM

## 2021-09-15 DIAGNOSIS — Z86.010 PERSONAL HISTORY OF COLONIC POLYPS: ICD-10-CM

## 2021-09-15 DIAGNOSIS — E78.00 PURE HYPERCHOLESTEROLEMIA: ICD-10-CM

## 2021-09-15 DIAGNOSIS — Z12.5 PROSTATE CANCER SCREENING: ICD-10-CM

## 2021-09-15 DIAGNOSIS — E29.1 HYPOGONADISM MALE: ICD-10-CM

## 2021-09-15 DIAGNOSIS — N18.31 STAGE 3A CHRONIC KIDNEY DISEASE: ICD-10-CM

## 2021-09-15 DIAGNOSIS — I10 ESSENTIAL HYPERTENSION: Primary | ICD-10-CM

## 2021-09-15 DIAGNOSIS — Z87.39 HISTORY OF GOUT: ICD-10-CM

## 2021-09-15 LAB
ALBUMIN SERPL-MCNC: 4.3 G/DL (ref 3.6–5.1)
SHBG SERPL-SCNC: 18 NMOL/L (ref 22–77)
TESTOST FREE SERPL-MCNC: 122.3 PG/ML (ref 6–73)
TESTOST SERPL-MCNC: 550 NG/DL (ref 250–1100)
TESTOSTERONE.FREE+WB SERPL-MCNC: 240.9 NG/DL (ref 15–150)

## 2021-09-15 PROCEDURE — 1101F PT FALLS ASSESS-DOCD LE1/YR: CPT | Mod: CPTII,S$GLB,, | Performed by: INTERNAL MEDICINE

## 2021-09-15 PROCEDURE — 1157F PR ADVANCE CARE PLAN OR EQUIV PRESENT IN MEDICAL RECORD: ICD-10-PCS | Mod: CPTII,S$GLB,, | Performed by: INTERNAL MEDICINE

## 2021-09-15 PROCEDURE — 1159F MED LIST DOCD IN RCRD: CPT | Mod: CPTII,S$GLB,, | Performed by: INTERNAL MEDICINE

## 2021-09-15 PROCEDURE — 1157F ADVNC CARE PLAN IN RCRD: CPT | Mod: CPTII,S$GLB,, | Performed by: INTERNAL MEDICINE

## 2021-09-15 PROCEDURE — 99999 PR PBB SHADOW E&M-EST. PATIENT-LVL IV: CPT | Mod: PBBFAC,,, | Performed by: INTERNAL MEDICINE

## 2021-09-15 PROCEDURE — 1159F PR MEDICATION LIST DOCUMENTED IN MEDICAL RECORD: ICD-10-PCS | Mod: CPTII,S$GLB,, | Performed by: INTERNAL MEDICINE

## 2021-09-15 PROCEDURE — 3079F PR MOST RECENT DIASTOLIC BLOOD PRESSURE 80-89 MM HG: ICD-10-PCS | Mod: CPTII,S$GLB,, | Performed by: INTERNAL MEDICINE

## 2021-09-15 PROCEDURE — 3079F DIAST BP 80-89 MM HG: CPT | Mod: CPTII,S$GLB,, | Performed by: INTERNAL MEDICINE

## 2021-09-15 PROCEDURE — 1160F RVW MEDS BY RX/DR IN RCRD: CPT | Mod: CPTII,S$GLB,, | Performed by: INTERNAL MEDICINE

## 2021-09-15 PROCEDURE — 4010F ACE/ARB THERAPY RXD/TAKEN: CPT | Mod: CPTII,S$GLB,, | Performed by: INTERNAL MEDICINE

## 2021-09-15 PROCEDURE — 99214 PR OFFICE/OUTPT VISIT, EST, LEVL IV, 30-39 MIN: ICD-10-PCS | Mod: S$GLB,,, | Performed by: INTERNAL MEDICINE

## 2021-09-15 PROCEDURE — 1125F AMNT PAIN NOTED PAIN PRSNT: CPT | Mod: CPTII,S$GLB,, | Performed by: INTERNAL MEDICINE

## 2021-09-15 PROCEDURE — 99999 PR PBB SHADOW E&M-EST. PATIENT-LVL IV: ICD-10-PCS | Mod: PBBFAC,,, | Performed by: INTERNAL MEDICINE

## 2021-09-15 PROCEDURE — 3074F SYST BP LT 130 MM HG: CPT | Mod: CPTII,S$GLB,, | Performed by: INTERNAL MEDICINE

## 2021-09-15 PROCEDURE — 99499 RISK ADDL DX/OHS AUDIT: ICD-10-PCS | Mod: HCNC,S$GLB,, | Performed by: INTERNAL MEDICINE

## 2021-09-15 PROCEDURE — 3074F PR MOST RECENT SYSTOLIC BLOOD PRESSURE < 130 MM HG: ICD-10-PCS | Mod: CPTII,S$GLB,, | Performed by: INTERNAL MEDICINE

## 2021-09-15 PROCEDURE — 4010F PR ACE/ARB THEARPY RXD/TAKEN: ICD-10-PCS | Mod: CPTII,S$GLB,, | Performed by: INTERNAL MEDICINE

## 2021-09-15 PROCEDURE — 99214 OFFICE O/P EST MOD 30 MIN: CPT | Mod: S$GLB,,, | Performed by: INTERNAL MEDICINE

## 2021-09-15 PROCEDURE — 1160F PR REVIEW ALL MEDS BY PRESCRIBER/CLIN PHARMACIST DOCUMENTED: ICD-10-PCS | Mod: CPTII,S$GLB,, | Performed by: INTERNAL MEDICINE

## 2021-09-15 PROCEDURE — 3008F PR BODY MASS INDEX (BMI) DOCUMENTED: ICD-10-PCS | Mod: CPTII,S$GLB,, | Performed by: INTERNAL MEDICINE

## 2021-09-15 PROCEDURE — 3288F PR FALLS RISK ASSESSMENT DOCUMENTED: ICD-10-PCS | Mod: CPTII,S$GLB,, | Performed by: INTERNAL MEDICINE

## 2021-09-15 PROCEDURE — 3288F FALL RISK ASSESSMENT DOCD: CPT | Mod: CPTII,S$GLB,, | Performed by: INTERNAL MEDICINE

## 2021-09-15 PROCEDURE — 99499 UNLISTED E&M SERVICE: CPT | Mod: HCNC,S$GLB,, | Performed by: INTERNAL MEDICINE

## 2021-09-15 PROCEDURE — 3008F BODY MASS INDEX DOCD: CPT | Mod: CPTII,S$GLB,, | Performed by: INTERNAL MEDICINE

## 2021-09-15 PROCEDURE — 1101F PR PT FALLS ASSESS DOC 0-1 FALLS W/OUT INJ PAST YR: ICD-10-PCS | Mod: CPTII,S$GLB,, | Performed by: INTERNAL MEDICINE

## 2021-09-15 PROCEDURE — 1125F PR PAIN SEVERITY QUANTIFIED, PAIN PRESENT: ICD-10-PCS | Mod: CPTII,S$GLB,, | Performed by: INTERNAL MEDICINE

## 2021-09-15 RX ORDER — HYDROCODONE BITARTRATE AND ACETAMINOPHEN 10; 325 MG/1; MG/1
1 TABLET ORAL EVERY 8 HOURS PRN
Qty: 90 TABLET | Refills: 0 | Status: SHIPPED | OUTPATIENT
Start: 2021-09-15 | End: 2022-03-16 | Stop reason: SDUPTHER

## 2021-09-15 RX ORDER — OFLOXACIN 3 MG/ML
SOLUTION/ DROPS OPHTHALMIC
COMMUNITY
Start: 2021-08-31 | End: 2021-12-13

## 2021-09-20 ENCOUNTER — TELEPHONE (OUTPATIENT)
Dept: INTERNAL MEDICINE | Facility: CLINIC | Age: 71
End: 2021-09-20

## 2021-09-21 ENCOUNTER — TELEPHONE (OUTPATIENT)
Dept: NEUROSURGERY | Facility: CLINIC | Age: 71
End: 2021-09-21

## 2021-10-07 ENCOUNTER — OFFICE VISIT (OUTPATIENT)
Dept: FAMILY MEDICINE | Facility: CLINIC | Age: 71
End: 2021-10-07
Payer: MEDICARE

## 2021-10-07 VITALS
HEIGHT: 69 IN | BODY MASS INDEX: 38.91 KG/M2 | OXYGEN SATURATION: 97 % | HEART RATE: 58 BPM | SYSTOLIC BLOOD PRESSURE: 110 MMHG | TEMPERATURE: 96 F | WEIGHT: 262.69 LBS | DIASTOLIC BLOOD PRESSURE: 70 MMHG

## 2021-10-07 DIAGNOSIS — M15.9 GENERALIZED OSTEOARTHROSIS, INVOLVING MULTIPLE SITES: Primary | ICD-10-CM

## 2021-10-07 DIAGNOSIS — N18.31 STAGE 3A CHRONIC KIDNEY DISEASE: ICD-10-CM

## 2021-10-07 DIAGNOSIS — I10 ESSENTIAL HYPERTENSION: ICD-10-CM

## 2021-10-07 PROCEDURE — 1101F PR PT FALLS ASSESS DOC 0-1 FALLS W/OUT INJ PAST YR: ICD-10-PCS | Mod: HCNC,CPTII,S$GLB, | Performed by: NURSE PRACTITIONER

## 2021-10-07 PROCEDURE — 3288F PR FALLS RISK ASSESSMENT DOCUMENTED: ICD-10-PCS | Mod: HCNC,CPTII,S$GLB, | Performed by: NURSE PRACTITIONER

## 2021-10-07 PROCEDURE — 99214 PR OFFICE/OUTPT VISIT, EST, LEVL IV, 30-39 MIN: ICD-10-PCS | Mod: 25,HCNC,S$GLB, | Performed by: NURSE PRACTITIONER

## 2021-10-07 PROCEDURE — 1159F MED LIST DOCD IN RCRD: CPT | Mod: HCNC,CPTII,S$GLB, | Performed by: NURSE PRACTITIONER

## 2021-10-07 PROCEDURE — 4010F ACE/ARB THERAPY RXD/TAKEN: CPT | Mod: HCNC,CPTII,S$GLB, | Performed by: NURSE PRACTITIONER

## 2021-10-07 PROCEDURE — 3008F PR BODY MASS INDEX (BMI) DOCUMENTED: ICD-10-PCS | Mod: HCNC,CPTII,S$GLB, | Performed by: NURSE PRACTITIONER

## 2021-10-07 PROCEDURE — 3078F PR MOST RECENT DIASTOLIC BLOOD PRESSURE < 80 MM HG: ICD-10-PCS | Mod: HCNC,CPTII,S$GLB, | Performed by: NURSE PRACTITIONER

## 2021-10-07 PROCEDURE — 1125F PR PAIN SEVERITY QUANTIFIED, PAIN PRESENT: ICD-10-PCS | Mod: HCNC,CPTII,S$GLB, | Performed by: NURSE PRACTITIONER

## 2021-10-07 PROCEDURE — 1101F PT FALLS ASSESS-DOCD LE1/YR: CPT | Mod: HCNC,CPTII,S$GLB, | Performed by: NURSE PRACTITIONER

## 2021-10-07 PROCEDURE — 20610 DRAIN/INJ JOINT/BURSA W/O US: CPT | Mod: HCNC,LT,S$GLB, | Performed by: NURSE PRACTITIONER

## 2021-10-07 PROCEDURE — 20610 PR DRAIN/INJECT LARGE JOINT/BURSA: ICD-10-PCS | Mod: HCNC,LT,S$GLB, | Performed by: NURSE PRACTITIONER

## 2021-10-07 PROCEDURE — 3074F PR MOST RECENT SYSTOLIC BLOOD PRESSURE < 130 MM HG: ICD-10-PCS | Mod: HCNC,CPTII,S$GLB, | Performed by: NURSE PRACTITIONER

## 2021-10-07 PROCEDURE — 3074F SYST BP LT 130 MM HG: CPT | Mod: HCNC,CPTII,S$GLB, | Performed by: NURSE PRACTITIONER

## 2021-10-07 PROCEDURE — 99214 OFFICE O/P EST MOD 30 MIN: CPT | Mod: 25,HCNC,S$GLB, | Performed by: NURSE PRACTITIONER

## 2021-10-07 PROCEDURE — 99499 UNLISTED E&M SERVICE: CPT | Mod: S$GLB,,, | Performed by: NURSE PRACTITIONER

## 2021-10-07 PROCEDURE — 4010F PR ACE/ARB THEARPY RXD/TAKEN: ICD-10-PCS | Mod: HCNC,CPTII,S$GLB, | Performed by: NURSE PRACTITIONER

## 2021-10-07 PROCEDURE — 1125F AMNT PAIN NOTED PAIN PRSNT: CPT | Mod: HCNC,CPTII,S$GLB, | Performed by: NURSE PRACTITIONER

## 2021-10-07 PROCEDURE — 99999 PR PBB SHADOW E&M-EST. PATIENT-LVL IV: ICD-10-PCS | Mod: PBBFAC,HCNC,, | Performed by: NURSE PRACTITIONER

## 2021-10-07 PROCEDURE — 1159F PR MEDICATION LIST DOCUMENTED IN MEDICAL RECORD: ICD-10-PCS | Mod: HCNC,CPTII,S$GLB, | Performed by: NURSE PRACTITIONER

## 2021-10-07 PROCEDURE — 1157F PR ADVANCE CARE PLAN OR EQUIV PRESENT IN MEDICAL RECORD: ICD-10-PCS | Mod: HCNC,CPTII,S$GLB, | Performed by: NURSE PRACTITIONER

## 2021-10-07 PROCEDURE — 1157F ADVNC CARE PLAN IN RCRD: CPT | Mod: HCNC,CPTII,S$GLB, | Performed by: NURSE PRACTITIONER

## 2021-10-07 PROCEDURE — 99999 PR PBB SHADOW E&M-EST. PATIENT-LVL IV: CPT | Mod: PBBFAC,HCNC,, | Performed by: NURSE PRACTITIONER

## 2021-10-07 PROCEDURE — 3008F BODY MASS INDEX DOCD: CPT | Mod: HCNC,CPTII,S$GLB, | Performed by: NURSE PRACTITIONER

## 2021-10-07 PROCEDURE — 99499 RISK ADDL DX/OHS AUDIT: ICD-10-PCS | Mod: S$GLB,,, | Performed by: NURSE PRACTITIONER

## 2021-10-07 PROCEDURE — 3288F FALL RISK ASSESSMENT DOCD: CPT | Mod: HCNC,CPTII,S$GLB, | Performed by: NURSE PRACTITIONER

## 2021-10-07 PROCEDURE — 3078F DIAST BP <80 MM HG: CPT | Mod: HCNC,CPTII,S$GLB, | Performed by: NURSE PRACTITIONER

## 2021-10-08 ENCOUNTER — HOSPITAL ENCOUNTER (OUTPATIENT)
Dept: RADIOLOGY | Facility: HOSPITAL | Age: 71
Discharge: HOME | End: 2021-10-08
Attending: NURSE PRACTITIONER
Payer: MEDICARE

## 2021-10-08 ENCOUNTER — OFFICE VISIT (OUTPATIENT)
Dept: NEUROSURGERY | Facility: CLINIC | Age: 71
End: 2021-10-08
Payer: MEDICARE

## 2021-10-08 VITALS — HEART RATE: 70 BPM | SYSTOLIC BLOOD PRESSURE: 132 MMHG | DIASTOLIC BLOOD PRESSURE: 83 MMHG

## 2021-10-08 DIAGNOSIS — M48.062 SPINAL STENOSIS OF LUMBAR REGION WITH NEUROGENIC CLAUDICATION: ICD-10-CM

## 2021-10-08 DIAGNOSIS — M54.9 DORSALGIA, UNSPECIFIED: ICD-10-CM

## 2021-10-08 DIAGNOSIS — M54.2 NECK PAIN: ICD-10-CM

## 2021-10-08 DIAGNOSIS — M51.36 DEGENERATIVE DISC DISEASE, LUMBAR: Primary | ICD-10-CM

## 2021-10-08 PROCEDURE — 1125F PR PAIN SEVERITY QUANTIFIED, PAIN PRESENT: ICD-10-PCS | Mod: HCNC,CPTII,S$GLB, | Performed by: NURSE PRACTITIONER

## 2021-10-08 PROCEDURE — 3075F SYST BP GE 130 - 139MM HG: CPT | Mod: HCNC,CPTII,S$GLB, | Performed by: NURSE PRACTITIONER

## 2021-10-08 PROCEDURE — 1159F PR MEDICATION LIST DOCUMENTED IN MEDICAL RECORD: ICD-10-PCS | Mod: HCNC,CPTII,S$GLB, | Performed by: NURSE PRACTITIONER

## 2021-10-08 PROCEDURE — 72110 X-RAY EXAM L-2 SPINE 4/>VWS: CPT | Mod: TC,HCNC,59

## 2021-10-08 PROCEDURE — 1157F ADVNC CARE PLAN IN RCRD: CPT | Mod: HCNC,CPTII,S$GLB, | Performed by: NURSE PRACTITIONER

## 2021-10-08 PROCEDURE — 3079F PR MOST RECENT DIASTOLIC BLOOD PRESSURE 80-89 MM HG: ICD-10-PCS | Mod: HCNC,CPTII,S$GLB, | Performed by: NURSE PRACTITIONER

## 2021-10-08 PROCEDURE — 72110 X-RAY EXAM L-2 SPINE 4/>VWS: CPT | Mod: 26,HCNC,, | Performed by: RADIOLOGY

## 2021-10-08 PROCEDURE — 3079F DIAST BP 80-89 MM HG: CPT | Mod: HCNC,CPTII,S$GLB, | Performed by: NURSE PRACTITIONER

## 2021-10-08 PROCEDURE — 72082 X-RAY EXAM ENTIRE SPI 2/3 VW: CPT | Mod: TC,HCNC

## 2021-10-08 PROCEDURE — 99215 OFFICE O/P EST HI 40 MIN: CPT | Mod: HCNC,S$GLB,, | Performed by: NURSE PRACTITIONER

## 2021-10-08 PROCEDURE — 99999 PR PBB SHADOW E&M-EST. PATIENT-LVL V: CPT | Mod: PBBFAC,HCNC,, | Performed by: NURSE PRACTITIONER

## 2021-10-08 PROCEDURE — 72110 XR LUMBAR SPINE 5 VIEW WITH FLEX AND EXT: ICD-10-PCS | Mod: 26,HCNC,, | Performed by: RADIOLOGY

## 2021-10-08 PROCEDURE — 1125F AMNT PAIN NOTED PAIN PRSNT: CPT | Mod: HCNC,CPTII,S$GLB, | Performed by: NURSE PRACTITIONER

## 2021-10-08 PROCEDURE — 1160F RVW MEDS BY RX/DR IN RCRD: CPT | Mod: HCNC,CPTII,S$GLB, | Performed by: NURSE PRACTITIONER

## 2021-10-08 PROCEDURE — 1160F PR REVIEW ALL MEDS BY PRESCRIBER/CLIN PHARMACIST DOCUMENTED: ICD-10-PCS | Mod: HCNC,CPTII,S$GLB, | Performed by: NURSE PRACTITIONER

## 2021-10-08 PROCEDURE — 3075F PR MOST RECENT SYSTOLIC BLOOD PRESS GE 130-139MM HG: ICD-10-PCS | Mod: HCNC,CPTII,S$GLB, | Performed by: NURSE PRACTITIONER

## 2021-10-08 PROCEDURE — 4010F ACE/ARB THERAPY RXD/TAKEN: CPT | Mod: HCNC,CPTII,S$GLB, | Performed by: NURSE PRACTITIONER

## 2021-10-08 PROCEDURE — 72082 XR SCOLIOSIS COMPLETE: ICD-10-PCS | Mod: 26,HCNC,, | Performed by: RADIOLOGY

## 2021-10-08 PROCEDURE — 99999 PR PBB SHADOW E&M-EST. PATIENT-LVL V: ICD-10-PCS | Mod: PBBFAC,HCNC,, | Performed by: NURSE PRACTITIONER

## 2021-10-08 PROCEDURE — 4010F PR ACE/ARB THEARPY RXD/TAKEN: ICD-10-PCS | Mod: HCNC,CPTII,S$GLB, | Performed by: NURSE PRACTITIONER

## 2021-10-08 PROCEDURE — 1157F PR ADVANCE CARE PLAN OR EQUIV PRESENT IN MEDICAL RECORD: ICD-10-PCS | Mod: HCNC,CPTII,S$GLB, | Performed by: NURSE PRACTITIONER

## 2021-10-08 PROCEDURE — 1159F MED LIST DOCD IN RCRD: CPT | Mod: HCNC,CPTII,S$GLB, | Performed by: NURSE PRACTITIONER

## 2021-10-08 PROCEDURE — 99215 PR OFFICE/OUTPT VISIT, EST, LEVL V, 40-54 MIN: ICD-10-PCS | Mod: HCNC,S$GLB,, | Performed by: NURSE PRACTITIONER

## 2021-10-08 PROCEDURE — 72082 X-RAY EXAM ENTIRE SPI 2/3 VW: CPT | Mod: 26,HCNC,, | Performed by: RADIOLOGY

## 2021-10-14 ENCOUNTER — PATIENT MESSAGE (OUTPATIENT)
Dept: NEUROSURGERY | Facility: CLINIC | Age: 71
End: 2021-10-14

## 2021-10-15 ENCOUNTER — LAB VISIT (OUTPATIENT)
Dept: LAB | Facility: HOSPITAL | Age: 71
End: 2021-10-15
Attending: INTERNAL MEDICINE
Payer: MEDICARE

## 2021-10-15 DIAGNOSIS — N18.31 STAGE 3A CHRONIC KIDNEY DISEASE: ICD-10-CM

## 2021-10-15 LAB
ANION GAP SERPL CALC-SCNC: 13 MMOL/L (ref 8–16)
BUN SERPL-MCNC: 40 MG/DL (ref 8–23)
CALCIUM SERPL-MCNC: 9.6 MG/DL (ref 8.7–10.5)
CHLORIDE SERPL-SCNC: 105 MMOL/L (ref 95–110)
CO2 SERPL-SCNC: 19 MMOL/L (ref 23–29)
CREAT SERPL-MCNC: 1.1 MG/DL (ref 0.5–1.4)
EST. GFR  (AFRICAN AMERICAN): >60 ML/MIN/1.73 M^2
EST. GFR  (NON AFRICAN AMERICAN): >60 ML/MIN/1.73 M^2
GLUCOSE SERPL-MCNC: 90 MG/DL (ref 70–110)
POTASSIUM SERPL-SCNC: 4.2 MMOL/L (ref 3.5–5.1)
SODIUM SERPL-SCNC: 137 MMOL/L (ref 136–145)

## 2021-10-15 PROCEDURE — 36415 COLL VENOUS BLD VENIPUNCTURE: CPT | Mod: HCNC,PO | Performed by: INTERNAL MEDICINE

## 2021-10-15 PROCEDURE — 80048 BASIC METABOLIC PNL TOTAL CA: CPT | Mod: HCNC | Performed by: INTERNAL MEDICINE

## 2021-10-21 ENCOUNTER — TELEPHONE (OUTPATIENT)
Dept: NEUROSURGERY | Facility: CLINIC | Age: 71
End: 2021-10-21

## 2021-10-25 ENCOUNTER — PES CALL (OUTPATIENT)
Dept: ADMINISTRATIVE | Facility: CLINIC | Age: 71
End: 2021-10-25
Payer: MEDICARE

## 2021-10-29 ENCOUNTER — HOSPITAL ENCOUNTER (OUTPATIENT)
Dept: RADIOLOGY | Facility: HOSPITAL | Age: 71
Discharge: HOME | End: 2021-10-29
Attending: NURSE PRACTITIONER
Payer: MEDICARE

## 2021-10-29 DIAGNOSIS — M51.36 DEGENERATIVE DISC DISEASE, LUMBAR: ICD-10-CM

## 2021-10-29 DIAGNOSIS — M54.9 DORSALGIA, UNSPECIFIED: ICD-10-CM

## 2021-10-29 DIAGNOSIS — M48.062 SPINAL STENOSIS OF LUMBAR REGION WITH NEUROGENIC CLAUDICATION: ICD-10-CM

## 2021-10-29 PROCEDURE — 72131 CT LUMBAR SPINE W/O DYE: CPT | Mod: TC,HCNC

## 2021-10-30 ENCOUNTER — PATIENT MESSAGE (OUTPATIENT)
Dept: INTERNAL MEDICINE | Facility: CLINIC | Age: 71
End: 2021-10-30
Payer: MEDICARE

## 2021-11-03 ENCOUNTER — PATIENT MESSAGE (OUTPATIENT)
Dept: NEUROSURGERY | Facility: CLINIC | Age: 71
End: 2021-11-03
Payer: MEDICARE

## 2021-12-06 ENCOUNTER — PATIENT MESSAGE (OUTPATIENT)
Dept: NEUROSURGERY | Facility: CLINIC | Age: 71
End: 2021-12-06
Payer: MEDICARE

## 2021-12-13 ENCOUNTER — OFFICE VISIT (OUTPATIENT)
Dept: INTERNAL MEDICINE | Facility: CLINIC | Age: 71
End: 2021-12-13
Payer: MEDICARE

## 2021-12-13 VITALS
HEIGHT: 69 IN | OXYGEN SATURATION: 97 % | WEIGHT: 255.06 LBS | DIASTOLIC BLOOD PRESSURE: 80 MMHG | BODY MASS INDEX: 37.78 KG/M2 | SYSTOLIC BLOOD PRESSURE: 132 MMHG | HEART RATE: 55 BPM

## 2021-12-13 DIAGNOSIS — M75.102 ROTATOR CUFF SYNDROME OF LEFT SHOULDER: ICD-10-CM

## 2021-12-13 DIAGNOSIS — M75.02 ADHESIVE CAPSULITIS OF LEFT SHOULDER: ICD-10-CM

## 2021-12-13 DIAGNOSIS — M15.9 GENERALIZED OSTEOARTHROSIS, INVOLVING MULTIPLE SITES: Primary | ICD-10-CM

## 2021-12-13 PROCEDURE — 99999 PR PBB SHADOW E&M-EST. PATIENT-LVL V: CPT | Mod: PBBFAC,HCNC,, | Performed by: INTERNAL MEDICINE

## 2021-12-13 PROCEDURE — 99213 PR OFFICE/OUTPT VISIT, EST, LEVL III, 20-29 MIN: ICD-10-PCS | Mod: 25,HCNC,S$GLB, | Performed by: INTERNAL MEDICINE

## 2021-12-13 PROCEDURE — 99213 OFFICE O/P EST LOW 20 MIN: CPT | Mod: 25,HCNC,S$GLB, | Performed by: INTERNAL MEDICINE

## 2021-12-13 PROCEDURE — 99999 PR PBB SHADOW E&M-EST. PATIENT-LVL V: ICD-10-PCS | Mod: PBBFAC,HCNC,, | Performed by: INTERNAL MEDICINE

## 2021-12-13 PROCEDURE — 1157F ADVNC CARE PLAN IN RCRD: CPT | Mod: HCNC,CPTII,S$GLB, | Performed by: INTERNAL MEDICINE

## 2021-12-13 PROCEDURE — 4010F ACE/ARB THERAPY RXD/TAKEN: CPT | Mod: HCNC,CPTII,S$GLB, | Performed by: INTERNAL MEDICINE

## 2021-12-13 PROCEDURE — 20610 PR DRAIN/INJECT LARGE JOINT/BURSA: ICD-10-PCS | Mod: HCNC,LT,S$GLB, | Performed by: INTERNAL MEDICINE

## 2021-12-13 PROCEDURE — 1157F PR ADVANCE CARE PLAN OR EQUIV PRESENT IN MEDICAL RECORD: ICD-10-PCS | Mod: HCNC,CPTII,S$GLB, | Performed by: INTERNAL MEDICINE

## 2021-12-13 PROCEDURE — 20610 DRAIN/INJ JOINT/BURSA W/O US: CPT | Mod: HCNC,LT,S$GLB, | Performed by: INTERNAL MEDICINE

## 2021-12-13 PROCEDURE — 4010F PR ACE/ARB THEARPY RXD/TAKEN: ICD-10-PCS | Mod: HCNC,CPTII,S$GLB, | Performed by: INTERNAL MEDICINE

## 2021-12-13 RX ORDER — METHYLPREDNISOLONE ACETATE 80 MG/ML
80 INJECTION, SUSPENSION INTRA-ARTICULAR; INTRALESIONAL; INTRAMUSCULAR; SOFT TISSUE
Status: COMPLETED | OUTPATIENT
Start: 2021-12-13 | End: 2021-12-13

## 2021-12-13 RX ADMIN — METHYLPREDNISOLONE ACETATE 80 MG: 80 INJECTION, SUSPENSION INTRA-ARTICULAR; INTRALESIONAL; INTRAMUSCULAR; SOFT TISSUE at 10:12

## 2021-12-15 ENCOUNTER — PES CALL (OUTPATIENT)
Dept: ADMINISTRATIVE | Facility: CLINIC | Age: 71
End: 2021-12-15
Payer: MEDICARE

## 2022-01-03 ENCOUNTER — PATIENT MESSAGE (OUTPATIENT)
Dept: INTERNAL MEDICINE | Facility: CLINIC | Age: 72
End: 2022-01-03
Payer: MEDICARE

## 2022-01-10 ENCOUNTER — PATIENT MESSAGE (OUTPATIENT)
Dept: INTERNAL MEDICINE | Facility: CLINIC | Age: 72
End: 2022-01-10
Payer: MEDICARE

## 2022-01-20 ENCOUNTER — OFFICE VISIT (OUTPATIENT)
Dept: FAMILY MEDICINE | Facility: CLINIC | Age: 72
End: 2022-01-20
Payer: MEDICARE

## 2022-01-20 VITALS
BODY MASS INDEX: 37.31 KG/M2 | RESPIRATION RATE: 20 BRPM | WEIGHT: 251.88 LBS | OXYGEN SATURATION: 97 % | SYSTOLIC BLOOD PRESSURE: 130 MMHG | TEMPERATURE: 96 F | HEIGHT: 69 IN | DIASTOLIC BLOOD PRESSURE: 70 MMHG | HEART RATE: 61 BPM

## 2022-01-20 DIAGNOSIS — E66.01 MORBID OBESITY WITH BMI OF 40.0-44.9, ADULT: ICD-10-CM

## 2022-01-20 DIAGNOSIS — I10 ESSENTIAL HYPERTENSION: ICD-10-CM

## 2022-01-20 DIAGNOSIS — E78.00 PURE HYPERCHOLESTEROLEMIA: ICD-10-CM

## 2022-01-20 DIAGNOSIS — Z87.39 HISTORY OF GOUT: ICD-10-CM

## 2022-01-20 DIAGNOSIS — E29.1 HYPOGONADISM MALE: ICD-10-CM

## 2022-01-20 DIAGNOSIS — N18.31 STAGE 3A CHRONIC KIDNEY DISEASE: ICD-10-CM

## 2022-01-20 DIAGNOSIS — N40.0 BENIGN PROSTATIC HYPERPLASIA, UNSPECIFIED WHETHER LOWER URINARY TRACT SYMPTOMS PRESENT: ICD-10-CM

## 2022-01-20 DIAGNOSIS — R26.9 ABNORMALITY OF GAIT AND MOBILITY: ICD-10-CM

## 2022-01-20 DIAGNOSIS — Z00.00 ENCOUNTER FOR PREVENTIVE HEALTH EXAMINATION: Primary | ICD-10-CM

## 2022-01-20 DIAGNOSIS — M51.16 LUMBAR DISC DISEASE WITH RADICULOPATHY: ICD-10-CM

## 2022-01-20 DIAGNOSIS — K51.50 LEFT SIDED COLITIS WITHOUT COMPLICATIONS: ICD-10-CM

## 2022-01-20 PROBLEM — R19.7 DIARRHEA IN ADULT PATIENT: Status: RESOLVED | Noted: 2021-02-10 | Resolved: 2022-01-20

## 2022-01-20 PROCEDURE — 3288F PR FALLS RISK ASSESSMENT DOCUMENTED: ICD-10-PCS | Mod: HCNC,CPTII,S$GLB, | Performed by: NURSE PRACTITIONER

## 2022-01-20 PROCEDURE — 3008F PR BODY MASS INDEX (BMI) DOCUMENTED: ICD-10-PCS | Mod: HCNC,CPTII,S$GLB, | Performed by: NURSE PRACTITIONER

## 2022-01-20 PROCEDURE — 3008F BODY MASS INDEX DOCD: CPT | Mod: HCNC,CPTII,S$GLB, | Performed by: NURSE PRACTITIONER

## 2022-01-20 PROCEDURE — 1157F PR ADVANCE CARE PLAN OR EQUIV PRESENT IN MEDICAL RECORD: ICD-10-PCS | Mod: HCNC,CPTII,S$GLB, | Performed by: NURSE PRACTITIONER

## 2022-01-20 PROCEDURE — 1101F PT FALLS ASSESS-DOCD LE1/YR: CPT | Mod: HCNC,CPTII,S$GLB, | Performed by: NURSE PRACTITIONER

## 2022-01-20 PROCEDURE — G0439 PR MEDICARE ANNUAL WELLNESS SUBSEQUENT VISIT: ICD-10-PCS | Mod: HCNC,S$GLB,, | Performed by: NURSE PRACTITIONER

## 2022-01-20 PROCEDURE — G0439 PPPS, SUBSEQ VISIT: HCPCS | Mod: HCNC,S$GLB,, | Performed by: NURSE PRACTITIONER

## 2022-01-20 PROCEDURE — 3078F DIAST BP <80 MM HG: CPT | Mod: HCNC,CPTII,S$GLB, | Performed by: NURSE PRACTITIONER

## 2022-01-20 PROCEDURE — 3078F PR MOST RECENT DIASTOLIC BLOOD PRESSURE < 80 MM HG: ICD-10-PCS | Mod: HCNC,CPTII,S$GLB, | Performed by: NURSE PRACTITIONER

## 2022-01-20 PROCEDURE — 1160F RVW MEDS BY RX/DR IN RCRD: CPT | Mod: HCNC,CPTII,S$GLB, | Performed by: NURSE PRACTITIONER

## 2022-01-20 PROCEDURE — 1170F FXNL STATUS ASSESSED: CPT | Mod: HCNC,CPTII,S$GLB, | Performed by: NURSE PRACTITIONER

## 2022-01-20 PROCEDURE — 99499 RISK ADDL DX/OHS AUDIT: ICD-10-PCS | Mod: S$GLB,,, | Performed by: NURSE PRACTITIONER

## 2022-01-20 PROCEDURE — 99499 UNLISTED E&M SERVICE: CPT | Mod: S$GLB,,, | Performed by: NURSE PRACTITIONER

## 2022-01-20 PROCEDURE — 1159F PR MEDICATION LIST DOCUMENTED IN MEDICAL RECORD: ICD-10-PCS | Mod: HCNC,CPTII,S$GLB, | Performed by: NURSE PRACTITIONER

## 2022-01-20 PROCEDURE — 3075F SYST BP GE 130 - 139MM HG: CPT | Mod: HCNC,CPTII,S$GLB, | Performed by: NURSE PRACTITIONER

## 2022-01-20 PROCEDURE — 99999 PR PBB SHADOW E&M-EST. PATIENT-LVL V: ICD-10-PCS | Mod: PBBFAC,HCNC,, | Performed by: NURSE PRACTITIONER

## 2022-01-20 PROCEDURE — 99999 PR PBB SHADOW E&M-EST. PATIENT-LVL V: CPT | Mod: PBBFAC,HCNC,, | Performed by: NURSE PRACTITIONER

## 2022-01-20 PROCEDURE — 1101F PR PT FALLS ASSESS DOC 0-1 FALLS W/OUT INJ PAST YR: ICD-10-PCS | Mod: HCNC,CPTII,S$GLB, | Performed by: NURSE PRACTITIONER

## 2022-01-20 PROCEDURE — 1157F ADVNC CARE PLAN IN RCRD: CPT | Mod: HCNC,CPTII,S$GLB, | Performed by: NURSE PRACTITIONER

## 2022-01-20 PROCEDURE — 1125F AMNT PAIN NOTED PAIN PRSNT: CPT | Mod: HCNC,CPTII,S$GLB, | Performed by: NURSE PRACTITIONER

## 2022-01-20 PROCEDURE — 1160F PR REVIEW ALL MEDS BY PRESCRIBER/CLIN PHARMACIST DOCUMENTED: ICD-10-PCS | Mod: HCNC,CPTII,S$GLB, | Performed by: NURSE PRACTITIONER

## 2022-01-20 PROCEDURE — 1159F MED LIST DOCD IN RCRD: CPT | Mod: HCNC,CPTII,S$GLB, | Performed by: NURSE PRACTITIONER

## 2022-01-20 PROCEDURE — 1170F PR FUNCTIONAL STATUS ASSESSED: ICD-10-PCS | Mod: HCNC,CPTII,S$GLB, | Performed by: NURSE PRACTITIONER

## 2022-01-20 PROCEDURE — 3075F PR MOST RECENT SYSTOLIC BLOOD PRESS GE 130-139MM HG: ICD-10-PCS | Mod: HCNC,CPTII,S$GLB, | Performed by: NURSE PRACTITIONER

## 2022-01-20 PROCEDURE — 3288F FALL RISK ASSESSMENT DOCD: CPT | Mod: HCNC,CPTII,S$GLB, | Performed by: NURSE PRACTITIONER

## 2022-01-20 PROCEDURE — 1125F PR PAIN SEVERITY QUANTIFIED, PAIN PRESENT: ICD-10-PCS | Mod: HCNC,CPTII,S$GLB, | Performed by: NURSE PRACTITIONER

## 2022-01-20 NOTE — PATIENT INSTRUCTIONS
Counseling and Referral of Other Preventative  (Italic type indicates deductible and co-insurance are waived)    Patient Name: Enoc Collado  Today's Date: 1/20/2022    Health Maintenance       Date Due Completion Date    Influenza Vaccine (1) Never done ---    PROSTATE-SPECIFIC ANTIGEN 03/03/2022 3/3/2021    Shingles Vaccine (3 of 3) 03/07/2022 1/10/2022    Lipid Panel 09/08/2022 9/8/2021    Colorectal Cancer Screening 02/10/2026 2/10/2021    TETANUS VACCINE 01/13/2027 1/13/2017        No orders of the defined types were placed in this encounter.    The following information is provided to all patients.  This information is to help you find resources for any of the problems found today that may be affecting your health:                Living healthy guide: www.[facility].[place].gov      Understanding Diabetes: www.diabetes.org      Eating healthy: www.cdc.gov/healthyweight      CDC home safety checklist: www.cdc.gov/steadi/patient.html      Agency on Aging: www.goea.[place].gov      Alcoholics anonymous (AA): www.aa.org      Physical Activity: www.marifer.nih.gov/dk3tkti      Tobacco use: www.quitwithusla.org

## 2022-01-20 NOTE — TELEPHONE ENCOUNTER
Care Due:                  Date            Visit Type   Department     Provider  --------------------------------------------------------------------------------                                             [facility] INTERNAL  Last Visit: 12-      None         TATE Mason                                           [facility] INTERNAL  Next Visit: 03-      None         TATE Mason                                                            Last  Test          Frequency    Reason                     Performed    Due Date  --------------------------------------------------------------------------------    Uric Acid...  12 months..  allopurinoL..............  03- 02-    Powered by [employer] Reference number: [id number].   1/20/2022 3:16:22 PM CST

## 2022-01-20 NOTE — PROGRESS NOTES
"  Enoc Collado presented for a  Medicare AWV and comprehensive Health Risk Assessment today. The following components were reviewed and updated:    · Medical history  · Family History  · Social history  · Allergies and Current Medications  · Health Risk Assessment  · Health Maintenance  · Care Team         ** See Completed Assessments for Annual Wellness Visit within the encounter summary.**         The following assessments were completed:  · Living Situation  · CAGE  · Depression Screening  · Timed Get Up and Go  · Whisper Test  · Cognitive Function Screening  · Nutrition Screening  · ADL Screening  · PAQ Screening        Vitals:    01/20/22 1008   BP: 130/70   Pulse: 61   Resp: 20   Temp: 96 °F (35.6 °C)   TempSrc: Tympanic   SpO2: 97%   Weight: 114.3 kg (251 lb 14 oz)   Height: 5' 9" (1.753 m)     Body mass index is 37.2 kg/m².  Physical Exam  Constitutional:       General: He is not in acute distress.     Appearance: Normal appearance. He is well-developed and well-nourished. He is obese. He is not ill-appearing, toxic-appearing or diaphoretic.   HENT:      Head: Normocephalic and atraumatic.      Right Ear: External ear normal.      Left Ear: External ear normal.      Nose: Nose normal. No rhinorrhea.      Mouth/Throat:      Mouth: Mucous membranes are moist.      Pharynx: No posterior oropharyngeal erythema.   Eyes:      Conjunctiva/sclera: Conjunctivae normal.   Neck:      Vascular: No carotid bruit.   Cardiovascular:      Rate and Rhythm: Normal rate and regular rhythm.      Heart sounds: Normal heart sounds. No murmur heard.  No friction rub. No gallop.    Pulmonary:      Effort: Pulmonary effort is normal. No respiratory distress.      Breath sounds: Normal breath sounds. No wheezing or rales.   Chest:      Chest wall: No tenderness.   Abdominal:      General: Abdomen is flat. Bowel sounds are normal. There is no distension.      Palpations: Abdomen is soft. There is no mass.      Tenderness: There is no " abdominal tenderness.      Hernia: No hernia is present.      Comments: Obese abd   Musculoskeletal:         General: No tenderness or edema. Normal range of motion.      Cervical back: Normal range of motion and neck supple. No tenderness.   Lymphadenopathy:      Cervical: No cervical adenopathy.   Skin:     General: Skin is warm and dry.   Neurological:      Mental Status: He is alert and oriented to person, place, and time.      Cranial Nerves: No cranial nerve deficit.   Psychiatric:         Mood and Affect: Mood and affect and mood normal.         Behavior: Behavior normal.               Diagnoses and health risks identified today and associated recommendations/orders:    1. Encounter for preventive health examination  Screenings performed, as noted above.  Personal preventative testing needs reviewed.     2. Morbid obesity with BMI of 40.0-44.9, adult  Monitored/treated on meds, continue the same tx, improving, states is losing as little weight, cutting back on carbs and sweets, was 262 in Oct    3. Left sided colitis without complications  Monitored/treated on meds, continue the same tx, stable, no complaints at this time, follow up with pcp    4. Stage 3a chronic kidney disease  Monitored/treated on meds, continue the same tx, stable, follow up with pcp    5. Abnormality of gait and mobility  Monitored/treated on meds, continue the same tx, stable, follow up with pcp, going to PT now    6. Lumbar disc disease with radiculopathy  Monitored/treated on meds, continue the same tx, stable, follow up with pcp/ and neurosurgery    7. Essential hypertension  Monitored/treated on meds, continue the same tx, stable, follow up with pcp    8. Pure hypercholesterolemia  Monitored/treated on meds, continue the same tx, stable, follow up with pcp    9. Benign prostatic hyperplasia, unspecified whether lower urinary tract symptoms present  Monitored/treated on meds, continue the same tx, stable, follow up with pcp    10.  Hypogonadism male  Monitored/treated on meds, continue the same tx, stable, follow up with pcp, gets testosterone shots    11. History of gout  Monitored/treated on meds, continue the same tx, stable, follow up with pcp, no recent flares      Provided Enoc with a 5-10 year written screening schedule and personal prevention plan. Recommendations were developed using the USPSTF age appropriate recommendations. Education, counseling, and referrals were provided as needed. After Visit Summary printed and given to patient which includes a list of additional screenings\tests needed.    No follow-ups on file.    Miguel Angel Phillip, BABAK  I offered to discuss advanced care planning, including how to pick a person who would make decisions for you if you were unable to make them for yourself, called a health care power of , and what kind of decisions you might make such as use of life sustaining treatments such as ventilators and tube feeding when faced with a life limiting illness recorded on a living will that they will need to know. (How you want to be cared for as you near the end of your natural life)     X Patient is interested in learning more about how to make advanced directives.  I provided them paperwork and offered to discuss this with them.

## 2022-01-24 ENCOUNTER — PATIENT MESSAGE (OUTPATIENT)
Dept: INTERNAL MEDICINE | Facility: CLINIC | Age: 72
End: 2022-01-24
Payer: MEDICARE

## 2022-01-25 RX ORDER — HYDROCHLOROTHIAZIDE 25 MG/1
TABLET ORAL
Qty: 90 TABLET | Refills: 2 | Status: SHIPPED | OUTPATIENT
Start: 2022-01-25 | End: 2022-03-16 | Stop reason: SDUPTHER

## 2022-01-25 RX ORDER — CARVEDILOL 25 MG/1
TABLET ORAL
Qty: 180 TABLET | Refills: 3 | Status: SHIPPED | OUTPATIENT
Start: 2022-01-25 | End: 2022-03-16 | Stop reason: SDUPTHER

## 2022-01-25 RX ORDER — AMLODIPINE BESYLATE 10 MG/1
TABLET ORAL
Qty: 90 TABLET | Refills: 3 | Status: SHIPPED | OUTPATIENT
Start: 2022-01-25 | End: 2022-03-16 | Stop reason: SDUPTHER

## 2022-01-25 RX ORDER — PRAVASTATIN SODIUM 20 MG/1
TABLET ORAL
Qty: 90 TABLET | Refills: 2 | Status: SHIPPED | OUTPATIENT
Start: 2022-01-25 | End: 2022-03-16 | Stop reason: SDUPTHER

## 2022-01-25 RX ORDER — LISINOPRIL 40 MG/1
TABLET ORAL
Qty: 90 TABLET | Refills: 2 | Status: SHIPPED | OUTPATIENT
Start: 2022-01-25 | End: 2022-03-16 | Stop reason: SDUPTHER

## 2022-01-25 RX ORDER — GABAPENTIN 300 MG/1
CAPSULE ORAL
Qty: 270 CAPSULE | Refills: 3 | Status: SHIPPED | OUTPATIENT
Start: 2022-01-25 | End: 2022-03-16 | Stop reason: SDUPTHER

## 2022-01-25 RX ORDER — ALLOPURINOL 300 MG/1
TABLET ORAL
Qty: 90 TABLET | Refills: 3 | Status: SHIPPED | OUTPATIENT
Start: 2022-01-25 | End: 2022-03-16 | Stop reason: SDUPTHER

## 2022-01-25 NOTE — TELEPHONE ENCOUNTER
Refill Routing Note   Medication(s) are not appropriate for processing by [facility] for the following reason(s):      - Outside of protocol  - Drug-Disease Interaction (allopurinoL and CKD (chronic kidney disease), stage III; Stage 3a chronic kidney disease)    [facility] action(s):  Defer  Route  Approve Medication-related problems identified:   Drug-disease interaction  Requires labs     Medication Therapy Plan: CDMR. LABS(Uric Acid) Allopurinol- DDI; Gabapentin is outside of [facility] protocol  --->Care Gap information included in message below if applicable.   Medication reconciliation completed: No   Automatic Epic Generated Protocol Data:    Orders Placed This Encounter    lisinopriL (PRINIVIL,ZESTRIL) 40 MG tablet    hydroCHLOROthiazide (HYDRODIURIL) 25 MG tablet    amLODIPine (NORVASC) 10 MG tablet    carvediloL (COREG) 25 MG tablet    pravastatin (PRAVACHOL) 20 MG tablet      Requested Prescriptions   Pending Prescriptions Disp Refills    allopurinoL (ZYLOPRIM) 300 MG tablet [Pharmacy Med Name: ALLOPURINOL 300 MG Tablet] 90 tablet 0     Sig: TAKE 1 TABLET EVERY DAY       Endocrinology:  Gout Agents - allopurinol Passed - 1/20/2022  3:15 PM        Passed - Patient is at least 18 years old        Passed - Valid encounter within last 15 months     Recent Visits  Date Type Provider Dept   12/13/21 Office Visit Frantz Mason MD [facility] Internal Medicine   09/15/21 Office Visit Frantz Mason MD [facility] Internal Medicine   03/10/21 Office Visit Frantz Mason MD [facility] Internal Medicine   01/26/21 Office Visit Frantz Mason MD [facility] Internal Medicine   08/24/20 Office Visit Frantz Mason MD [facility] Internal Medicine   Showing recent visits within past 720 days and meeting all other requirements  Future Appointments  No visits were found meeting these conditions.  Showing future appointments within next 150 days and meeting all other requirements      Future Appointments              In 1 week Oracio White  MD Isrrael Mas - Neurosurgery [facility], Isrrael Mas    In 1 month LABORATORY, [facility] S - Lab, [facility]    In 1 month Frantz Mason MD [facility] - Internal Medicine, [facility]                Passed - Uric Acid within 360 days     Uric Acid   Date Value Ref Range Status   03/03/2021 5.7 3.4 - 7.0 mg/dL Final   05/23/2018 4.9 3.4 - 7.0 mg/dL Final   11/20/2017 6.8 3.4 - 7.0 mg/dL Final              Passed - Cr is 1.39 or below and within 360 days     Lab Results   Component Value Date    CREATININE 1.1 10/15/2021    CREATININE 1.4 09/08/2021    CREATININE 1.1 03/03/2021              Passed - WBC within 360 days     WBC   Date Value Ref Range Status   09/08/2021 6.59 3.90 - 12.70 K/uL Final   03/03/2021 6.34 3.90 - 12.70 K/uL Final   08/17/2020 6.94 3.90 - 12.70 K/uL Final              Passed - RBC within 360 days     RBC   Date Value Ref Range Status   09/08/2021 4.69 4.60 - 6.20 M/uL Final   03/03/2021 5.00 4.60 - 6.20 M/uL Final   08/17/2020 4.94 4.60 - 6.20 M/uL Final              Passed - HGB within 360 days     Hemoglobin   Date Value Ref Range Status   09/08/2021 15.3 14.0 - 18.0 g/dL Final   03/03/2021 16.1 14.0 - 18.0 g/dL Final   08/17/2020 16.0 14.0 - 18.0 g/dL Final              Passed - HCT within 360 days     Hematocrit   Date Value Ref Range Status   09/08/2021 47.3 40.0 - 54.0 % Final   03/03/2021 49.7 40.0 - 54.0 % Final   08/17/2020 48.6 40.0 - 54.0 % Final              Passed - PLT within 360 days     Platelets   Date Value Ref Range Status   09/08/2021 293 150 - 450 K/uL Final   03/03/2021 319 150 - 350 K/uL Final   08/17/2020 243 150 - 350 K/uL Final              Passed - ALT is 131 or below and within 360 days     ALT   Date Value Ref Range Status   09/08/2021 47 (H) 10 - 44 U/L Final   03/03/2021 38 10 - 44 U/L Final   08/17/2020 55 (H) 10 - 44 U/L Final              Passed - AST is 119 or below and within 360 days     AST   Date Value Ref Range Status   09/08/2021 35 10 - 40 U/L Final    03/03/2021 29 10 - 40 U/L Final   08/17/2020 37 10 - 40 U/L Final              Passed - eGFR within 360 days     Lab Results   Component Value Date    EGFRNONAA >60.0 10/15/2021    EGFRNONAA 50.5 (A) 09/08/2021    EGFRNONAA >60.0 03/03/2021                  gabapentin (NEURONTIN) 300 MG capsule [Pharmacy Med Name: GABAPENTIN 300 MG Capsule] 270 capsule 3     Sig: TAKE 1 CAPSULE EVERY EVENING  AND TAKE 2 CAPSULES AT BEDTIME       Neurology: Anticonvulsants - gabapentin Failed - 1/20/2022  3:15 PM        Failed - This refill cannot be delegated        Passed - Valid encounter within last 12 months     Recent Visits  Date Type Provider Dept   12/13/21 Office Visit Frantz Mason MD [facility] Internal Medicine   09/15/21 Office Visit Frantz Mason MD [facility] Internal Medicine   03/10/21 Office Visit Frantz Mason MD [facility] Internal Medicine   01/26/21 Office Visit Frantz Mason MD [facility] Internal Medicine   08/24/20 Office Visit Frantz Mason MD [facility] Internal Medicine   Showing recent visits within past 720 days and meeting all other requirements  Future Appointments  No visits were found meeting these conditions.  Showing future appointments within next 150 days and meeting all other requirements      Future Appointments              In 1 week MD Isrrael Cuevas - Neurosurgery [facility], Isrrael Mas    In 1 month LABORATORY, [facility] S - Lab, [facility]    In 1 month Frantz Mason MD Central - Internal Medicine, [place]                Passed - Cr within 360 days     Lab Results   Component Value Date    CREATININE 1.1 10/15/2021    CREATININE 1.4 09/08/2021    CREATININE 1.1 03/03/2021              Passed - eGFR within 360 days     Lab Results   Component Value Date    EGFRNONAA >60.0 10/15/2021    EGFRNONAA 50.5 (A) 09/08/2021    EGFRNONAA >60.0 03/03/2021                 Signed Prescriptions Disp Refills    lisinopriL (PRINIVIL,ZESTRIL) 40 MG tablet 90 tablet 2     Sig: TAKE 1 TABLET EVERY  DAY       Cardiovascular:  ACE Inhibitors Passed - 1/20/2022  3:15 PM        Passed - Patient is at least 18 years old        Passed - Last BP in normal range within 360 days     BP Readings from Last 1 Encounters:   01/20/22 130/70               Passed - Valid encounter within last 15 months     Recent Visits  Date Type Provider Dept   12/13/21 Office Visit Frantz Mason MD [facility] Internal Medicine   09/15/21 Office Visit Frantz Mason MD [facility] Internal Medicine   03/10/21 Office Visit Frantz Mason MD [facility] Internal Medicine   01/26/21 Office Visit Frantz Mason MD [facility] Internal Medicine   08/24/20 Office Visit Frantz Mason MD [facility] Internal Medicine   Showing recent visits within past 720 days and meeting all other requirements  Future Appointments  No visits were found meeting these conditions.  Showing future appointments within next 150 days and meeting all other requirements      Future Appointments              In 1 week MD Isrrael Cuevas - Neurosurgery [facility], Isrrael Mas    In 1 month LABORATORY, [facility] S - Lab, [facility]    In 1 month Frantz Mason MD [facility] - Internal Medicine, [place]                Passed - Cr is 1.39 or below and within 360 days     Lab Results   Component Value Date    CREATININE 1.1 10/15/2021    CREATININE 1.4 09/08/2021    CREATININE 1.1 03/03/2021              Passed - K is 5.2 or below and within 360 days     Potassium   Date Value Ref Range Status   10/15/2021 4.2 3.5 - 5.1 mmol/L Final   09/08/2021 4.5 3.5 - 5.1 mmol/L Final   03/03/2021 4.2 3.5 - 5.1 mmol/L Final              Passed - eGFR within 360 days     Lab Results   Component Value Date    EGFRNONAA >60.0 10/15/2021    EGFRNONAA 50.5 (A) 09/08/2021    EGFRNONAA >60.0 03/03/2021                  hydroCHLOROthiazide (HYDRODIURIL) 25 MG tablet 90 tablet 2     Sig: TAKE 1 TABLET EVERY DAY       Cardiovascular: Diuretics - Thiazide Passed - 1/20/2022  3:15 PM        Passed - Patient  is at least 18 years old        Passed - Last BP in normal range within 360 days     BP Readings from Last 1 Encounters:   01/20/22 130/70               Passed - Valid encounter within last 15 months     Recent Visits  Date Type Provider Dept   12/13/21 Office Visit Frantz Mason MD [facility] Internal Medicine   09/15/21 Office Visit Frantz Mason MD [facility] Internal Medicine   03/10/21 Office Visit Frantz Mason MD [facility] Internal Medicine   01/26/21 Office Visit Frantz Mason MD [facility] Internal Medicine   08/24/20 Office Visit Frantz Mason MD [facility] Internal Medicine   Showing recent visits within past 720 days and meeting all other requirements  Future Appointments  No visits were found meeting these conditions.  Showing future appointments within next 150 days and meeting all other requirements      Future Appointments              In 1 week MD Isrrael Cuevas - Neurosurgery [facility], Isrrael Mas    In 1 month LABORATORY, [facility] S - Lab, [facility]    In 1 month Frantz Mason MD [facility] - Internal Medicine, [place]                Passed - Cr is 1.39 or below and within 360 days     Lab Results   Component Value Date    CREATININE 1.1 10/15/2021    CREATININE 1.4 09/08/2021    CREATININE 1.1 03/03/2021              Passed - K in normal range and within 360 days     Potassium   Date Value Ref Range Status   10/15/2021 4.2 3.5 - 5.1 mmol/L Final   09/08/2021 4.5 3.5 - 5.1 mmol/L Final   03/03/2021 4.2 3.5 - 5.1 mmol/L Final              Passed - Na is between 130 and 148 and within 360 days     Sodium   Date Value Ref Range Status   10/15/2021 137 136 - 145 mmol/L Final   09/08/2021 140 136 - 145 mmol/L Final   03/03/2021 143 136 - 145 mmol/L Final              Passed - eGFR within 360 days     Lab Results   Component Value Date    EGFRNONAA >60.0 10/15/2021    EGFRNONAA 50.5 (A) 09/08/2021    EGFRNONAA >60.0 03/03/2021                  amLODIPine (NORVASC) 10 MG tablet 90 tablet 3      Sig: TAKE 1 TABLET EVERY DAY       Cardiovascular:  Calcium Channel Blockers Passed - 1/20/2022  3:15 PM        Passed - Patient is at least 18 years old        Passed - Last BP in normal range within 360 days     BP Readings from Last 1 Encounters:   01/20/22 130/70               Passed - Valid encounter within last 15 months     Recent Visits  Date Type Provider Dept   12/13/21 Office Visit Frantz Mason MD [facility] Internal Medicine   09/15/21 Office Visit Frantz Mason MD [facility] Internal Medicine   03/10/21 Office Visit Frantz Mason MD [facility] Internal Medicine   01/26/21 Office Visit Frantz Mason MD [facility] Internal Medicine   08/24/20 Office Visit Frantz Mason MD [facility] Internal Medicine   Showing recent visits within past 720 days and meeting all other requirements  Future Appointments  No visits were found meeting these conditions.  Showing future appointments within next 150 days and meeting all other requirements      Future Appointments              In 1 week MD Isrrael Cuevas - Neurosurgery [facility], Isrrael Mas    In 1 month LABORATORY, [facility] S - Lab, [facility]    In 1 month Frantz Mason MD [facility] - Internal Medicine, [facility]                  carvediloL (COREG) 25 MG tablet 180 tablet 3     Sig: TAKE 1 TABLET TWICE DAILY WITH MEALS       Cardiovascular:  Beta Blockers Passed - 1/20/2022  3:15 PM        Passed - Patient is at least 18 years old        Passed - Last BP in normal range within 360 days     BP Readings from Last 1 Encounters:   01/20/22 130/70               Passed - Last Heart Rate in normal range within 360 days     Pulse Readings from Last 1 Encounters:   01/20/22 61              Passed - Valid encounter within last 15 months     Recent Visits  Date Type Provider Dept   12/13/21 Office Visit Frantz Mason MD [facility] Internal Medicine   09/15/21 Office Visit Frantz Mason MD [facility] Internal Medicine   03/10/21 Office Visit Frantz Mason MD [facility] Internal  Medicine   01/26/21 Office Visit Frantz Mason MD [facility] Internal Medicine   08/24/20 Office Visit Frantz Mason MD [facility] Internal Medicine   Showing recent visits within past 720 days and meeting all other requirements  Future Appointments  No visits were found meeting these conditions.  Showing future appointments within next 150 days and meeting all other requirements      Future Appointments              In 1 week MD Isrrael Cuevas - Neurosurgery 8th Fl, Isrrael Mas    In 1 month LABORATORY, [facility], [facility]    In 1 month Frantz Mason MD [facility] Internal Medicine, [place]                  pravastatin (PRAVACHOL) 20 MG tablet 90 tablet 2     Sig: TAKE 1 TABLET EVERY DAY       Cardiovascular:  Antilipid - Statins Passed - 1/20/2022  3:15 PM        Passed - Patient is at least 18 years old        Passed - Valid encounter within last 15 months     Recent Visits  Date Type Provider Dept   12/13/21 Office Visit Frantz Mason MD [facility] Internal Medicine   09/15/21 Office Visit Frantz Mason MD [facility] Internal Medicine   03/10/21 Office Visit Frantz Mason MD [facility] Internal Medicine   01/26/21 Office Visit Frantz Mason MD [facility] Internal Medicine   08/24/20 Office Visit Frantz Mason MD [facility] Internal Medicine   Showing recent visits within past 720 days and meeting all other requirements  Future Appointments  No visits were found meeting these conditions.  Showing future appointments within next 150 days and meeting all other requirements      Future Appointments              In 1 week MD Isrrael Cuevas - Neurosurgery 8th Fl, Isrrael Mas    In 1 month LABORATORY, [facility], [facility]    In 1 month Frantz Mason MD [facility]                Passed - ALT is 131 or below and within 360 days     ALT   Date Value Ref Range Status   09/08/2021 47 (H) 10 - 44 U/L Final   03/03/2021 38 10 - 44 U/L Final   08/17/2020  55 (H) 10 - 44 U/L Final              Passed - AST is 119 or below and within 360 days     AST   Date Value Ref Range Status   09/08/2021 35 10 - 40 U/L Final   03/03/2021 29 10 - 40 U/L Final   08/17/2020 37 10 - 40 U/L Final              Passed - Total Cholesterol within 360 days     Lab Results   Component Value Date    CHOL 149 09/08/2021    CHOL 163 03/03/2021    CHOL 165 08/17/2020              Passed - LDL within 360 days     LDL Cholesterol   Date Value Ref Range Status   09/08/2021 97.2 63.0 - 159.0 mg/dL Final     Comment:     The National Cholesterol Education Program (NCEP) has set the  following guidelines (reference values) for LDL Cholesterol:  Optimal.......................<130 mg/dL  Borderline High...............130-159 mg/dL  High..........................160-189 mg/dL  Very High.....................>190 mg/dL              Passed - HDL within 360 days     HDL   Date Value Ref Range Status   09/08/2021 22 (L) 40 - 75 mg/dL Final     Comment:     The National Cholesterol Education Program (NCEP) has set the  following guidelines (reference values) for HDL Cholesterol:  Low...............<40 mg/dL  Optimal...........>60 mg/dL              Passed - Triglycerides within 360 days     Lab Results   Component Value Date    TRIG 149 09/08/2021    TRIG 123 03/03/2021    TRIG 183 (H) 08/17/2020                    Appointments  past 12m or future 3m with PCP    Date Provider   Last Visit   12/13/2021 Frantz Mason MD   Next Visit   3/16/2022 Frantz Mason MD   ED visits in past 90 days: 0        Note composed:3:55 PM 01/25/2022

## 2022-02-02 ENCOUNTER — TELEPHONE (OUTPATIENT)
Dept: NEUROSURGERY | Facility: CLINIC | Age: 72
End: 2022-02-02
Payer: MEDICARE

## 2022-02-03 ENCOUNTER — OFFICE VISIT (OUTPATIENT)
Dept: NEUROSURGERY | Facility: CLINIC | Age: 72
End: 2022-02-03
Payer: MEDICARE

## 2022-02-03 VITALS — SYSTOLIC BLOOD PRESSURE: 107 MMHG | DIASTOLIC BLOOD PRESSURE: 70 MMHG | HEART RATE: 60 BPM

## 2022-02-03 DIAGNOSIS — G95.9 CERVICAL MYELOPATHY: Primary | ICD-10-CM

## 2022-02-03 DIAGNOSIS — M41.50 DEGENERATIVE SCOLIOSIS IN ADULT PATIENT: ICD-10-CM

## 2022-02-03 PROCEDURE — 99999 PR PBB SHADOW E&M-EST. PATIENT-LVL III: ICD-10-PCS | Mod: PBBFAC,HCNC,, | Performed by: NEUROLOGICAL SURGERY

## 2022-02-03 PROCEDURE — 1159F PR MEDICATION LIST DOCUMENTED IN MEDICAL RECORD: ICD-10-PCS | Mod: HCNC,CPTII,S$GLB, | Performed by: NEUROLOGICAL SURGERY

## 2022-02-03 PROCEDURE — 4010F ACE/ARB THERAPY RXD/TAKEN: CPT | Mod: HCNC,CPTII,S$GLB, | Performed by: NEUROLOGICAL SURGERY

## 2022-02-03 PROCEDURE — 1125F PR PAIN SEVERITY QUANTIFIED, PAIN PRESENT: ICD-10-PCS | Mod: HCNC,CPTII,S$GLB, | Performed by: NEUROLOGICAL SURGERY

## 2022-02-03 PROCEDURE — 99214 PR OFFICE/OUTPT VISIT, EST, LEVL IV, 30-39 MIN: ICD-10-PCS | Mod: HCNC,S$GLB,, | Performed by: NEUROLOGICAL SURGERY

## 2022-02-03 PROCEDURE — 1159F MED LIST DOCD IN RCRD: CPT | Mod: HCNC,CPTII,S$GLB, | Performed by: NEUROLOGICAL SURGERY

## 2022-02-03 PROCEDURE — 1125F AMNT PAIN NOTED PAIN PRSNT: CPT | Mod: HCNC,CPTII,S$GLB, | Performed by: NEUROLOGICAL SURGERY

## 2022-02-03 PROCEDURE — 3288F PR FALLS RISK ASSESSMENT DOCUMENTED: ICD-10-PCS | Mod: HCNC,CPTII,S$GLB, | Performed by: NEUROLOGICAL SURGERY

## 2022-02-03 PROCEDURE — 3288F FALL RISK ASSESSMENT DOCD: CPT | Mod: HCNC,CPTII,S$GLB, | Performed by: NEUROLOGICAL SURGERY

## 2022-02-03 PROCEDURE — 3074F SYST BP LT 130 MM HG: CPT | Mod: HCNC,CPTII,S$GLB, | Performed by: NEUROLOGICAL SURGERY

## 2022-02-03 PROCEDURE — 1157F PR ADVANCE CARE PLAN OR EQUIV PRESENT IN MEDICAL RECORD: ICD-10-PCS | Mod: HCNC,CPTII,S$GLB, | Performed by: NEUROLOGICAL SURGERY

## 2022-02-03 PROCEDURE — 1101F PR PT FALLS ASSESS DOC 0-1 FALLS W/OUT INJ PAST YR: ICD-10-PCS | Mod: HCNC,CPTII,S$GLB, | Performed by: NEUROLOGICAL SURGERY

## 2022-02-03 PROCEDURE — 1101F PT FALLS ASSESS-DOCD LE1/YR: CPT | Mod: HCNC,CPTII,S$GLB, | Performed by: NEUROLOGICAL SURGERY

## 2022-02-03 PROCEDURE — 1160F RVW MEDS BY RX/DR IN RCRD: CPT | Mod: HCNC,CPTII,S$GLB, | Performed by: NEUROLOGICAL SURGERY

## 2022-02-03 PROCEDURE — 99999 PR PBB SHADOW E&M-EST. PATIENT-LVL III: CPT | Mod: PBBFAC,HCNC,, | Performed by: NEUROLOGICAL SURGERY

## 2022-02-03 PROCEDURE — 4010F PR ACE/ARB THEARPY RXD/TAKEN: ICD-10-PCS | Mod: HCNC,CPTII,S$GLB, | Performed by: NEUROLOGICAL SURGERY

## 2022-02-03 PROCEDURE — 1157F ADVNC CARE PLAN IN RCRD: CPT | Mod: HCNC,CPTII,S$GLB, | Performed by: NEUROLOGICAL SURGERY

## 2022-02-03 PROCEDURE — 1160F PR REVIEW ALL MEDS BY PRESCRIBER/CLIN PHARMACIST DOCUMENTED: ICD-10-PCS | Mod: HCNC,CPTII,S$GLB, | Performed by: NEUROLOGICAL SURGERY

## 2022-02-03 PROCEDURE — 99214 OFFICE O/P EST MOD 30 MIN: CPT | Mod: HCNC,S$GLB,, | Performed by: NEUROLOGICAL SURGERY

## 2022-02-03 PROCEDURE — 3078F PR MOST RECENT DIASTOLIC BLOOD PRESSURE < 80 MM HG: ICD-10-PCS | Mod: HCNC,CPTII,S$GLB, | Performed by: NEUROLOGICAL SURGERY

## 2022-02-03 PROCEDURE — 3078F DIAST BP <80 MM HG: CPT | Mod: HCNC,CPTII,S$GLB, | Performed by: NEUROLOGICAL SURGERY

## 2022-02-03 PROCEDURE — 3074F PR MOST RECENT SYSTOLIC BLOOD PRESSURE < 130 MM HG: ICD-10-PCS | Mod: HCNC,CPTII,S$GLB, | Performed by: NEUROLOGICAL SURGERY

## 2022-02-03 RX ORDER — BRIMONIDINE TARTRATE, TIMOLOL MALEATE 2; 5 MG/ML; MG/ML
SOLUTION/ DROPS OPHTHALMIC
COMMUNITY
Start: 2021-11-30 | End: 2023-08-29

## 2022-02-03 NOTE — PROGRESS NOTES
Dear Yusra Montes De Oca NP,    Thank you for referring this patient to my clinic. The full details of my evaluation will also be forthcoming to you by letter.    CHIEF COMPLAINT:  Consultation for back pain    I, Leonel Walker, attest that this documentation has been prepared under the direction and in the presence of Oracio White MD.    HPI:  Enoc Collado is a 71 y.o.  male with PMH of BPH, CKD stg III, HTN, HLD, class 3 obesity, on ASA 325mg, and blind right eye, who is referred to me by Yusra Montes De Oca NP for evaluation of back pain. Of note, he obtained an CHRISTINA in the past for 6 years without significant relief.     The pt c/o low back pain. Denies leg pain. Aggravating factors when walking and standing. Alleviating factors include sitting or lying down. Endorses leaning over that is about the same 6 months ago. Endorses urinary retention. Reports having known prostate enlargement. He has done PT since October without relief. Endorses balance issues. Denies dropping things with hands. Denies neck pain. States that he is able to walk about 50 yards before stopping. Reports that his right leg calf is getting smaller.    Review of patient's allergies indicates:   Allergen Reactions    No known drug allergies        Past Medical History:   Diagnosis Date    BPH (benign prostatic hyperplasia)     CKD (chronic kidney disease), stage III     Colon polyps 2015    Diarrhea in adult patient 2/10/2021    Generalized osteoarthrosis, involving multiple sites     History of gout     Hyperlipidemia     Hypertension     Hypogonadism male     Lumbar disc disease with radiculopathy     Morbid obesity with BMI of 40.0-44.9, adult      Past Surgical History:   Procedure Laterality Date    COLONOSCOPY N/A 6/22/2020    Procedure: COLONOSCOPY;  Surgeon: Montserrat Conroy MD;  Location: [facility];  Service: Endoscopy;  Laterality: N/A;    COLONOSCOPY N/A 2/10/2021    Procedure: COLONOSCOPY;  Surgeon: Jones Lemus  MD MELLISSA;  Location: [facility];  Service: Endoscopy;  Laterality: N/A;    ESOPHAGOGASTRODUODENOSCOPY N/A 2/10/2021    Procedure: EGD (ESOPHAGOGASTRODUODENOSCOPY);  Surgeon: Jones Lemus III, MD;  Location: [facility];  Service: Endoscopy;  Laterality: N/A;    EYE SURGERY      JOINT REPLACEMENT      KNEE SCOPE      open globe      right eye 1980    ROTATOR CUFF REPAIR      SHOULDER SURGERY Right      Family History   Problem Relation Age of Onset    Hypertension Father     Cataracts Father     Heart attack Father     Cataracts Mother     Hypertension Mother     Cancer Cousin         colon    Heart disease Brother      Social History     Tobacco Use    Smoking status: Former Smoker     Packs/day: 1.00     Years: 30.00     Pack years: 30.00     Types: Cigarettes     Quit date: 2000     Years since quittin.0    Smokeless tobacco: Current User     Types: Snuff   Substance Use Topics    Alcohol use: No    Drug use: No        Review of Systems   Constitutional: Negative.    HENT: Negative.    Eyes: Negative.    Respiratory: Negative.    Cardiovascular: Negative.    Gastrointestinal: Negative.    Endocrine: Negative.    Genitourinary: Negative.    Musculoskeletal: Positive for back pain. Negative for gait problem and neck pain.   Skin: Negative.    Allergic/Immunologic: Negative.    Neurological: Positive for weakness. Negative for light-headedness, numbness and headaches.   Hematological: Negative.    Psychiatric/Behavioral: Negative.        OBJECTIVE:   Vital Signs:  Pulse: 60 (22 1037)  BP: 107/70 (22 1037)    Physical Exam:    Vital signs: All nursing notes and vital signs reviewed -- afebrile, vital signs stable.  Constitutional: Patient sitting comfortably in chair. Appears well developed and well nourished.  Skin: Exposed areas are intact without abnormal markings, rashes or other lesions.  HEENT: Normocephalic. Normal conjunctivae.  Cardiovascular: Normal rate and regular  rhythm.  Respiratory: Chest wall rises and falls symmetrically, without signs of respiratory distress.  Abdomen: Soft and non-tender.  Extremities: Warm and without edema. Calves supple, non-tender.  Psych/Behavior: Normal affect.    Neurological:    Mental status: Alert and oriented. Conversational and appropriate.       Cranial Nerves: VFF to confrontation. PERRL. EOMI without nystagmus. Facial STLT normal and symmetric. Strong, symmetric muscles of mastication. Facial strength full and symmetric. Hearing equal bilaterally to finger rub. Palate and uvula rise and fall normally in midline. Shoulder shrug 5/5 strength. Tongue midline.     Motor:    Upper:  Deltoids Triceps Biceps WE WF     R 5/5 5/5 5/5 5/5 5/5 5/5    L 5/5 5/5 5/5 5/5 5/5 5/5      Lower:  HF KE KF DF PF EHL    R 5/5 5/5 5/5 5/5 5/5 5/5    L 5/5 5/5 5/5 5/5 5/5 5/5     Sensory: Intact sensation to light touch in all extremities. Romberg positive.    Stooped posture and bent knees  Some mild pelvic retroversion  Mild gait ataxia    Reflexes:          DTR: 2+ symmetrically throughout.     Andrews's: Negative.     Babinski's: Negative.     Clonus: Negative.    Cerebellar: Finger-to-nose and rapid alternating movements normal. Gait stable, fluid.    Spine:    Posture: Head well aligned over pelvis in front and side views.  No focal or global spinal deformity visible on inspection. Shoulders and hips even. No obvious leg length discrepancy. No scapula winging.    Bending: Full ROM with forward, back and lateral bending. No rib prominence with forward bend.    Cervical:      ROM: Full with flexion, extension, lateral rotation and ear-to-shoulder bend.      Midline TTP: Negative.     Spurling's test: Negative.     Lhermitte's: Negative.    Thoracic:     Midline TTP: Negative    Lumbar:     Midline TTP: Negative     Straight Leg Test: Negative     Crossed Straight Leg Test: Negative     Sciatic notch tenderness: Negative.    Other:     SI joint TTP:  Negative.     Greater trochanter TTP: Negative.     Tenderness with external/internal hip rotation: Negative.    Diagnostic Results:  All imaging was independently reviewed by me.    MRI L-spine, dated 08/10/2021:  1. Severe central stenosis at L3-4  2. Severe right neuroforaminal stenosis at L2-3, L3-4, L4-5, L5-S1  3. Severe left neuroforaminal stenosis at L3-4 and L4-5    Flex/Ex X-ray L-spine, dated 10/08/2021:  1. No instability  2. Vacuum discs L3-4, L4-5, L5-S1    Scoliosis standing AP and Lateral X-ray, dated 10/08/2021:  1. 30 degrees of spinal pelvic mismatch  2. 10 cm of postive sagittal balance  3. 33 degrees of pelvic tilt  4. He has mild thoracolumbar scoliosis    ASSESSMENT/PLAN:     Enoc Collado has flat back deformity with severe sagittal imbalance and severe degenerative lumbar stenosis causing axial low back pain. I have discussed spinal deformity surgery which in his case would require a two stage OLIF/T10-pelvis construct. At this time he doesn't think the benefits outweigh the risks. We will follow this deformity with a repeat scoli xray in 6-12 months. He does have some s/s of cervical myelopathy and I'd like to get an MRI C spine and have him follow up once complete.    The patient understands and agrees with the plan of care. All questions were answered.     1. MRI C spine and RTC in next 4-6 weeks  2. Repeat scoli xray in 6-12 months      I, Dr. Oracio White personally performed the services described in this documentation. All medical record entries made by the scribe, Leonel Walker, were at my direction and in my presence.  I have reviewed the chart and agree that the record reflects my personal performance and is accurate and complete.      Oracio White M.D.  Department of Neurosurgery  [facility]

## 2022-02-15 ENCOUNTER — PATIENT MESSAGE (OUTPATIENT)
Dept: NEUROSURGERY | Facility: CLINIC | Age: 72
End: 2022-02-15
Payer: MEDICARE

## 2022-02-23 ENCOUNTER — HOSPITAL ENCOUNTER (OUTPATIENT)
Dept: RADIOLOGY | Facility: HOSPITAL | Age: 72
Discharge: HOME | End: 2022-02-23
Attending: NEUROLOGICAL SURGERY
Payer: MEDICARE

## 2022-02-23 DIAGNOSIS — G95.9 CERVICAL MYELOPATHY: ICD-10-CM

## 2022-02-23 DIAGNOSIS — M41.50 DEGENERATIVE SCOLIOSIS IN ADULT PATIENT: ICD-10-CM

## 2022-02-23 PROCEDURE — 72141 MRI NECK SPINE W/O DYE: CPT | Mod: TC,HCNC

## 2022-02-23 PROCEDURE — 72141 MRI CERVICAL SPINE WITHOUT CONTRAST: ICD-10-PCS | Mod: 26,HCNC,, | Performed by: RADIOLOGY

## 2022-02-23 PROCEDURE — 72141 MRI NECK SPINE W/O DYE: CPT | Mod: 26,HCNC,, | Performed by: RADIOLOGY

## 2022-03-09 ENCOUNTER — PATIENT MESSAGE (OUTPATIENT)
Dept: NEUROSURGERY | Facility: CLINIC | Age: 72
End: 2022-03-09
Payer: MEDICARE

## 2022-03-10 ENCOUNTER — LAB VISIT (OUTPATIENT)
Dept: LAB | Facility: HOSPITAL | Age: 72
End: 2022-03-10
Attending: INTERNAL MEDICINE
Payer: MEDICARE

## 2022-03-10 DIAGNOSIS — I10 ESSENTIAL HYPERTENSION: ICD-10-CM

## 2022-03-10 DIAGNOSIS — Z87.39 HISTORY OF GOUT: ICD-10-CM

## 2022-03-10 DIAGNOSIS — Z12.5 PROSTATE CANCER SCREENING: ICD-10-CM

## 2022-03-10 DIAGNOSIS — E29.1 HYPOGONADISM MALE: ICD-10-CM

## 2022-03-10 LAB
ALBUMIN SERPL BCP-MCNC: 4 G/DL (ref 3.5–5.2)
ALP SERPL-CCNC: 82 U/L (ref 55–135)
ALT SERPL W/O P-5'-P-CCNC: 35 U/L (ref 10–44)
ANION GAP SERPL CALC-SCNC: 11 MMOL/L (ref 8–16)
AST SERPL-CCNC: 23 U/L (ref 10–40)
BASOPHILS # BLD AUTO: 0.02 K/UL (ref 0–0.2)
BASOPHILS NFR BLD: 0.2 % (ref 0–1.9)
BILIRUB SERPL-MCNC: 0.7 MG/DL (ref 0.1–1)
BUN SERPL-MCNC: 24 MG/DL (ref 8–23)
CALCIUM SERPL-MCNC: 9.6 MG/DL (ref 8.7–10.5)
CHLORIDE SERPL-SCNC: 104 MMOL/L (ref 95–110)
CHOLEST SERPL-MCNC: 171 MG/DL (ref 120–199)
CHOLEST/HDLC SERPL: 4.6 {RATIO} (ref 2–5)
CO2 SERPL-SCNC: 26 MMOL/L (ref 23–29)
COMPLEXED PSA SERPL-MCNC: 1.9 NG/ML (ref 0–4)
CREAT SERPL-MCNC: 1 MG/DL (ref 0.5–1.4)
DIFFERENTIAL METHOD: ABNORMAL
EOSINOPHIL # BLD AUTO: 0 K/UL (ref 0–0.5)
EOSINOPHIL NFR BLD: 0.2 % (ref 0–8)
ERYTHROCYTE [DISTWIDTH] IN BLOOD BY AUTOMATED COUNT: 14.5 % (ref 11.5–14.5)
EST. GFR  (AFRICAN AMERICAN): >60 ML/MIN/1.73 M^2
EST. GFR  (NON AFRICAN AMERICAN): >60 ML/MIN/1.73 M^2
GLUCOSE SERPL-MCNC: 119 MG/DL (ref 70–110)
HCT VFR BLD AUTO: 46.8 % (ref 40–54)
HDLC SERPL-MCNC: 37 MG/DL (ref 40–75)
HDLC SERPL: 21.6 % (ref 20–50)
HGB BLD-MCNC: 15.6 G/DL (ref 14–18)
IMM GRANULOCYTES # BLD AUTO: 0.05 K/UL (ref 0–0.04)
IMM GRANULOCYTES NFR BLD AUTO: 0.4 % (ref 0–0.5)
LDLC SERPL CALC-MCNC: 117.2 MG/DL (ref 63–159)
LYMPHOCYTES # BLD AUTO: 0.9 K/UL (ref 1–4.8)
LYMPHOCYTES NFR BLD: 7.1 % (ref 18–48)
MCH RBC QN AUTO: 33 PG (ref 27–31)
MCHC RBC AUTO-ENTMCNC: 33.3 G/DL (ref 32–36)
MCV RBC AUTO: 99 FL (ref 82–98)
MONOCYTES # BLD AUTO: 0.6 K/UL (ref 0.3–1)
MONOCYTES NFR BLD: 4.6 % (ref 4–15)
NEUTROPHILS # BLD AUTO: 11.1 K/UL (ref 1.8–7.7)
NEUTROPHILS NFR BLD: 87.5 % (ref 38–73)
NONHDLC SERPL-MCNC: 134 MG/DL
NRBC BLD-RTO: 0 /100 WBC
PLATELET # BLD AUTO: 302 K/UL (ref 150–450)
PMV BLD AUTO: 10 FL (ref 9.2–12.9)
POTASSIUM SERPL-SCNC: 5.6 MMOL/L (ref 3.5–5.1)
PROT SERPL-MCNC: 6.7 G/DL (ref 6–8.4)
RBC # BLD AUTO: 4.73 M/UL (ref 4.6–6.2)
SODIUM SERPL-SCNC: 141 MMOL/L (ref 136–145)
T4 FREE SERPL-MCNC: 0.88 NG/DL (ref 0.71–1.51)
TRIGL SERPL-MCNC: 84 MG/DL (ref 30–150)
TSH SERPL DL<=0.005 MIU/L-ACNC: 0.21 UIU/ML (ref 0.4–4)
URATE SERPL-MCNC: 6 MG/DL (ref 3.4–7)
WBC # BLD AUTO: 12.68 K/UL (ref 3.9–12.7)

## 2022-03-10 PROCEDURE — 84439 ASSAY OF FREE THYROXINE: CPT | Performed by: INTERNAL MEDICINE

## 2022-03-10 PROCEDURE — 84153 ASSAY OF PSA TOTAL: CPT | Performed by: INTERNAL MEDICINE

## 2022-03-10 PROCEDURE — 80053 COMPREHEN METABOLIC PANEL: CPT | Performed by: INTERNAL MEDICINE

## 2022-03-10 PROCEDURE — 36415 COLL VENOUS BLD VENIPUNCTURE: CPT | Mod: PO | Performed by: INTERNAL MEDICINE

## 2022-03-10 PROCEDURE — 82040 ASSAY OF SERUM ALBUMIN: CPT | Performed by: INTERNAL MEDICINE

## 2022-03-10 PROCEDURE — 84443 ASSAY THYROID STIM HORMONE: CPT | Performed by: INTERNAL MEDICINE

## 2022-03-10 PROCEDURE — 80061 LIPID PANEL: CPT | Performed by: INTERNAL MEDICINE

## 2022-03-10 PROCEDURE — 84550 ASSAY OF BLOOD/URIC ACID: CPT | Performed by: INTERNAL MEDICINE

## 2022-03-10 PROCEDURE — 85025 COMPLETE CBC W/AUTO DIFF WBC: CPT | Performed by: INTERNAL MEDICINE

## 2022-03-11 ENCOUNTER — PATIENT MESSAGE (OUTPATIENT)
Dept: NEUROSURGERY | Facility: CLINIC | Age: 72
End: 2022-03-11
Payer: MEDICARE

## 2022-03-16 ENCOUNTER — PATIENT MESSAGE (OUTPATIENT)
Dept: INTERNAL MEDICINE | Facility: CLINIC | Age: 72
End: 2022-03-16

## 2022-03-16 ENCOUNTER — OFFICE VISIT (OUTPATIENT)
Dept: INTERNAL MEDICINE | Facility: CLINIC | Age: 72
End: 2022-03-16
Payer: MEDICARE

## 2022-03-16 VITALS
BODY MASS INDEX: 38.53 KG/M2 | HEART RATE: 65 BPM | OXYGEN SATURATION: 95 % | SYSTOLIC BLOOD PRESSURE: 134 MMHG | DIASTOLIC BLOOD PRESSURE: 82 MMHG | WEIGHT: 260.13 LBS | HEIGHT: 69 IN

## 2022-03-16 DIAGNOSIS — E29.1 HYPOGONADISM MALE: ICD-10-CM

## 2022-03-16 DIAGNOSIS — M17.12 PRIMARY OSTEOARTHRITIS OF LEFT KNEE: ICD-10-CM

## 2022-03-16 DIAGNOSIS — Z87.39 HISTORY OF GOUT: ICD-10-CM

## 2022-03-16 DIAGNOSIS — E66.01 MORBID OBESITY WITH BMI OF 40.0-44.9, ADULT: ICD-10-CM

## 2022-03-16 DIAGNOSIS — I10 ESSENTIAL HYPERTENSION: ICD-10-CM

## 2022-03-16 DIAGNOSIS — Z86.010 PERSONAL HISTORY OF COLONIC POLYPS: ICD-10-CM

## 2022-03-16 DIAGNOSIS — N18.31 STAGE 3A CHRONIC KIDNEY DISEASE: ICD-10-CM

## 2022-03-16 DIAGNOSIS — M19.012 PRIMARY OSTEOARTHRITIS OF LEFT SHOULDER: ICD-10-CM

## 2022-03-16 DIAGNOSIS — E78.00 PURE HYPERCHOLESTEROLEMIA: ICD-10-CM

## 2022-03-16 DIAGNOSIS — Z00.00 ROUTINE GENERAL MEDICAL EXAMINATION AT A HEALTH CARE FACILITY: Primary | ICD-10-CM

## 2022-03-16 LAB
ALBUMIN SERPL-MCNC: 4.2 G/DL (ref 3.6–5.1)
SHBG SERPL-SCNC: 40 NMOL/L (ref 22–77)
TESTOST FREE SERPL-MCNC: 12.8 PG/ML (ref 6–73)
TESTOST SERPL-MCNC: 123 NG/DL (ref 250–1100)
TESTOSTERONE.FREE+WB SERPL-MCNC: 24.7 NG/DL (ref 15–150)

## 2022-03-16 PROCEDURE — 3288F FALL RISK ASSESSMENT DOCD: CPT | Mod: CPTII,S$GLB,, | Performed by: INTERNAL MEDICINE

## 2022-03-16 PROCEDURE — 3288F PR FALLS RISK ASSESSMENT DOCUMENTED: ICD-10-PCS | Mod: CPTII,S$GLB,, | Performed by: INTERNAL MEDICINE

## 2022-03-16 PROCEDURE — 1101F PT FALLS ASSESS-DOCD LE1/YR: CPT | Mod: CPTII,S$GLB,, | Performed by: INTERNAL MEDICINE

## 2022-03-16 PROCEDURE — 3008F PR BODY MASS INDEX (BMI) DOCUMENTED: ICD-10-PCS | Mod: CPTII,S$GLB,, | Performed by: INTERNAL MEDICINE

## 2022-03-16 PROCEDURE — 99999 PR PBB SHADOW E&M-EST. PATIENT-LVL III: CPT | Mod: PBBFAC,,, | Performed by: INTERNAL MEDICINE

## 2022-03-16 PROCEDURE — 1101F PR PT FALLS ASSESS DOC 0-1 FALLS W/OUT INJ PAST YR: ICD-10-PCS | Mod: CPTII,S$GLB,, | Performed by: INTERNAL MEDICINE

## 2022-03-16 PROCEDURE — 99397 PR PREVENTIVE VISIT,EST,65 & OVER: ICD-10-PCS | Mod: 25,S$GLB,, | Performed by: INTERNAL MEDICINE

## 2022-03-16 PROCEDURE — 1157F PR ADVANCE CARE PLAN OR EQUIV PRESENT IN MEDICAL RECORD: ICD-10-PCS | Mod: CPTII,S$GLB,, | Performed by: INTERNAL MEDICINE

## 2022-03-16 PROCEDURE — 99999 PR PBB SHADOW E&M-EST. PATIENT-LVL III: ICD-10-PCS | Mod: PBBFAC,,, | Performed by: INTERNAL MEDICINE

## 2022-03-16 PROCEDURE — 3079F DIAST BP 80-89 MM HG: CPT | Mod: CPTII,S$GLB,, | Performed by: INTERNAL MEDICINE

## 2022-03-16 PROCEDURE — 1159F PR MEDICATION LIST DOCUMENTED IN MEDICAL RECORD: ICD-10-PCS | Mod: CPTII,S$GLB,, | Performed by: INTERNAL MEDICINE

## 2022-03-16 PROCEDURE — 20610 DRAIN/INJ JOINT/BURSA W/O US: CPT | Mod: 50,S$GLB,, | Performed by: INTERNAL MEDICINE

## 2022-03-16 PROCEDURE — 1157F ADVNC CARE PLAN IN RCRD: CPT | Mod: CPTII,S$GLB,, | Performed by: INTERNAL MEDICINE

## 2022-03-16 PROCEDURE — 3079F PR MOST RECENT DIASTOLIC BLOOD PRESSURE 80-89 MM HG: ICD-10-PCS | Mod: CPTII,S$GLB,, | Performed by: INTERNAL MEDICINE

## 2022-03-16 PROCEDURE — 1126F AMNT PAIN NOTED NONE PRSNT: CPT | Mod: CPTII,S$GLB,, | Performed by: INTERNAL MEDICINE

## 2022-03-16 PROCEDURE — 4010F PR ACE/ARB THEARPY RXD/TAKEN: ICD-10-PCS | Mod: CPTII,S$GLB,, | Performed by: INTERNAL MEDICINE

## 2022-03-16 PROCEDURE — 4010F ACE/ARB THERAPY RXD/TAKEN: CPT | Mod: CPTII,S$GLB,, | Performed by: INTERNAL MEDICINE

## 2022-03-16 PROCEDURE — 1126F PR PAIN SEVERITY QUANTIFIED, NO PAIN PRESENT: ICD-10-PCS | Mod: CPTII,S$GLB,, | Performed by: INTERNAL MEDICINE

## 2022-03-16 PROCEDURE — 3075F SYST BP GE 130 - 139MM HG: CPT | Mod: CPTII,S$GLB,, | Performed by: INTERNAL MEDICINE

## 2022-03-16 PROCEDURE — 3008F BODY MASS INDEX DOCD: CPT | Mod: CPTII,S$GLB,, | Performed by: INTERNAL MEDICINE

## 2022-03-16 PROCEDURE — 99397 PER PM REEVAL EST PAT 65+ YR: CPT | Mod: 25,S$GLB,, | Performed by: INTERNAL MEDICINE

## 2022-03-16 PROCEDURE — 20610 PR DRAIN/INJECT LARGE JOINT/BURSA: ICD-10-PCS | Mod: 50,S$GLB,, | Performed by: INTERNAL MEDICINE

## 2022-03-16 PROCEDURE — 1159F MED LIST DOCD IN RCRD: CPT | Mod: CPTII,S$GLB,, | Performed by: INTERNAL MEDICINE

## 2022-03-16 PROCEDURE — 3075F PR MOST RECENT SYSTOLIC BLOOD PRESS GE 130-139MM HG: ICD-10-PCS | Mod: CPTII,S$GLB,, | Performed by: INTERNAL MEDICINE

## 2022-03-16 RX ORDER — PRAVASTATIN SODIUM 20 MG/1
20 TABLET ORAL DAILY
Qty: 90 TABLET | Refills: 3 | Status: SHIPPED | OUTPATIENT
Start: 2022-03-16 | End: 2023-03-31

## 2022-03-16 RX ORDER — HYDROCHLOROTHIAZIDE 25 MG/1
25 TABLET ORAL DAILY
Qty: 90 TABLET | Refills: 3 | Status: SHIPPED | OUTPATIENT
Start: 2022-03-16 | End: 2023-03-31

## 2022-03-16 RX ORDER — TESTOSTERONE CYPIONATE 200 MG/ML
300 INJECTION, SOLUTION INTRAMUSCULAR
Qty: 10 ML | Refills: 1 | Status: SHIPPED | OUTPATIENT
Start: 2022-03-16 | End: 2022-03-16 | Stop reason: SDUPTHER

## 2022-03-16 RX ORDER — HYDROCODONE BITARTRATE AND ACETAMINOPHEN 10; 325 MG/1; MG/1
1 TABLET ORAL EVERY 8 HOURS PRN
Qty: 90 TABLET | Refills: 0 | Status: SHIPPED | OUTPATIENT
Start: 2022-03-16 | End: 2022-03-16 | Stop reason: SDUPTHER

## 2022-03-16 RX ORDER — ALLOPURINOL 300 MG/1
300 TABLET ORAL DAILY
Qty: 90 TABLET | Refills: 3 | Status: SHIPPED | OUTPATIENT
Start: 2022-03-16 | End: 2023-03-31

## 2022-03-16 RX ORDER — TESTOSTERONE CYPIONATE 200 MG/ML
300 INJECTION, SOLUTION INTRAMUSCULAR
Qty: 10 ML | Refills: 1 | Status: SHIPPED | OUTPATIENT
Start: 2022-03-16 | End: 2022-09-26 | Stop reason: SDUPTHER

## 2022-03-16 RX ORDER — GABAPENTIN 300 MG/1
300 CAPSULE ORAL 3 TIMES DAILY
Qty: 270 CAPSULE | Refills: 3 | Status: SHIPPED | OUTPATIENT
Start: 2022-03-16 | End: 2023-03-31

## 2022-03-16 RX ORDER — LISINOPRIL 40 MG/1
40 TABLET ORAL DAILY
Qty: 90 TABLET | Refills: 3 | Status: SHIPPED | OUTPATIENT
Start: 2022-03-16 | End: 2023-03-31

## 2022-03-16 RX ORDER — AMLODIPINE BESYLATE 10 MG/1
10 TABLET ORAL DAILY
Qty: 90 TABLET | Refills: 3 | Status: SHIPPED | OUTPATIENT
Start: 2022-03-16 | End: 2023-03-31

## 2022-03-16 RX ORDER — METHYLPREDNISOLONE ACETATE 80 MG/ML
80 INJECTION, SUSPENSION INTRA-ARTICULAR; INTRALESIONAL; INTRAMUSCULAR; SOFT TISSUE ONCE
Status: COMPLETED | OUTPATIENT
Start: 2022-03-16 | End: 2022-03-16

## 2022-03-16 RX ORDER — HYDROCODONE BITARTRATE AND ACETAMINOPHEN 10; 325 MG/1; MG/1
1 TABLET ORAL EVERY 8 HOURS PRN
Qty: 90 TABLET | Refills: 0 | Status: SHIPPED | OUTPATIENT
Start: 2022-03-16 | End: 2023-04-26 | Stop reason: SDUPTHER

## 2022-03-16 RX ORDER — CARVEDILOL 25 MG/1
25 TABLET ORAL 2 TIMES DAILY WITH MEALS
Qty: 180 TABLET | Refills: 3 | Status: SHIPPED | OUTPATIENT
Start: 2022-03-16 | End: 2023-03-31

## 2022-03-16 RX ADMIN — METHYLPREDNISOLONE ACETATE 80 MG: 80 INJECTION, SUSPENSION INTRA-ARTICULAR; INTRALESIONAL; INTRAMUSCULAR; SOFT TISSUE at 11:03

## 2022-03-16 NOTE — PROGRESS NOTES
HPI:  Patient is a 71-year-old man who comes today for follow-up of hypertension, lipids and for his annual physical.  Continues to do fairly well except for arthritis of his lower lumbar spine knee and shoulder.  Did see the neurosurgeon about possible surgery on his lumbar spine.  They gave him options of surgery but it would be extensive and only be a 50 50 chance of improvement.  He chose not to proceed.  Patient's blood pressures been doing well.  He would like to get steroid shots in his left shoulder and left knee.  He has responded well to them in the past.  It has been a couple years since the last time he had injections.    Current MEDS: medcard review, verified and update  Allergies: Per the electronic medical record    Past Medical History:   Diagnosis Date    BPH (benign prostatic hyperplasia)     CKD (chronic kidney disease), stage III     Colon polyps 2015    Diarrhea in adult patient 2/10/2021    Generalized osteoarthrosis, involving multiple sites     History of gout     Hyperlipidemia     Hypertension     Hypogonadism male     Lumbar disc disease with radiculopathy     Morbid obesity with BMI of 40.0-44.9, adult        Past Surgical History:   Procedure Laterality Date    COLONOSCOPY N/A 6/22/2020    Procedure: COLONOSCOPY;  Surgeon: Montserrat Conroy MD;  Location: [facility];  Service: Endoscopy;  Laterality: N/A;    COLONOSCOPY N/A 2/10/2021    Procedure: COLONOSCOPY;  Surgeon: Jones Lemus III, MD;  Location: [facility];  Service: Endoscopy;  Laterality: N/A;    ESOPHAGOGASTRODUODENOSCOPY N/A 2/10/2021    Procedure: EGD (ESOPHAGOGASTRODUODENOSCOPY);  Surgeon: Jones Lemus III, MD;  Location: [facility];  Service: Endoscopy;  Laterality: N/A;    EYE SURGERY      JOINT REPLACEMENT      KNEE SCOPE      open globe      right eye 1980    ROTATOR CUFF REPAIR      SHOULDER SURGERY Right        SHx: per the electronic medical record    FHx: recorded in the electronic medical  "record    ROS:    denies any chest pains or shortness of breath. Denies any nausea, vomiting or diarrhea. Denies any fever, chills or sweats. Denies any change in weight, voice, stool, skin or hair. Denies any dysuria, dyspepsia or dysphagia. Denies any change in vision, hearing or headaches. Denies any swollen lymph nodes or loss of memory.    PE:  /82   Pulse 65   Ht 5' 9" (1.753 m)   Wt 118 kg (260 lb 2.3 oz)   SpO2 95%   BMI 38.42 kg/m²   Gen: Well-developed, well-nourished, male, in no acute distress, oriented x3  HEENT: neck is supple, no adenopathy, carotids 2+ equal without bruits, thyroid exam normal size without nodules.  CHEST: clear to auscultation and percussion  CVS: regular rate and rhythm without significant murmur, gallop, or rubs  ABD: soft, benign, no rebound no guarding, no distention.  Bowel sounds are normal.     nontender.  No palpable masses.  No organomegaly and no audible bruits.  RECTAL:  Deferred.  EXT: no clubbing, cyanosis, or edema  LYMPH: no cervical, inguinal, or axillary adenopathy  FEET: no loss of sensation.  No ulcers or pressure sores.  NEURO: gait normal.  Cranial nerves II- XII intact. No nystagmus.  Speech normal.   Gross motor and sensory unremarkable.  Joint:  He has decreased range of motion this left shoulder.  He has pain with range of motion.  He has no impingement sign.  Left knee has no effusion.  No warmth.  He has crepitus and pain with range of motion.  He has decreased range of motion.    Lab Results   Component Value Date    WBC 12.68 03/10/2022    HGB 15.6 03/10/2022    HCT 46.8 03/10/2022     03/10/2022    CHOL 171 03/10/2022    TRIG 84 03/10/2022    HDL 37 (L) 03/10/2022    ALT 35 03/10/2022    AST 23 03/10/2022     03/10/2022    K 5.6 (H) 03/10/2022     03/10/2022    CREATININE 1.0 03/10/2022    BUN 24 (H) 03/10/2022    CO2 26 03/10/2022    TSH 0.206 (L) 03/10/2022    PSA 1.9 03/10/2022    HGBA1C 5.3 05/05/2015 "       Impression:  Degenerative lumbar disc disease with radiculopathy of the left lower extremity.  Primary osteoarthritis of the left knee and left shoulder.  Numerous other medical problems below, stable  Patient Active Problem List   Diagnosis    Essential hypertension    Hyperlipidemia    Generalized osteoarthrosis, involving multiple sites    Hypogonadism male    History of gout    BPH (benign prostatic hyperplasia)    Lumbar disc disease with radiculopathy    Morbid obesity with BMI of 40.0-44.9, adult    CKD (chronic kidney disease), stage III    Personal history of colonic polyps    Left sided colitis without complications       Plan:   Orders Placed This Encounter    Comprehensive Metabolic Panel    Lipid Panel    TSH    CBC Auto Differential    Testosterone Panel    carvediloL (COREG) 25 MG tablet    hydroCHLOROthiazide (HYDRODIURIL) 25 MG tablet    lisinopriL (PRINIVIL,ZESTRIL) 40 MG tablet    pravastatin (PRAVACHOL) 20 MG tablet    allopurinoL (ZYLOPRIM) 300 MG tablet    amLODIPine (NORVASC) 10 MG tablet    gabapentin (NEURONTIN) 300 MG capsule    HYDROcodone-acetaminophen (NORCO)  mg per tablet    testosterone cypionate (DEPOTESTOTERONE CYPIONATE) 200 mg/mL injection    methylPREDNISolone acetate injection 80 mg    methylPREDNISolone acetate injection 80 mg     Patient had both the left knee and left shoulder injected with 1 cc Depo-Medrol 80 and 2 cc of Sensorcaine.  He will see me again in 6 months with above lab work.  His medications remain the same.  This note is generated with speech recognition software and is subject to transcription error and sound alike phrases that may be missed by proofreading.

## 2022-06-08 ENCOUNTER — TELEPHONE (OUTPATIENT)
Dept: INTERNAL MEDICINE | Facility: CLINIC | Age: 72
End: 2022-06-08
Payer: MEDICARE

## 2022-06-08 NOTE — TELEPHONE ENCOUNTER
----- Message from Jessi Hawley sent at 6/8/2022  2:10 PM CDT -----  Regarding: same day  Contact: wife  Calling for  a same day appt for cough for several day's not sleeping at night. Annel Collado at [phone]

## 2022-09-09 ENCOUNTER — LAB VISIT (OUTPATIENT)
Dept: LAB | Facility: HOSPITAL | Age: 72
End: 2022-09-09
Attending: INTERNAL MEDICINE
Payer: MEDICARE

## 2022-09-09 DIAGNOSIS — I10 ESSENTIAL HYPERTENSION: ICD-10-CM

## 2022-09-09 DIAGNOSIS — E29.1 HYPOGONADISM MALE: ICD-10-CM

## 2022-09-09 LAB
ALBUMIN SERPL BCP-MCNC: 4.1 G/DL (ref 3.5–5.2)
ALP SERPL-CCNC: 63 U/L (ref 55–135)
ALT SERPL W/O P-5'-P-CCNC: 51 U/L (ref 10–44)
ANION GAP SERPL CALC-SCNC: 14 MMOL/L (ref 8–16)
AST SERPL-CCNC: 34 U/L (ref 10–40)
BASOPHILS # BLD AUTO: 0.03 K/UL (ref 0–0.2)
BASOPHILS NFR BLD: 0.2 % (ref 0–1.9)
BILIRUB SERPL-MCNC: 0.7 MG/DL (ref 0.1–1)
BUN SERPL-MCNC: 20 MG/DL (ref 8–23)
CALCIUM SERPL-MCNC: 10 MG/DL (ref 8.7–10.5)
CHLORIDE SERPL-SCNC: 102 MMOL/L (ref 95–110)
CHOLEST SERPL-MCNC: 190 MG/DL (ref 120–199)
CHOLEST/HDLC SERPL: 5.6 {RATIO} (ref 2–5)
CO2 SERPL-SCNC: 23 MMOL/L (ref 23–29)
CREAT SERPL-MCNC: 1 MG/DL (ref 0.5–1.4)
DIFFERENTIAL METHOD: ABNORMAL
EOSINOPHIL # BLD AUTO: 0 K/UL (ref 0–0.5)
EOSINOPHIL NFR BLD: 0.1 % (ref 0–8)
ERYTHROCYTE [DISTWIDTH] IN BLOOD BY AUTOMATED COUNT: 15 % (ref 11.5–14.5)
EST. GFR  (NO RACE VARIABLE): >60 ML/MIN/1.73 M^2
GLUCOSE SERPL-MCNC: 117 MG/DL (ref 70–110)
HCT VFR BLD AUTO: 49.1 % (ref 40–54)
HDLC SERPL-MCNC: 34 MG/DL (ref 40–75)
HDLC SERPL: 17.9 % (ref 20–50)
HGB BLD-MCNC: 16.7 G/DL (ref 14–18)
IMM GRANULOCYTES # BLD AUTO: 0.06 K/UL (ref 0–0.04)
IMM GRANULOCYTES NFR BLD AUTO: 0.4 % (ref 0–0.5)
LDLC SERPL CALC-MCNC: 141.4 MG/DL (ref 63–159)
LYMPHOCYTES # BLD AUTO: 1.3 K/UL (ref 1–4.8)
LYMPHOCYTES NFR BLD: 9.3 % (ref 18–48)
MCH RBC QN AUTO: 33.4 PG (ref 27–31)
MCHC RBC AUTO-ENTMCNC: 34 G/DL (ref 32–36)
MCV RBC AUTO: 98 FL (ref 82–98)
MONOCYTES # BLD AUTO: 0.6 K/UL (ref 0.3–1)
MONOCYTES NFR BLD: 4.7 % (ref 4–15)
NEUTROPHILS # BLD AUTO: 11.6 K/UL (ref 1.8–7.7)
NEUTROPHILS NFR BLD: 85.3 % (ref 38–73)
NONHDLC SERPL-MCNC: 156 MG/DL
NRBC BLD-RTO: 0 /100 WBC
PLATELET # BLD AUTO: 283 K/UL (ref 150–450)
PMV BLD AUTO: 10.3 FL (ref 9.2–12.9)
POTASSIUM SERPL-SCNC: 4.8 MMOL/L (ref 3.5–5.1)
PROT SERPL-MCNC: 6.8 G/DL (ref 6–8.4)
RBC # BLD AUTO: 5 M/UL (ref 4.6–6.2)
SODIUM SERPL-SCNC: 139 MMOL/L (ref 136–145)
T4 FREE SERPL-MCNC: 0.84 NG/DL (ref 0.71–1.51)
TRIGL SERPL-MCNC: 73 MG/DL (ref 30–150)
TSH SERPL DL<=0.005 MIU/L-ACNC: 0.4 UIU/ML (ref 0.4–4)
WBC # BLD AUTO: 13.6 K/UL (ref 3.9–12.7)

## 2022-09-09 PROCEDURE — 84439 ASSAY OF FREE THYROXINE: CPT | Performed by: INTERNAL MEDICINE

## 2022-09-09 PROCEDURE — 85025 COMPLETE CBC W/AUTO DIFF WBC: CPT | Performed by: INTERNAL MEDICINE

## 2022-09-09 PROCEDURE — 80053 COMPREHEN METABOLIC PANEL: CPT | Performed by: INTERNAL MEDICINE

## 2022-09-09 PROCEDURE — 80061 LIPID PANEL: CPT | Performed by: INTERNAL MEDICINE

## 2022-09-09 PROCEDURE — 82040 ASSAY OF SERUM ALBUMIN: CPT | Mod: 59 | Performed by: INTERNAL MEDICINE

## 2022-09-09 PROCEDURE — 84443 ASSAY THYROID STIM HORMONE: CPT | Performed by: INTERNAL MEDICINE

## 2022-09-09 PROCEDURE — 36415 COLL VENOUS BLD VENIPUNCTURE: CPT | Mod: PO | Performed by: INTERNAL MEDICINE

## 2022-09-15 LAB
ALBUMIN SERPL-MCNC: 4.1 G/DL (ref 3.6–5.1)
SHBG SERPL-SCNC: 37 NMOL/L (ref 22–77)
TESTOST FREE SERPL-MCNC: 107.7 PG/ML (ref 6–73)
TESTOST SERPL-MCNC: 783 NG/DL (ref 250–1100)
TESTOSTERONE.FREE+WB SERPL-MCNC: 202.7 NG/DL (ref 15–150)

## 2022-09-26 NOTE — TELEPHONE ENCOUNTER
No new care gaps identified.  [facility] Embedded Care Gaps. Reference number: [id number]. 9/26/2022   4:06:21 PM CDT

## 2022-09-27 RX ORDER — TESTOSTERONE CYPIONATE 200 MG/ML
300 INJECTION, SOLUTION INTRAMUSCULAR
Qty: 10 ML | Refills: 1 | Status: SHIPPED | OUTPATIENT
Start: 2022-09-27 | End: 2022-12-27

## 2022-10-12 ENCOUNTER — OFFICE VISIT (OUTPATIENT)
Dept: INTERNAL MEDICINE | Facility: CLINIC | Age: 72
End: 2022-10-12
Payer: MEDICARE

## 2022-10-12 VITALS
WEIGHT: 291.88 LBS | BODY MASS INDEX: 43.23 KG/M2 | HEART RATE: 60 BPM | OXYGEN SATURATION: 95 % | DIASTOLIC BLOOD PRESSURE: 76 MMHG | HEIGHT: 69 IN | SYSTOLIC BLOOD PRESSURE: 122 MMHG

## 2022-10-12 DIAGNOSIS — N18.31 STAGE 3A CHRONIC KIDNEY DISEASE: ICD-10-CM

## 2022-10-12 DIAGNOSIS — Z86.010 PERSONAL HISTORY OF COLONIC POLYPS: ICD-10-CM

## 2022-10-12 DIAGNOSIS — E29.1 HYPOGONADISM MALE: ICD-10-CM

## 2022-10-12 DIAGNOSIS — Z87.39 HISTORY OF GOUT: ICD-10-CM

## 2022-10-12 DIAGNOSIS — E66.01 MORBID OBESITY WITH BMI OF 40.0-44.9, ADULT: ICD-10-CM

## 2022-10-12 DIAGNOSIS — Z12.5 PROSTATE CANCER SCREENING: ICD-10-CM

## 2022-10-12 DIAGNOSIS — M51.16 LUMBAR DISC DISEASE WITH RADICULOPATHY: ICD-10-CM

## 2022-10-12 DIAGNOSIS — E78.00 PURE HYPERCHOLESTEROLEMIA: Primary | ICD-10-CM

## 2022-10-12 DIAGNOSIS — I10 ESSENTIAL HYPERTENSION: ICD-10-CM

## 2022-10-12 DIAGNOSIS — R73.01 IFG (IMPAIRED FASTING GLUCOSE): ICD-10-CM

## 2022-10-12 PROCEDURE — 99214 PR OFFICE/OUTPT VISIT, EST, LEVL IV, 30-39 MIN: ICD-10-PCS | Mod: S$GLB,,, | Performed by: INTERNAL MEDICINE

## 2022-10-12 PROCEDURE — 99999 PR PBB SHADOW E&M-EST. PATIENT-LVL III: ICD-10-PCS | Mod: PBBFAC,,, | Performed by: INTERNAL MEDICINE

## 2022-10-12 PROCEDURE — 1159F MED LIST DOCD IN RCRD: CPT | Mod: CPTII,S$GLB,, | Performed by: INTERNAL MEDICINE

## 2022-10-12 PROCEDURE — 3074F PR MOST RECENT SYSTOLIC BLOOD PRESSURE < 130 MM HG: ICD-10-PCS | Mod: CPTII,S$GLB,, | Performed by: INTERNAL MEDICINE

## 2022-10-12 PROCEDURE — 3008F BODY MASS INDEX DOCD: CPT | Mod: CPTII,S$GLB,, | Performed by: INTERNAL MEDICINE

## 2022-10-12 PROCEDURE — 1157F PR ADVANCE CARE PLAN OR EQUIV PRESENT IN MEDICAL RECORD: ICD-10-PCS | Mod: CPTII,S$GLB,, | Performed by: INTERNAL MEDICINE

## 2022-10-12 PROCEDURE — 1157F ADVNC CARE PLAN IN RCRD: CPT | Mod: CPTII,S$GLB,, | Performed by: INTERNAL MEDICINE

## 2022-10-12 PROCEDURE — 1159F PR MEDICATION LIST DOCUMENTED IN MEDICAL RECORD: ICD-10-PCS | Mod: CPTII,S$GLB,, | Performed by: INTERNAL MEDICINE

## 2022-10-12 PROCEDURE — 99214 OFFICE O/P EST MOD 30 MIN: CPT | Mod: S$GLB,,, | Performed by: INTERNAL MEDICINE

## 2022-10-12 PROCEDURE — 3078F PR MOST RECENT DIASTOLIC BLOOD PRESSURE < 80 MM HG: ICD-10-PCS | Mod: CPTII,S$GLB,, | Performed by: INTERNAL MEDICINE

## 2022-10-12 PROCEDURE — 4010F PR ACE/ARB THEARPY RXD/TAKEN: ICD-10-PCS | Mod: CPTII,S$GLB,, | Performed by: INTERNAL MEDICINE

## 2022-10-12 PROCEDURE — 1101F PR PT FALLS ASSESS DOC 0-1 FALLS W/OUT INJ PAST YR: ICD-10-PCS | Mod: CPTII,S$GLB,, | Performed by: INTERNAL MEDICINE

## 2022-10-12 PROCEDURE — 3078F DIAST BP <80 MM HG: CPT | Mod: CPTII,S$GLB,, | Performed by: INTERNAL MEDICINE

## 2022-10-12 PROCEDURE — 3288F FALL RISK ASSESSMENT DOCD: CPT | Mod: CPTII,S$GLB,, | Performed by: INTERNAL MEDICINE

## 2022-10-12 PROCEDURE — 1101F PT FALLS ASSESS-DOCD LE1/YR: CPT | Mod: CPTII,S$GLB,, | Performed by: INTERNAL MEDICINE

## 2022-10-12 PROCEDURE — 3074F SYST BP LT 130 MM HG: CPT | Mod: CPTII,S$GLB,, | Performed by: INTERNAL MEDICINE

## 2022-10-12 PROCEDURE — 4010F ACE/ARB THERAPY RXD/TAKEN: CPT | Mod: CPTII,S$GLB,, | Performed by: INTERNAL MEDICINE

## 2022-10-12 PROCEDURE — 99999 PR PBB SHADOW E&M-EST. PATIENT-LVL III: CPT | Mod: PBBFAC,,, | Performed by: INTERNAL MEDICINE

## 2022-10-12 PROCEDURE — 3288F PR FALLS RISK ASSESSMENT DOCUMENTED: ICD-10-PCS | Mod: CPTII,S$GLB,, | Performed by: INTERNAL MEDICINE

## 2022-10-12 PROCEDURE — 3008F PR BODY MASS INDEX (BMI) DOCUMENTED: ICD-10-PCS | Mod: CPTII,S$GLB,, | Performed by: INTERNAL MEDICINE

## 2022-10-12 NOTE — PROGRESS NOTES
"HPI:  Patient is a 72-year-old man who comes today for follow-up of his hypertension, lipids, hypogonadism.  Patient states he has been doing about the same.  His back is tolerable.  His blood pressures been doing well.  He denies any flares of gout.    Current meds have been verified and updated per the EMR  Exam:/76 (BP Location: Right arm)   Pulse 60   Ht 5' 9" (1.753 m)   Wt 132.4 kg (291 lb 14.2 oz)   SpO2 95%   BMI 43.10 kg/m²   Carotids 2+ equal without bruits  Chest clear  Cardiovascular regular rate and rhythm without murmur gallop or rub    Lab Results   Component Value Date    WBC 13.60 (H) 09/09/2022    HGB 16.7 09/09/2022    HCT 49.1 09/09/2022     09/09/2022    CHOL 190 09/09/2022    TRIG 73 09/09/2022    HDL 34 (L) 09/09/2022    ALT 51 (H) 09/09/2022    AST 34 09/09/2022     09/09/2022    K 4.8 09/09/2022     09/09/2022    CREATININE 1.0 09/09/2022    BUN 20 09/09/2022    CO2 23 09/09/2022    TSH 0.396 (L) 09/09/2022    PSA 1.9 03/10/2022    HGBA1C 5.3 05/05/2015       Impression:  Stable medical problems below  Patient Active Problem List   Diagnosis    Essential hypertension    Hyperlipidemia    Generalized osteoarthrosis, involving multiple sites    Hypogonadism male    History of gout    BPH (benign prostatic hyperplasia)    Lumbar disc disease with radiculopathy    Morbid obesity with BMI of 40.0-44.9, adult    CKD (chronic kidney disease), stage III    Personal history of colonic polyps    Left sided colitis without complications       Plan:  Orders Placed This Encounter    Comprehensive Metabolic Panel    Lipid Panel    TSH    CBC Auto Differential    PSA, Screening    Testosterone Panel    Hemoglobin A1C     Patient will see me again in 6 months with above lab work.  He has been encouraged to lose some weight.    This note is generated with speech recognition software and is subject to transcription error and sound alike phrases that may be missed by " proofreading.

## 2022-11-14 ENCOUNTER — OFFICE VISIT (OUTPATIENT)
Dept: INTERNAL MEDICINE | Facility: CLINIC | Age: 72
End: 2022-11-14
Payer: MEDICARE

## 2022-11-14 VITALS
HEART RATE: 59 BPM | HEIGHT: 69 IN | SYSTOLIC BLOOD PRESSURE: 122 MMHG | OXYGEN SATURATION: 94 % | DIASTOLIC BLOOD PRESSURE: 84 MMHG | WEIGHT: 282.44 LBS | BODY MASS INDEX: 41.83 KG/M2

## 2022-11-14 DIAGNOSIS — M17.12 PRIMARY OSTEOARTHRITIS OF LEFT KNEE: Primary | ICD-10-CM

## 2022-11-14 DIAGNOSIS — M19.012 PRIMARY OSTEOARTHRITIS OF LEFT SHOULDER: ICD-10-CM

## 2022-11-14 PROCEDURE — 1159F PR MEDICATION LIST DOCUMENTED IN MEDICAL RECORD: ICD-10-PCS | Mod: CPTII,S$GLB,, | Performed by: INTERNAL MEDICINE

## 2022-11-14 PROCEDURE — 3079F DIAST BP 80-89 MM HG: CPT | Mod: CPTII,S$GLB,, | Performed by: INTERNAL MEDICINE

## 2022-11-14 PROCEDURE — 3288F PR FALLS RISK ASSESSMENT DOCUMENTED: ICD-10-PCS | Mod: CPTII,S$GLB,, | Performed by: INTERNAL MEDICINE

## 2022-11-14 PROCEDURE — 1101F PT FALLS ASSESS-DOCD LE1/YR: CPT | Mod: CPTII,S$GLB,, | Performed by: INTERNAL MEDICINE

## 2022-11-14 PROCEDURE — 1159F MED LIST DOCD IN RCRD: CPT | Mod: CPTII,S$GLB,, | Performed by: INTERNAL MEDICINE

## 2022-11-14 PROCEDURE — 99999 PR PBB SHADOW E&M-EST. PATIENT-LVL III: ICD-10-PCS | Mod: PBBFAC,,, | Performed by: INTERNAL MEDICINE

## 2022-11-14 PROCEDURE — 1126F PR PAIN SEVERITY QUANTIFIED, NO PAIN PRESENT: ICD-10-PCS | Mod: CPTII,S$GLB,, | Performed by: INTERNAL MEDICINE

## 2022-11-14 PROCEDURE — 3079F PR MOST RECENT DIASTOLIC BLOOD PRESSURE 80-89 MM HG: ICD-10-PCS | Mod: CPTII,S$GLB,, | Performed by: INTERNAL MEDICINE

## 2022-11-14 PROCEDURE — 1126F AMNT PAIN NOTED NONE PRSNT: CPT | Mod: CPTII,S$GLB,, | Performed by: INTERNAL MEDICINE

## 2022-11-14 PROCEDURE — 1157F PR ADVANCE CARE PLAN OR EQUIV PRESENT IN MEDICAL RECORD: ICD-10-PCS | Mod: CPTII,S$GLB,, | Performed by: INTERNAL MEDICINE

## 2022-11-14 PROCEDURE — 1101F PR PT FALLS ASSESS DOC 0-1 FALLS W/OUT INJ PAST YR: ICD-10-PCS | Mod: CPTII,S$GLB,, | Performed by: INTERNAL MEDICINE

## 2022-11-14 PROCEDURE — 1157F ADVNC CARE PLAN IN RCRD: CPT | Mod: CPTII,S$GLB,, | Performed by: INTERNAL MEDICINE

## 2022-11-14 PROCEDURE — 4010F PR ACE/ARB THEARPY RXD/TAKEN: ICD-10-PCS | Mod: CPTII,S$GLB,, | Performed by: INTERNAL MEDICINE

## 2022-11-14 PROCEDURE — 3008F BODY MASS INDEX DOCD: CPT | Mod: CPTII,S$GLB,, | Performed by: INTERNAL MEDICINE

## 2022-11-14 PROCEDURE — 3074F SYST BP LT 130 MM HG: CPT | Mod: CPTII,S$GLB,, | Performed by: INTERNAL MEDICINE

## 2022-11-14 PROCEDURE — 99999 PR PBB SHADOW E&M-EST. PATIENT-LVL III: CPT | Mod: PBBFAC,,, | Performed by: INTERNAL MEDICINE

## 2022-11-14 PROCEDURE — 20610 DRAIN/INJ JOINT/BURSA W/O US: CPT | Mod: 50,S$GLB,, | Performed by: INTERNAL MEDICINE

## 2022-11-14 PROCEDURE — 3008F PR BODY MASS INDEX (BMI) DOCUMENTED: ICD-10-PCS | Mod: CPTII,S$GLB,, | Performed by: INTERNAL MEDICINE

## 2022-11-14 PROCEDURE — 20610 PR DRAIN/INJECT LARGE JOINT/BURSA: ICD-10-PCS | Mod: 50,S$GLB,, | Performed by: INTERNAL MEDICINE

## 2022-11-14 PROCEDURE — 99213 PR OFFICE/OUTPT VISIT, EST, LEVL III, 20-29 MIN: ICD-10-PCS | Mod: 25,S$GLB,, | Performed by: INTERNAL MEDICINE

## 2022-11-14 PROCEDURE — 3288F FALL RISK ASSESSMENT DOCD: CPT | Mod: CPTII,S$GLB,, | Performed by: INTERNAL MEDICINE

## 2022-11-14 PROCEDURE — 3074F PR MOST RECENT SYSTOLIC BLOOD PRESSURE < 130 MM HG: ICD-10-PCS | Mod: CPTII,S$GLB,, | Performed by: INTERNAL MEDICINE

## 2022-11-14 PROCEDURE — 99213 OFFICE O/P EST LOW 20 MIN: CPT | Mod: 25,S$GLB,, | Performed by: INTERNAL MEDICINE

## 2022-11-14 PROCEDURE — 4010F ACE/ARB THERAPY RXD/TAKEN: CPT | Mod: CPTII,S$GLB,, | Performed by: INTERNAL MEDICINE

## 2022-11-14 RX ORDER — METHYLPREDNISOLONE ACETATE 80 MG/ML
80 INJECTION, SUSPENSION INTRA-ARTICULAR; INTRALESIONAL; INTRAMUSCULAR; SOFT TISSUE
Status: COMPLETED | OUTPATIENT
Start: 2022-11-14 | End: 2022-11-14

## 2022-11-14 RX ADMIN — METHYLPREDNISOLONE ACETATE 80 MG: 80 INJECTION, SUSPENSION INTRA-ARTICULAR; INTRALESIONAL; INTRAMUSCULAR; SOFT TISSUE at 01:11

## 2022-11-14 NOTE — PROGRESS NOTES
"HPI:  Patient is a 72 year man who comes today in order to get left told knee injected with steroids.  Patient has had problems with progressive arthritis in both joints.  He plans to see his orthopedic physician in the near future to have a knee replacement done.  After that he plans to have a shoulder replacement done as well.  Current meds have been verified and updated per the EMR  Exam:/84 (BP Location: Right arm)   Pulse (!) 59   Ht 5' 9" (1.753 m)   Wt 128.1 kg (282 lb 6.6 oz)   SpO2 (!) 94%   BMI 41.70 kg/m²   He is significant loss of range of motion left shoulder.  He has pain with any attempts of abduction or external rotation  The left knee has significant decreased range of motion.  He has pain with flexion and extension.  There is no fluid in the knee    Lab Results   Component Value Date    WBC 13.60 (H) 09/09/2022    HGB 16.7 09/09/2022    HCT 49.1 09/09/2022     09/09/2022    CHOL 190 09/09/2022    TRIG 73 09/09/2022    HDL 34 (L) 09/09/2022    ALT 51 (H) 09/09/2022    AST 34 09/09/2022     09/09/2022    K 4.8 09/09/2022     09/09/2022    CREATININE 1.0 09/09/2022    BUN 20 09/09/2022    CO2 23 09/09/2022    TSH 0.396 (L) 09/09/2022    PSA 1.9 03/10/2022    HGBA1C 5.3 05/05/2015       Impression:  Primary osteoarthritis of both left shoulder and left knee  Patient Active Problem List   Diagnosis    Essential hypertension    Hyperlipidemia    Generalized osteoarthrosis, involving multiple sites    Hypogonadism male    History of gout    BPH (benign prostatic hyperplasia)    Lumbar disc disease with radiculopathy    Morbid obesity with BMI of 40.0-44.9, adult    CKD (chronic kidney disease), stage III    Personal history of colonic polyps    Left sided colitis without complications       Plan:  Orders Placed This Encounter    methylPREDNISolone acetate injection 80 mg    methylPREDNISolone acetate injection 80 mg   The left knee and left shoulder [facility] both [facility]      This note is generated with speech recognition software and is subject to transcription error and sound alike phrases that may be missed by proofreading.

## 2022-12-22 ENCOUNTER — TELEPHONE (OUTPATIENT)
Dept: INTERNAL MEDICINE | Facility: CLINIC | Age: 72
End: 2022-12-22
Payer: MEDICARE

## 2022-12-22 NOTE — TELEPHONE ENCOUNTER
----- Message from Ivan Huffman sent at 12/22/2022  9:17 AM CST -----  Contact: Annel (wife)  Type:  Patient Returning Call    Who Called:Annel   Who Left Message for Patient: nurse   Does the patient know what this is regarding?: appointment   Would the patient rather a call back or a response via [facility]?  Call back   Best Call Back Number: [phone]   Additional Information:  She states that the pt pre op  appointment will have to be before 1/3/23, due to his surgery on 1/10/23.

## 2022-12-28 ENCOUNTER — HOSPITAL ENCOUNTER (OUTPATIENT)
Dept: RADIOLOGY | Facility: HOSPITAL | Age: 72
Discharge: HOME | End: 2022-12-28
Attending: INTERNAL MEDICINE
Payer: MEDICARE

## 2022-12-28 ENCOUNTER — CLINICAL SUPPORT (OUTPATIENT)
Dept: CARDIOLOGY | Facility: CLINIC | Age: 72
End: 2022-12-28
Payer: MEDICARE

## 2022-12-28 ENCOUNTER — OFFICE VISIT (OUTPATIENT)
Dept: INTERNAL MEDICINE | Facility: CLINIC | Age: 72
End: 2022-12-28
Payer: MEDICARE

## 2022-12-28 VITALS
OXYGEN SATURATION: 96 % | SYSTOLIC BLOOD PRESSURE: 120 MMHG | WEIGHT: 274 LBS | HEART RATE: 67 BPM | DIASTOLIC BLOOD PRESSURE: 78 MMHG | TEMPERATURE: 97 F | HEIGHT: 69 IN | BODY MASS INDEX: 40.58 KG/M2

## 2022-12-28 DIAGNOSIS — M15.9 GENERALIZED OSTEOARTHROSIS, INVOLVING MULTIPLE SITES: ICD-10-CM

## 2022-12-28 DIAGNOSIS — I10 ESSENTIAL HYPERTENSION: ICD-10-CM

## 2022-12-28 DIAGNOSIS — M51.16 LUMBAR DISC DISEASE WITH RADICULOPATHY: ICD-10-CM

## 2022-12-28 DIAGNOSIS — E66.01 MORBID OBESITY WITH BMI OF 40.0-44.9, ADULT: ICD-10-CM

## 2022-12-28 DIAGNOSIS — Z01.818 PREOP EXAMINATION: Primary | ICD-10-CM

## 2022-12-28 DIAGNOSIS — N18.31 STAGE 3A CHRONIC KIDNEY DISEASE: ICD-10-CM

## 2022-12-28 DIAGNOSIS — Z01.818 PREOP EXAMINATION: ICD-10-CM

## 2022-12-28 DIAGNOSIS — E29.1 HYPOGONADISM MALE: ICD-10-CM

## 2022-12-28 DIAGNOSIS — E78.00 PURE HYPERCHOLESTEROLEMIA: ICD-10-CM

## 2022-12-28 LAB
BILIRUB UR QL STRIP: NEGATIVE
CLARITY UR REFRACT.AUTO: CLEAR
COLOR UR AUTO: YELLOW
GLUCOSE UR QL STRIP: NEGATIVE
HGB UR QL STRIP: NEGATIVE
KETONES UR QL STRIP: NEGATIVE
LEUKOCYTE ESTERASE UR QL STRIP: NEGATIVE
NITRITE UR QL STRIP: NEGATIVE
PH UR STRIP: 7 [PH] (ref 5–8)
PROT UR QL STRIP: ABNORMAL
SP GR UR STRIP: 1.02 (ref 1–1.03)
URN SPEC COLLECT METH UR: ABNORMAL

## 2022-12-28 PROCEDURE — 3008F BODY MASS INDEX DOCD: CPT | Mod: HCNC,CPTII,S$GLB, | Performed by: INTERNAL MEDICINE

## 2022-12-28 PROCEDURE — 3288F FALL RISK ASSESSMENT DOCD: CPT | Mod: HCNC,CPTII,S$GLB, | Performed by: INTERNAL MEDICINE

## 2022-12-28 PROCEDURE — 4010F ACE/ARB THERAPY RXD/TAKEN: CPT | Mod: HCNC,CPTII,S$GLB, | Performed by: INTERNAL MEDICINE

## 2022-12-28 PROCEDURE — 1157F PR ADVANCE CARE PLAN OR EQUIV PRESENT IN MEDICAL RECORD: ICD-10-PCS | Mod: HCNC,CPTII,S$GLB, | Performed by: INTERNAL MEDICINE

## 2022-12-28 PROCEDURE — 3074F SYST BP LT 130 MM HG: CPT | Mod: HCNC,CPTII,S$GLB, | Performed by: INTERNAL MEDICINE

## 2022-12-28 PROCEDURE — 87081 CULTURE SCREEN ONLY: CPT | Mod: HCNC | Performed by: INTERNAL MEDICINE

## 2022-12-28 PROCEDURE — 99214 PR OFFICE/OUTPT VISIT, EST, LEVL IV, 30-39 MIN: ICD-10-PCS | Mod: HCNC,S$GLB,, | Performed by: INTERNAL MEDICINE

## 2022-12-28 PROCEDURE — 99999 PR PBB SHADOW E&M-EST. PATIENT-LVL V: ICD-10-PCS | Mod: PBBFAC,HCNC,, | Performed by: INTERNAL MEDICINE

## 2022-12-28 PROCEDURE — 1159F MED LIST DOCD IN RCRD: CPT | Mod: HCNC,CPTII,S$GLB, | Performed by: INTERNAL MEDICINE

## 2022-12-28 PROCEDURE — 71046 X-RAY EXAM CHEST 2 VIEWS: CPT | Mod: 26,HCNC,, | Performed by: STUDENT IN AN ORGANIZED HEALTH CARE EDUCATION/TRAINING PROGRAM

## 2022-12-28 PROCEDURE — 99999 PR PBB SHADOW E&M-EST. PATIENT-LVL V: CPT | Mod: PBBFAC,HCNC,, | Performed by: INTERNAL MEDICINE

## 2022-12-28 PROCEDURE — 1159F PR MEDICATION LIST DOCUMENTED IN MEDICAL RECORD: ICD-10-PCS | Mod: HCNC,CPTII,S$GLB, | Performed by: INTERNAL MEDICINE

## 2022-12-28 PROCEDURE — 1157F ADVNC CARE PLAN IN RCRD: CPT | Mod: HCNC,CPTII,S$GLB, | Performed by: INTERNAL MEDICINE

## 2022-12-28 PROCEDURE — 3008F PR BODY MASS INDEX (BMI) DOCUMENTED: ICD-10-PCS | Mod: HCNC,CPTII,S$GLB, | Performed by: INTERNAL MEDICINE

## 2022-12-28 PROCEDURE — 3078F DIAST BP <80 MM HG: CPT | Mod: HCNC,CPTII,S$GLB, | Performed by: INTERNAL MEDICINE

## 2022-12-28 PROCEDURE — 3288F PR FALLS RISK ASSESSMENT DOCUMENTED: ICD-10-PCS | Mod: HCNC,CPTII,S$GLB, | Performed by: INTERNAL MEDICINE

## 2022-12-28 PROCEDURE — 93005 ELECTROCARDIOGRAM TRACING: CPT | Mod: HCNC

## 2022-12-28 PROCEDURE — 71046 X-RAY EXAM CHEST 2 VIEWS: CPT | Mod: TC,HCNC,PO

## 2022-12-28 PROCEDURE — 93010 ELECTROCARDIOGRAM REPORT: CPT | Mod: HCNC,S$GLB,, | Performed by: INTERNAL MEDICINE

## 2022-12-28 PROCEDURE — 4010F PR ACE/ARB THEARPY RXD/TAKEN: ICD-10-PCS | Mod: HCNC,CPTII,S$GLB, | Performed by: INTERNAL MEDICINE

## 2022-12-28 PROCEDURE — 1101F PT FALLS ASSESS-DOCD LE1/YR: CPT | Mod: HCNC,CPTII,S$GLB, | Performed by: INTERNAL MEDICINE

## 2022-12-28 PROCEDURE — 99214 OFFICE O/P EST MOD 30 MIN: CPT | Mod: HCNC,S$GLB,, | Performed by: INTERNAL MEDICINE

## 2022-12-28 PROCEDURE — 3074F PR MOST RECENT SYSTOLIC BLOOD PRESSURE < 130 MM HG: ICD-10-PCS | Mod: HCNC,CPTII,S$GLB, | Performed by: INTERNAL MEDICINE

## 2022-12-28 PROCEDURE — 93010 EKG 12-LEAD: ICD-10-PCS | Mod: HCNC,S$GLB,, | Performed by: INTERNAL MEDICINE

## 2022-12-28 PROCEDURE — 1101F PR PT FALLS ASSESS DOC 0-1 FALLS W/OUT INJ PAST YR: ICD-10-PCS | Mod: HCNC,CPTII,S$GLB, | Performed by: INTERNAL MEDICINE

## 2022-12-28 PROCEDURE — 3078F PR MOST RECENT DIASTOLIC BLOOD PRESSURE < 80 MM HG: ICD-10-PCS | Mod: HCNC,CPTII,S$GLB, | Performed by: INTERNAL MEDICINE

## 2022-12-28 PROCEDURE — 81003 URINALYSIS AUTO W/O SCOPE: CPT | Mod: HCNC | Performed by: INTERNAL MEDICINE

## 2022-12-28 PROCEDURE — 71046 XR CHEST PA AND LATERAL: ICD-10-PCS | Mod: 26,HCNC,, | Performed by: STUDENT IN AN ORGANIZED HEALTH CARE EDUCATION/TRAINING PROGRAM

## 2022-12-28 NOTE — PROGRESS NOTES
HPI:  Patient is 72-year-old man who comes in today for preoperative clearance to have knee replacement surgery done.  Patient otherwise is doing well.  He has no other problems or complaints.  He denies any chest pain shortness breast.  His blood pressure has been well controlled.      Current MEDS: medcard review, verified and update  Allergies: Per the electronic medical record    Past Medical History:   Diagnosis Date    BPH (benign prostatic hyperplasia)     CKD (chronic kidney disease), stage III     Colon polyps 2015    Diarrhea in adult patient 2/10/2021    Generalized osteoarthrosis, involving multiple sites     History of gout     Hyperlipidemia     Hypertension     Hypogonadism male     Lumbar disc disease with radiculopathy     Morbid obesity with BMI of 40.0-44.9, adult        Past Surgical History:   Procedure Laterality Date    COLONOSCOPY N/A 6/22/2020    Procedure: COLONOSCOPY;  Surgeon: Montserrat Conroy MD;  Location: [facility];  Service: Endoscopy;  Laterality: N/A;    COLONOSCOPY N/A 2/10/2021    Procedure: COLONOSCOPY;  Surgeon: Jones Lemus III, MD;  Location: [facility];  Service: Endoscopy;  Laterality: N/A;    ESOPHAGOGASTRODUODENOSCOPY N/A 2/10/2021    Procedure: EGD (ESOPHAGOGASTRODUODENOSCOPY);  Surgeon: Jones Lemus III, MD;  Location: [facility];  Service: Endoscopy;  Laterality: N/A;    EYE SURGERY      JOINT REPLACEMENT      KNEE SCOPE      open globe      right eye 1980    ROTATOR CUFF REPAIR      SHOULDER SURGERY Right        SHx: per the electronic medical record    FHx: recorded in the electronic medical record    ROS:    denies any chest pains or shortness of breath. Denies any nausea, vomiting or diarrhea. Denies any fever, chills or sweats. Denies any change in weight, voice, stool, skin or hair. Denies any dysuria, dyspepsia or dysphagia. Denies any change in vision, hearing or headaches. Denies any swollen lymph nodes or loss of memory.    PE:  /78   Pulse 67   " Temp 96.8 °F (36 °C) (Temporal)   Ht 5' 9" (1.753 m)   Wt 124.3 kg (274 lb)   SpO2 96%   BMI 40.46 kg/m²   Gen: Well-developed, well-nourished, male, in no acute distress, oriented x3  HEENT: neck is supple, no adenopathy, carotids 2+ equal without bruits, thyroid exam normal size without nodules.  CHEST: clear to auscultation and percussion  CVS: regular rate and rhythm without significant murmur, gallop, or rubs  ABD: soft, benign, no rebound no guarding, no distention.  Bowel sounds are normal.     nontender.  No palpable masses.  No organomegaly and no audible bruits.      Lab Results   Component Value Date    WBC 8.56 12/28/2022    HGB 16.9 12/28/2022    HCT 51.7 12/28/2022     12/28/2022    CHOL 190 09/09/2022    TRIG 73 09/09/2022    HDL 34 (L) 09/09/2022    ALT 54 (H) 12/28/2022    AST 35 12/28/2022     12/28/2022    K 4.5 12/28/2022     12/28/2022    CREATININE 1.1 12/28/2022    BUN 15 12/28/2022    CO2 27 12/28/2022    TSH 0.396 (L) 09/09/2022    PSA 1.9 03/10/2022    INR 1.0 12/28/2022    HGBA1C 5.3 05/05/2015   INR & aPTT both normal  EKG NSR with low QRS voltage but o/w unremarkable.  CXR no infiltrate    Impression:  Primary osteoarthritis of left knee  Numerous other medical problems and last listed below.  He is all been maximally optimized.  Patient Active Problem List   Diagnosis    Essential hypertension    Hyperlipidemia    Generalized osteoarthrosis, involving multiple sites    Hypogonadism male    History of gout    BPH (benign prostatic hyperplasia)    Lumbar disc disease with radiculopathy    Morbid obesity with BMI of 40.0-44.9, adult    CKD (chronic kidney disease), stage III    Personal history of colonic polyps    Left sided colitis without complications       Plan:   Orders Placed This Encounter    X-Ray Chest PA And Lateral    CBC Auto Differential    Comprehensive Metabolic Panel    PROTIME-INR    APTT    EKG 12-lead   Patient is cleared for anesthesia and " surgery.  He has no absolute contraindications.  He is at slightly higher increased risk due to his comorbid conditions.  These have all been maximally optimized.  He should stop the aspirin 1 week prior to the procedure.  All other medications may be continued throughout.  Copies of this note, lab work, chest x-ray and EKG have all been sent to his surgeon.    This note is generated with speech recognition software and is subject to transcription error and sound alike phrases that may be missed by proofreading.

## 2022-12-30 LAB — MRSA SPEC QL CULT: NORMAL

## 2023-01-11 ENCOUNTER — PES CALL (OUTPATIENT)
Dept: ADMINISTRATIVE | Facility: CLINIC | Age: 73
End: 2023-01-11
Payer: MEDICARE

## 2023-01-13 ENCOUNTER — TELEPHONE (OUTPATIENT)
Dept: INTERNAL MEDICINE | Facility: CLINIC | Age: 73
End: 2023-01-13
Payer: MEDICARE

## 2023-01-13 NOTE — TELEPHONE ENCOUNTER
----- Message from Oralia Wallace sent at 1/13/2023  8:14 AM CST -----  Contact: Home Health(Kendra)-[phone]  Ms Kendra is calling to consult with nurse regarding wife states that patient is lethargic. Please call home health back at [phone]. Thanks/ar

## 2023-02-09 DIAGNOSIS — Z00.00 ENCOUNTER FOR MEDICARE ANNUAL WELLNESS EXAM: ICD-10-CM

## 2023-03-07 ENCOUNTER — PATIENT MESSAGE (OUTPATIENT)
Dept: INTERNAL MEDICINE | Facility: CLINIC | Age: 73
End: 2023-03-07
Payer: MEDICARE

## 2023-03-07 DIAGNOSIS — M54.50 LUMBAR BACK PAIN: Primary | ICD-10-CM

## 2023-03-07 NOTE — TELEPHONE ENCOUNTER
Referral put in and signed. Print and fax to [facility] in [place]. Call pt and tell him   Dupixent Counseling: I discussed with the patient the risks of dupilumab including but not limited to eye infection and irritation, cold sores, injection site reactions, worsening of asthma, allergic reactions and increased risk of parasitic infection.  Live vaccines should be avoided while taking dupilumab. Dupilumab will also interact with certain medications such as warfarin and cyclosporine. The patient understands that monitoring is required and they must alert us or the primary physician if symptoms of infection or other concerning signs are noted.

## 2023-03-10 ENCOUNTER — TELEPHONE (OUTPATIENT)
Dept: ADMINISTRATIVE | Facility: HOSPITAL | Age: 73
End: 2023-03-10
Payer: MEDICARE

## 2023-03-24 ENCOUNTER — OFFICE VISIT (OUTPATIENT)
Dept: CARDIOLOGY | Facility: CLINIC | Age: 73
End: 2023-03-24
Payer: MEDICARE

## 2023-03-24 VITALS
DIASTOLIC BLOOD PRESSURE: 80 MMHG | BODY MASS INDEX: 39.48 KG/M2 | WEIGHT: 266.56 LBS | OXYGEN SATURATION: 96 % | SYSTOLIC BLOOD PRESSURE: 128 MMHG | HEART RATE: 65 BPM | HEIGHT: 69 IN

## 2023-03-24 DIAGNOSIS — E78.00 PURE HYPERCHOLESTEROLEMIA: ICD-10-CM

## 2023-03-24 DIAGNOSIS — M79.609 PAIN IN EXTREMITY, UNSPECIFIED EXTREMITY: ICD-10-CM

## 2023-03-24 DIAGNOSIS — I10 ESSENTIAL HYPERTENSION: Primary | ICD-10-CM

## 2023-03-24 DIAGNOSIS — N18.31 STAGE 3A CHRONIC KIDNEY DISEASE: ICD-10-CM

## 2023-03-24 DIAGNOSIS — R60.0 LOCALIZED EDEMA: ICD-10-CM

## 2023-03-24 PROCEDURE — 3079F DIAST BP 80-89 MM HG: CPT | Mod: HCNC,CPTII,S$GLB, | Performed by: INTERNAL MEDICINE

## 2023-03-24 PROCEDURE — 99999 PR PBB SHADOW E&M-EST. PATIENT-LVL IV: ICD-10-PCS | Mod: PBBFAC,HCNC,, | Performed by: INTERNAL MEDICINE

## 2023-03-24 PROCEDURE — 3079F PR MOST RECENT DIASTOLIC BLOOD PRESSURE 80-89 MM HG: ICD-10-PCS | Mod: HCNC,CPTII,S$GLB, | Performed by: INTERNAL MEDICINE

## 2023-03-24 PROCEDURE — 99205 PR OFFICE/OUTPT VISIT, NEW, LEVL V, 60-74 MIN: ICD-10-PCS | Mod: HCNC,S$GLB,, | Performed by: INTERNAL MEDICINE

## 2023-03-24 PROCEDURE — 1126F PR PAIN SEVERITY QUANTIFIED, NO PAIN PRESENT: ICD-10-PCS | Mod: HCNC,CPTII,S$GLB, | Performed by: INTERNAL MEDICINE

## 2023-03-24 PROCEDURE — 1159F MED LIST DOCD IN RCRD: CPT | Mod: HCNC,CPTII,S$GLB, | Performed by: INTERNAL MEDICINE

## 2023-03-24 PROCEDURE — 3074F PR MOST RECENT SYSTOLIC BLOOD PRESSURE < 130 MM HG: ICD-10-PCS | Mod: HCNC,CPTII,S$GLB, | Performed by: INTERNAL MEDICINE

## 2023-03-24 PROCEDURE — 1157F PR ADVANCE CARE PLAN OR EQUIV PRESENT IN MEDICAL RECORD: ICD-10-PCS | Mod: HCNC,CPTII,S$GLB, | Performed by: INTERNAL MEDICINE

## 2023-03-24 PROCEDURE — 1160F PR REVIEW ALL MEDS BY PRESCRIBER/CLIN PHARMACIST DOCUMENTED: ICD-10-PCS | Mod: HCNC,CPTII,S$GLB, | Performed by: INTERNAL MEDICINE

## 2023-03-24 PROCEDURE — 1126F AMNT PAIN NOTED NONE PRSNT: CPT | Mod: HCNC,CPTII,S$GLB, | Performed by: INTERNAL MEDICINE

## 2023-03-24 PROCEDURE — 1160F RVW MEDS BY RX/DR IN RCRD: CPT | Mod: HCNC,CPTII,S$GLB, | Performed by: INTERNAL MEDICINE

## 2023-03-24 PROCEDURE — 3074F SYST BP LT 130 MM HG: CPT | Mod: HCNC,CPTII,S$GLB, | Performed by: INTERNAL MEDICINE

## 2023-03-24 PROCEDURE — 3008F BODY MASS INDEX DOCD: CPT | Mod: HCNC,CPTII,S$GLB, | Performed by: INTERNAL MEDICINE

## 2023-03-24 PROCEDURE — 99999 PR PBB SHADOW E&M-EST. PATIENT-LVL IV: CPT | Mod: PBBFAC,HCNC,, | Performed by: INTERNAL MEDICINE

## 2023-03-24 PROCEDURE — 1157F ADVNC CARE PLAN IN RCRD: CPT | Mod: HCNC,CPTII,S$GLB, | Performed by: INTERNAL MEDICINE

## 2023-03-24 PROCEDURE — 99205 OFFICE O/P NEW HI 60 MIN: CPT | Mod: HCNC,S$GLB,, | Performed by: INTERNAL MEDICINE

## 2023-03-24 PROCEDURE — 3008F PR BODY MASS INDEX (BMI) DOCUMENTED: ICD-10-PCS | Mod: HCNC,CPTII,S$GLB, | Performed by: INTERNAL MEDICINE

## 2023-03-24 PROCEDURE — 1159F PR MEDICATION LIST DOCUMENTED IN MEDICAL RECORD: ICD-10-PCS | Mod: HCNC,CPTII,S$GLB, | Performed by: INTERNAL MEDICINE

## 2023-03-24 NOTE — PROGRESS NOTES
Subjective:   Patient ID:  Enoc Collado is a 72 y.o. male who presents for follow-up of No chief complaint on file.  Patient denies CP, angina or anginal equivalent. Last seen 2020, echo and stress test was normal  Pt with recent feet swelling, coldness. Ex smoker  Hypertension  This is a chronic problem. The current episode started more than 1 year ago. The problem has been gradually improving since onset. The problem is controlled. Pertinent negatives include no chest pain, palpitations or shortness of breath. Past treatments include ACE inhibitors, beta blockers, calcium channel blockers and diuretics. The current treatment provides moderate improvement. There are no compliance problems.    Hyperlipidemia  This is a chronic problem. The current episode started more than 1 year ago. The problem is controlled. Pertinent negatives include no chest pain or shortness of breath. Current antihyperlipidemic treatment includes statins. The current treatment provides moderate improvement of lipids. There are no compliance problems.      Review of Systems   Constitutional: Negative. Negative for weight gain.   HENT: Negative.     Eyes: Negative.    Cardiovascular: Negative.  Negative for chest pain, leg swelling and palpitations.   Respiratory: Negative.  Negative for shortness of breath.    Endocrine: Negative.    Hematologic/Lymphatic: Negative.    Skin: Negative.    Musculoskeletal:  Negative for muscle weakness.   Gastrointestinal: Negative.    Genitourinary: Negative.    Neurological: Negative.  Negative for dizziness.   Psychiatric/Behavioral: Negative.     Allergic/Immunologic: Negative.    All other systems reviewed and are negative.  Family History   Problem Relation Age of Onset    Hypertension Father     Cataracts Father     Heart attack Father     Cataracts Mother     Hypertension Mother     Cancer Cousin         colon    Heart disease Brother      Past Medical History:   Diagnosis Date    BPH (benign  prostatic hyperplasia)     CKD (chronic kidney disease), stage III     Colon polyps 2015    Diarrhea in adult patient 2/10/2021    Generalized osteoarthrosis, involving multiple sites     History of gout     Hyperlipidemia     Hypertension     Hypogonadism male     Lumbar disc disease with radiculopathy     Morbid obesity with BMI of 40.0-44.9, adult      Social History     Socioeconomic History    Marital status:    Occupational History    Occupation: Retired   Tobacco Use    Smoking status: Former     Packs/day: 1.00     Years: 30.00     Pack years: 30.00     Types: Cigarettes     Quit date: 2000     Years since quittin.1    Smokeless tobacco: Current     Types: Snuff   Substance and Sexual Activity    Alcohol use: No    Drug use: No   Social History Narrative    Live w/ spouse. No pets      Social Determinants of Health     Financial Resource Strain: Low Risk     Difficulty of Paying Living Expenses: Not hard at all   Food Insecurity: No Food Insecurity    Worried About Running Out of Food in the Last Year: Never true    Ran Out of Food in the Last Year: Never true   Transportation Needs: No Transportation Needs    Lack of Transportation (Medical): No    Lack of Transportation (Non-Medical): No   Physical Activity: Inactive    Days of Exercise per Week: 0 days    Minutes of Exercise per Session: 0 min   Stress: No Stress Concern Present    Feeling of Stress : Not at all   Social Connections: Unknown    Frequency of Communication with Friends and Family: More than three times a week    Frequency of Social Gatherings with Friends and Family: More than three times a week    Active Member of Clubs or Organizations: No    Attends Club or Organization Meetings: Never    Marital Status:    Housing Stability: Unknown    Unable to Pay for Housing in the Last Year: No    Unstable Housing in the Last Year: No     Current Outpatient Medications on File Prior to Visit   Medication Sig Dispense Refill     "allopurinoL (ZYLOPRIM) 300 MG tablet Take 1 tablet (300 mg total) by mouth once daily. 90 tablet 3    amLODIPine (NORVASC) 10 MG tablet Take 1 tablet (10 mg total) by mouth once daily. 90 tablet 3    aspirin 325 MG tablet Take 325 mg by mouth once daily.      carvediloL (COREG) 25 MG tablet Take 1 tablet (25 mg total) by mouth 2 (two) times daily with meals. 180 tablet 3    gabapentin (NEURONTIN) 300 MG capsule Take 1 capsule (300 mg total) by mouth 3 (three) times daily. 270 capsule 3    hydroCHLOROthiazide (HYDRODIURIL) 25 MG tablet Take 1 tablet (25 mg total) by mouth once daily. 90 tablet 3    HYDROcodone-acetaminophen (NORCO)  mg per tablet Take 1 tablet by mouth every 8 (eight) hours as needed for Pain. 90 tablet 0    lisinopriL (PRINIVIL,ZESTRIL) 40 MG tablet Take 1 tablet (40 mg total) by mouth once daily. 90 tablet 3    pravastatin (PRAVACHOL) 20 MG tablet Take 1 tablet (20 mg total) by mouth once daily. 90 tablet 3    sildenafil (REVATIO) 20 mg Tab 2-4 tablets as needed one hour prior to intercourse 40 tablet 11    syringe with needle, safety 3 mL 21 gauge x 1 1/2" Syrg 1 Syringe by Misc.(Non-Drug; Combo Route) route every 21 days. 10 Syringe 1    testosterone cypionate (DEPOTESTOTERONE CYPIONATE) 200 mg/mL injection INJECT 1.5 MLS (300 MG TOTAL) INTO THE MUSCLE EVERY 14 (FOURTEEN) DAYS. (Patient taking differently: Inject 300 mg into the muscle every 21 days.) 10 mL 1    COMBIGAN 0.2-0.5 % Drop        No current facility-administered medications on file prior to visit.     Review of patient's allergies indicates:   Allergen Reactions    No known drug allergies        Objective:     Physical Exam  Vitals and nursing note reviewed.   Constitutional:       Appearance: He is well-developed.   HENT:      Head: Normocephalic and atraumatic.   Eyes:      Conjunctiva/sclera: Conjunctivae normal.      Pupils: Pupils are equal, round, and reactive to light.   Cardiovascular:      Rate and Rhythm: Normal rate " and regular rhythm.      Pulses: Intact distal pulses.      Heart sounds: Normal heart sounds.   Pulmonary:      Effort: Pulmonary effort is normal.      Breath sounds: Normal breath sounds.   Abdominal:      General: Bowel sounds are normal.      Palpations: Abdomen is soft.   Musculoskeletal:      Cervical back: Normal range of motion and neck supple.   Skin:     General: Skin is warm and dry.   Neurological:      Mental Status: He is alert and oriented to person, place, and time.       Assessment:     1. Essential hypertension    2. Pure hypercholesterolemia    3. Stage 3a chronic kidney disease        Plan:     Essential hypertension    Pure hypercholesterolemia    Stage 3a chronic kidney disease        Continue statin-hlp  Continue lisinopril,hctz, coreg, norvasc-htn    ENEIDA  echo

## 2023-03-31 ENCOUNTER — HOSPITAL ENCOUNTER (OUTPATIENT)
Dept: CARDIOLOGY | Facility: HOSPITAL | Age: 73
Discharge: HOME | End: 2023-03-31
Attending: INTERNAL MEDICINE
Payer: MEDICARE

## 2023-03-31 VITALS
SYSTOLIC BLOOD PRESSURE: 128 MMHG | BODY MASS INDEX: 39.4 KG/M2 | HEIGHT: 69 IN | DIASTOLIC BLOOD PRESSURE: 80 MMHG | WEIGHT: 266 LBS

## 2023-03-31 DIAGNOSIS — R60.0 LOCALIZED EDEMA: ICD-10-CM

## 2023-03-31 DIAGNOSIS — M79.609 PAIN IN EXTREMITY, UNSPECIFIED EXTREMITY: ICD-10-CM

## 2023-03-31 LAB
AORTIC ROOT ANNULUS: 3.77 CM
AV INDEX (PROSTH): 0.64
AV MEAN GRADIENT: 6 MMHG
AV PEAK GRADIENT: 12 MMHG
AV VALVE AREA: 2.25 CM2
AV VELOCITY RATIO: 0.64
BSA FOR ECHO PROCEDURE: 2.42 M2
CV ECHO LV RWT: 0.49 CM
DOP CALC AO PEAK VEL: 1.74 M/S
DOP CALC AO VTI: 34 CM
DOP CALC LVOT AREA: 3.5 CM2
DOP CALC LVOT DIAMETER: 2.11 CM
DOP CALC LVOT PEAK VEL: 1.12 M/S
DOP CALC LVOT STROKE VOLUME: 76.54 CM3
DOP CALC RVOT PEAK VEL: 0.76 M/S
DOP CALC RVOT VTI: 17.3 CM
DOP CALCLVOT PEAK VEL VTI: 21.9 CM
E WAVE DECELERATION TIME: 256.7 MSEC
E/A RATIO: 0.63
E/E' RATIO: 7.86 M/S
ECHO LV POSTERIOR WALL: 1.37 CM (ref 0.6–1.1)
EJECTION FRACTION: 55 %
FRACTIONAL SHORTENING: 30 % (ref 28–44)
INTERVENTRICULAR SEPTUM: 1.46 CM (ref 0.6–1.1)
IVRT: 97.05 MSEC
LA MAJOR: 6.46 CM
LA MINOR: 6.27 CM
LA WIDTH: 4.8 CM
LEFT ATRIUM SIZE: 4.59 CM
LEFT ATRIUM VOLUME INDEX MOD: 18.7 ML/M2
LEFT ATRIUM VOLUME INDEX: 51.1 ML/M2
LEFT ATRIUM VOLUME MOD: 43.47 CM3
LEFT ATRIUM VOLUME: 119.17 CM3
LEFT INTERNAL DIMENSION IN SYSTOLE: 3.95 CM (ref 2.1–4)
LEFT VENTRICLE DIASTOLIC VOLUME INDEX: 66.7 ML/M2
LEFT VENTRICLE DIASTOLIC VOLUME: 155.4 ML
LEFT VENTRICLE MASS INDEX: 153 G/M2
LEFT VENTRICLE SYSTOLIC VOLUME INDEX: 29.2 ML/M2
LEFT VENTRICLE SYSTOLIC VOLUME: 67.98 ML
LEFT VENTRICULAR INTERNAL DIMENSION IN DIASTOLE: 5.63 CM (ref 3.5–6)
LEFT VENTRICULAR MASS: 355.9 G
LV LATERAL E/E' RATIO: 6.88 M/S
LV SEPTAL E/E' RATIO: 9.17 M/S
LVOT MG: 2.97 MMHG
LVOT MV: 0.81 CM/S
MV PEAK A VEL: 0.88 M/S
MV PEAK E VEL: 0.55 M/S
MV STENOSIS PRESSURE HALF TIME: 74.44 MS
MV VALVE AREA P 1/2 METHOD: 2.96 CM2
PULM VEIN S/D RATIO: 1
PV MEAN GRADIENT: 1.24 MMHG
PV MV: 0.74 M/S
PV PEAK D VEL: 0.38 M/S
PV PEAK S VEL: 0.38 M/S
PV PEAK VELOCITY: 1.01 CM/S
RA MAJOR: 5.71 CM
RA PRESSURE: 8 MMHG
RA WIDTH: 3.54 CM
RIGHT VENTRICULAR END-DIASTOLIC DIMENSION: 4.66 CM
SINUS: 3.98 CM
STJ: 3.96 CM
TDI LATERAL: 0.08 M/S
TDI SEPTAL: 0.06 M/S
TDI: 0.07 M/S
TRICUSPID ANNULAR PLANE SYSTOLIC EXCURSION: 2.77 CM

## 2023-03-31 PROCEDURE — 93306 TTE W/DOPPLER COMPLETE: CPT | Mod: HCNC

## 2023-03-31 PROCEDURE — 93306 TTE W/DOPPLER COMPLETE: CPT | Mod: 26,HCNC,, | Performed by: INTERNAL MEDICINE

## 2023-03-31 PROCEDURE — 93922 UPR/L XTREMITY ART 2 LEVELS: CPT | Mod: 26,HCNC,, | Performed by: INTERNAL MEDICINE

## 2023-03-31 PROCEDURE — 93922 UPR/L XTREMITY ART 2 LEVELS: CPT | Mod: HCNC

## 2023-03-31 PROCEDURE — 93922 ANKLE BRACHIAL INDICES (ABI): ICD-10-PCS | Mod: 26,HCNC,, | Performed by: INTERNAL MEDICINE

## 2023-03-31 PROCEDURE — 93306 ECHO (CUPID ONLY): ICD-10-PCS | Mod: 26,HCNC,, | Performed by: INTERNAL MEDICINE

## 2023-03-31 RX ORDER — LISINOPRIL 40 MG/1
40 TABLET ORAL DAILY
Qty: 90 TABLET | Refills: 2 | Status: SHIPPED | OUTPATIENT
Start: 2023-03-31 | End: 2023-12-02

## 2023-03-31 RX ORDER — CARVEDILOL 25 MG/1
25 TABLET ORAL 2 TIMES DAILY WITH MEALS
Qty: 180 TABLET | Refills: 2 | Status: SHIPPED | OUTPATIENT
Start: 2023-03-31 | End: 2023-12-02

## 2023-03-31 RX ORDER — AMLODIPINE BESYLATE 10 MG/1
10 TABLET ORAL DAILY
Qty: 90 TABLET | Refills: 2 | Status: SHIPPED | OUTPATIENT
Start: 2023-03-31 | End: 2023-12-02

## 2023-03-31 RX ORDER — HYDROCHLOROTHIAZIDE 25 MG/1
25 TABLET ORAL DAILY
Qty: 90 TABLET | Refills: 2 | Status: SHIPPED | OUTPATIENT
Start: 2023-03-31 | End: 2023-07-06

## 2023-03-31 RX ORDER — ALLOPURINOL 300 MG/1
300 TABLET ORAL DAILY
Qty: 90 TABLET | Refills: 3 | Status: SHIPPED | OUTPATIENT
Start: 2023-03-31

## 2023-03-31 RX ORDER — GABAPENTIN 300 MG/1
CAPSULE ORAL
Qty: 270 CAPSULE | Refills: 3 | Status: SHIPPED | OUTPATIENT
Start: 2023-03-31

## 2023-03-31 RX ORDER — PRAVASTATIN SODIUM 20 MG/1
20 TABLET ORAL DAILY
Qty: 90 TABLET | Refills: 1 | Status: SHIPPED | OUTPATIENT
Start: 2023-03-31 | End: 2023-11-07

## 2023-03-31 NOTE — TELEPHONE ENCOUNTER
Refill Routing Note   Medication(s) are not appropriate for processing by [facility] for the following reason(s):      Medication outside of protocol  Required labs outdated: Uric acid blood draw    [facility] action(s):  Defer  Route Labs due        Medication reconciliation completed: No     Appointments  past 12m or future 3m with PCP    Date Provider   Last Visit   12/28/2022 Frantz Mason MD   Next Visit   Visit date not found Frantz Mason MD   ED visits in past 90 days: 0        Note composed:3:36 PM 03/31/2023

## 2023-03-31 NOTE — TELEPHONE ENCOUNTER
Care Due:                  Date            Visit Type   Department     Provider  --------------------------------------------------------------------------------                                EP -                              PRIMARY      [facility] INTERNAL  Last Visit: 12-      CARE ([facility])   MEDICINE       Frantz Mason  Next Visit: None Scheduled  None         None Found                                                            Last  Test          Frequency    Reason                     Performed    Due Date  --------------------------------------------------------------------------------    Uric Acid...  12 months..  allopurinoL..............  03- 03-    [facility] Embedded Care Gaps. Reference number: [id number]. 3/31/2023   11:56:41 AM CDT

## 2023-04-01 LAB
LEFT ABI: 1.43
LEFT ARM BP: 113 MMHG
LEFT DORSALIS PEDIS: 149 MMHG
LEFT POSTERIOR TIBIAL: 162 MMHG
LEFT TBI: 1.13
LEFT TOE PRESSURE: 128 MMHG
RIGHT ABI: 1.37
RIGHT ARM BP: 100 MMHG
RIGHT DORSALIS PEDIS: 126 MMHG
RIGHT POSTERIOR TIBIAL: 155 MMHG
RIGHT TBI: 0.96
RIGHT TOE PRESSURE: 109 MMHG

## 2023-04-03 ENCOUNTER — TELEPHONE (OUTPATIENT)
Dept: CARDIOLOGY | Facility: CLINIC | Age: 73
End: 2023-04-03
Payer: MEDICARE

## 2023-04-03 NOTE — TELEPHONE ENCOUNTER
Spoke to pt and pt's wife. Discussed results of echo. Both pt and pt's wife verbalized understanding. CT scheduled for 4/10/23. Pt's wife will call back with any further questions or concerns.    ----- Message from Sai Knowles MD sent at 4/3/2023  4:47 AM CDT -----  Please tell pt:  Echo shows normal systolic function  Mildly dilated aortic root  Needs CT of chest

## 2023-04-03 NOTE — TELEPHONE ENCOUNTER
Spoke with pt and discussed ENEIDA results. Pt verbalized understanding and will call back with any further questions or concerns.     ----- Message from Sai Knowles MD sent at 4/3/2023  4:39 AM CDT -----  Please tell pt:  ENEIDA is normal

## 2023-04-10 ENCOUNTER — HOSPITAL ENCOUNTER (OUTPATIENT)
Dept: RADIOLOGY | Facility: HOSPITAL | Age: 73
Discharge: HOME | End: 2023-04-10
Attending: INTERNAL MEDICINE
Payer: MEDICARE

## 2023-04-10 DIAGNOSIS — I71.9 AORTIC ANEURYSM: Primary | ICD-10-CM

## 2023-04-10 DIAGNOSIS — I77.819 DILATION OF AORTA: ICD-10-CM

## 2023-04-10 PROCEDURE — 71275 CTA CHEST ABDOMEN NON CORONARY (XPD): ICD-10-PCS | Mod: 26,,, | Performed by: RADIOLOGY

## 2023-04-10 PROCEDURE — 71275 CT ANGIOGRAPHY CHEST: CPT | Mod: 26,,, | Performed by: RADIOLOGY

## 2023-04-10 PROCEDURE — 25500020 PHARM REV CODE 255: Performed by: INTERNAL MEDICINE

## 2023-04-10 PROCEDURE — 74175 CTA CHEST ABDOMEN NON CORONARY (XPD): ICD-10-PCS | Mod: 26,,, | Performed by: RADIOLOGY

## 2023-04-10 PROCEDURE — 74175 CTA ABDOMEN W/CONTRAST: CPT | Mod: TC

## 2023-04-10 PROCEDURE — 74175 CTA ABDOMEN W/CONTRAST: CPT | Mod: 26,,, | Performed by: RADIOLOGY

## 2023-04-10 PROCEDURE — 71275 CT ANGIOGRAPHY CHEST: CPT | Mod: TC

## 2023-04-10 RX ADMIN — IOHEXOL 100 ML: 350 INJECTION, SOLUTION INTRAVENOUS at 01:04

## 2023-04-11 ENCOUNTER — TELEPHONE (OUTPATIENT)
Dept: CARDIOLOGY | Facility: CLINIC | Age: 73
End: 2023-04-11
Payer: MEDICARE

## 2023-04-11 NOTE — TELEPHONE ENCOUNTER
Spoke with pt and discussed CTA results. Pt verbalized understanding and will call back with any further questions or concerns.     ----- Message from Sai Knowles MD sent at 4/11/2023  2:01 PM CDT -----  Please tell pt:       CTA shows  No evidence of aneurysm

## 2023-04-17 ENCOUNTER — LAB VISIT (OUTPATIENT)
Dept: LAB | Facility: HOSPITAL | Age: 73
End: 2023-04-17
Attending: INTERNAL MEDICINE
Payer: MEDICARE

## 2023-04-17 DIAGNOSIS — I10 ESSENTIAL HYPERTENSION: ICD-10-CM

## 2023-04-17 DIAGNOSIS — Z12.5 PROSTATE CANCER SCREENING: ICD-10-CM

## 2023-04-17 DIAGNOSIS — E29.1 HYPOGONADISM MALE: ICD-10-CM

## 2023-04-17 DIAGNOSIS — N18.31 STAGE 3A CHRONIC KIDNEY DISEASE: ICD-10-CM

## 2023-04-17 DIAGNOSIS — E78.00 PURE HYPERCHOLESTEROLEMIA: ICD-10-CM

## 2023-04-17 DIAGNOSIS — R73.01 IFG (IMPAIRED FASTING GLUCOSE): ICD-10-CM

## 2023-04-17 PROCEDURE — 83036 HEMOGLOBIN GLYCOSYLATED A1C: CPT | Performed by: INTERNAL MEDICINE

## 2023-04-17 PROCEDURE — 80053 COMPREHEN METABOLIC PANEL: CPT | Performed by: INTERNAL MEDICINE

## 2023-04-17 PROCEDURE — 36415 COLL VENOUS BLD VENIPUNCTURE: CPT | Mod: PO | Performed by: INTERNAL MEDICINE

## 2023-04-17 PROCEDURE — 84403 ASSAY OF TOTAL TESTOSTERONE: CPT | Performed by: INTERNAL MEDICINE

## 2023-04-17 PROCEDURE — 84270 ASSAY OF SEX HORMONE GLOBUL: CPT | Performed by: INTERNAL MEDICINE

## 2023-04-17 PROCEDURE — 84443 ASSAY THYROID STIM HORMONE: CPT | Performed by: INTERNAL MEDICINE

## 2023-04-17 PROCEDURE — 80061 LIPID PANEL: CPT | Performed by: INTERNAL MEDICINE

## 2023-04-17 PROCEDURE — 85025 COMPLETE CBC W/AUTO DIFF WBC: CPT | Performed by: INTERNAL MEDICINE

## 2023-04-17 PROCEDURE — 84153 ASSAY OF PSA TOTAL: CPT | Performed by: INTERNAL MEDICINE

## 2023-04-18 LAB
ALBUMIN SERPL BCP-MCNC: 3.8 G/DL (ref 3.5–5.2)
ALP SERPL-CCNC: 86 U/L (ref 55–135)
ALT SERPL W/O P-5'-P-CCNC: 38 U/L (ref 10–44)
ANION GAP SERPL CALC-SCNC: 14 MMOL/L (ref 8–16)
AST SERPL-CCNC: 28 U/L (ref 10–40)
BASOPHILS # BLD AUTO: 0.06 K/UL (ref 0–0.2)
BASOPHILS NFR BLD: 0.8 % (ref 0–1.9)
BILIRUB SERPL-MCNC: 0.6 MG/DL (ref 0.1–1)
BUN SERPL-MCNC: 13 MG/DL (ref 8–23)
CALCIUM SERPL-MCNC: 9.2 MG/DL (ref 8.7–10.5)
CHLORIDE SERPL-SCNC: 103 MMOL/L (ref 95–110)
CHOLEST SERPL-MCNC: 176 MG/DL (ref 120–199)
CHOLEST/HDLC SERPL: 5.3 {RATIO} (ref 2–5)
CO2 SERPL-SCNC: 23 MMOL/L (ref 23–29)
COMPLEXED PSA SERPL-MCNC: 3.3 NG/ML (ref 0–4)
CREAT SERPL-MCNC: 0.9 MG/DL (ref 0.5–1.4)
DIFFERENTIAL METHOD: ABNORMAL
EOSINOPHIL # BLD AUTO: 0.2 K/UL (ref 0–0.5)
EOSINOPHIL NFR BLD: 2.9 % (ref 0–8)
ERYTHROCYTE [DISTWIDTH] IN BLOOD BY AUTOMATED COUNT: 14.6 % (ref 11.5–14.5)
EST. GFR  (NO RACE VARIABLE): >60 ML/MIN/1.73 M^2
ESTIMATED AVG GLUCOSE: 111 MG/DL (ref 68–131)
GLUCOSE SERPL-MCNC: 86 MG/DL (ref 70–110)
HBA1C MFR BLD: 5.5 % (ref 4–5.6)
HCT VFR BLD AUTO: 50.1 % (ref 40–54)
HDLC SERPL-MCNC: 33 MG/DL (ref 40–75)
HDLC SERPL: 18.8 % (ref 20–50)
HGB BLD-MCNC: 16.4 G/DL (ref 14–18)
IMM GRANULOCYTES # BLD AUTO: 0.05 K/UL (ref 0–0.04)
IMM GRANULOCYTES NFR BLD AUTO: 0.7 % (ref 0–0.5)
LDLC SERPL CALC-MCNC: 120 MG/DL (ref 63–159)
LYMPHOCYTES # BLD AUTO: 1.7 K/UL (ref 1–4.8)
LYMPHOCYTES NFR BLD: 22.5 % (ref 18–48)
MCH RBC QN AUTO: 31.9 PG (ref 27–31)
MCHC RBC AUTO-ENTMCNC: 32.7 G/DL (ref 32–36)
MCV RBC AUTO: 98 FL (ref 82–98)
MONOCYTES # BLD AUTO: 0.7 K/UL (ref 0.3–1)
MONOCYTES NFR BLD: 9 % (ref 4–15)
NEUTROPHILS # BLD AUTO: 4.9 K/UL (ref 1.8–7.7)
NEUTROPHILS NFR BLD: 64.1 % (ref 38–73)
NONHDLC SERPL-MCNC: 143 MG/DL
NRBC BLD-RTO: 0 /100 WBC
PLATELET # BLD AUTO: 289 K/UL (ref 150–450)
PMV BLD AUTO: 9.8 FL (ref 9.2–12.9)
POTASSIUM SERPL-SCNC: 4.5 MMOL/L (ref 3.5–5.1)
PROT SERPL-MCNC: 6.9 G/DL (ref 6–8.4)
RBC # BLD AUTO: 5.14 M/UL (ref 4.6–6.2)
SODIUM SERPL-SCNC: 140 MMOL/L (ref 136–145)
TRIGL SERPL-MCNC: 115 MG/DL (ref 30–150)
TSH SERPL DL<=0.005 MIU/L-ACNC: 0.74 UIU/ML (ref 0.4–4)
WBC # BLD AUTO: 7.64 K/UL (ref 3.9–12.7)

## 2023-04-18 NOTE — TELEPHONE ENCOUNTER
Pt had taken too much medication, Kendra from  asked him not to take his Gabapentin, flexeril and they just wanted to let you know what happened.     Called pt/ he is lathargic but he is almost the same in mood larthargic he is sensitive to medication,  and had the knee sx, he is taking hydrocodone and tramadol  alternating with flexeril and Gabapentin , he has to have some due to sx but his wife thinks its too much.please advise her what to do.    Purse String (Simple) Text: Given the location of the defect and the characteristics of the surrounding skin a purse string simple closure was deemed most appropriate.  Undermining was performed circumferentially around the surgical defect.  A purse string suture was then placed and tightened.

## 2023-04-24 LAB
ALBUMIN SERPL-MCNC: 3.8 G/DL (ref 3.6–5.1)
SHBG SERPL-SCNC: 45 NMOL/L (ref 22–77)
TESTOST FREE SERPL-MCNC: 38.6 PG/ML (ref 6–73)
TESTOST SERPL-MCNC: 374 NG/DL (ref 250–1100)
TESTOSTERONE.FREE+WB SERPL-MCNC: 67.6 NG/DL (ref 15–150)

## 2023-04-26 ENCOUNTER — OFFICE VISIT (OUTPATIENT)
Dept: INTERNAL MEDICINE | Facility: CLINIC | Age: 73
End: 2023-04-26
Payer: MEDICARE

## 2023-04-26 VITALS
OXYGEN SATURATION: 94 % | HEART RATE: 65 BPM | DIASTOLIC BLOOD PRESSURE: 60 MMHG | HEIGHT: 69 IN | SYSTOLIC BLOOD PRESSURE: 100 MMHG | BODY MASS INDEX: 40.62 KG/M2 | WEIGHT: 274.25 LBS

## 2023-04-26 DIAGNOSIS — E66.01 MORBID OBESITY WITH BMI OF 40.0-44.9, ADULT: ICD-10-CM

## 2023-04-26 DIAGNOSIS — N18.31 STAGE 3A CHRONIC KIDNEY DISEASE: ICD-10-CM

## 2023-04-26 DIAGNOSIS — E29.1 HYPOGONADISM MALE: ICD-10-CM

## 2023-04-26 DIAGNOSIS — Z86.010 PERSONAL HISTORY OF COLONIC POLYPS: ICD-10-CM

## 2023-04-26 DIAGNOSIS — N40.0 BENIGN PROSTATIC HYPERPLASIA, UNSPECIFIED WHETHER LOWER URINARY TRACT SYMPTOMS PRESENT: ICD-10-CM

## 2023-04-26 DIAGNOSIS — I10 ESSENTIAL HYPERTENSION: ICD-10-CM

## 2023-04-26 DIAGNOSIS — E78.00 PURE HYPERCHOLESTEROLEMIA: ICD-10-CM

## 2023-04-26 DIAGNOSIS — M15.9 GENERALIZED OSTEOARTHROSIS, INVOLVING MULTIPLE SITES: ICD-10-CM

## 2023-04-26 DIAGNOSIS — Z87.39 HISTORY OF GOUT: ICD-10-CM

## 2023-04-26 DIAGNOSIS — M51.16 LUMBAR DISC DISEASE WITH RADICULOPATHY: ICD-10-CM

## 2023-04-26 DIAGNOSIS — Z00.00 ROUTINE GENERAL MEDICAL EXAMINATION AT A HEALTH CARE FACILITY: Primary | ICD-10-CM

## 2023-04-26 PROCEDURE — 3074F PR MOST RECENT SYSTOLIC BLOOD PRESSURE < 130 MM HG: ICD-10-PCS | Mod: CPTII,S$GLB,, | Performed by: INTERNAL MEDICINE

## 2023-04-26 PROCEDURE — 1159F PR MEDICATION LIST DOCUMENTED IN MEDICAL RECORD: ICD-10-PCS | Mod: CPTII,S$GLB,, | Performed by: INTERNAL MEDICINE

## 2023-04-26 PROCEDURE — 3008F PR BODY MASS INDEX (BMI) DOCUMENTED: ICD-10-PCS | Mod: CPTII,S$GLB,, | Performed by: INTERNAL MEDICINE

## 2023-04-26 PROCEDURE — 99397 PER PM REEVAL EST PAT 65+ YR: CPT | Mod: S$GLB,,, | Performed by: INTERNAL MEDICINE

## 2023-04-26 PROCEDURE — 1126F PR PAIN SEVERITY QUANTIFIED, NO PAIN PRESENT: ICD-10-PCS | Mod: CPTII,S$GLB,, | Performed by: INTERNAL MEDICINE

## 2023-04-26 PROCEDURE — 1126F AMNT PAIN NOTED NONE PRSNT: CPT | Mod: CPTII,S$GLB,, | Performed by: INTERNAL MEDICINE

## 2023-04-26 PROCEDURE — 1157F ADVNC CARE PLAN IN RCRD: CPT | Mod: CPTII,S$GLB,, | Performed by: INTERNAL MEDICINE

## 2023-04-26 PROCEDURE — 99397 PR PREVENTIVE VISIT,EST,65 & OVER: ICD-10-PCS | Mod: S$GLB,,, | Performed by: INTERNAL MEDICINE

## 2023-04-26 PROCEDURE — 1101F PR PT FALLS ASSESS DOC 0-1 FALLS W/OUT INJ PAST YR: ICD-10-PCS | Mod: CPTII,S$GLB,, | Performed by: INTERNAL MEDICINE

## 2023-04-26 PROCEDURE — 3044F PR MOST RECENT HEMOGLOBIN A1C LEVEL <7.0%: ICD-10-PCS | Mod: CPTII,S$GLB,, | Performed by: INTERNAL MEDICINE

## 2023-04-26 PROCEDURE — 3078F DIAST BP <80 MM HG: CPT | Mod: CPTII,S$GLB,, | Performed by: INTERNAL MEDICINE

## 2023-04-26 PROCEDURE — 99999 PR PBB SHADOW E&M-EST. PATIENT-LVL IV: CPT | Mod: PBBFAC,,, | Performed by: INTERNAL MEDICINE

## 2023-04-26 PROCEDURE — 1101F PT FALLS ASSESS-DOCD LE1/YR: CPT | Mod: CPTII,S$GLB,, | Performed by: INTERNAL MEDICINE

## 2023-04-26 PROCEDURE — 3044F HG A1C LEVEL LT 7.0%: CPT | Mod: CPTII,S$GLB,, | Performed by: INTERNAL MEDICINE

## 2023-04-26 PROCEDURE — 1160F PR REVIEW ALL MEDS BY PRESCRIBER/CLIN PHARMACIST DOCUMENTED: ICD-10-PCS | Mod: CPTII,S$GLB,, | Performed by: INTERNAL MEDICINE

## 2023-04-26 PROCEDURE — 1160F RVW MEDS BY RX/DR IN RCRD: CPT | Mod: CPTII,S$GLB,, | Performed by: INTERNAL MEDICINE

## 2023-04-26 PROCEDURE — 3288F FALL RISK ASSESSMENT DOCD: CPT | Mod: CPTII,S$GLB,, | Performed by: INTERNAL MEDICINE

## 2023-04-26 PROCEDURE — 3074F SYST BP LT 130 MM HG: CPT | Mod: CPTII,S$GLB,, | Performed by: INTERNAL MEDICINE

## 2023-04-26 PROCEDURE — 99999 PR PBB SHADOW E&M-EST. PATIENT-LVL IV: ICD-10-PCS | Mod: PBBFAC,,, | Performed by: INTERNAL MEDICINE

## 2023-04-26 PROCEDURE — 4010F PR ACE/ARB THEARPY RXD/TAKEN: ICD-10-PCS | Mod: CPTII,S$GLB,, | Performed by: INTERNAL MEDICINE

## 2023-04-26 PROCEDURE — 3078F PR MOST RECENT DIASTOLIC BLOOD PRESSURE < 80 MM HG: ICD-10-PCS | Mod: CPTII,S$GLB,, | Performed by: INTERNAL MEDICINE

## 2023-04-26 PROCEDURE — 3008F BODY MASS INDEX DOCD: CPT | Mod: CPTII,S$GLB,, | Performed by: INTERNAL MEDICINE

## 2023-04-26 PROCEDURE — 1157F PR ADVANCE CARE PLAN OR EQUIV PRESENT IN MEDICAL RECORD: ICD-10-PCS | Mod: CPTII,S$GLB,, | Performed by: INTERNAL MEDICINE

## 2023-04-26 PROCEDURE — 4010F ACE/ARB THERAPY RXD/TAKEN: CPT | Mod: CPTII,S$GLB,, | Performed by: INTERNAL MEDICINE

## 2023-04-26 PROCEDURE — 3288F PR FALLS RISK ASSESSMENT DOCUMENTED: ICD-10-PCS | Mod: CPTII,S$GLB,, | Performed by: INTERNAL MEDICINE

## 2023-04-26 PROCEDURE — 1159F MED LIST DOCD IN RCRD: CPT | Mod: CPTII,S$GLB,, | Performed by: INTERNAL MEDICINE

## 2023-04-26 RX ORDER — HYDROCODONE BITARTRATE AND ACETAMINOPHEN 10; 325 MG/1; MG/1
1 TABLET ORAL EVERY 8 HOURS PRN
Qty: 90 TABLET | Refills: 0 | Status: SHIPPED | OUTPATIENT
Start: 2023-04-26 | End: 2023-05-25

## 2023-04-26 NOTE — PROGRESS NOTES
HPI:  Patient is 72-year-old man comes today follow-up of his hypertension, lipids, gout, hypogonadism, chronic kidney disease and for his annual physical exam.  He has been doing well.  He denies any problems with his blood pressure.  He is had no flares of gout.  His knee replacement surgery went very well.  He is finished all physical therapy.      Current MEDS: medcard review, verified and update  Allergies: Per the electronic medical record    Past Medical History:   Diagnosis Date    BPH (benign prostatic hyperplasia)     CKD (chronic kidney disease), stage III     Colon polyps 2015    Diarrhea in adult patient 2/10/2021    Generalized osteoarthrosis, involving multiple sites     History of gout     Hyperlipidemia     Hypertension     Hypogonadism male     Lumbar disc disease with radiculopathy     Morbid obesity with BMI of 40.0-44.9, adult        Past Surgical History:   Procedure Laterality Date    COLONOSCOPY N/A 6/22/2020    Procedure: COLONOSCOPY;  Surgeon: Montserrat Conroy MD;  Location: [facility];  Service: Endoscopy;  Laterality: N/A;    COLONOSCOPY N/A 2/10/2021    Procedure: COLONOSCOPY;  Surgeon: Jones Lemus III, MD;  Location: [facility];  Service: Endoscopy;  Laterality: N/A;    ESOPHAGOGASTRODUODENOSCOPY N/A 2/10/2021    Procedure: EGD (ESOPHAGOGASTRODUODENOSCOPY);  Surgeon: Jones Lemus III, MD;  Location: [facility];  Service: Endoscopy;  Laterality: N/A;    EYE SURGERY      JOINT REPLACEMENT      KNEE SCOPE      open globe      right eye 1980    ROTATOR CUFF REPAIR      SHOULDER SURGERY Right        SHx: per the electronic medical record    FHx: recorded in the electronic medical record    ROS:    denies any chest pains or shortness of breath. Denies any nausea, vomiting or diarrhea. Denies any fever, chills or sweats. Denies any change in weight, voice, stool, skin or hair. Denies any dysuria, dyspepsia or dysphagia. Denies any change in vision, hearing or headaches. Denies any  "swollen lymph nodes or loss of memory.    PE:  /60 (BP Location: Right arm)   Pulse 65   Ht 5' 9" (1.753 m)   Wt 124.4 kg (274 lb 3.7 oz)   SpO2 (!) 94%   BMI 40.50 kg/m²   Gen: Well-developed, well-nourished, male, in no acute distress, oriented x3  HEENT: neck is supple, no adenopathy, carotids 2+ equal without bruits, thyroid exam normal size without nodules.  CHEST: clear to auscultation and percussion  CVS: regular rate and rhythm without significant murmur, gallop, or rubs  ABD: soft, benign, no rebound no guarding, no distention.  Bowel sounds are normal.     nontender.  No palpable masses.  No organomegaly and no audible bruits.  RECTAL:  Deferred.  EXT: no clubbing, cyanosis, or edema  LYMPH: no cervical, inguinal, or axillary adenopathy  FEET: no loss of sensation.  No ulcers or pressure sores.  NEURO: gait normal.  Cranial nerves II- XII intact. No nystagmus.  Speech normal.   Gross motor and sensory unremarkable.    Lab Results   Component Value Date    WBC 7.64 04/17/2023    HGB 16.4 04/17/2023    HCT 50.1 04/17/2023     04/17/2023    CHOL 176 04/17/2023    TRIG 115 04/17/2023    HDL 33 (L) 04/17/2023    ALT 38 04/17/2023    AST 28 04/17/2023     04/17/2023    K 4.5 04/17/2023     04/17/2023    CREATININE 0.9 04/17/2023    BUN 13 04/17/2023    CO2 23 04/17/2023    TSH 0.739 04/17/2023    PSA 3.3 04/17/2023    INR 1.0 12/28/2022    HGBA1C 5.5 04/17/2023       Impression:  Stable problems below  Patient Active Problem List   Diagnosis    Essential hypertension    Hyperlipidemia    Generalized osteoarthrosis, involving multiple sites    Hypogonadism male    History of gout    BPH (benign prostatic hyperplasia)    Lumbar disc disease with radiculopathy    Morbid obesity with BMI of 40.0-44.9, adult    CKD (chronic kidney disease), stage III    Personal history of colonic polyps    Left sided colitis without complications       Plan:   Orders Placed This Encounter    " Comprehensive Metabolic Panel    Lipid Panel    TSH    CBC Auto Differential    Uric Acid    Testosterone Panel    HYDROcodone-acetaminophen (NORCO)  mg per tablet     Patient will see me again in 6 months with above lab work.  Medications remain the same.  This note is generated with speech recognition software and is subject to transcription error and sound alike phrases that may be missed by proofreading.

## 2023-05-13 ENCOUNTER — TELEPHONE (OUTPATIENT)
Dept: ADMINISTRATIVE | Facility: HOSPITAL | Age: 73
End: 2023-05-13
Payer: MEDICARE

## 2023-05-22 ENCOUNTER — PES CALL (OUTPATIENT)
Dept: ADMINISTRATIVE | Facility: CLINIC | Age: 73
End: 2023-05-22
Payer: MEDICARE

## 2023-05-24 NOTE — TELEPHONE ENCOUNTER
No care due was identified.  [facility] Embedded Care Due Messages. Reference number: [id number].   5/24/2023 2:26:19 PM CDT

## 2023-05-25 RX ORDER — HYDROCODONE BITARTRATE AND ACETAMINOPHEN 10; 325 MG/1; MG/1
1 TABLET ORAL EVERY 8 HOURS PRN
Qty: 90 TABLET | Refills: 0 | Status: SHIPPED | OUTPATIENT
Start: 2023-05-25

## 2023-07-06 ENCOUNTER — OFFICE VISIT (OUTPATIENT)
Dept: CARDIOLOGY | Facility: CLINIC | Age: 73
End: 2023-07-06
Payer: MEDICARE

## 2023-07-06 VITALS
WEIGHT: 280.19 LBS | DIASTOLIC BLOOD PRESSURE: 84 MMHG | BODY MASS INDEX: 43.98 KG/M2 | HEIGHT: 67 IN | HEART RATE: 67 BPM | SYSTOLIC BLOOD PRESSURE: 122 MMHG | OXYGEN SATURATION: 99 %

## 2023-07-06 DIAGNOSIS — E78.00 PURE HYPERCHOLESTEROLEMIA: ICD-10-CM

## 2023-07-06 DIAGNOSIS — E66.01 MORBID OBESITY WITH BMI OF 40.0-44.9, ADULT: ICD-10-CM

## 2023-07-06 DIAGNOSIS — N18.31 STAGE 3A CHRONIC KIDNEY DISEASE: ICD-10-CM

## 2023-07-06 DIAGNOSIS — I10 ESSENTIAL HYPERTENSION: Primary | ICD-10-CM

## 2023-07-06 PROCEDURE — 1125F PR PAIN SEVERITY QUANTIFIED, PAIN PRESENT: ICD-10-PCS | Mod: HCNC,CPTII,S$GLB, | Performed by: INTERNAL MEDICINE

## 2023-07-06 PROCEDURE — 3079F DIAST BP 80-89 MM HG: CPT | Mod: HCNC,CPTII,S$GLB, | Performed by: INTERNAL MEDICINE

## 2023-07-06 PROCEDURE — 1159F MED LIST DOCD IN RCRD: CPT | Mod: HCNC,CPTII,S$GLB, | Performed by: INTERNAL MEDICINE

## 2023-07-06 PROCEDURE — 4010F ACE/ARB THERAPY RXD/TAKEN: CPT | Mod: HCNC,CPTII,S$GLB, | Performed by: INTERNAL MEDICINE

## 2023-07-06 PROCEDURE — 3288F FALL RISK ASSESSMENT DOCD: CPT | Mod: HCNC,CPTII,S$GLB, | Performed by: INTERNAL MEDICINE

## 2023-07-06 PROCEDURE — 1101F PR PT FALLS ASSESS DOC 0-1 FALLS W/OUT INJ PAST YR: ICD-10-PCS | Mod: HCNC,CPTII,S$GLB, | Performed by: INTERNAL MEDICINE

## 2023-07-06 PROCEDURE — 4010F PR ACE/ARB THEARPY RXD/TAKEN: ICD-10-PCS | Mod: HCNC,CPTII,S$GLB, | Performed by: INTERNAL MEDICINE

## 2023-07-06 PROCEDURE — 99214 PR OFFICE/OUTPT VISIT, EST, LEVL IV, 30-39 MIN: ICD-10-PCS | Mod: HCNC,S$GLB,, | Performed by: INTERNAL MEDICINE

## 2023-07-06 PROCEDURE — 99999 PR PBB SHADOW E&M-EST. PATIENT-LVL IV: CPT | Mod: PBBFAC,HCNC,, | Performed by: INTERNAL MEDICINE

## 2023-07-06 PROCEDURE — 3074F SYST BP LT 130 MM HG: CPT | Mod: HCNC,CPTII,S$GLB, | Performed by: INTERNAL MEDICINE

## 2023-07-06 PROCEDURE — 3008F PR BODY MASS INDEX (BMI) DOCUMENTED: ICD-10-PCS | Mod: HCNC,CPTII,S$GLB, | Performed by: INTERNAL MEDICINE

## 2023-07-06 PROCEDURE — 1125F AMNT PAIN NOTED PAIN PRSNT: CPT | Mod: HCNC,CPTII,S$GLB, | Performed by: INTERNAL MEDICINE

## 2023-07-06 PROCEDURE — 1160F PR REVIEW ALL MEDS BY PRESCRIBER/CLIN PHARMACIST DOCUMENTED: ICD-10-PCS | Mod: HCNC,CPTII,S$GLB, | Performed by: INTERNAL MEDICINE

## 2023-07-06 PROCEDURE — 99214 OFFICE O/P EST MOD 30 MIN: CPT | Mod: HCNC,S$GLB,, | Performed by: INTERNAL MEDICINE

## 2023-07-06 PROCEDURE — 3044F HG A1C LEVEL LT 7.0%: CPT | Mod: HCNC,CPTII,S$GLB, | Performed by: INTERNAL MEDICINE

## 2023-07-06 PROCEDURE — 3079F PR MOST RECENT DIASTOLIC BLOOD PRESSURE 80-89 MM HG: ICD-10-PCS | Mod: HCNC,CPTII,S$GLB, | Performed by: INTERNAL MEDICINE

## 2023-07-06 PROCEDURE — 99999 PR PBB SHADOW E&M-EST. PATIENT-LVL IV: ICD-10-PCS | Mod: PBBFAC,HCNC,, | Performed by: INTERNAL MEDICINE

## 2023-07-06 PROCEDURE — 3074F PR MOST RECENT SYSTOLIC BLOOD PRESSURE < 130 MM HG: ICD-10-PCS | Mod: HCNC,CPTII,S$GLB, | Performed by: INTERNAL MEDICINE

## 2023-07-06 PROCEDURE — 1160F RVW MEDS BY RX/DR IN RCRD: CPT | Mod: HCNC,CPTII,S$GLB, | Performed by: INTERNAL MEDICINE

## 2023-07-06 PROCEDURE — 3008F BODY MASS INDEX DOCD: CPT | Mod: HCNC,CPTII,S$GLB, | Performed by: INTERNAL MEDICINE

## 2023-07-06 PROCEDURE — 1157F ADVNC CARE PLAN IN RCRD: CPT | Mod: HCNC,CPTII,S$GLB, | Performed by: INTERNAL MEDICINE

## 2023-07-06 PROCEDURE — 1101F PT FALLS ASSESS-DOCD LE1/YR: CPT | Mod: HCNC,CPTII,S$GLB, | Performed by: INTERNAL MEDICINE

## 2023-07-06 PROCEDURE — 1157F PR ADVANCE CARE PLAN OR EQUIV PRESENT IN MEDICAL RECORD: ICD-10-PCS | Mod: HCNC,CPTII,S$GLB, | Performed by: INTERNAL MEDICINE

## 2023-07-06 PROCEDURE — 1159F PR MEDICATION LIST DOCUMENTED IN MEDICAL RECORD: ICD-10-PCS | Mod: HCNC,CPTII,S$GLB, | Performed by: INTERNAL MEDICINE

## 2023-07-06 PROCEDURE — 3044F PR MOST RECENT HEMOGLOBIN A1C LEVEL <7.0%: ICD-10-PCS | Mod: HCNC,CPTII,S$GLB, | Performed by: INTERNAL MEDICINE

## 2023-07-06 PROCEDURE — 3288F PR FALLS RISK ASSESSMENT DOCUMENTED: ICD-10-PCS | Mod: HCNC,CPTII,S$GLB, | Performed by: INTERNAL MEDICINE

## 2023-07-06 RX ORDER — FUROSEMIDE 40 MG/1
40 TABLET ORAL DAILY
Qty: 30 TABLET | Refills: 11 | Status: SHIPPED | OUTPATIENT
Start: 2023-07-06 | End: 2023-07-31 | Stop reason: SDUPTHER

## 2023-07-06 NOTE — PROGRESS NOTES
Subjective:   Patient ID:  Enoc Collado is a 73 y.o. male who presents for follow-up of Hypertension and ned leg edema  ENEIDA normal, echo nml lv function  CTA of aorta nml  Pt may have venous ds and compression stockings.  Hypertension  This is a chronic problem. The current episode started more than 1 year ago. The problem has been gradually improving since onset. The problem is controlled. Pertinent negatives include no chest pain, palpitations or shortness of breath. Past treatments include ACE inhibitors, beta blockers, calcium channel blockers and diuretics. The current treatment provides moderate improvement. There are no compliance problems.    Hyperlipidemia  This is a chronic problem. The current episode started more than 1 year ago. The problem is controlled. Pertinent negatives include no chest pain or shortness of breath. Current antihyperlipidemic treatment includes statins. The current treatment provides moderate improvement of lipids. There are no compliance problems.      Review of Systems   Constitutional: Negative. Negative for weight gain.   HENT: Negative.     Eyes: Negative.    Cardiovascular: Negative.  Negative for chest pain, leg swelling and palpitations.   Respiratory: Negative.  Negative for shortness of breath.    Endocrine: Negative.    Hematologic/Lymphatic: Negative.    Skin: Negative.    Musculoskeletal:  Negative for muscle weakness.   Gastrointestinal: Negative.    Genitourinary: Negative.    Neurological: Negative.  Negative for dizziness.   Psychiatric/Behavioral: Negative.     Allergic/Immunologic: Negative.    All other systems reviewed and are negative.  Family History   Problem Relation Age of Onset    Hypertension Father     Cataracts Father     Heart attack Father     Cataracts Mother     Hypertension Mother     Cancer Cousin         colon    Heart disease Brother      Past Medical History:   Diagnosis Date    BPH (benign prostatic hyperplasia)     CKD (chronic kidney  disease), stage III     Colon polyps 2015    Diarrhea in adult patient 2/10/2021    Generalized osteoarthrosis, involving multiple sites     History of gout     Hyperlipidemia     Hypertension     Hypogonadism male     Lumbar disc disease with radiculopathy     Morbid obesity with BMI of 40.0-44.9, adult      Social History     Socioeconomic History    Marital status:    Occupational History    Occupation: Retired   Tobacco Use    Smoking status: Former     Packs/day: 1.00     Years: 30.00     Pack years: 30.00     Types: Cigarettes     Quit date: 2000     Years since quittin.4    Smokeless tobacco: Current     Types: Snuff   Substance and Sexual Activity    Alcohol use: No    Drug use: No   Social History Narrative    Live w/ spouse. No pets      Social Determinants of Health     Financial Resource Strain: Low Risk     Difficulty of Paying Living Expenses: Not hard at all   Food Insecurity: No Food Insecurity    Worried About Running Out of Food in the Last Year: Never true    Ran Out of Food in the Last Year: Never true   Transportation Needs: No Transportation Needs    Lack of Transportation (Medical): No    Lack of Transportation (Non-Medical): No   Physical Activity: Inactive    Days of Exercise per Week: 0 days    Minutes of Exercise per Session: 0 min   Stress: No Stress Concern Present    Feeling of Stress : Not at all   Social Connections: Unknown    Frequency of Communication with Friends and Family: More than three times a week    Frequency of Social Gatherings with Friends and Family: More than three times a week    Active Member of Clubs or Organizations: No    Attends Club or Organization Meetings: Never    Marital Status:    Housing Stability: Unknown    Unable to Pay for Housing in the Last Year: No    Unstable Housing in the Last Year: No     Current Outpatient Medications on File Prior to Visit   Medication Sig Dispense Refill    allopurinoL (ZYLOPRIM) 300 MG tablet Take 1  "tablet (300 mg total) by mouth once daily. 90 tablet 3    amLODIPine (NORVASC) 10 MG tablet Take 1 tablet (10 mg total) by mouth once daily. 90 tablet 2    aspirin 325 MG tablet Take 325 mg by mouth once daily.      carvediloL (COREG) 25 MG tablet Take 1 tablet (25 mg total) by mouth 2 (two) times daily with meals. 180 tablet 2    gabapentin (NEURONTIN) 300 MG capsule TAKE 1 CAPSULE THREE TIMES DAILY 270 capsule 3    hydroCHLOROthiazide (HYDRODIURIL) 25 MG tablet Take 1 tablet (25 mg total) by mouth once daily. 90 tablet 2    HYDROcodone-acetaminophen (NORCO)  mg per tablet TAKE 1 TABLET BY MOUTH EVERY 8 (EIGHT) HOURS AS NEEDED FOR PAIN. 90 tablet 0    lisinopriL (PRINIVIL,ZESTRIL) 40 MG tablet Take 1 tablet (40 mg total) by mouth once daily. 90 tablet 2    pravastatin (PRAVACHOL) 20 MG tablet Take 1 tablet (20 mg total) by mouth once daily. 90 tablet 1    sildenafil (REVATIO) 20 mg Tab 2-4 tablets as needed one hour prior to intercourse 40 tablet 11    syringe with needle, safety 3 mL 21 gauge x 1 1/2" Syrg 1 Syringe by Misc.(Non-Drug; Combo Route) route every 21 days. 10 Syringe 1    testosterone cypionate (DEPOTESTOTERONE CYPIONATE) 200 mg/mL injection INJECT 1.5 MLS (300 MG TOTAL) INTO THE MUSCLE EVERY 14 (FOURTEEN) DAYS. (Patient taking differently: Inject 300 mg into the muscle every 21 days.) 10 mL 1    COMBIGAN 0.2-0.5 % Drop        No current facility-administered medications on file prior to visit.     Review of patient's allergies indicates:   Allergen Reactions    No known drug allergies        Objective:     Physical Exam  Vitals and nursing note reviewed.   Constitutional:       Appearance: He is well-developed.   HENT:      Head: Normocephalic and atraumatic.   Eyes:      Conjunctiva/sclera: Conjunctivae normal.      Pupils: Pupils are equal, round, and reactive to light.   Cardiovascular:      Rate and Rhythm: Normal rate and regular rhythm.      Pulses: Intact distal pulses.      Heart sounds: " Normal heart sounds.   Pulmonary:      Effort: Pulmonary effort is normal.      Breath sounds: Normal breath sounds.   Abdominal:      General: Bowel sounds are normal.      Palpations: Abdomen is soft.   Musculoskeletal:      Cervical back: Normal range of motion and neck supple.   Skin:     General: Skin is warm and dry.   Neurological:      Mental Status: He is alert and oriented to person, place, and time.       Assessment:     1. Essential hypertension    2. Pure hypercholesterolemia    3. Morbid obesity with BMI of 40.0-44.9, adult    4. Stage 3a chronic kidney disease        Plan:     Essential hypertension    Pure hypercholesterolemia    Morbid obesity with BMI of 40.0-44.9, adult    Stage 3a chronic kidney disease      Compression stockings   Continue statin-hlp  Continue lisinopril, coreg, norvasc-htn    Change hctz to lasix

## 2023-07-31 ENCOUNTER — PATIENT MESSAGE (OUTPATIENT)
Dept: CARDIOLOGY | Facility: CLINIC | Age: 73
End: 2023-07-31
Payer: MEDICARE

## 2023-08-01 RX ORDER — FUROSEMIDE 40 MG/1
40 TABLET ORAL DAILY
Qty: 90 TABLET | Refills: 3 | Status: SHIPPED | OUTPATIENT
Start: 2023-08-01 | End: 2023-08-22

## 2023-08-06 ENCOUNTER — TELEPHONE (OUTPATIENT)
Dept: ADMINISTRATIVE | Facility: HOSPITAL | Age: 73
End: 2023-08-06
Payer: MEDICARE

## 2023-08-22 RX ORDER — FUROSEMIDE 40 MG/1
40 TABLET ORAL
Qty: 90 TABLET | Refills: 3 | Status: SHIPPED | OUTPATIENT
Start: 2023-08-22 | End: 2023-10-13 | Stop reason: SDUPTHER

## 2023-08-29 ENCOUNTER — OFFICE VISIT (OUTPATIENT)
Dept: INTERNAL MEDICINE | Facility: CLINIC | Age: 73
End: 2023-08-29
Payer: MEDICARE

## 2023-08-29 VITALS
RESPIRATION RATE: 18 BRPM | DIASTOLIC BLOOD PRESSURE: 80 MMHG | HEART RATE: 57 BPM | HEIGHT: 69 IN | BODY MASS INDEX: 42.54 KG/M2 | OXYGEN SATURATION: 97 % | WEIGHT: 287.25 LBS | SYSTOLIC BLOOD PRESSURE: 124 MMHG

## 2023-08-29 DIAGNOSIS — I70.0 AORTIC ATHEROSCLEROSIS: ICD-10-CM

## 2023-08-29 DIAGNOSIS — Z00.00 ENCOUNTER FOR PREVENTIVE HEALTH EXAMINATION: Primary | ICD-10-CM

## 2023-08-29 DIAGNOSIS — E78.00 PURE HYPERCHOLESTEROLEMIA: ICD-10-CM

## 2023-08-29 DIAGNOSIS — G95.9 CERVICAL MYELOPATHY: ICD-10-CM

## 2023-08-29 DIAGNOSIS — I10 ESSENTIAL HYPERTENSION: ICD-10-CM

## 2023-08-29 DIAGNOSIS — E29.1 HYPOGONADISM MALE: ICD-10-CM

## 2023-08-29 DIAGNOSIS — E66.01 MORBID OBESITY WITH BMI OF 40.0-44.9, ADULT: ICD-10-CM

## 2023-08-29 DIAGNOSIS — Z87.39 HISTORY OF GOUT: ICD-10-CM

## 2023-08-29 DIAGNOSIS — M15.9 GENERALIZED OSTEOARTHROSIS, INVOLVING MULTIPLE SITES: ICD-10-CM

## 2023-08-29 DIAGNOSIS — R26.9 ABNORMALITY OF GAIT AND MOBILITY: ICD-10-CM

## 2023-08-29 PROBLEM — K51.50 LEFT SIDED COLITIS WITHOUT COMPLICATIONS: Status: RESOLVED | Noted: 2022-01-20 | Resolved: 2023-08-29

## 2023-08-29 PROCEDURE — 4010F ACE/ARB THERAPY RXD/TAKEN: CPT | Mod: HCNC,CPTII,S$GLB, | Performed by: NURSE PRACTITIONER

## 2023-08-29 PROCEDURE — 1157F ADVNC CARE PLAN IN RCRD: CPT | Mod: HCNC,CPTII,S$GLB, | Performed by: NURSE PRACTITIONER

## 2023-08-29 PROCEDURE — 1170F PR FUNCTIONAL STATUS ASSESSED: ICD-10-PCS | Mod: HCNC,CPTII,S$GLB, | Performed by: NURSE PRACTITIONER

## 2023-08-29 PROCEDURE — 3074F SYST BP LT 130 MM HG: CPT | Mod: HCNC,CPTII,S$GLB, | Performed by: NURSE PRACTITIONER

## 2023-08-29 PROCEDURE — G0439 PR MEDICARE ANNUAL WELLNESS SUBSEQUENT VISIT: ICD-10-PCS | Mod: HCNC,S$GLB,, | Performed by: NURSE PRACTITIONER

## 2023-08-29 PROCEDURE — 3079F DIAST BP 80-89 MM HG: CPT | Mod: HCNC,CPTII,S$GLB, | Performed by: NURSE PRACTITIONER

## 2023-08-29 PROCEDURE — 1159F PR MEDICATION LIST DOCUMENTED IN MEDICAL RECORD: ICD-10-PCS | Mod: HCNC,CPTII,S$GLB, | Performed by: NURSE PRACTITIONER

## 2023-08-29 PROCEDURE — 3044F HG A1C LEVEL LT 7.0%: CPT | Mod: HCNC,CPTII,S$GLB, | Performed by: NURSE PRACTITIONER

## 2023-08-29 PROCEDURE — 1101F PR PT FALLS ASSESS DOC 0-1 FALLS W/OUT INJ PAST YR: ICD-10-PCS | Mod: HCNC,CPTII,S$GLB, | Performed by: NURSE PRACTITIONER

## 2023-08-29 PROCEDURE — 99999 PR PBB SHADOW E&M-EST. PATIENT-LVL V: CPT | Mod: PBBFAC,HCNC,, | Performed by: NURSE PRACTITIONER

## 2023-08-29 PROCEDURE — 1160F PR REVIEW ALL MEDS BY PRESCRIBER/CLIN PHARMACIST DOCUMENTED: ICD-10-PCS | Mod: HCNC,CPTII,S$GLB, | Performed by: NURSE PRACTITIONER

## 2023-08-29 PROCEDURE — 3288F PR FALLS RISK ASSESSMENT DOCUMENTED: ICD-10-PCS | Mod: HCNC,CPTII,S$GLB, | Performed by: NURSE PRACTITIONER

## 2023-08-29 PROCEDURE — 1126F AMNT PAIN NOTED NONE PRSNT: CPT | Mod: HCNC,CPTII,S$GLB, | Performed by: NURSE PRACTITIONER

## 2023-08-29 PROCEDURE — 1170F FXNL STATUS ASSESSED: CPT | Mod: HCNC,CPTII,S$GLB, | Performed by: NURSE PRACTITIONER

## 2023-08-29 PROCEDURE — 3079F PR MOST RECENT DIASTOLIC BLOOD PRESSURE 80-89 MM HG: ICD-10-PCS | Mod: HCNC,CPTII,S$GLB, | Performed by: NURSE PRACTITIONER

## 2023-08-29 PROCEDURE — 3008F PR BODY MASS INDEX (BMI) DOCUMENTED: ICD-10-PCS | Mod: HCNC,CPTII,S$GLB, | Performed by: NURSE PRACTITIONER

## 2023-08-29 PROCEDURE — 1157F PR ADVANCE CARE PLAN OR EQUIV PRESENT IN MEDICAL RECORD: ICD-10-PCS | Mod: HCNC,CPTII,S$GLB, | Performed by: NURSE PRACTITIONER

## 2023-08-29 PROCEDURE — 99999 PR PBB SHADOW E&M-EST. PATIENT-LVL V: ICD-10-PCS | Mod: PBBFAC,HCNC,, | Performed by: NURSE PRACTITIONER

## 2023-08-29 PROCEDURE — 1101F PT FALLS ASSESS-DOCD LE1/YR: CPT | Mod: HCNC,CPTII,S$GLB, | Performed by: NURSE PRACTITIONER

## 2023-08-29 PROCEDURE — 4010F PR ACE/ARB THEARPY RXD/TAKEN: ICD-10-PCS | Mod: HCNC,CPTII,S$GLB, | Performed by: NURSE PRACTITIONER

## 2023-08-29 PROCEDURE — 3288F FALL RISK ASSESSMENT DOCD: CPT | Mod: HCNC,CPTII,S$GLB, | Performed by: NURSE PRACTITIONER

## 2023-08-29 PROCEDURE — 3044F PR MOST RECENT HEMOGLOBIN A1C LEVEL <7.0%: ICD-10-PCS | Mod: HCNC,CPTII,S$GLB, | Performed by: NURSE PRACTITIONER

## 2023-08-29 PROCEDURE — 1126F PR PAIN SEVERITY QUANTIFIED, NO PAIN PRESENT: ICD-10-PCS | Mod: HCNC,CPTII,S$GLB, | Performed by: NURSE PRACTITIONER

## 2023-08-29 PROCEDURE — G0439 PPPS, SUBSEQ VISIT: HCPCS | Mod: HCNC,S$GLB,, | Performed by: NURSE PRACTITIONER

## 2023-08-29 PROCEDURE — 1159F MED LIST DOCD IN RCRD: CPT | Mod: HCNC,CPTII,S$GLB, | Performed by: NURSE PRACTITIONER

## 2023-08-29 PROCEDURE — 3074F PR MOST RECENT SYSTOLIC BLOOD PRESSURE < 130 MM HG: ICD-10-PCS | Mod: HCNC,CPTII,S$GLB, | Performed by: NURSE PRACTITIONER

## 2023-08-29 PROCEDURE — 1160F RVW MEDS BY RX/DR IN RCRD: CPT | Mod: HCNC,CPTII,S$GLB, | Performed by: NURSE PRACTITIONER

## 2023-08-29 PROCEDURE — 3008F BODY MASS INDEX DOCD: CPT | Mod: HCNC,CPTII,S$GLB, | Performed by: NURSE PRACTITIONER

## 2023-08-29 RX ORDER — TIMOLOL MALEATE 5 MG/ML
1 SOLUTION OPHTHALMIC EVERY MORNING
COMMUNITY
Start: 2023-08-23

## 2023-08-29 NOTE — PATIENT INSTRUCTIONS
Counseling and Referral of Other Preventative  (Italic type indicates deductible and co-insurance are waived)    Patient Name: Enoc Collado  Today's Date: 8/29/2023    Health Maintenance       Date Due Completion Date    COVID-19 Vaccine (5 - Mixed Product series) 12/30/2021 11/4/2021    Influenza Vaccine (1) 09/01/2023 ---    PROSTATE-SPECIFIC ANTIGEN 04/17/2024 4/17/2023    Hemoglobin A1c (Prediabetes) 04/17/2024 4/17/2023    Lipid Panel 04/17/2024 4/17/2023    TETANUS VACCINE 01/13/2027 1/13/2017        No orders of the defined types were placed in this encounter.      The following information is provided to all patients.  This information is to help you find resources for any of the problems found today that may be affecting your health:                Living healthy guide: www.[facility].[place].gov      Understanding Diabetes: www.diabetes.org      Eating healthy: www.cdc.gov/healthyweight      CDC home safety checklist: www.cdc.gov/steadi/patient.html      Agency on Aging: www.goea.[place].[facility]      Alcoholics anonymous (AA): www.aa.org      Physical Activity: www.marifer.nih.gov/lb8cysi      Tobacco use: www.quitwithusla.org     
(M6) obeys commands

## 2023-08-29 NOTE — PROGRESS NOTES
"  Enoc Collado presented for a  Medicare AWV and comprehensive Health Risk Assessment today. The following components were reviewed and updated:    Medical history  Family History  Social history  Allergies and Current Medications  Health Risk Assessment  Health Maintenance  Care Team         ** See Completed Assessments for Annual Wellness Visit within the encounter summary.**         The following assessments were completed:  Living Situation  CAGE  Depression Screening  Timed Get Up and Go  Whisper Test  Cognitive Function Screening    Nutrition Screening  ADL Screening  PAQ Screening        Vitals:    08/29/23 0843   BP: 124/80   Pulse: (!) 57   Resp: 18   SpO2: 97%   Weight: 130.3 kg (287 lb 4.2 oz)   Height: 5' 9" (1.753 m)     Body mass index is 42.42 kg/m².  Physical Exam  Constitutional:       General: He is not in acute distress.     Appearance: He is well-developed. He is obese. He is not ill-appearing, toxic-appearing or diaphoretic.   HENT:      Head: Normocephalic and atraumatic.      Right Ear: External ear normal.      Left Ear: External ear normal.      Nose: Nose normal.      Mouth/Throat:      Mouth: Mucous membranes are moist.   Eyes:      Conjunctiva/sclera: Conjunctivae normal.      Comments: Left pupil white due to old injury   Neck:      Vascular: No carotid bruit.   Cardiovascular:      Rate and Rhythm: Normal rate and regular rhythm.      Pulses: Normal pulses.      Heart sounds: Normal heart sounds. No murmur heard.     No friction rub. No gallop.   Pulmonary:      Effort: Pulmonary effort is normal. No respiratory distress.      Breath sounds: Normal breath sounds. No stridor. No wheezing, rhonchi or rales.   Chest:      Chest wall: No tenderness.   Abdominal:      General: Bowel sounds are normal. There is no distension.      Palpations: Abdomen is soft. There is no mass.      Tenderness: There is no abdominal tenderness.      Hernia: No hernia is present.      Comments: Central " adiposity   Musculoskeletal:         General: No tenderness. Normal range of motion.      Cervical back: Normal range of motion and neck supple. No tenderness.   Lymphadenopathy:      Cervical: No cervical adenopathy.   Skin:     General: Skin is warm and dry.   Neurological:      Mental Status: He is alert and oriented to person, place, and time.      Cranial Nerves: No cranial nerve deficit.   Psychiatric:         Mood and Affect: Mood normal.         Behavior: Behavior normal.               Diagnoses and health risks identified today and associated recommendations/orders:    1. Encounter for preventive health examination  Screenings performed, as noted above.  Personal preventative testing needs reviewed.      2. Cervical myelopathy  Treated with pain mgmt, stable, cont tx    3. Abnormality of gait and mobility  Monitored, stable, no recent falls, exercise to tolerance    4. Essential hypertension  Treated with amlodipine, lisinopril, carvedilol, stable, cont tx    5. Hypogonadism male  Treated with testosterone injections, stable, cont tx    6. Aortic atherosclerosis  Monitored, stable, continue statin    7. Morbid obesity with BMI of 40.0-44.9, adult  Monitored, unstable, cardiac diet, exercise to tolerance    8. Generalized osteoarthrosis, involving multiple sites  Monitored, stable, stays active    9. History of gout  Treated with allopurinol, stable, cont tx    10. Pure hypercholesterolemia  Treated with a statin, stable, cont tx    Review for Substance Use Disorders: patient does not use substances per chart    Patient on chronic (specify) opioids-. Norco  Followed by pain mgmt  Risk factors reviewed. Risk factors may include Sleep-disordered breathing, renal or hepatic insufficiency, increased risk for falls and fractures, risk of opioid misuse/overdose/opioid use disorder.  Pain evaluated during visit, documented under vitals.  Discussed nonpharmacologic treatments options, will follow up with prescribing  provider to discuss further       Provided Enoc with a 5-10 year written screening schedule and personal prevention plan. Recommendations were developed using the USPSTF age appropriate recommendations. Education, counseling, and referrals were provided as needed. After Visit Summary printed and given to patient which includes a list of additional screenings\tests needed.    No follow-ups on file.    Miguel Angel Phillip, NP    I offered to discuss advanced care planning, including how to pick a person who would make decisions for you if you were unable to make them for yourself, called a health care power of , and what kind of decisions you might make such as use of life sustaining treatments such as ventilators and tube feeding when faced with a life limiting illness recorded on a living will that they will need to know. (How you want to be cared for as you near the end of your natural life)     X Patient is interested in learning more about how to make advanced directives.  I provided them paperwork and offered to discuss this with them.

## 2023-10-13 ENCOUNTER — OFFICE VISIT (OUTPATIENT)
Dept: CARDIOLOGY | Facility: CLINIC | Age: 73
End: 2023-10-13
Payer: MEDICARE

## 2023-10-13 VITALS
HEIGHT: 69 IN | OXYGEN SATURATION: 96 % | HEART RATE: 68 BPM | DIASTOLIC BLOOD PRESSURE: 76 MMHG | BODY MASS INDEX: 43.72 KG/M2 | WEIGHT: 295.19 LBS | SYSTOLIC BLOOD PRESSURE: 124 MMHG

## 2023-10-13 DIAGNOSIS — I70.0 AORTIC ATHEROSCLEROSIS: ICD-10-CM

## 2023-10-13 DIAGNOSIS — E66.01 MORBID OBESITY WITH BMI OF 40.0-44.9, ADULT: ICD-10-CM

## 2023-10-13 DIAGNOSIS — I10 ESSENTIAL HYPERTENSION: Primary | ICD-10-CM

## 2023-10-13 DIAGNOSIS — E78.00 PURE HYPERCHOLESTEROLEMIA: ICD-10-CM

## 2023-10-13 PROCEDURE — 3008F BODY MASS INDEX DOCD: CPT | Mod: HCNC,CPTII,S$GLB, | Performed by: INTERNAL MEDICINE

## 2023-10-13 PROCEDURE — 3074F PR MOST RECENT SYSTOLIC BLOOD PRESSURE < 130 MM HG: ICD-10-PCS | Mod: HCNC,CPTII,S$GLB, | Performed by: INTERNAL MEDICINE

## 2023-10-13 PROCEDURE — 1125F AMNT PAIN NOTED PAIN PRSNT: CPT | Mod: HCNC,CPTII,S$GLB, | Performed by: INTERNAL MEDICINE

## 2023-10-13 PROCEDURE — 99214 PR OFFICE/OUTPT VISIT, EST, LEVL IV, 30-39 MIN: ICD-10-PCS | Mod: HCNC,S$GLB,, | Performed by: INTERNAL MEDICINE

## 2023-10-13 PROCEDURE — 1159F PR MEDICATION LIST DOCUMENTED IN MEDICAL RECORD: ICD-10-PCS | Mod: HCNC,CPTII,S$GLB, | Performed by: INTERNAL MEDICINE

## 2023-10-13 PROCEDURE — 1125F PR PAIN SEVERITY QUANTIFIED, PAIN PRESENT: ICD-10-PCS | Mod: HCNC,CPTII,S$GLB, | Performed by: INTERNAL MEDICINE

## 2023-10-13 PROCEDURE — 99999 PR PBB SHADOW E&M-EST. PATIENT-LVL IV: CPT | Mod: PBBFAC,HCNC,, | Performed by: INTERNAL MEDICINE

## 2023-10-13 PROCEDURE — 1160F PR REVIEW ALL MEDS BY PRESCRIBER/CLIN PHARMACIST DOCUMENTED: ICD-10-PCS | Mod: HCNC,CPTII,S$GLB, | Performed by: INTERNAL MEDICINE

## 2023-10-13 PROCEDURE — 3044F PR MOST RECENT HEMOGLOBIN A1C LEVEL <7.0%: ICD-10-PCS | Mod: HCNC,CPTII,S$GLB, | Performed by: INTERNAL MEDICINE

## 2023-10-13 PROCEDURE — 3078F PR MOST RECENT DIASTOLIC BLOOD PRESSURE < 80 MM HG: ICD-10-PCS | Mod: HCNC,CPTII,S$GLB, | Performed by: INTERNAL MEDICINE

## 2023-10-13 PROCEDURE — 3078F DIAST BP <80 MM HG: CPT | Mod: HCNC,CPTII,S$GLB, | Performed by: INTERNAL MEDICINE

## 2023-10-13 PROCEDURE — 3008F PR BODY MASS INDEX (BMI) DOCUMENTED: ICD-10-PCS | Mod: HCNC,CPTII,S$GLB, | Performed by: INTERNAL MEDICINE

## 2023-10-13 PROCEDURE — 4010F ACE/ARB THERAPY RXD/TAKEN: CPT | Mod: HCNC,CPTII,S$GLB, | Performed by: INTERNAL MEDICINE

## 2023-10-13 PROCEDURE — 3288F FALL RISK ASSESSMENT DOCD: CPT | Mod: HCNC,CPTII,S$GLB, | Performed by: INTERNAL MEDICINE

## 2023-10-13 PROCEDURE — 3288F PR FALLS RISK ASSESSMENT DOCUMENTED: ICD-10-PCS | Mod: HCNC,CPTII,S$GLB, | Performed by: INTERNAL MEDICINE

## 2023-10-13 PROCEDURE — 99999 PR PBB SHADOW E&M-EST. PATIENT-LVL IV: ICD-10-PCS | Mod: PBBFAC,HCNC,, | Performed by: INTERNAL MEDICINE

## 2023-10-13 PROCEDURE — 1159F MED LIST DOCD IN RCRD: CPT | Mod: HCNC,CPTII,S$GLB, | Performed by: INTERNAL MEDICINE

## 2023-10-13 PROCEDURE — 1101F PR PT FALLS ASSESS DOC 0-1 FALLS W/OUT INJ PAST YR: ICD-10-PCS | Mod: HCNC,CPTII,S$GLB, | Performed by: INTERNAL MEDICINE

## 2023-10-13 PROCEDURE — 3074F SYST BP LT 130 MM HG: CPT | Mod: HCNC,CPTII,S$GLB, | Performed by: INTERNAL MEDICINE

## 2023-10-13 PROCEDURE — 1157F ADVNC CARE PLAN IN RCRD: CPT | Mod: HCNC,CPTII,S$GLB, | Performed by: INTERNAL MEDICINE

## 2023-10-13 PROCEDURE — 1157F PR ADVANCE CARE PLAN OR EQUIV PRESENT IN MEDICAL RECORD: ICD-10-PCS | Mod: HCNC,CPTII,S$GLB, | Performed by: INTERNAL MEDICINE

## 2023-10-13 PROCEDURE — 99214 OFFICE O/P EST MOD 30 MIN: CPT | Mod: HCNC,S$GLB,, | Performed by: INTERNAL MEDICINE

## 2023-10-13 PROCEDURE — 4010F PR ACE/ARB THEARPY RXD/TAKEN: ICD-10-PCS | Mod: HCNC,CPTII,S$GLB, | Performed by: INTERNAL MEDICINE

## 2023-10-13 PROCEDURE — 1160F RVW MEDS BY RX/DR IN RCRD: CPT | Mod: HCNC,CPTII,S$GLB, | Performed by: INTERNAL MEDICINE

## 2023-10-13 PROCEDURE — 3044F HG A1C LEVEL LT 7.0%: CPT | Mod: HCNC,CPTII,S$GLB, | Performed by: INTERNAL MEDICINE

## 2023-10-13 PROCEDURE — 1101F PT FALLS ASSESS-DOCD LE1/YR: CPT | Mod: HCNC,CPTII,S$GLB, | Performed by: INTERNAL MEDICINE

## 2023-10-13 RX ORDER — FUROSEMIDE 40 MG/1
40 TABLET ORAL DAILY
Qty: 90 TABLET | Refills: 3 | Status: SHIPPED | OUTPATIENT
Start: 2023-10-13 | End: 2024-01-09 | Stop reason: SDUPTHER

## 2023-10-13 NOTE — PROGRESS NOTES
Subjective:   Patient ID:  Enoc Collado is a 73 y.o. male who presents for follow-up of No chief complaint on file.  Last clinic note:  ENEIDA normal, echo nml lv function  CTA of aorta nml  Pt may have venous ds and compression stockings.  Hctz to lasix    Today:  Edema slightly improved with lasix  Not using compressions socking    Hypertension  This is a chronic problem. The current episode started more than 1 year ago. The problem has been gradually improving since onset. The problem is controlled. Pertinent negatives include no chest pain, palpitations or shortness of breath. Past treatments include ACE inhibitors, beta blockers, calcium channel blockers and diuretics. The current treatment provides moderate improvement. There are no compliance problems.    Hyperlipidemia  This is a chronic problem. The current episode started more than 1 year ago. The problem is controlled. Pertinent negatives include no chest pain or shortness of breath. Current antihyperlipidemic treatment includes statins. The current treatment provides moderate improvement of lipids. There are no compliance problems.        Review of Systems   Constitutional: Negative. Negative for weight gain.   HENT: Negative.     Eyes: Negative.    Cardiovascular: Negative.  Negative for chest pain, leg swelling and palpitations.   Respiratory: Negative.  Negative for shortness of breath.    Endocrine: Negative.    Hematologic/Lymphatic: Negative.    Skin: Negative.    Musculoskeletal:  Negative for muscle weakness.   Gastrointestinal: Negative.    Genitourinary: Negative.    Neurological: Negative.  Negative for dizziness.   Psychiatric/Behavioral: Negative.     Allergic/Immunologic: Negative.    All other systems reviewed and are negative.    Family History   Problem Relation Age of Onset    Hypertension Father     Cataracts Father     Heart attack Father     Cataracts Mother     Hypertension Mother     Cancer Cousin         colon    Heart disease  Brother      Past Medical History:   Diagnosis Date    BPH (benign prostatic hyperplasia)     CKD (chronic kidney disease), stage III     Colon polyps 2015    Diarrhea in adult patient 2/10/2021    Generalized osteoarthrosis, involving multiple sites     History of gout     Hyperlipidemia     Hypertension     Hypogonadism male     Lumbar disc disease with radiculopathy     Morbid obesity with BMI of 40.0-44.9, adult      Social History     Socioeconomic History    Marital status:    Occupational History    Occupation: Retired   Tobacco Use    Smoking status: Former     Current packs/day: 0.00     Average packs/day: 1 pack/day for 30.0 years (30.0 total pack years)     Types: Cigarettes     Start date: 1970     Quit date: 2000     Years since quittin.6    Smokeless tobacco: Current     Types: Snuff   Substance and Sexual Activity    Alcohol use: No    Drug use: No   Social History Narrative    Live w/ spouse. No pets      Social Determinants of Health     Financial Resource Strain: Low Risk  (10/10/2023)    Overall Financial Resource Strain (CARDIA)     Difficulty of Paying Living Expenses: Not hard at all   Food Insecurity: No Food Insecurity (10/10/2023)    Hunger Vital Sign     Worried About Running Out of Food in the Last Year: Never true     Ran Out of Food in the Last Year: Never true   Transportation Needs: No Transportation Needs (10/10/2023)    PRAPARE - Transportation     Lack of Transportation (Medical): No     Lack of Transportation (Non-Medical): No   Physical Activity: Inactive (10/10/2023)    Exercise Vital Sign     Days of Exercise per Week: 0 days     Minutes of Exercise per Session: 30 min   Stress: No Stress Concern Present (10/10/2023)    [employer] [place] [place] [employer] - Occupational Stress Questionnaire     Feeling of Stress : Not at all   Social Connections: Socially Integrated (10/10/2023)    Social Connection and Isolation Panel [NHANES]     Frequency of  "Communication with Friends and Family: More than three times a week     Frequency of Social Gatherings with Friends and Family: More than three times a week     Attends Temple Services: More than 4 times per year     Active Member of Clubs or Organizations: Yes     Attends Club or Organization Meetings: More than 4 times per year     Marital Status:    Housing Stability: Low Risk  (10/10/2023)    Housing Stability Vital Sign     Unable to Pay for Housing in the Last Year: No     Number of Places Lived in the Last Year: 1     Unstable Housing in the Last Year: No     Current Outpatient Medications on File Prior to Visit   Medication Sig Dispense Refill    allopurinoL (ZYLOPRIM) 300 MG tablet Take 1 tablet (300 mg total) by mouth once daily. 90 tablet 3    amLODIPine (NORVASC) 10 MG tablet Take 1 tablet (10 mg total) by mouth once daily. 90 tablet 2    ascorbic acid, vitamin C, (VITAMIN C) 100 MG tablet Take 100 mg by mouth once daily.      aspirin 325 MG tablet Take 325 mg by mouth once daily.      carvediloL (COREG) 25 MG tablet Take 1 tablet (25 mg total) by mouth 2 (two) times daily with meals. 180 tablet 2    furosemide (LASIX) 40 MG tablet TAKE 1 TABLET EVERY DAY 90 tablet 3    gabapentin (NEURONTIN) 300 MG capsule TAKE 1 CAPSULE THREE TIMES DAILY 270 capsule 3    HYDROcodone-acetaminophen (NORCO)  mg per tablet TAKE 1 TABLET BY MOUTH EVERY 8 (EIGHT) HOURS AS NEEDED FOR PAIN. 90 tablet 0    lisinopriL (PRINIVIL,ZESTRIL) 40 MG tablet Take 1 tablet (40 mg total) by mouth once daily. 90 tablet 2    multivitamin capsule Take 1 capsule by mouth once daily.      pravastatin (PRAVACHOL) 20 MG tablet Take 1 tablet (20 mg total) by mouth once daily. 90 tablet 1    sildenafil (REVATIO) 20 mg Tab 2-4 tablets as needed one hour prior to intercourse 40 tablet 11    syringe with needle, safety 3 mL 21 gauge x 1 1/2" Syrg 1 Syringe by Misc.(Non-Drug; Combo Route) route every 21 days. 10 Syringe 1    " testosterone cypionate (DEPOTESTOTERONE CYPIONATE) 200 mg/mL injection INJECT 1.5 MLS (300 MG TOTAL) INTO THE MUSCLE EVERY 14 (FOURTEEN) DAYS. (Patient taking differently: Inject 300 mg into the muscle every 21 days.) 10 mL 1    timolol maleate 0.5% (TIMOPTIC-XE) 0.5 % SolG Place 1 drop into both eyes every morning.       No current facility-administered medications on file prior to visit.     Review of patient's allergies indicates:   Allergen Reactions    No known drug allergies        Objective:     Physical Exam  Vitals and nursing note reviewed.   Constitutional:       Appearance: He is well-developed.   HENT:      Head: Normocephalic and atraumatic.   Eyes:      Conjunctiva/sclera: Conjunctivae normal.      Pupils: Pupils are equal, round, and reactive to light.   Cardiovascular:      Rate and Rhythm: Normal rate and regular rhythm.      Pulses: Intact distal pulses.      Heart sounds: Normal heart sounds.   Pulmonary:      Effort: Pulmonary effort is normal.      Breath sounds: Normal breath sounds.   Abdominal:      General: Bowel sounds are normal.      Palpations: Abdomen is soft.   Musculoskeletal:      Cervical back: Normal range of motion and neck supple.   Skin:     General: Skin is warm and dry.   Neurological:      Mental Status: He is alert and oriented to person, place, and time.         Assessment:     1. Essential hypertension    2. Pure hypercholesterolemia    3. Aortic atherosclerosis    4. Morbid obesity with BMI of 40.0-44.9, adult        Plan:     Essential hypertension    Pure hypercholesterolemia    Aortic atherosclerosis    Morbid obesity with BMI of 40.0-44.9, adult      Compression stockings   Continue statin-hlp  Continue lisinopril, coreg, norvasc, lasix-htn/edema

## 2023-10-19 ENCOUNTER — LAB VISIT (OUTPATIENT)
Dept: LAB | Facility: HOSPITAL | Age: 73
End: 2023-10-19
Attending: INTERNAL MEDICINE
Payer: MEDICARE

## 2023-10-19 ENCOUNTER — PATIENT MESSAGE (OUTPATIENT)
Dept: CARDIOLOGY | Facility: CLINIC | Age: 73
End: 2023-10-19
Payer: MEDICARE

## 2023-10-19 DIAGNOSIS — E78.00 PURE HYPERCHOLESTEROLEMIA: ICD-10-CM

## 2023-10-19 DIAGNOSIS — I10 ESSENTIAL HYPERTENSION: ICD-10-CM

## 2023-10-19 DIAGNOSIS — N18.31 STAGE 3A CHRONIC KIDNEY DISEASE: ICD-10-CM

## 2023-10-19 DIAGNOSIS — E29.1 HYPOGONADISM MALE: ICD-10-CM

## 2023-10-19 DIAGNOSIS — N40.0 BENIGN PROSTATIC HYPERPLASIA, UNSPECIFIED WHETHER LOWER URINARY TRACT SYMPTOMS PRESENT: ICD-10-CM

## 2023-10-19 LAB
BASOPHILS # BLD AUTO: 0.02 K/UL (ref 0–0.2)
BASOPHILS NFR BLD: 0.2 % (ref 0–1.9)
DIFFERENTIAL METHOD: ABNORMAL
EOSINOPHIL # BLD AUTO: 0 K/UL (ref 0–0.5)
EOSINOPHIL NFR BLD: 0 % (ref 0–8)
ERYTHROCYTE [DISTWIDTH] IN BLOOD BY AUTOMATED COUNT: 13.5 % (ref 11.5–14.5)
HCT VFR BLD AUTO: 43.9 % (ref 40–54)
HGB BLD-MCNC: 14.3 G/DL (ref 14–18)
IMM GRANULOCYTES # BLD AUTO: 0.07 K/UL (ref 0–0.04)
IMM GRANULOCYTES NFR BLD AUTO: 0.6 % (ref 0–0.5)
LYMPHOCYTES # BLD AUTO: 1.1 K/UL (ref 1–4.8)
LYMPHOCYTES NFR BLD: 9.1 % (ref 18–48)
MCH RBC QN AUTO: 33.4 PG (ref 27–31)
MCHC RBC AUTO-ENTMCNC: 32.6 G/DL (ref 32–36)
MCV RBC AUTO: 103 FL (ref 82–98)
MONOCYTES # BLD AUTO: 0.6 K/UL (ref 0.3–1)
MONOCYTES NFR BLD: 5.2 % (ref 4–15)
NEUTROPHILS # BLD AUTO: 9.9 K/UL (ref 1.8–7.7)
NEUTROPHILS NFR BLD: 84.9 % (ref 38–73)
NRBC BLD-RTO: 0 /100 WBC
PLATELET # BLD AUTO: 266 K/UL (ref 150–450)
PMV BLD AUTO: 11.2 FL (ref 9.2–12.9)
RBC # BLD AUTO: 4.28 M/UL (ref 4.6–6.2)
WBC # BLD AUTO: 11.63 K/UL (ref 3.9–12.7)

## 2023-10-19 PROCEDURE — 84443 ASSAY THYROID STIM HORMONE: CPT | Mod: HCNC | Performed by: INTERNAL MEDICINE

## 2023-10-19 PROCEDURE — 82040 ASSAY OF SERUM ALBUMIN: CPT | Mod: HCNC | Performed by: INTERNAL MEDICINE

## 2023-10-19 PROCEDURE — 84439 ASSAY OF FREE THYROXINE: CPT | Mod: HCNC | Performed by: INTERNAL MEDICINE

## 2023-10-19 PROCEDURE — 84550 ASSAY OF BLOOD/URIC ACID: CPT | Mod: HCNC | Performed by: INTERNAL MEDICINE

## 2023-10-19 PROCEDURE — 85025 COMPLETE CBC W/AUTO DIFF WBC: CPT | Mod: HCNC | Performed by: INTERNAL MEDICINE

## 2023-10-19 PROCEDURE — 80053 COMPREHEN METABOLIC PANEL: CPT | Mod: HCNC | Performed by: INTERNAL MEDICINE

## 2023-10-19 PROCEDURE — 80061 LIPID PANEL: CPT | Mod: HCNC | Performed by: INTERNAL MEDICINE

## 2023-10-20 LAB
ALBUMIN SERPL BCP-MCNC: 3.8 G/DL (ref 3.5–5.2)
ALP SERPL-CCNC: 83 U/L (ref 55–135)
ALT SERPL W/O P-5'-P-CCNC: 57 U/L (ref 10–44)
ANION GAP SERPL CALC-SCNC: 15 MMOL/L (ref 8–16)
AST SERPL-CCNC: 30 U/L (ref 10–40)
BILIRUB SERPL-MCNC: 0.4 MG/DL (ref 0.1–1)
BUN SERPL-MCNC: 39 MG/DL (ref 8–23)
CALCIUM SERPL-MCNC: 9.7 MG/DL (ref 8.7–10.5)
CHLORIDE SERPL-SCNC: 105 MMOL/L (ref 95–110)
CHOLEST SERPL-MCNC: 171 MG/DL (ref 120–199)
CHOLEST/HDLC SERPL: 5.5 {RATIO} (ref 2–5)
CO2 SERPL-SCNC: 20 MMOL/L (ref 23–29)
CREAT SERPL-MCNC: 1.3 MG/DL (ref 0.5–1.4)
EST. GFR  (NO RACE VARIABLE): 58.4 ML/MIN/1.73 M^2
GLUCOSE SERPL-MCNC: 153 MG/DL (ref 70–110)
HDLC SERPL-MCNC: 31 MG/DL (ref 40–75)
HDLC SERPL: 18.1 % (ref 20–50)
LDLC SERPL CALC-MCNC: 116.6 MG/DL (ref 63–159)
NONHDLC SERPL-MCNC: 140 MG/DL
POTASSIUM SERPL-SCNC: 5.4 MMOL/L (ref 3.5–5.1)
PROT SERPL-MCNC: 7 G/DL (ref 6–8.4)
SODIUM SERPL-SCNC: 140 MMOL/L (ref 136–145)
T4 FREE SERPL-MCNC: 0.92 NG/DL (ref 0.71–1.51)
TRIGL SERPL-MCNC: 117 MG/DL (ref 30–150)
TSH SERPL DL<=0.005 MIU/L-ACNC: 0.22 UIU/ML (ref 0.4–4)
URATE SERPL-MCNC: 6.4 MG/DL (ref 3.4–7)

## 2023-10-26 ENCOUNTER — LAB VISIT (OUTPATIENT)
Dept: LAB | Facility: HOSPITAL | Age: 73
End: 2023-10-26
Attending: INTERNAL MEDICINE
Payer: MEDICARE

## 2023-10-26 ENCOUNTER — OFFICE VISIT (OUTPATIENT)
Dept: INTERNAL MEDICINE | Facility: CLINIC | Age: 73
End: 2023-10-26
Payer: MEDICARE

## 2023-10-26 VITALS
BODY MASS INDEX: 43.46 KG/M2 | HEART RATE: 65 BPM | HEIGHT: 69 IN | OXYGEN SATURATION: 93 % | SYSTOLIC BLOOD PRESSURE: 110 MMHG | WEIGHT: 293.44 LBS | DIASTOLIC BLOOD PRESSURE: 88 MMHG

## 2023-10-26 DIAGNOSIS — N52.9 ERECTILE DYSFUNCTION, UNSPECIFIED ERECTILE DYSFUNCTION TYPE: ICD-10-CM

## 2023-10-26 DIAGNOSIS — Z86.010 PERSONAL HISTORY OF COLONIC POLYPS: ICD-10-CM

## 2023-10-26 DIAGNOSIS — R73.01 IFG (IMPAIRED FASTING GLUCOSE): ICD-10-CM

## 2023-10-26 DIAGNOSIS — E66.01 MORBID OBESITY WITH BMI OF 40.0-44.9, ADULT: ICD-10-CM

## 2023-10-26 DIAGNOSIS — M15.9 GENERALIZED OSTEOARTHROSIS, INVOLVING MULTIPLE SITES: ICD-10-CM

## 2023-10-26 DIAGNOSIS — E29.1 HYPOGONADISM MALE: ICD-10-CM

## 2023-10-26 DIAGNOSIS — Z87.39 HISTORY OF GOUT: ICD-10-CM

## 2023-10-26 DIAGNOSIS — I70.0 AORTIC ATHEROSCLEROSIS: ICD-10-CM

## 2023-10-26 DIAGNOSIS — Z12.5 PROSTATE CANCER SCREENING: ICD-10-CM

## 2023-10-26 DIAGNOSIS — E78.00 PURE HYPERCHOLESTEROLEMIA: ICD-10-CM

## 2023-10-26 DIAGNOSIS — I10 ESSENTIAL HYPERTENSION: Primary | ICD-10-CM

## 2023-10-26 DIAGNOSIS — N18.31 STAGE 3A CHRONIC KIDNEY DISEASE: ICD-10-CM

## 2023-10-26 DIAGNOSIS — G95.9 CERVICAL MYELOPATHY: ICD-10-CM

## 2023-10-26 LAB
ANION GAP SERPL CALC-SCNC: 9 MMOL/L (ref 8–16)
BUN SERPL-MCNC: 17 MG/DL (ref 8–23)
CALCIUM SERPL-MCNC: 8.9 MG/DL (ref 8.7–10.5)
CHLORIDE SERPL-SCNC: 105 MMOL/L (ref 95–110)
CO2 SERPL-SCNC: 26 MMOL/L (ref 23–29)
CREAT SERPL-MCNC: 1 MG/DL (ref 0.5–1.4)
EST. GFR  (NO RACE VARIABLE): >60 ML/MIN/1.73 M^2
ESTIMATED AVG GLUCOSE: 117 MG/DL (ref 68–131)
GLUCOSE SERPL-MCNC: 131 MG/DL (ref 70–110)
HBA1C MFR BLD: 5.7 % (ref 4–5.6)
POTASSIUM SERPL-SCNC: 3.9 MMOL/L (ref 3.5–5.1)
SODIUM SERPL-SCNC: 140 MMOL/L (ref 136–145)

## 2023-10-26 PROCEDURE — 4010F ACE/ARB THERAPY RXD/TAKEN: CPT | Mod: HCNC,CPTII,S$GLB, | Performed by: INTERNAL MEDICINE

## 2023-10-26 PROCEDURE — 1157F ADVNC CARE PLAN IN RCRD: CPT | Mod: HCNC,CPTII,S$GLB, | Performed by: INTERNAL MEDICINE

## 2023-10-26 PROCEDURE — 3288F FALL RISK ASSESSMENT DOCD: CPT | Mod: HCNC,CPTII,S$GLB, | Performed by: INTERNAL MEDICINE

## 2023-10-26 PROCEDURE — 3044F HG A1C LEVEL LT 7.0%: CPT | Mod: HCNC,CPTII,S$GLB, | Performed by: INTERNAL MEDICINE

## 2023-10-26 PROCEDURE — 3008F BODY MASS INDEX DOCD: CPT | Mod: HCNC,CPTII,S$GLB, | Performed by: INTERNAL MEDICINE

## 2023-10-26 PROCEDURE — 3079F PR MOST RECENT DIASTOLIC BLOOD PRESSURE 80-89 MM HG: ICD-10-PCS | Mod: HCNC,CPTII,S$GLB, | Performed by: INTERNAL MEDICINE

## 2023-10-26 PROCEDURE — 1126F PR PAIN SEVERITY QUANTIFIED, NO PAIN PRESENT: ICD-10-PCS | Mod: HCNC,CPTII,S$GLB, | Performed by: INTERNAL MEDICINE

## 2023-10-26 PROCEDURE — 1159F MED LIST DOCD IN RCRD: CPT | Mod: HCNC,CPTII,S$GLB, | Performed by: INTERNAL MEDICINE

## 2023-10-26 PROCEDURE — 4010F PR ACE/ARB THEARPY RXD/TAKEN: ICD-10-PCS | Mod: HCNC,CPTII,S$GLB, | Performed by: INTERNAL MEDICINE

## 2023-10-26 PROCEDURE — 3074F PR MOST RECENT SYSTOLIC BLOOD PRESSURE < 130 MM HG: ICD-10-PCS | Mod: HCNC,CPTII,S$GLB, | Performed by: INTERNAL MEDICINE

## 2023-10-26 PROCEDURE — 3044F PR MOST RECENT HEMOGLOBIN A1C LEVEL <7.0%: ICD-10-PCS | Mod: HCNC,CPTII,S$GLB, | Performed by: INTERNAL MEDICINE

## 2023-10-26 PROCEDURE — 3008F PR BODY MASS INDEX (BMI) DOCUMENTED: ICD-10-PCS | Mod: HCNC,CPTII,S$GLB, | Performed by: INTERNAL MEDICINE

## 2023-10-26 PROCEDURE — 80048 BASIC METABOLIC PNL TOTAL CA: CPT | Mod: HCNC | Performed by: INTERNAL MEDICINE

## 2023-10-26 PROCEDURE — 1101F PT FALLS ASSESS-DOCD LE1/YR: CPT | Mod: HCNC,CPTII,S$GLB, | Performed by: INTERNAL MEDICINE

## 2023-10-26 PROCEDURE — 99999 PR PBB SHADOW E&M-EST. PATIENT-LVL IV: CPT | Mod: PBBFAC,HCNC,, | Performed by: INTERNAL MEDICINE

## 2023-10-26 PROCEDURE — 3079F DIAST BP 80-89 MM HG: CPT | Mod: HCNC,CPTII,S$GLB, | Performed by: INTERNAL MEDICINE

## 2023-10-26 PROCEDURE — 3288F PR FALLS RISK ASSESSMENT DOCUMENTED: ICD-10-PCS | Mod: HCNC,CPTII,S$GLB, | Performed by: INTERNAL MEDICINE

## 2023-10-26 PROCEDURE — 1159F PR MEDICATION LIST DOCUMENTED IN MEDICAL RECORD: ICD-10-PCS | Mod: HCNC,CPTII,S$GLB, | Performed by: INTERNAL MEDICINE

## 2023-10-26 PROCEDURE — 99999 PR PBB SHADOW E&M-EST. PATIENT-LVL IV: ICD-10-PCS | Mod: PBBFAC,HCNC,, | Performed by: INTERNAL MEDICINE

## 2023-10-26 PROCEDURE — 99214 OFFICE O/P EST MOD 30 MIN: CPT | Mod: HCNC,S$GLB,, | Performed by: INTERNAL MEDICINE

## 2023-10-26 PROCEDURE — 3074F SYST BP LT 130 MM HG: CPT | Mod: HCNC,CPTII,S$GLB, | Performed by: INTERNAL MEDICINE

## 2023-10-26 PROCEDURE — 83036 HEMOGLOBIN GLYCOSYLATED A1C: CPT | Mod: HCNC | Performed by: INTERNAL MEDICINE

## 2023-10-26 PROCEDURE — 1126F AMNT PAIN NOTED NONE PRSNT: CPT | Mod: HCNC,CPTII,S$GLB, | Performed by: INTERNAL MEDICINE

## 2023-10-26 PROCEDURE — 1157F PR ADVANCE CARE PLAN OR EQUIV PRESENT IN MEDICAL RECORD: ICD-10-PCS | Mod: HCNC,CPTII,S$GLB, | Performed by: INTERNAL MEDICINE

## 2023-10-26 PROCEDURE — 36415 COLL VENOUS BLD VENIPUNCTURE: CPT | Mod: HCNC,PO | Performed by: INTERNAL MEDICINE

## 2023-10-26 PROCEDURE — 99214 PR OFFICE/OUTPT VISIT, EST, LEVL IV, 30-39 MIN: ICD-10-PCS | Mod: HCNC,S$GLB,, | Performed by: INTERNAL MEDICINE

## 2023-10-26 PROCEDURE — 1101F PR PT FALLS ASSESS DOC 0-1 FALLS W/OUT INJ PAST YR: ICD-10-PCS | Mod: HCNC,CPTII,S$GLB, | Performed by: INTERNAL MEDICINE

## 2023-10-26 RX ORDER — SILDENAFIL CITRATE 20 MG/1
TABLET ORAL
Qty: 40 TABLET | Refills: 11 | Status: SHIPPED | OUTPATIENT
Start: 2023-10-26

## 2023-10-26 NOTE — PROGRESS NOTES
"HPI:  Patient is a 73-year-old man who comes today for follow-up of his hypertension, chronic kidney disease, lipids, and obesity.  Patient states he is doing well.  He has no problems or complaints at this time.  He states his blood pressures been well controlled.  He plans to have shoulder replacement surgery done in about 3 months.    Current meds have been verified and updated per the EMR  Exam:/88 (BP Location: Right arm)   Pulse 65   Ht 5' 9" (1.753 m)   Wt 133.1 kg (293 lb 6.9 oz)   SpO2 (!) 93%   BMI 43.33 kg/m²   Carotids 2+ equal without bruits  Chest clear  Cardiovascular regular rate and rhythm without murmur gallop or rub    Lab Results   Component Value Date    WBC 11.63 10/19/2023    HGB 14.3 10/19/2023    HCT 43.9 10/19/2023     10/19/2023    CHOL 171 10/19/2023    TRIG 117 10/19/2023    HDL 31 (L) 10/19/2023    ALT 57 (H) 10/19/2023    AST 30 10/19/2023     10/19/2023    K 5.4 (H) 10/19/2023     10/19/2023    CREATININE 1.3 10/19/2023    BUN 39 (H) 10/19/2023    CO2 20 (L) 10/19/2023    TSH 0.218 (L) 10/19/2023    PSA 3.3 04/17/2023    INR 1.0 12/28/2022    HGBA1C 5.5 04/17/2023    URICACID 6.4 10/19/2023       Impression:  Elevated fasting blood sugar of 156, his A1cs was normal 6 months ago.  Hypertension and lipids both well controlled on current therapy  Chronic kidney disease, stable GFR  History of gout, asymptomatic  Obesity, BMI stable, reinforce he needs to lose some weight  Patient Active Problem List   Diagnosis    Essential hypertension    Hyperlipidemia    Generalized osteoarthrosis, involving multiple sites    Hypogonadism male    History of gout    BPH (benign prostatic hyperplasia)    Lumbar disc disease with radiculopathy    Morbid obesity with BMI of 40.0-44.9, adult    CKD (chronic kidney disease), stage III    Personal history of colonic polyps    Cervical myelopathy    Aortic atherosclerosis    IFG (impaired fasting glucose)       Plan:  Orders " Placed This Encounter    Basic Metabolic Panel    Hemoglobin A1C    Hemoglobin A1C    Comprehensive Metabolic Panel    Lipid Panel    TSH    CBC Auto Differential    PSA, Screening    Testosterone Panel    Uric Acid     Patient will have hemoglobin A1c and BMP done today.  We will call him with results.  He will have the other lab work done in 6 months.  Medications remain the same.    This note is generated with speech recognition software and is subject to transcription error and sound alike phrases that may be missed by proofreading.

## 2023-10-27 ENCOUNTER — PATIENT MESSAGE (OUTPATIENT)
Dept: INTERNAL MEDICINE | Facility: CLINIC | Age: 73
End: 2023-10-27
Payer: MEDICARE

## 2023-10-29 ENCOUNTER — PATIENT MESSAGE (OUTPATIENT)
Dept: INTERNAL MEDICINE | Facility: CLINIC | Age: 73
End: 2023-10-29
Payer: MEDICARE

## 2023-10-29 LAB
ALBUMIN SERPL-MCNC: 4.2 G/DL (ref 3.6–5.1)
SHBG SERPL-SCNC: 49 NMOL/L (ref 22–77)
TESTOST FREE SERPL-MCNC: 10.5 PG/ML (ref 6–73)
TESTOST SERPL-MCNC: 119 NG/DL (ref 250–1100)
TESTOSTERONE.FREE+WB SERPL-MCNC: 20.1 NG/DL (ref 15–150)

## 2023-11-02 ENCOUNTER — PATIENT MESSAGE (OUTPATIENT)
Dept: INTERNAL MEDICINE | Facility: CLINIC | Age: 73
End: 2023-11-02
Payer: MEDICARE

## 2023-11-06 ENCOUNTER — PATIENT MESSAGE (OUTPATIENT)
Dept: INTERNAL MEDICINE | Facility: CLINIC | Age: 73
End: 2023-11-06
Payer: MEDICARE

## 2023-11-06 RX ORDER — TESTOSTERONE CYPIONATE 200 MG/ML
300 INJECTION, SOLUTION INTRAMUSCULAR
Qty: 10 ML | Refills: 1 | Status: SHIPPED | OUTPATIENT
Start: 2023-11-06

## 2023-11-06 NOTE — TELEPHONE ENCOUNTER
No care due was identified.  [facility] Embedded Care Due Messages. Reference number: [id number].   11/06/2023 10:50:21 AM CST

## 2023-12-01 NOTE — TELEPHONE ENCOUNTER
No care due was identified.  [facility] Embedded Care Due Messages. Reference number: [id number].   12/01/2023 2:14:55 PM CST

## 2023-12-02 RX ORDER — CARVEDILOL 25 MG/1
25 TABLET ORAL 2 TIMES DAILY WITH MEALS
Qty: 180 TABLET | Refills: 3 | Status: SHIPPED | OUTPATIENT
Start: 2023-12-02

## 2023-12-02 RX ORDER — AMLODIPINE BESYLATE 10 MG/1
10 TABLET ORAL DAILY
Qty: 90 TABLET | Refills: 3 | Status: SHIPPED | OUTPATIENT
Start: 2023-12-02

## 2023-12-02 RX ORDER — LISINOPRIL 40 MG/1
40 TABLET ORAL DAILY
Qty: 90 TABLET | Refills: 3 | Status: SHIPPED | OUTPATIENT
Start: 2023-12-02

## 2023-12-02 NOTE — TELEPHONE ENCOUNTER
Refill Decision Note   Enoc Tobias  is requesting a refill authorization.  Brief Assessment and Rationale for Refill:  Approve     Medication Therapy Plan:         Comments:     Note composed:12:27 PM 12/02/2023

## 2024-01-01 ENCOUNTER — PATIENT MESSAGE (OUTPATIENT)
Dept: CARDIOLOGY | Facility: CLINIC | Age: 74
End: 2024-01-01
Payer: MEDICARE

## 2024-01-01 ENCOUNTER — HOSPITAL ENCOUNTER (INPATIENT)
Facility: HOSPITAL | Age: 74
LOS: 7 days | DRG: 870 | End: 2024-04-13
Attending: INTERNAL MEDICINE | Admitting: INTERNAL MEDICINE
Payer: MEDICARE

## 2024-01-01 ENCOUNTER — HOSPITAL ENCOUNTER (OUTPATIENT)
Dept: RADIOLOGY | Facility: HOSPITAL | Age: 74
Discharge: HOME | End: 2024-01-02
Attending: INTERNAL MEDICINE
Payer: MEDICARE

## 2024-01-01 ENCOUNTER — TELEPHONE (OUTPATIENT)
Dept: INTERNAL MEDICINE | Facility: CLINIC | Age: 74
End: 2024-01-01
Payer: MEDICARE

## 2024-01-01 ENCOUNTER — CLINICAL SUPPORT (OUTPATIENT)
Dept: CARDIOLOGY | Facility: CLINIC | Age: 74
End: 2024-01-01
Payer: MEDICARE

## 2024-01-01 ENCOUNTER — DOCUMENTATION ONLY (OUTPATIENT)
Dept: PEDIATRIC NEUROLOGY | Facility: CLINIC | Age: 74
End: 2024-01-01
Payer: MEDICARE

## 2024-01-01 ENCOUNTER — PATIENT MESSAGE (OUTPATIENT)
Dept: INTERNAL MEDICINE | Facility: CLINIC | Age: 74
End: 2024-01-01
Payer: MEDICARE

## 2024-01-01 ENCOUNTER — ANESTHESIA (OUTPATIENT)
Dept: ENDOSCOPY | Facility: HOSPITAL | Age: 74
DRG: 870 | End: 2024-01-01
Payer: MEDICARE

## 2024-01-01 ENCOUNTER — ANESTHESIA EVENT (OUTPATIENT)
Dept: ENDOSCOPY | Facility: HOSPITAL | Age: 74
DRG: 870 | End: 2024-01-01
Payer: MEDICARE

## 2024-01-01 ENCOUNTER — OFFICE VISIT (OUTPATIENT)
Dept: INTERNAL MEDICINE | Facility: CLINIC | Age: 74
End: 2024-01-01
Payer: MEDICARE

## 2024-01-01 ENCOUNTER — HOSPITAL ENCOUNTER (INPATIENT)
Facility: HOSPITAL | Age: 74
LOS: 1 days | Discharge: ANOTHER HOSPITAL | DRG: 438 | End: 2024-04-06
Attending: EMERGENCY MEDICINE | Admitting: INTERNAL MEDICINE
Payer: MEDICARE

## 2024-01-01 VITALS
TEMPERATURE: 97 F | WEIGHT: 289.88 LBS | HEIGHT: 66 IN | DIASTOLIC BLOOD PRESSURE: 64 MMHG | SYSTOLIC BLOOD PRESSURE: 109 MMHG | BODY MASS INDEX: 46.59 KG/M2 | OXYGEN SATURATION: 91 %

## 2024-01-01 VITALS
DIASTOLIC BLOOD PRESSURE: 50 MMHG | SYSTOLIC BLOOD PRESSURE: 83 MMHG | RESPIRATION RATE: 19 BRPM | BODY MASS INDEX: 42.83 KG/M2 | OXYGEN SATURATION: 98 % | TEMPERATURE: 100 F | HEART RATE: 74 BPM | WEIGHT: 290 LBS

## 2024-01-01 VITALS
OXYGEN SATURATION: 96 % | HEIGHT: 69 IN | WEIGHT: 284.63 LBS | DIASTOLIC BLOOD PRESSURE: 82 MMHG | BODY MASS INDEX: 42.16 KG/M2 | SYSTOLIC BLOOD PRESSURE: 126 MMHG | HEART RATE: 72 BPM

## 2024-01-01 DIAGNOSIS — I95.9 HYPOTENSION: ICD-10-CM

## 2024-01-01 DIAGNOSIS — E87.5 HYPERKALEMIA: ICD-10-CM

## 2024-01-01 DIAGNOSIS — Z01.818 PREOP EXAMINATION: Primary | ICD-10-CM

## 2024-01-01 DIAGNOSIS — K85.90 ACUTE PANCREATITIS, UNSPECIFIED COMPLICATION STATUS, UNSPECIFIED PANCREATITIS TYPE: Primary | ICD-10-CM

## 2024-01-01 DIAGNOSIS — R00.0 TACHYCARDIA: ICD-10-CM

## 2024-01-01 DIAGNOSIS — E87.70 VOLUME OVERLOAD: ICD-10-CM

## 2024-01-01 DIAGNOSIS — J80 ARDS (ADULT RESPIRATORY DISTRESS SYNDROME): ICD-10-CM

## 2024-01-01 DIAGNOSIS — M25.512 CHRONIC LEFT SHOULDER PAIN: ICD-10-CM

## 2024-01-01 DIAGNOSIS — N17.9 AKI (ACUTE KIDNEY INJURY): ICD-10-CM

## 2024-01-01 DIAGNOSIS — I48.91 A-FIB: ICD-10-CM

## 2024-01-01 DIAGNOSIS — J96.01 ACUTE HYPOXEMIC RESPIRATORY FAILURE: ICD-10-CM

## 2024-01-01 DIAGNOSIS — R57.9 SHOCK: Primary | ICD-10-CM

## 2024-01-01 DIAGNOSIS — I70.0 AORTIC ATHEROSCLEROSIS: ICD-10-CM

## 2024-01-01 DIAGNOSIS — K85.10 ACUTE BILIARY PANCREATITIS WITHOUT INFECTION OR NECROSIS: ICD-10-CM

## 2024-01-01 DIAGNOSIS — G89.29 CHRONIC LEFT SHOULDER PAIN: ICD-10-CM

## 2024-01-01 DIAGNOSIS — Z01.818 PREOP EXAMINATION: ICD-10-CM

## 2024-01-01 DIAGNOSIS — R10.13 EPIGASTRIC PAIN: ICD-10-CM

## 2024-01-01 DIAGNOSIS — I10 ESSENTIAL HYPERTENSION: Primary | ICD-10-CM

## 2024-01-01 DIAGNOSIS — E66.01 MORBID OBESITY WITH BMI OF 40.0-44.9, ADULT: ICD-10-CM

## 2024-01-01 DIAGNOSIS — K85.90 ACUTE PANCREATITIS: ICD-10-CM

## 2024-01-01 DIAGNOSIS — N18.31 STAGE 3A CHRONIC KIDNEY DISEASE: ICD-10-CM

## 2024-01-01 LAB
ABO + RH BLD: NORMAL
ALBUMIN SERPL BCP-MCNC: 1.3 G/DL (ref 3.5–5.2)
ALBUMIN SERPL BCP-MCNC: 1.3 G/DL (ref 3.5–5.2)
ALBUMIN SERPL BCP-MCNC: 1.4 G/DL (ref 3.5–5.2)
ALBUMIN SERPL BCP-MCNC: 1.4 G/DL (ref 3.5–5.2)
ALBUMIN SERPL BCP-MCNC: 1.7 G/DL (ref 3.5–5.2)
ALBUMIN SERPL BCP-MCNC: 1.7 G/DL (ref 3.5–5.2)
ALBUMIN SERPL BCP-MCNC: 1.8 G/DL (ref 3.5–5.2)
ALBUMIN SERPL BCP-MCNC: 1.9 G/DL (ref 3.5–5.2)
ALBUMIN SERPL BCP-MCNC: 2 G/DL (ref 3.5–5.2)
ALBUMIN SERPL BCP-MCNC: 2.1 G/DL (ref 3.5–5.2)
ALBUMIN SERPL BCP-MCNC: 2.5 G/DL (ref 3.5–5.2)
ALBUMIN SERPL BCP-MCNC: 2.5 G/DL (ref 3.5–5.2)
ALBUMIN SERPL BCP-MCNC: 2.6 G/DL (ref 3.5–5.2)
ALBUMIN SERPL BCP-MCNC: 2.9 G/DL (ref 3.5–5.2)
ALBUMIN SERPL BCP-MCNC: 2.9 G/DL (ref 3.5–5.2)
ALBUMIN SERPL BCP-MCNC: 3 G/DL (ref 3.5–5.2)
ALBUMIN SERPL BCP-MCNC: 3.1 G/DL (ref 3.5–5.2)
ALBUMIN SERPL BCP-MCNC: 3.2 G/DL (ref 3.5–5.2)
ALBUMIN SERPL BCP-MCNC: 3.5 G/DL (ref 3.5–5.2)
ALBUMIN SERPL BCP-MCNC: 3.5 G/DL (ref 3.5–5.2)
ALLENS TEST: ABNORMAL
ALP SERPL-CCNC: 104 U/L (ref 55–135)
ALP SERPL-CCNC: 111 U/L (ref 55–135)
ALP SERPL-CCNC: 118 U/L (ref 55–135)
ALP SERPL-CCNC: 119 U/L (ref 55–135)
ALP SERPL-CCNC: 132 U/L (ref 55–135)
ALP SERPL-CCNC: 165 U/L (ref 55–135)
ALP SERPL-CCNC: 179 U/L (ref 55–135)
ALP SERPL-CCNC: 288 U/L (ref 55–135)
ALP SERPL-CCNC: 91 U/L (ref 55–135)
ALP SERPL-CCNC: 93 U/L (ref 55–135)
ALP SERPL-CCNC: 96 U/L (ref 55–135)
ALP SERPL-CCNC: 98 U/L (ref 55–135)
ALP SERPL-CCNC: 98 U/L (ref 55–135)
ALT SERPL W/O P-5'-P-CCNC: 133 U/L (ref 10–44)
ALT SERPL W/O P-5'-P-CCNC: 148 U/L (ref 10–44)
ALT SERPL W/O P-5'-P-CCNC: 150 U/L (ref 10–44)
ALT SERPL W/O P-5'-P-CCNC: 153 U/L (ref 10–44)
ALT SERPL W/O P-5'-P-CCNC: 158 U/L (ref 10–44)
ALT SERPL W/O P-5'-P-CCNC: 159 U/L (ref 10–44)
ALT SERPL W/O P-5'-P-CCNC: 207 U/L (ref 10–44)
ALT SERPL W/O P-5'-P-CCNC: 232 U/L (ref 10–44)
ALT SERPL W/O P-5'-P-CCNC: 236 U/L (ref 10–44)
ALT SERPL W/O P-5'-P-CCNC: 249 U/L (ref 10–44)
ALT SERPL W/O P-5'-P-CCNC: 251 U/L (ref 10–44)
ALT SERPL W/O P-5'-P-CCNC: 257 U/L (ref 10–44)
ALT SERPL W/O P-5'-P-CCNC: 3818 U/L (ref 10–44)
AMYLASE SERPL-CCNC: 1207 U/L (ref 20–110)
AMYLASE SERPL-CCNC: 219 U/L (ref 20–110)
AMYLASE SERPL-CCNC: 481 U/L (ref 20–110)
ANION GAP SERPL CALC-SCNC: 10 MMOL/L (ref 8–16)
ANION GAP SERPL CALC-SCNC: 12 MMOL/L (ref 8–16)
ANION GAP SERPL CALC-SCNC: 13 MMOL/L (ref 8–16)
ANION GAP SERPL CALC-SCNC: 14 MMOL/L (ref 8–16)
ANION GAP SERPL CALC-SCNC: 15 MMOL/L (ref 8–16)
ANION GAP SERPL CALC-SCNC: 16 MMOL/L (ref 8–16)
ANION GAP SERPL CALC-SCNC: 17 MMOL/L (ref 8–16)
ANION GAP SERPL CALC-SCNC: 17 MMOL/L (ref 8–16)
ANION GAP SERPL CALC-SCNC: 8 MMOL/L (ref 8–16)
ANION GAP SERPL CALC-SCNC: 9 MMOL/L (ref 8–16)
ANION GAP SERPL CALC-SCNC: 9 MMOL/L (ref 8–16)
ANISOCYTOSIS BLD QL SMEAR: ABNORMAL
ANISOCYTOSIS BLD QL SMEAR: SLIGHT
APTT PPP: 37.3 SEC (ref 21–32)
ASCENDING AORTA: 4.19 CM
AST SERPL-CCNC: 106 U/L (ref 10–40)
AST SERPL-CCNC: 133 U/L (ref 10–40)
AST SERPL-CCNC: 141 U/L (ref 10–40)
AST SERPL-CCNC: 145 U/L (ref 10–40)
AST SERPL-CCNC: 164 U/L (ref 10–40)
AST SERPL-CCNC: 184 U/L (ref 10–40)
AST SERPL-CCNC: 278 U/L (ref 10–40)
AST SERPL-CCNC: 306 U/L (ref 10–40)
AST SERPL-CCNC: 318 U/L (ref 10–40)
AST SERPL-CCNC: 326 U/L (ref 10–40)
AST SERPL-CCNC: 387 U/L (ref 10–40)
AST SERPL-CCNC: 421 U/L (ref 10–40)
AST SERPL-CCNC: ABNORMAL U/L (ref 10–40)
AV INDEX (PROSTH): 1.54
AV MEAN GRADIENT: 2 MMHG
AV PEAK GRADIENT: 3 MMHG
AV VALVE AREA BY VELOCITY RATIO: 3.53 CM²
AV VALVE AREA: 5.81 CM²
AV VELOCITY RATIO: 0.94
BACTERIA #/AREA URNS AUTO: ABNORMAL /HPF
BACTERIA BLD CULT: ABNORMAL
BACTERIA BLD CULT: NORMAL
BASO STIPL BLD QL SMEAR: ABNORMAL
BASOPHILS # BLD AUTO: 0.04 K/UL (ref 0–0.2)
BASOPHILS # BLD AUTO: 0.05 K/UL (ref 0–0.2)
BASOPHILS # BLD AUTO: 0.08 K/UL (ref 0–0.2)
BASOPHILS # BLD AUTO: 0.12 K/UL (ref 0–0.2)
BASOPHILS # BLD AUTO: ABNORMAL K/UL (ref 0–0.2)
BASOPHILS # BLD AUTO: ABNORMAL K/UL (ref 0–0.2)
BASOPHILS NFR BLD: 0 % (ref 0–1.9)
BASOPHILS NFR BLD: 0.3 % (ref 0–1.9)
BASOPHILS NFR BLD: 0.3 % (ref 0–1.9)
BASOPHILS NFR BLD: 0.4 % (ref 0–1.9)
BASOPHILS NFR BLD: 0.4 % (ref 0–1.9)
BASOPHILS NFR BLD: 0.5 % (ref 0–1.9)
BASOPHILS NFR BLD: 0.6 % (ref 0–1.9)
BASOPHILS NFR BLD: 0.7 % (ref 0–1.9)
BILIRUB DIRECT SERPL-MCNC: 2 MG/DL (ref 0.1–0.3)
BILIRUB DIRECT SERPL-MCNC: 8.9 MG/DL (ref 0.1–0.3)
BILIRUB SERPL-MCNC: 12.1 MG/DL (ref 0.1–1)
BILIRUB SERPL-MCNC: 12.5 MG/DL (ref 0.1–1)
BILIRUB SERPL-MCNC: 12.6 MG/DL (ref 0.1–1)
BILIRUB SERPL-MCNC: 12.9 MG/DL (ref 0.1–1)
BILIRUB SERPL-MCNC: 14.1 MG/DL (ref 0.1–1)
BILIRUB SERPL-MCNC: 14.8 MG/DL (ref 0.1–1)
BILIRUB SERPL-MCNC: 3.6 MG/DL (ref 0.1–1)
BILIRUB SERPL-MCNC: 7.8 MG/DL (ref 0.1–1)
BILIRUB SERPL-MCNC: 7.8 MG/DL (ref 0.1–1)
BILIRUB SERPL-MCNC: 8.2 MG/DL (ref 0.1–1)
BILIRUB SERPL-MCNC: 8.2 MG/DL (ref 0.1–1)
BILIRUB SERPL-MCNC: 9.1 MG/DL (ref 0.1–1)
BILIRUB SERPL-MCNC: 9.3 MG/DL (ref 0.1–1)
BILIRUB UR QL STRIP: ABNORMAL
BLD GP AB SCN CELLS X3 SERPL QL: NORMAL
BNP SERPL-MCNC: 267 PG/ML (ref 0–99)
BNP SERPL-MCNC: 28 PG/ML (ref 0–99)
BSA FOR ECHO PROCEDURE: 2.47 M2
BUN SERPL-MCNC: 12 MG/DL (ref 8–23)
BUN SERPL-MCNC: 15 MG/DL (ref 8–23)
BUN SERPL-MCNC: 15 MG/DL (ref 8–23)
BUN SERPL-MCNC: 16 MG/DL (ref 8–23)
BUN SERPL-MCNC: 17 MG/DL (ref 8–23)
BUN SERPL-MCNC: 18 MG/DL (ref 8–23)
BUN SERPL-MCNC: 18 MG/DL (ref 8–23)
BUN SERPL-MCNC: 19 MG/DL (ref 8–23)
BUN SERPL-MCNC: 20 MG/DL (ref 8–23)
BUN SERPL-MCNC: 20 MG/DL (ref 8–23)
BUN SERPL-MCNC: 21 MG/DL (ref 8–23)
BUN SERPL-MCNC: 22 MG/DL (ref 8–23)
BUN SERPL-MCNC: 23 MG/DL (ref 8–23)
BUN SERPL-MCNC: 23 MG/DL (ref 8–23)
BUN SERPL-MCNC: 25 MG/DL (ref 8–23)
BUN SERPL-MCNC: 26 MG/DL (ref 8–23)
BUN SERPL-MCNC: 27 MG/DL (ref 8–23)
BUN SERPL-MCNC: 32 MG/DL (ref 8–23)
BUN SERPL-MCNC: 32 MG/DL (ref 8–23)
BUN SERPL-MCNC: 36 MG/DL (ref 8–23)
BUN SERPL-MCNC: 38 MG/DL (ref 8–23)
BUN SERPL-MCNC: 42 MG/DL (ref 8–23)
BUN SERPL-MCNC: 44 MG/DL (ref 8–23)
BUN SERPL-MCNC: 47 MG/DL (ref 8–23)
BUN SERPL-MCNC: 52 MG/DL (ref 8–23)
BUN SERPL-MCNC: 53 MG/DL (ref 8–23)
BURR CELLS BLD QL SMEAR: ABNORMAL
CA-I BLDV-SCNC: 0.7 MMOL/L (ref 1.06–1.42)
CA-I BLDV-SCNC: 0.79 MMOL/L (ref 1.06–1.42)
CA-I BLDV-SCNC: 0.79 MMOL/L (ref 1.06–1.42)
CA-I BLDV-SCNC: 0.87 MMOL/L (ref 1.06–1.42)
CA-I BLDV-SCNC: 0.87 MMOL/L (ref 1.06–1.42)
CA-I BLDV-SCNC: 0.88 MMOL/L (ref 1.06–1.42)
CA-I BLDV-SCNC: 0.95 MMOL/L (ref 1.06–1.42)
CA-I BLDV-SCNC: 0.96 MMOL/L (ref 1.06–1.42)
CA-I BLDV-SCNC: 0.99 MMOL/L (ref 1.06–1.42)
CA-I BLDV-SCNC: 1.01 MMOL/L (ref 1.06–1.42)
CA-I BLDV-SCNC: 1.02 MMOL/L (ref 1.06–1.42)
CA-I BLDV-SCNC: 1.03 MMOL/L (ref 1.06–1.42)
CA-I BLDV-SCNC: 1.04 MMOL/L (ref 1.06–1.42)
CA-I BLDV-SCNC: 1.05 MMOL/L (ref 1.06–1.42)
CA-I BLDV-SCNC: 1.05 MMOL/L (ref 1.06–1.42)
CA-I BLDV-SCNC: 1.06 MMOL/L (ref 1.06–1.42)
CA-I BLDV-SCNC: 1.06 MMOL/L (ref 1.06–1.42)
CALCIUM SERPL-MCNC: 4.3 MG/DL (ref 8.7–10.5)
CALCIUM SERPL-MCNC: 5.2 MG/DL (ref 8.7–10.5)
CALCIUM SERPL-MCNC: 5.2 MG/DL (ref 8.7–10.5)
CALCIUM SERPL-MCNC: 7.1 MG/DL (ref 8.7–10.5)
CALCIUM SERPL-MCNC: 7.2 MG/DL (ref 8.7–10.5)
CALCIUM SERPL-MCNC: 7.2 MG/DL (ref 8.7–10.5)
CALCIUM SERPL-MCNC: 7.3 MG/DL (ref 8.7–10.5)
CALCIUM SERPL-MCNC: 7.6 MG/DL (ref 8.7–10.5)
CALCIUM SERPL-MCNC: 7.6 MG/DL (ref 8.7–10.5)
CALCIUM SERPL-MCNC: 7.8 MG/DL (ref 8.7–10.5)
CALCIUM SERPL-MCNC: 7.8 MG/DL (ref 8.7–10.5)
CALCIUM SERPL-MCNC: 7.9 MG/DL (ref 8.7–10.5)
CALCIUM SERPL-MCNC: 8 MG/DL (ref 8.7–10.5)
CALCIUM SERPL-MCNC: 8 MG/DL (ref 8.7–10.5)
CALCIUM SERPL-MCNC: 8.1 MG/DL (ref 8.7–10.5)
CALCIUM SERPL-MCNC: 8.1 MG/DL (ref 8.7–10.5)
CALCIUM SERPL-MCNC: 8.2 MG/DL (ref 8.7–10.5)
CALCIUM SERPL-MCNC: 8.3 MG/DL (ref 8.7–10.5)
CALCIUM SERPL-MCNC: 8.5 MG/DL (ref 8.7–10.5)
CALCIUM SERPL-MCNC: 8.7 MG/DL (ref 8.7–10.5)
CALCIUM SERPL-MCNC: 8.9 MG/DL (ref 8.7–10.5)
CALCIUM SERPL-MCNC: 9 MG/DL (ref 8.7–10.5)
CALCIUM SERPL-MCNC: 9.1 MG/DL (ref 8.7–10.5)
CALCIUM SERPL-MCNC: 9.2 MG/DL (ref 8.7–10.5)
CALCIUM SERPL-MCNC: 9.2 MG/DL (ref 8.7–10.5)
CHLORIDE SERPL-SCNC: 101 MMOL/L (ref 95–110)
CHLORIDE SERPL-SCNC: 103 MMOL/L (ref 95–110)
CHLORIDE SERPL-SCNC: 104 MMOL/L (ref 95–110)
CHLORIDE SERPL-SCNC: 105 MMOL/L (ref 95–110)
CHLORIDE SERPL-SCNC: 106 MMOL/L (ref 95–110)
CHLORIDE SERPL-SCNC: 107 MMOL/L (ref 95–110)
CHLORIDE SERPL-SCNC: 107 MMOL/L (ref 95–110)
CHLORIDE SERPL-SCNC: 108 MMOL/L (ref 95–110)
CHLORIDE SERPL-SCNC: 85 MMOL/L (ref 95–110)
CHLORIDE SERPL-SCNC: 86 MMOL/L (ref 95–110)
CHLORIDE SERPL-SCNC: 87 MMOL/L (ref 95–110)
CHLORIDE SERPL-SCNC: 88 MMOL/L (ref 95–110)
CHLORIDE SERPL-SCNC: 88 MMOL/L (ref 95–110)
CHLORIDE SERPL-SCNC: 89 MMOL/L (ref 95–110)
CHLORIDE SERPL-SCNC: 90 MMOL/L (ref 95–110)
CHLORIDE SERPL-SCNC: 90 MMOL/L (ref 95–110)
CHLORIDE SERPL-SCNC: 92 MMOL/L (ref 95–110)
CHLORIDE SERPL-SCNC: 93 MMOL/L (ref 95–110)
CHLORIDE SERPL-SCNC: 94 MMOL/L (ref 95–110)
CHLORIDE SERPL-SCNC: 94 MMOL/L (ref 95–110)
CHLORIDE SERPL-SCNC: 95 MMOL/L (ref 95–110)
CHLORIDE SERPL-SCNC: 96 MMOL/L (ref 95–110)
CHLORIDE SERPL-SCNC: 97 MMOL/L (ref 95–110)
CHLORIDE SERPL-SCNC: 97 MMOL/L (ref 95–110)
CHLORIDE SERPL-SCNC: 98 MMOL/L (ref 95–110)
CHLORIDE SERPL-SCNC: 99 MMOL/L (ref 95–110)
CK SERPL-CCNC: 2982 U/L (ref 20–200)
CK SERPL-CCNC: ABNORMAL U/L (ref 20–200)
CLARITY UR REFRACT.AUTO: ABNORMAL
CO2 SERPL-SCNC: 12 MMOL/L (ref 23–29)
CO2 SERPL-SCNC: 13 MMOL/L (ref 23–29)
CO2 SERPL-SCNC: 14 MMOL/L (ref 23–29)
CO2 SERPL-SCNC: 15 MMOL/L (ref 23–29)
CO2 SERPL-SCNC: 16 MMOL/L (ref 23–29)
CO2 SERPL-SCNC: 17 MMOL/L (ref 23–29)
CO2 SERPL-SCNC: 18 MMOL/L (ref 23–29)
CO2 SERPL-SCNC: 19 MMOL/L (ref 23–29)
CO2 SERPL-SCNC: 20 MMOL/L (ref 23–29)
CO2 SERPL-SCNC: 21 MMOL/L (ref 23–29)
CO2 SERPL-SCNC: 22 MMOL/L (ref 23–29)
CO2 SERPL-SCNC: 23 MMOL/L (ref 23–29)
CO2 SERPL-SCNC: 23 MMOL/L (ref 23–29)
CO2 SERPL-SCNC: 24 MMOL/L (ref 23–29)
CO2 SERPL-SCNC: 24 MMOL/L (ref 23–29)
COLOR UR AUTO: YELLOW
CREAT SERPL-MCNC: 1 MG/DL (ref 0.5–1.4)
CREAT SERPL-MCNC: 1 MG/DL (ref 0.5–1.4)
CREAT SERPL-MCNC: 1.1 MG/DL (ref 0.5–1.4)
CREAT SERPL-MCNC: 1.2 MG/DL (ref 0.5–1.4)
CREAT SERPL-MCNC: 1.3 MG/DL (ref 0.5–1.4)
CREAT SERPL-MCNC: 1.3 MG/DL (ref 0.5–1.4)
CREAT SERPL-MCNC: 1.5 MG/DL (ref 0.5–1.4)
CREAT SERPL-MCNC: 1.6 MG/DL (ref 0.5–1.4)
CREAT SERPL-MCNC: 1.7 MG/DL (ref 0.5–1.4)
CREAT SERPL-MCNC: 1.8 MG/DL (ref 0.5–1.4)
CREAT SERPL-MCNC: 1.8 MG/DL (ref 0.5–1.4)
CREAT SERPL-MCNC: 1.9 MG/DL (ref 0.5–1.4)
CREAT SERPL-MCNC: 1.9 MG/DL (ref 0.5–1.4)
CREAT SERPL-MCNC: 2 MG/DL (ref 0.5–1.4)
CREAT SERPL-MCNC: 2.4 MG/DL (ref 0.5–1.4)
CREAT SERPL-MCNC: 3.1 MG/DL (ref 0.5–1.4)
CREAT SERPL-MCNC: 3.2 MG/DL (ref 0.5–1.4)
CREAT SERPL-MCNC: 3.3 MG/DL (ref 0.5–1.4)
CREAT SERPL-MCNC: 3.4 MG/DL (ref 0.5–1.4)
CREAT SERPL-MCNC: 3.6 MG/DL (ref 0.5–1.4)
CREAT SERPL-MCNC: 3.7 MG/DL (ref 0.5–1.4)
CREAT SERPL-MCNC: 4 MG/DL (ref 0.5–1.4)
CREAT SERPL-MCNC: 4.1 MG/DL (ref 0.5–1.4)
CREAT SERPL-MCNC: 4.4 MG/DL (ref 0.5–1.4)
CREAT SERPL-MCNC: 4.5 MG/DL (ref 0.5–1.4)
CV ECHO LV RWT: 0.38 CM
DACRYOCYTES BLD QL SMEAR: ABNORMAL
DACRYOCYTES BLD QL SMEAR: ABNORMAL
DELSYS: ABNORMAL
DIFFERENTIAL METHOD BLD: ABNORMAL
DOHLE BOD BLD QL SMEAR: PRESENT
DOP CALC AO PEAK VEL: 0.81 M/S
DOP CALC AO VTI: 8.55 CM
DOP CALC LVOT AREA: 3.8 CM2
DOP CALC LVOT DIAMETER: 2.19 CM
DOP CALC LVOT PEAK VEL: 0.76 M/S
DOP CALC LVOT STROKE VOLUME: 49.66 CM3
DOP CALCLVOT PEAK VEL VTI: 13.19 CM
E/E' RATIO: 4.4 M/S
ECHO LV POSTERIOR WALL: 0.9 CM (ref 0.6–1.1)
EOSINOPHIL # BLD AUTO: 0 K/UL (ref 0–0.5)
EOSINOPHIL # BLD AUTO: 0.1 K/UL (ref 0–0.5)
EOSINOPHIL # BLD AUTO: 0.1 K/UL (ref 0–0.5)
EOSINOPHIL # BLD AUTO: 0.2 K/UL (ref 0–0.5)
EOSINOPHIL # BLD AUTO: ABNORMAL K/UL (ref 0–0.5)
EOSINOPHIL # BLD AUTO: ABNORMAL K/UL (ref 0–0.5)
EOSINOPHIL NFR BLD: 0 % (ref 0–8)
EOSINOPHIL NFR BLD: 0.8 % (ref 0–8)
EOSINOPHIL NFR BLD: 1 % (ref 0–8)
EOSINOPHIL NFR BLD: 1.1 % (ref 0–8)
EOSINOPHIL NFR BLD: 1.1 % (ref 0–8)
EOSINOPHIL NFR BLD: 1.3 % (ref 0–8)
EP: 8
ERYTHROCYTE [DISTWIDTH] IN BLOOD BY AUTOMATED COUNT: 13.8 % (ref 11.5–14.5)
ERYTHROCYTE [DISTWIDTH] IN BLOOD BY AUTOMATED COUNT: 14.4 % (ref 11.5–14.5)
ERYTHROCYTE [DISTWIDTH] IN BLOOD BY AUTOMATED COUNT: 14.6 % (ref 11.5–14.5)
ERYTHROCYTE [DISTWIDTH] IN BLOOD BY AUTOMATED COUNT: 14.7 % (ref 11.5–14.5)
ERYTHROCYTE [DISTWIDTH] IN BLOOD BY AUTOMATED COUNT: 14.8 % (ref 11.5–14.5)
ERYTHROCYTE [DISTWIDTH] IN BLOOD BY AUTOMATED COUNT: 14.9 % (ref 11.5–14.5)
ERYTHROCYTE [DISTWIDTH] IN BLOOD BY AUTOMATED COUNT: 15.4 % (ref 11.5–14.5)
ERYTHROCYTE [DISTWIDTH] IN BLOOD BY AUTOMATED COUNT: 15.4 % (ref 11.5–14.5)
ERYTHROCYTE [DISTWIDTH] IN BLOOD BY AUTOMATED COUNT: 15.7 % (ref 11.5–14.5)
ERYTHROCYTE [DISTWIDTH] IN BLOOD BY AUTOMATED COUNT: 15.8 % (ref 11.5–14.5)
ERYTHROCYTE [DISTWIDTH] IN BLOOD BY AUTOMATED COUNT: 18 % (ref 11.5–14.5)
ERYTHROCYTE [DISTWIDTH] IN BLOOD BY AUTOMATED COUNT: 18.1 % (ref 11.5–14.5)
ERYTHROCYTE [SEDIMENTATION RATE] IN BLOOD BY WESTERGREN METHOD: 15 MM/H
ERYTHROCYTE [SEDIMENTATION RATE] IN BLOOD BY WESTERGREN METHOD: 24 MM/H
ERYTHROCYTE [SEDIMENTATION RATE] IN BLOOD BY WESTERGREN METHOD: 26 MM/H
ERYTHROCYTE [SEDIMENTATION RATE] IN BLOOD BY WESTERGREN METHOD: 32 MM/H
EST. GFR  (NO RACE VARIABLE): 13.2 ML/MIN/1.73 M^2
EST. GFR  (NO RACE VARIABLE): 13.5 ML/MIN/1.73 M^2
EST. GFR  (NO RACE VARIABLE): 15 ML/MIN/1.73 M^2
EST. GFR  (NO RACE VARIABLE): 15.2 ML/MIN/1.73 M^2
EST. GFR  (NO RACE VARIABLE): 17 ML/MIN/1.73 M^2
EST. GFR  (NO RACE VARIABLE): 17.2 ML/MIN/1.73 M^2
EST. GFR  (NO RACE VARIABLE): 18.4 ML/MIN/1.73 M^2
EST. GFR  (NO RACE VARIABLE): 19.1 ML/MIN/1.73 M^2
EST. GFR  (NO RACE VARIABLE): 20 ML/MIN/1.73 M^2
EST. GFR  (NO RACE VARIABLE): 20.6 ML/MIN/1.73 M^2
EST. GFR  (NO RACE VARIABLE): 28 ML/MIN/1.73 M^2
EST. GFR  (NO RACE VARIABLE): 34.8 ML/MIN/1.73 M^2
EST. GFR  (NO RACE VARIABLE): 37 ML/MIN/1.73 M^2
EST. GFR  (NO RACE VARIABLE): 37 ML/MIN/1.73 M^2
EST. GFR  (NO RACE VARIABLE): 39.5 ML/MIN/1.73 M^2
EST. GFR  (NO RACE VARIABLE): 39.5 ML/MIN/1.73 M^2
EST. GFR  (NO RACE VARIABLE): 42.3 ML/MIN/1.73 M^2
EST. GFR  (NO RACE VARIABLE): 45 ML/MIN/1.73 M^2
EST. GFR  (NO RACE VARIABLE): 45.5 ML/MIN/1.73 M^2
EST. GFR  (NO RACE VARIABLE): 49.2 ML/MIN/1.73 M^2
EST. GFR  (NO RACE VARIABLE): 58.4 ML/MIN/1.73 M^2
EST. GFR  (NO RACE VARIABLE): 58.4 ML/MIN/1.73 M^2
EST. GFR  (NO RACE VARIABLE): >60 ML/MIN/1.73 M^2
FIO2: 100
FIO2: 40
FIO2: 70
FIO2: 70
FIO2: 80
FIO2: 80
FLOW: 5
FRACTIONAL SHORTENING: 42 % (ref 28–44)
GIANT PLATELETS BLD QL SMEAR: PRESENT
GIANT PLATELETS BLD QL SMEAR: PRESENT
GLUCOSE SERPL-MCNC: 106 MG/DL (ref 70–110)
GLUCOSE SERPL-MCNC: 118 MG/DL (ref 70–110)
GLUCOSE SERPL-MCNC: 137 MG/DL (ref 70–110)
GLUCOSE SERPL-MCNC: 155 MG/DL (ref 70–110)
GLUCOSE SERPL-MCNC: 175 MG/DL (ref 70–110)
GLUCOSE SERPL-MCNC: 175 MG/DL (ref 70–110)
GLUCOSE SERPL-MCNC: 176 MG/DL (ref 70–110)
GLUCOSE SERPL-MCNC: 177 MG/DL (ref 70–110)
GLUCOSE SERPL-MCNC: 181 MG/DL (ref 70–110)
GLUCOSE SERPL-MCNC: 181 MG/DL (ref 70–110)
GLUCOSE SERPL-MCNC: 189 MG/DL (ref 70–110)
GLUCOSE SERPL-MCNC: 193 MG/DL (ref 70–110)
GLUCOSE SERPL-MCNC: 196 MG/DL (ref 70–110)
GLUCOSE SERPL-MCNC: 197 MG/DL (ref 70–110)
GLUCOSE SERPL-MCNC: 199 MG/DL (ref 70–110)
GLUCOSE SERPL-MCNC: 207 MG/DL (ref 70–110)
GLUCOSE SERPL-MCNC: 208 MG/DL (ref 70–110)
GLUCOSE SERPL-MCNC: 219 MG/DL (ref 70–110)
GLUCOSE SERPL-MCNC: 220 MG/DL (ref 70–110)
GLUCOSE SERPL-MCNC: 223 MG/DL (ref 70–110)
GLUCOSE SERPL-MCNC: 227 MG/DL (ref 70–110)
GLUCOSE SERPL-MCNC: 227 MG/DL (ref 70–110)
GLUCOSE SERPL-MCNC: 243 MG/DL (ref 70–110)
GLUCOSE SERPL-MCNC: 250 MG/DL (ref 70–110)
GLUCOSE SERPL-MCNC: 294 MG/DL (ref 70–110)
GLUCOSE SERPL-MCNC: 70 MG/DL (ref 70–110)
GLUCOSE SERPL-MCNC: 70 MG/DL (ref 70–110)
GLUCOSE UR QL STRIP: ABNORMAL
HCO3 UR-SCNC: 15.3 MMOL/L (ref 24–28)
HCO3 UR-SCNC: 15.7 MMOL/L (ref 24–28)
HCO3 UR-SCNC: 17.9 MMOL/L (ref 24–28)
HCO3 UR-SCNC: 18 MMOL/L (ref 24–28)
HCO3 UR-SCNC: 18.1 MMOL/L (ref 24–28)
HCO3 UR-SCNC: 19.2 MMOL/L (ref 24–28)
HCO3 UR-SCNC: 20.2 MMOL/L (ref 24–28)
HCO3 UR-SCNC: 20.9 MMOL/L (ref 24–28)
HCO3 UR-SCNC: 21 MMOL/L (ref 24–28)
HCO3 UR-SCNC: 21.1 MMOL/L (ref 24–28)
HCO3 UR-SCNC: 21.3 MMOL/L (ref 24–28)
HCO3 UR-SCNC: 21.6 MMOL/L (ref 24–28)
HCO3 UR-SCNC: 21.6 MMOL/L (ref 24–28)
HCO3 UR-SCNC: 21.7 MMOL/L (ref 24–28)
HCO3 UR-SCNC: 21.9 MMOL/L (ref 24–28)
HCO3 UR-SCNC: 22 MMOL/L (ref 24–28)
HCO3 UR-SCNC: 22.5 MMOL/L (ref 24–28)
HCO3 UR-SCNC: 23 MMOL/L (ref 24–28)
HCO3 UR-SCNC: 23.8 MMOL/L (ref 24–28)
HCO3 UR-SCNC: 24.8 MMOL/L (ref 24–28)
HCO3 UR-SCNC: 25.3 MMOL/L (ref 24–28)
HCO3 UR-SCNC: 25.8 MMOL/L (ref 24–28)
HCO3 UR-SCNC: 26.7 MMOL/L (ref 24–28)
HCO3 UR-SCNC: 26.9 MMOL/L (ref 24–28)
HCT VFR BLD AUTO: 31.8 % (ref 40–54)
HCT VFR BLD AUTO: 38.8 % (ref 40–54)
HCT VFR BLD AUTO: 41.4 % (ref 40–54)
HCT VFR BLD AUTO: 41.6 % (ref 40–54)
HCT VFR BLD AUTO: 42.4 % (ref 40–54)
HCT VFR BLD AUTO: 42.7 % (ref 40–54)
HCT VFR BLD AUTO: 42.7 % (ref 40–54)
HCT VFR BLD AUTO: 45.5 % (ref 40–54)
HCT VFR BLD AUTO: 46.8 % (ref 40–54)
HCT VFR BLD AUTO: 46.8 % (ref 40–54)
HCT VFR BLD AUTO: 47.4 % (ref 40–54)
HCT VFR BLD AUTO: 48 % (ref 40–54)
HCT VFR BLD AUTO: 48.8 % (ref 40–54)
HCT VFR BLD CALC: 45 %PCV (ref 36–54)
HCT VFR BLD CALC: 47 %PCV (ref 36–54)
HCT VFR BLD CALC: 48 %PCV (ref 36–54)
HCT VFR BLD CALC: 49 %PCV (ref 36–54)
HCT VFR BLD CALC: 50 %PCV (ref 36–54)
HGB BLD-MCNC: 10.9 G/DL (ref 14–18)
HGB BLD-MCNC: 13.8 G/DL (ref 14–18)
HGB BLD-MCNC: 14.4 G/DL (ref 14–18)
HGB BLD-MCNC: 14.6 G/DL (ref 14–18)
HGB BLD-MCNC: 14.6 G/DL (ref 14–18)
HGB BLD-MCNC: 14.9 G/DL (ref 14–18)
HGB BLD-MCNC: 15.1 G/DL (ref 14–18)
HGB BLD-MCNC: 15.4 G/DL (ref 14–18)
HGB BLD-MCNC: 15.7 G/DL (ref 14–18)
HGB BLD-MCNC: 15.8 G/DL (ref 14–18)
HGB BLD-MCNC: 15.8 G/DL (ref 14–18)
HGB UR QL STRIP: NEGATIVE
HYALINE CASTS UR QL AUTO: 5 /LPF
HYPOCHROMIA BLD QL SMEAR: ABNORMAL
HYPOCHROMIA BLD QL SMEAR: ABNORMAL
IMM GRANULOCYTES # BLD AUTO: 0.07 K/UL (ref 0–0.04)
IMM GRANULOCYTES # BLD AUTO: 0.07 K/UL (ref 0–0.04)
IMM GRANULOCYTES # BLD AUTO: 0.22 K/UL (ref 0–0.04)
IMM GRANULOCYTES # BLD AUTO: 0.27 K/UL (ref 0–0.04)
IMM GRANULOCYTES # BLD AUTO: 0.34 K/UL (ref 0–0.04)
IMM GRANULOCYTES # BLD AUTO: 0.48 K/UL (ref 0–0.04)
IMM GRANULOCYTES # BLD AUTO: ABNORMAL K/UL (ref 0–0.04)
IMM GRANULOCYTES NFR BLD AUTO: 0.5 % (ref 0–0.5)
IMM GRANULOCYTES NFR BLD AUTO: 0.5 % (ref 0–0.5)
IMM GRANULOCYTES NFR BLD AUTO: 1.3 % (ref 0–0.5)
IMM GRANULOCYTES NFR BLD AUTO: 1.3 % (ref 0–0.5)
IMM GRANULOCYTES NFR BLD AUTO: 1.7 % (ref 0–0.5)
IMM GRANULOCYTES NFR BLD AUTO: 3.6 % (ref 0–0.5)
IMM GRANULOCYTES NFR BLD AUTO: ABNORMAL % (ref 0–0.5)
INR PPP: 1.3 (ref 0.8–1.2)
INR PPP: 1.3 (ref 0.8–1.2)
INTERVENTRICULAR SEPTUM: 0.9 CM (ref 0.6–1.1)
IP: 14
KETONES UR QL STRIP: NEGATIVE
LA MAJOR: 4.93 CM
LA MINOR: 4.1 CM
LA WIDTH: 3.5 CM
LACTATE SERPL-SCNC: 1.3 MMOL/L (ref 0.5–2.2)
LACTATE SERPL-SCNC: 2.1 MMOL/L (ref 0.5–2.2)
LACTATE SERPL-SCNC: 2.8 MMOL/L (ref 0.5–2.2)
LACTATE SERPL-SCNC: 2.9 MMOL/L (ref 0.5–2.2)
LACTATE SERPL-SCNC: 3 MMOL/L (ref 0.5–2.2)
LACTATE SERPL-SCNC: 3.1 MMOL/L (ref 0.5–2.2)
LACTATE SERPL-SCNC: 3.2 MMOL/L (ref 0.5–2.2)
LACTATE SERPL-SCNC: 3.8 MMOL/L (ref 0.5–2.2)
LACTATE SERPL-SCNC: 6.4 MMOL/L (ref 0.5–2.2)
LACTATE SERPL-SCNC: 8.9 MMOL/L (ref 0.5–2.2)
LEFT ATRIUM SIZE: 2.27 CM
LEFT ATRIUM VOLUME INDEX MOD: 14.1 ML/M2
LEFT ATRIUM VOLUME INDEX: 12.9 ML/M2
LEFT ATRIUM VOLUME MOD: 33 CM3
LEFT ATRIUM VOLUME: 30.23 CM3
LEFT INTERNAL DIMENSION IN SYSTOLE: 2.78 CM (ref 2.1–4)
LEFT VENTRICLE DIASTOLIC VOLUME INDEX: 20.53 ML/M2
LEFT VENTRICLE DIASTOLIC VOLUME: 48.05 ML
LEFT VENTRICLE MASS INDEX: 63 G/M2
LEFT VENTRICLE SYSTOLIC VOLUME INDEX: 12.4 ML/M2
LEFT VENTRICLE SYSTOLIC VOLUME: 29.11 ML
LEFT VENTRICULAR INTERNAL DIMENSION IN DIASTOLE: 4.8 CM (ref 3.5–6)
LEFT VENTRICULAR MASS: 147.78 G
LEUKOCYTE ESTERASE UR QL STRIP: NEGATIVE
LIPASE SERPL-CCNC: 110 U/L (ref 4–60)
LIPASE SERPL-CCNC: 242 U/L (ref 4–60)
LIPASE SERPL-CCNC: 2702 U/L (ref 4–60)
LIPASE SERPL-CCNC: >1000 U/L (ref 4–60)
LV LATERAL E/E' RATIO: 3.93 M/S
LV SEPTAL E/E' RATIO: 5 M/S
LYMPHOCYTES # BLD AUTO: 0.6 K/UL (ref 1–4.8)
LYMPHOCYTES # BLD AUTO: 0.7 K/UL (ref 1–4.8)
LYMPHOCYTES # BLD AUTO: 1 K/UL (ref 1–4.8)
LYMPHOCYTES # BLD AUTO: 1.1 K/UL (ref 1–4.8)
LYMPHOCYTES # BLD AUTO: ABNORMAL K/UL (ref 1–4.8)
LYMPHOCYTES # BLD AUTO: ABNORMAL K/UL (ref 1–4.8)
LYMPHOCYTES NFR BLD: 2 % (ref 18–48)
LYMPHOCYTES NFR BLD: 2.5 % (ref 18–48)
LYMPHOCYTES NFR BLD: 3 % (ref 18–48)
LYMPHOCYTES NFR BLD: 3 % (ref 18–48)
LYMPHOCYTES NFR BLD: 3.6 % (ref 18–48)
LYMPHOCYTES NFR BLD: 3.6 % (ref 18–48)
LYMPHOCYTES NFR BLD: 4.5 % (ref 18–48)
LYMPHOCYTES NFR BLD: 4.5 % (ref 18–48)
LYMPHOCYTES NFR BLD: 4.6 % (ref 18–48)
LYMPHOCYTES NFR BLD: 5 % (ref 18–48)
LYMPHOCYTES NFR BLD: 7 % (ref 18–48)
LYMPHOCYTES NFR BLD: 7.3 % (ref 18–48)
MAGNESIUM SERPL-MCNC: 1.3 MG/DL (ref 1.6–2.6)
MAGNESIUM SERPL-MCNC: 1.3 MG/DL (ref 1.6–2.6)
MAGNESIUM SERPL-MCNC: 1.4 MG/DL (ref 1.6–2.6)
MAGNESIUM SERPL-MCNC: 1.4 MG/DL (ref 1.6–2.6)
MAGNESIUM SERPL-MCNC: 1.6 MG/DL (ref 1.6–2.6)
MAGNESIUM SERPL-MCNC: 1.7 MG/DL (ref 1.6–2.6)
MAGNESIUM SERPL-MCNC: 1.7 MG/DL (ref 1.6–2.6)
MAGNESIUM SERPL-MCNC: 1.8 MG/DL (ref 1.6–2.6)
MAGNESIUM SERPL-MCNC: 1.9 MG/DL (ref 1.6–2.6)
MAGNESIUM SERPL-MCNC: 2 MG/DL (ref 1.6–2.6)
MAGNESIUM SERPL-MCNC: 2.1 MG/DL (ref 1.6–2.6)
MAGNESIUM SERPL-MCNC: 2.2 MG/DL (ref 1.6–2.6)
MAGNESIUM SERPL-MCNC: 2.3 MG/DL (ref 1.6–2.6)
MCH RBC QN AUTO: 30.6 PG (ref 27–31)
MCH RBC QN AUTO: 30.8 PG (ref 27–31)
MCH RBC QN AUTO: 30.9 PG (ref 27–31)
MCH RBC QN AUTO: 31 PG (ref 27–31)
MCH RBC QN AUTO: 31 PG (ref 27–31)
MCH RBC QN AUTO: 31.3 PG (ref 27–31)
MCH RBC QN AUTO: 31.4 PG (ref 27–31)
MCH RBC QN AUTO: 31.4 PG (ref 27–31)
MCH RBC QN AUTO: 31.5 PG (ref 27–31)
MCH RBC QN AUTO: 31.6 PG (ref 27–31)
MCH RBC QN AUTO: 32 PG (ref 27–31)
MCH RBC QN AUTO: 32.7 PG (ref 27–31)
MCHC RBC AUTO-ENTMCNC: 31.8 G/DL (ref 32–36)
MCHC RBC AUTO-ENTMCNC: 32.2 G/DL (ref 32–36)
MCHC RBC AUTO-ENTMCNC: 32.3 G/DL (ref 32–36)
MCHC RBC AUTO-ENTMCNC: 32.9 G/DL (ref 32–36)
MCHC RBC AUTO-ENTMCNC: 33.7 G/DL (ref 32–36)
MCHC RBC AUTO-ENTMCNC: 33.8 G/DL (ref 32–36)
MCHC RBC AUTO-ENTMCNC: 34.3 G/DL (ref 32–36)
MCHC RBC AUTO-ENTMCNC: 34.4 G/DL (ref 32–36)
MCHC RBC AUTO-ENTMCNC: 34.9 G/DL (ref 32–36)
MCHC RBC AUTO-ENTMCNC: 35.3 G/DL (ref 32–36)
MCHC RBC AUTO-ENTMCNC: 35.6 G/DL (ref 32–36)
MCHC RBC AUTO-ENTMCNC: 35.8 G/DL (ref 32–36)
MCV RBC AUTO: 87 FL (ref 82–98)
MCV RBC AUTO: 87 FL (ref 82–98)
MCV RBC AUTO: 89 FL (ref 82–98)
MCV RBC AUTO: 90 FL (ref 82–98)
MCV RBC AUTO: 90 FL (ref 82–98)
MCV RBC AUTO: 93 FL (ref 82–98)
MCV RBC AUTO: 93 FL (ref 82–98)
MCV RBC AUTO: 94 FL (ref 82–98)
MCV RBC AUTO: 96 FL (ref 82–98)
MCV RBC AUTO: 97 FL (ref 82–98)
MCV RBC AUTO: 97 FL (ref 82–98)
MCV RBC AUTO: 98 FL (ref 82–98)
METAMYELOCYTES NFR BLD MANUAL: 0.5 %
METAMYELOCYTES NFR BLD MANUAL: 1 %
METAMYELOCYTES NFR BLD MANUAL: 2 %
METAMYELOCYTES NFR BLD MANUAL: 3 %
METAMYELOCYTES NFR BLD MANUAL: 4 %
METAMYELOCYTES NFR BLD MANUAL: 5 %
MICROSCOPIC COMMENT: ABNORMAL
MIN VOL: 10.7
MIN VOL: 11.2
MIN VOL: 12.1
MIN VOL: 12.2
MIN VOL: 14
MIN VOL: 15
MIN VOL: 15
MIN VOL: 15.1
MODE: ABNORMAL
MONOCYTES # BLD AUTO: 0.9 K/UL (ref 0.3–1)
MONOCYTES # BLD AUTO: 1 K/UL (ref 0.3–1)
MONOCYTES # BLD AUTO: 1.2 K/UL (ref 0.3–1)
MONOCYTES # BLD AUTO: 1.3 K/UL (ref 0.3–1)
MONOCYTES # BLD AUTO: 1.5 K/UL (ref 0.3–1)
MONOCYTES # BLD AUTO: 1.9 K/UL (ref 0.3–1)
MONOCYTES # BLD AUTO: ABNORMAL K/UL (ref 0.3–1)
MONOCYTES # BLD AUTO: ABNORMAL K/UL (ref 0.3–1)
MONOCYTES NFR BLD: 1 % (ref 4–15)
MONOCYTES NFR BLD: 3 % (ref 4–15)
MONOCYTES NFR BLD: 4 % (ref 4–15)
MONOCYTES NFR BLD: 5 % (ref 4–15)
MONOCYTES NFR BLD: 5 % (ref 4–15)
MONOCYTES NFR BLD: 6 % (ref 4–15)
MONOCYTES NFR BLD: 6.8 % (ref 4–15)
MONOCYTES NFR BLD: 7.1 % (ref 4–15)
MONOCYTES NFR BLD: 7.5 % (ref 4–15)
MONOCYTES NFR BLD: 7.8 % (ref 4–15)
MONOCYTES NFR BLD: 8.2 % (ref 4–15)
MONOCYTES NFR BLD: 9.1 % (ref 4–15)
MRSA ID BY PCR: NEGATIVE
MRSA SPEC QL CULT: NORMAL
MV PEAK E VEL: 0.55 M/S
MYELOCYTES NFR BLD MANUAL: 1 %
MYELOCYTES NFR BLD MANUAL: 1 %
MYELOCYTES NFR BLD MANUAL: 2 %
MYELOCYTES NFR BLD MANUAL: 2 %
MYELOCYTES NFR BLD MANUAL: 3 %
NEUTROPHILS # BLD AUTO: 11 K/UL (ref 1.8–7.7)
NEUTROPHILS # BLD AUTO: 11.9 K/UL (ref 1.8–7.7)
NEUTROPHILS # BLD AUTO: 12.3 K/UL (ref 1.8–7.7)
NEUTROPHILS # BLD AUTO: 14.3 K/UL (ref 1.8–7.7)
NEUTROPHILS # BLD AUTO: 16.9 K/UL (ref 1.8–7.7)
NEUTROPHILS # BLD AUTO: 17.5 K/UL (ref 1.8–7.7)
NEUTROPHILS NFR BLD: 79 % (ref 38–73)
NEUTROPHILS NFR BLD: 81 % (ref 38–73)
NEUTROPHILS NFR BLD: 82.6 % (ref 38–73)
NEUTROPHILS NFR BLD: 83.1 % (ref 38–73)
NEUTROPHILS NFR BLD: 83.5 % (ref 38–73)
NEUTROPHILS NFR BLD: 84.6 % (ref 38–73)
NEUTROPHILS NFR BLD: 85 % (ref 38–73)
NEUTROPHILS NFR BLD: 85.3 % (ref 38–73)
NEUTROPHILS NFR BLD: 85.8 % (ref 38–73)
NEUTROPHILS NFR BLD: 87 % (ref 38–73)
NEUTROPHILS NFR BLD: 87.1 % (ref 38–73)
NEUTROPHILS NFR BLD: 89 % (ref 38–73)
NEUTS BAND NFR BLD MANUAL: 1 %
NEUTS BAND NFR BLD MANUAL: 3 %
NEUTS BAND NFR BLD MANUAL: 5.5 %
NEUTS BAND NFR BLD MANUAL: 7 %
NITRITE UR QL STRIP: NEGATIVE
NRBC BLD-RTO: 0 /100 WBC
NRBC BLD-RTO: 1 /100 WBC
NRBC BLD-RTO: 1 /100 WBC
NRBC BLD-RTO: 2 /100 WBC
NRBC BLD-RTO: 2 /100 WBC
NRBC BLD-RTO: 5 /100 WBC
NRBC BLD-RTO: 6 /100 WBC
OHS QRS DURATION: 102 MS
OHS QRS DURATION: 104 MS
OHS QRS DURATION: 110 MS
OHS QRS DURATION: 110 MS
OHS QRS DURATION: 114 MS
OHS QRS DURATION: 90 MS
OHS QTC CALCULATION: 410 MS
OHS QTC CALCULATION: 447 MS
OHS QTC CALCULATION: 463 MS
OHS QTC CALCULATION: 483 MS
OHS QTC CALCULATION: 494 MS
OHS QTC CALCULATION: 495 MS
OVALOCYTES BLD QL SMEAR: ABNORMAL
PCO2 BLDA: 34.3 MMHG (ref 35–45)
PCO2 BLDA: 35.8 MMHG (ref 35–45)
PCO2 BLDA: 38 MMHG (ref 35–45)
PCO2 BLDA: 39.7 MMHG (ref 35–45)
PCO2 BLDA: 42.2 MMHG (ref 35–45)
PCO2 BLDA: 45.4 MMHG (ref 35–45)
PCO2 BLDA: 45.7 MMHG (ref 35–45)
PCO2 BLDA: 46 MMHG (ref 35–45)
PCO2 BLDA: 46.3 MMHG (ref 35–45)
PCO2 BLDA: 46.8 MMHG (ref 35–45)
PCO2 BLDA: 47 MMHG (ref 35–45)
PCO2 BLDA: 47.2 MMHG (ref 35–45)
PCO2 BLDA: 47.3 MMHG (ref 35–45)
PCO2 BLDA: 47.4 MMHG (ref 35–45)
PCO2 BLDA: 48.4 MMHG (ref 35–45)
PCO2 BLDA: 49.4 MMHG (ref 35–45)
PCO2 BLDA: 50.1 MMHG (ref 35–45)
PCO2 BLDA: 50.4 MMHG (ref 35–45)
PCO2 BLDA: 50.6 MMHG (ref 35–45)
PCO2 BLDA: 51.3 MMHG (ref 35–45)
PCO2 BLDA: 52.2 MMHG (ref 35–45)
PCO2 BLDA: 55.8 MMHG (ref 35–45)
PCO2 BLDA: 57.2 MMHG (ref 35–45)
PCO2 BLDA: 58.4 MMHG (ref 35–45)
PEEP: 12
PEEP: 14
PEEP: 5
PEEP: 5
PEEP: 8
PEEP: 8
PH SMN: 7.19 [PH] (ref 7.35–7.45)
PH SMN: 7.2 [PH] (ref 7.35–7.45)
PH SMN: 7.2 [PH] (ref 7.35–7.45)
PH SMN: 7.21 [PH] (ref 7.35–7.45)
PH SMN: 7.22 [PH] (ref 7.35–7.45)
PH SMN: 7.23 [PH] (ref 7.35–7.45)
PH SMN: 7.23 [PH] (ref 7.35–7.45)
PH SMN: 7.24 [PH] (ref 7.35–7.45)
PH SMN: 7.25 [PH] (ref 7.35–7.45)
PH SMN: 7.26 [PH] (ref 7.35–7.45)
PH SMN: 7.26 [PH] (ref 7.35–7.45)
PH SMN: 7.28 [PH] (ref 7.35–7.45)
PH SMN: 7.29 [PH] (ref 7.35–7.45)
PH SMN: 7.29 [PH] (ref 7.35–7.45)
PH SMN: 7.3 [PH] (ref 7.35–7.45)
PH SMN: 7.31 [PH] (ref 7.35–7.45)
PH SMN: 7.32 [PH] (ref 7.35–7.45)
PH SMN: 7.32 [PH] (ref 7.35–7.45)
PH SMN: 7.33 [PH] (ref 7.35–7.45)
PH SMN: 7.34 [PH] (ref 7.35–7.45)
PH SMN: 7.36 [PH] (ref 7.35–7.45)
PH UR STRIP: 6 [PH] (ref 5–8)
PHOSPHATE SERPL-MCNC: 2.6 MG/DL (ref 2.7–4.5)
PHOSPHATE SERPL-MCNC: 3.3 MG/DL (ref 2.7–4.5)
PHOSPHATE SERPL-MCNC: 3.3 MG/DL (ref 2.7–4.5)
PHOSPHATE SERPL-MCNC: 3.4 MG/DL (ref 2.7–4.5)
PHOSPHATE SERPL-MCNC: 3.5 MG/DL (ref 2.7–4.5)
PHOSPHATE SERPL-MCNC: 3.6 MG/DL (ref 2.7–4.5)
PHOSPHATE SERPL-MCNC: 3.7 MG/DL (ref 2.7–4.5)
PHOSPHATE SERPL-MCNC: 3.8 MG/DL (ref 2.7–4.5)
PHOSPHATE SERPL-MCNC: 3.9 MG/DL (ref 2.7–4.5)
PHOSPHATE SERPL-MCNC: 4.1 MG/DL (ref 2.7–4.5)
PHOSPHATE SERPL-MCNC: 4.2 MG/DL (ref 2.7–4.5)
PHOSPHATE SERPL-MCNC: 4.2 MG/DL (ref 2.7–4.5)
PHOSPHATE SERPL-MCNC: 4.3 MG/DL (ref 2.7–4.5)
PHOSPHATE SERPL-MCNC: 4.5 MG/DL (ref 2.7–4.5)
PHOSPHATE SERPL-MCNC: 4.6 MG/DL (ref 2.7–4.5)
PHOSPHATE SERPL-MCNC: 4.7 MG/DL (ref 2.7–4.5)
PHOSPHATE SERPL-MCNC: 4.8 MG/DL (ref 2.7–4.5)
PHOSPHATE SERPL-MCNC: 4.8 MG/DL (ref 2.7–4.5)
PHOSPHATE SERPL-MCNC: 5 MG/DL (ref 2.7–4.5)
PHOSPHATE SERPL-MCNC: 5.1 MG/DL (ref 2.7–4.5)
PHOSPHATE SERPL-MCNC: 5.1 MG/DL (ref 2.7–4.5)
PIP: 23
PIP: 26
PIP: 29
PIP: 31
PIP: 32
PLATELET # BLD AUTO: 104 K/UL (ref 150–450)
PLATELET # BLD AUTO: 136 K/UL (ref 150–450)
PLATELET # BLD AUTO: 143 K/UL (ref 150–450)
PLATELET # BLD AUTO: 144 K/UL (ref 150–450)
PLATELET # BLD AUTO: 159 K/UL (ref 150–450)
PLATELET # BLD AUTO: 162 K/UL (ref 150–450)
PLATELET # BLD AUTO: 166 K/UL (ref 150–450)
PLATELET # BLD AUTO: 171 K/UL (ref 150–450)
PLATELET # BLD AUTO: 217 K/UL (ref 150–450)
PLATELET # BLD AUTO: 227 K/UL (ref 150–450)
PLATELET # BLD AUTO: 237 K/UL (ref 150–450)
PLATELET # BLD AUTO: 242 K/UL (ref 150–450)
PLATELET BLD QL SMEAR: ABNORMAL
PMV BLD AUTO: 10.3 FL (ref 9.2–12.9)
PMV BLD AUTO: 10.3 FL (ref 9.2–12.9)
PMV BLD AUTO: 10.6 FL (ref 9.2–12.9)
PMV BLD AUTO: 11.4 FL (ref 9.2–12.9)
PMV BLD AUTO: 12.1 FL (ref 9.2–12.9)
PMV BLD AUTO: 12.3 FL (ref 9.2–12.9)
PMV BLD AUTO: 12.6 FL (ref 9.2–12.9)
PMV BLD AUTO: 12.7 FL (ref 9.2–12.9)
PMV BLD AUTO: 8.7 FL (ref 9.2–12.9)
PMV BLD AUTO: 9.4 FL (ref 9.2–12.9)
PMV BLD AUTO: 9.7 FL (ref 9.2–12.9)
PMV BLD AUTO: 9.8 FL (ref 9.2–12.9)
PO2 BLDA: 101 MMHG (ref 80–100)
PO2 BLDA: 184 MMHG (ref 80–100)
PO2 BLDA: 57 MMHG (ref 80–100)
PO2 BLDA: 58 MMHG (ref 80–100)
PO2 BLDA: 60 MMHG (ref 80–100)
PO2 BLDA: 61 MMHG (ref 80–100)
PO2 BLDA: 62 MMHG (ref 80–100)
PO2 BLDA: 62 MMHG (ref 80–100)
PO2 BLDA: 65 MMHG (ref 80–100)
PO2 BLDA: 67 MMHG (ref 80–100)
PO2 BLDA: 68 MMHG (ref 80–100)
PO2 BLDA: 69 MMHG (ref 80–100)
PO2 BLDA: 72 MMHG (ref 80–100)
PO2 BLDA: 73 MMHG (ref 80–100)
PO2 BLDA: 73 MMHG (ref 80–100)
PO2 BLDA: 74 MMHG (ref 80–100)
PO2 BLDA: 74 MMHG (ref 80–100)
PO2 BLDA: 76 MMHG (ref 80–100)
PO2 BLDA: 77 MMHG (ref 80–100)
PO2 BLDA: 80 MMHG (ref 80–100)
PO2 BLDA: 83 MMHG (ref 80–100)
PO2 BLDA: 86 MMHG (ref 80–100)
PO2 BLDA: 93 MMHG (ref 80–100)
PO2 BLDA: 97 MMHG (ref 80–100)
POC BE: -1 MMOL/L
POC BE: -10 MMOL/L
POC BE: -12 MMOL/L
POC BE: -12 MMOL/L
POC BE: -3 MMOL/L
POC BE: -4 MMOL/L
POC BE: -5 MMOL/L
POC BE: -5 MMOL/L
POC BE: -6 MMOL/L
POC BE: -7 MMOL/L
POC BE: -8 MMOL/L
POC BE: -9 MMOL/L
POC BE: 0 MMOL/L
POC BE: 0 MMOL/L
POC BE: 1 MMOL/L
POC BE: 1 MMOL/L
POC IONIZED CALCIUM: 0.86 MMOL/L (ref 1.06–1.42)
POC IONIZED CALCIUM: 0.88 MMOL/L (ref 1.06–1.42)
POC IONIZED CALCIUM: 0.91 MMOL/L (ref 1.06–1.42)
POC IONIZED CALCIUM: 0.92 MMOL/L (ref 1.06–1.42)
POC IONIZED CALCIUM: 1.01 MMOL/L (ref 1.06–1.42)
POC IONIZED CALCIUM: 1.02 MMOL/L (ref 1.06–1.42)
POC IONIZED CALCIUM: 1.04 MMOL/L (ref 1.06–1.42)
POC IONIZED CALCIUM: 1.09 MMOL/L (ref 1.06–1.42)
POC SATURATED O2: 100 % (ref 95–100)
POC SATURATED O2: 84 % (ref 95–100)
POC SATURATED O2: 86 % (ref 95–100)
POC SATURATED O2: 87 % (ref 95–100)
POC SATURATED O2: 87 % (ref 95–100)
POC SATURATED O2: 88 % (ref 95–100)
POC SATURATED O2: 88 % (ref 95–100)
POC SATURATED O2: 89 % (ref 95–100)
POC SATURATED O2: 90 % (ref 95–100)
POC SATURATED O2: 90 % (ref 95–100)
POC SATURATED O2: 91 % (ref 95–100)
POC SATURATED O2: 91 % (ref 95–100)
POC SATURATED O2: 92 % (ref 95–100)
POC SATURATED O2: 93 % (ref 95–100)
POC SATURATED O2: 93 % (ref 95–100)
POC SATURATED O2: 94 % (ref 95–100)
POC SATURATED O2: 95 % (ref 95–100)
POC SATURATED O2: 96 % (ref 95–100)
POC SATURATED O2: 97 % (ref 95–100)
POC SATURATED O2: 97 % (ref 95–100)
POC TCO2: 16 MMOL/L (ref 23–27)
POC TCO2: 17 MMOL/L (ref 23–27)
POC TCO2: 19 MMOL/L (ref 23–27)
POC TCO2: 20 MMOL/L (ref 23–27)
POC TCO2: 22 MMOL/L (ref 23–27)
POC TCO2: 23 MMOL/L (ref 23–27)
POC TCO2: 24 MMOL/L (ref 23–27)
POC TCO2: 25 MMOL/L (ref 23–27)
POC TCO2: 26 MMOL/L (ref 23–27)
POC TCO2: 27 MMOL/L (ref 23–27)
POC TCO2: 27 MMOL/L (ref 23–27)
POC TCO2: 28 MMOL/L (ref 23–27)
POC TCO2: 28 MMOL/L (ref 23–27)
POCT GLUCOSE: 120 MG/DL (ref 70–110)
POCT GLUCOSE: 132 MG/DL (ref 70–110)
POCT GLUCOSE: 133 MG/DL (ref 70–110)
POCT GLUCOSE: 134 MG/DL (ref 70–110)
POCT GLUCOSE: 136 MG/DL (ref 70–110)
POCT GLUCOSE: 142 MG/DL (ref 70–110)
POCT GLUCOSE: 143 MG/DL (ref 70–110)
POCT GLUCOSE: 163 MG/DL (ref 70–110)
POCT GLUCOSE: 175 MG/DL (ref 70–110)
POCT GLUCOSE: 178 MG/DL (ref 70–110)
POCT GLUCOSE: 183 MG/DL (ref 70–110)
POCT GLUCOSE: 185 MG/DL (ref 70–110)
POCT GLUCOSE: 186 MG/DL (ref 70–110)
POCT GLUCOSE: 191 MG/DL (ref 70–110)
POCT GLUCOSE: 192 MG/DL (ref 70–110)
POCT GLUCOSE: 193 MG/DL (ref 70–110)
POCT GLUCOSE: 195 MG/DL (ref 70–110)
POCT GLUCOSE: 196 MG/DL (ref 70–110)
POCT GLUCOSE: 199 MG/DL (ref 70–110)
POCT GLUCOSE: 202 MG/DL (ref 70–110)
POCT GLUCOSE: 204 MG/DL (ref 70–110)
POCT GLUCOSE: 206 MG/DL (ref 70–110)
POCT GLUCOSE: 211 MG/DL (ref 70–110)
POCT GLUCOSE: 212 MG/DL (ref 70–110)
POCT GLUCOSE: 214 MG/DL (ref 70–110)
POCT GLUCOSE: 219 MG/DL (ref 70–110)
POCT GLUCOSE: 222 MG/DL (ref 70–110)
POCT GLUCOSE: 223 MG/DL (ref 70–110)
POCT GLUCOSE: 224 MG/DL (ref 70–110)
POCT GLUCOSE: 225 MG/DL (ref 70–110)
POCT GLUCOSE: 229 MG/DL (ref 70–110)
POCT GLUCOSE: 231 MG/DL (ref 70–110)
POCT GLUCOSE: 239 MG/DL (ref 70–110)
POCT GLUCOSE: 246 MG/DL (ref 70–110)
POCT GLUCOSE: 259 MG/DL (ref 70–110)
POCT GLUCOSE: 276 MG/DL (ref 70–110)
POCT GLUCOSE: 288 MG/DL (ref 70–110)
POCT GLUCOSE: 311 MG/DL (ref 70–110)
POCT GLUCOSE: 317 MG/DL (ref 70–110)
POCT GLUCOSE: 328 MG/DL (ref 70–110)
POCT GLUCOSE: 347 MG/DL (ref 70–110)
POCT GLUCOSE: 84 MG/DL (ref 70–110)
POIKILOCYTOSIS BLD QL SMEAR: SLIGHT
POLYCHROMASIA BLD QL SMEAR: ABNORMAL
POTASSIUM BLD-SCNC: 3.9 MMOL/L (ref 3.5–5.1)
POTASSIUM BLD-SCNC: 4.8 MMOL/L (ref 3.5–5.1)
POTASSIUM BLD-SCNC: 5 MMOL/L (ref 3.5–5.1)
POTASSIUM BLD-SCNC: 5.1 MMOL/L (ref 3.5–5.1)
POTASSIUM BLD-SCNC: 5.5 MMOL/L (ref 3.5–5.1)
POTASSIUM BLD-SCNC: 5.6 MMOL/L (ref 3.5–5.1)
POTASSIUM BLD-SCNC: 5.7 MMOL/L (ref 3.5–5.1)
POTASSIUM BLD-SCNC: 5.8 MMOL/L (ref 3.5–5.1)
POTASSIUM SERPL-SCNC: 3.1 MMOL/L (ref 3.5–5.1)
POTASSIUM SERPL-SCNC: 3.1 MMOL/L (ref 3.5–5.1)
POTASSIUM SERPL-SCNC: 3.4 MMOL/L (ref 3.5–5.1)
POTASSIUM SERPL-SCNC: 3.9 MMOL/L (ref 3.5–5.1)
POTASSIUM SERPL-SCNC: 3.9 MMOL/L (ref 3.5–5.1)
POTASSIUM SERPL-SCNC: 4 MMOL/L (ref 3.5–5.1)
POTASSIUM SERPL-SCNC: 4.2 MMOL/L (ref 3.5–5.1)
POTASSIUM SERPL-SCNC: 4.3 MMOL/L (ref 3.5–5.1)
POTASSIUM SERPL-SCNC: 4.5 MMOL/L (ref 3.5–5.1)
POTASSIUM SERPL-SCNC: 4.5 MMOL/L (ref 3.5–5.1)
POTASSIUM SERPL-SCNC: 4.6 MMOL/L (ref 3.5–5.1)
POTASSIUM SERPL-SCNC: 4.7 MMOL/L (ref 3.5–5.1)
POTASSIUM SERPL-SCNC: 4.8 MMOL/L (ref 3.5–5.1)
POTASSIUM SERPL-SCNC: 4.9 MMOL/L (ref 3.5–5.1)
POTASSIUM SERPL-SCNC: 5 MMOL/L (ref 3.5–5.1)
POTASSIUM SERPL-SCNC: 5.1 MMOL/L (ref 3.5–5.1)
POTASSIUM SERPL-SCNC: 5.2 MMOL/L (ref 3.5–5.1)
POTASSIUM SERPL-SCNC: 5.3 MMOL/L (ref 3.5–5.1)
POTASSIUM SERPL-SCNC: 5.4 MMOL/L (ref 3.5–5.1)
POTASSIUM SERPL-SCNC: 5.5 MMOL/L (ref 3.5–5.1)
POTASSIUM SERPL-SCNC: 6.2 MMOL/L (ref 3.5–5.1)
POTASSIUM SERPL-SCNC: 6.7 MMOL/L (ref 3.5–5.1)
POTASSIUM SERPL-SCNC: 6.8 MMOL/L (ref 3.5–5.1)
POTASSIUM SERPL-SCNC: 6.9 MMOL/L (ref 3.5–5.1)
PROMYELOCYTES NFR BLD MANUAL: 0.5 %
PROMYELOCYTES NFR BLD MANUAL: 1 %
PROT SERPL-MCNC: 5.6 G/DL (ref 6–8.4)
PROT SERPL-MCNC: 5.6 G/DL (ref 6–8.4)
PROT SERPL-MCNC: 5.9 G/DL (ref 6–8.4)
PROT SERPL-MCNC: 6.2 G/DL (ref 6–8.4)
PROT SERPL-MCNC: 6.2 G/DL (ref 6–8.4)
PROT SERPL-MCNC: 6.3 G/DL (ref 6–8.4)
PROT SERPL-MCNC: 6.4 G/DL (ref 6–8.4)
PROT SERPL-MCNC: 6.5 G/DL (ref 6–8.4)
PROT SERPL-MCNC: 6.5 G/DL (ref 6–8.4)
PROT SERPL-MCNC: 6.6 G/DL (ref 6–8.4)
PROT SERPL-MCNC: 7.2 G/DL (ref 6–8.4)
PROT UR QL STRIP: ABNORMAL
PROTHROMBIN TIME: 13.4 SEC (ref 9–12.5)
PROTHROMBIN TIME: 13.6 SEC (ref 9–12.5)
PROVIDER CREDENTIALS: ABNORMAL
PROVIDER CREDENTIALS: ABNORMAL
PROVIDER NOTIFIED: ABNORMAL
PROVIDER NOTIFIED: ABNORMAL
RA MAJOR: 4.46 CM
RA WIDTH: 3.02 CM
RBC # BLD AUTO: 3.33 M/UL (ref 4.6–6.2)
RBC # BLD AUTO: 4.48 M/UL (ref 4.6–6.2)
RBC # BLD AUTO: 4.57 M/UL (ref 4.6–6.2)
RBC # BLD AUTO: 4.65 M/UL (ref 4.6–6.2)
RBC # BLD AUTO: 4.72 M/UL (ref 4.6–6.2)
RBC # BLD AUTO: 4.74 M/UL (ref 4.6–6.2)
RBC # BLD AUTO: 4.78 M/UL (ref 4.6–6.2)
RBC # BLD AUTO: 4.81 M/UL (ref 4.6–6.2)
RBC # BLD AUTO: 4.81 M/UL (ref 4.6–6.2)
RBC # BLD AUTO: 4.82 M/UL (ref 4.6–6.2)
RBC # BLD AUTO: 5.02 M/UL (ref 4.6–6.2)
RBC # BLD AUTO: 5.16 M/UL (ref 4.6–6.2)
RBC #/AREA URNS AUTO: 3 /HPF (ref 0–4)
SAMPLE: ABNORMAL
SINUS: 4.03 CM
SITE: ABNORMAL
SMUDGE CELLS BLD QL SMEAR: PRESENT
SODIUM BLD-SCNC: 119 MMOL/L (ref 136–145)
SODIUM BLD-SCNC: 120 MMOL/L (ref 136–145)
SODIUM BLD-SCNC: 121 MMOL/L (ref 136–145)
SODIUM BLD-SCNC: 125 MMOL/L (ref 136–145)
SODIUM BLD-SCNC: 126 MMOL/L (ref 136–145)
SODIUM BLD-SCNC: 126 MMOL/L (ref 136–145)
SODIUM BLD-SCNC: 128 MMOL/L (ref 136–145)
SODIUM BLD-SCNC: 133 MMOL/L (ref 136–145)
SODIUM SERPL-SCNC: 120 MMOL/L (ref 136–145)
SODIUM SERPL-SCNC: 121 MMOL/L (ref 136–145)
SODIUM SERPL-SCNC: 122 MMOL/L (ref 136–145)
SODIUM SERPL-SCNC: 123 MMOL/L (ref 136–145)
SODIUM SERPL-SCNC: 123 MMOL/L (ref 136–145)
SODIUM SERPL-SCNC: 124 MMOL/L (ref 136–145)
SODIUM SERPL-SCNC: 126 MMOL/L (ref 136–145)
SODIUM SERPL-SCNC: 126 MMOL/L (ref 136–145)
SODIUM SERPL-SCNC: 127 MMOL/L (ref 136–145)
SODIUM SERPL-SCNC: 127 MMOL/L (ref 136–145)
SODIUM SERPL-SCNC: 128 MMOL/L (ref 136–145)
SODIUM SERPL-SCNC: 128 MMOL/L (ref 136–145)
SODIUM SERPL-SCNC: 129 MMOL/L (ref 136–145)
SODIUM SERPL-SCNC: 130 MMOL/L (ref 136–145)
SODIUM SERPL-SCNC: 135 MMOL/L (ref 136–145)
SODIUM SERPL-SCNC: 137 MMOL/L (ref 136–145)
SODIUM SERPL-SCNC: 138 MMOL/L (ref 136–145)
SODIUM SERPL-SCNC: 140 MMOL/L (ref 136–145)
SP GR UR STRIP: >1.03 (ref 1–1.03)
SP02: 90
SP02: 91
SP02: 91
SP02: 92
SP02: 92
SP02: 94
SP02: 97
SP02: 98
SPECIMEN OUTDATE: NORMAL
SPHEROCYTES BLD QL SMEAR: ABNORMAL
SQUAMOUS #/AREA URNS AUTO: 0 /HPF
STAPH AUREUS ID BY PCR: NEGATIVE
STJ: 3.52 CM
T4 FREE SERPL-MCNC: 0.67 NG/DL (ref 0.71–1.51)
TARGETS BLD QL SMEAR: ABNORMAL
TARGETS BLD QL SMEAR: ABNORMAL
TDI LATERAL: 0.14 M/S
TDI SEPTAL: 0.11 M/S
TDI: 0.13 M/S
TOXIC GRANULES BLD QL SMEAR: PRESENT
TRIGL SERPL-MCNC: 1314 MG/DL (ref 30–150)
TRIGL SERPL-MCNC: 1414 MG/DL (ref 30–150)
TRIGL SERPL-MCNC: 161 MG/DL (ref 30–150)
TRIGL SERPL-MCNC: 425 MG/DL (ref 30–150)
TRIGL SERPL-MCNC: 588 MG/DL (ref 30–150)
TRIGL SERPL-MCNC: 684 MG/DL (ref 30–150)
TRIGL SERPL-MCNC: 793 MG/DL (ref 30–150)
TRIGL SERPL-MCNC: 976 MG/DL (ref 30–150)
TROPONIN I SERPL DL<=0.01 NG/ML-MCNC: 0.06 NG/ML (ref 0–0.03)
TROPONIN I SERPL DL<=0.01 NG/ML-MCNC: 0.06 NG/ML (ref 0–0.03)
TROPONIN I SERPL DL<=0.01 NG/ML-MCNC: 0.17 NG/ML (ref 0–0.03)
TROPONIN I SERPL DL<=0.01 NG/ML-MCNC: 0.17 NG/ML (ref 0–0.03)
TSH SERPL DL<=0.005 MIU/L-ACNC: 0.28 UIU/ML (ref 0.4–4)
URN SPEC COLLECT METH UR: ABNORMAL
VANCOMYCIN SERPL-MCNC: 12.5 UG/ML
VANCOMYCIN SERPL-MCNC: 15.1 UG/ML
VANCOMYCIN SERPL-MCNC: 7 UG/ML
VANCOMYCIN SERPL-MCNC: <1.4 UG/ML
VERBAL RESULT READBACK PERFORMED: YES
VERBAL RESULT READBACK PERFORMED: YES
VT: 420
VT: 450
WBC # BLD AUTO: 13.26 K/UL (ref 3.9–12.7)
WBC # BLD AUTO: 14.13 K/UL (ref 3.9–12.7)
WBC # BLD AUTO: 14.43 K/UL (ref 3.9–12.7)
WBC # BLD AUTO: 16.74 K/UL (ref 3.9–12.7)
WBC # BLD AUTO: 19.73 K/UL (ref 3.9–12.7)
WBC # BLD AUTO: 20.67 K/UL (ref 3.9–12.7)
WBC # BLD AUTO: 20.78 K/UL (ref 3.9–12.7)
WBC # BLD AUTO: 21.4 K/UL (ref 3.9–12.7)
WBC # BLD AUTO: 24.34 K/UL (ref 3.9–12.7)
WBC # BLD AUTO: 28.89 K/UL (ref 3.9–12.7)
WBC # BLD AUTO: 37.89 K/UL (ref 3.9–12.7)
WBC # BLD AUTO: 43.46 K/UL (ref 3.9–12.7)
WBC #/AREA URNS AUTO: 7 /HPF (ref 0–5)
WBC TOXIC VACUOLES BLD QL SMEAR: PRESENT
WBC TOXIC VACUOLES BLD QL SMEAR: PRESENT
YEAST UR QL AUTO: ABNORMAL
Z-SCORE OF LEFT VENTRICULAR DIMENSION IN END DIASTOLE: -6.96
Z-SCORE OF LEFT VENTRICULAR DIMENSION IN END SYSTOLE: -5.81

## 2024-01-01 PROCEDURE — 27100171 HC OXYGEN HIGH FLOW UP TO 24 HOURS: Mod: HCNC

## 2024-01-01 PROCEDURE — 82330 ASSAY OF CALCIUM: CPT | Mod: HCNC

## 2024-01-01 PROCEDURE — 20000000 HC ICU ROOM: Mod: HCNC

## 2024-01-01 PROCEDURE — 85014 HEMATOCRIT: CPT | Mod: HCNC

## 2024-01-01 PROCEDURE — 71046 X-RAY EXAM CHEST 2 VIEWS: CPT | Mod: TC,HCNC,PO

## 2024-01-01 PROCEDURE — 27000190 HC CPAP FULL FACE MASK W/VALVE: Mod: HCNC

## 2024-01-01 PROCEDURE — 43274 ERCP DUCT STENT PLACEMENT: CPT | Mod: HCNC | Performed by: INTERNAL MEDICINE

## 2024-01-01 PROCEDURE — 99291 CRITICAL CARE FIRST HOUR: CPT | Mod: HCNC,,, | Performed by: INTERNAL MEDICINE

## 2024-01-01 PROCEDURE — 80069 RENAL FUNCTION PANEL: CPT | Mod: HCNC | Performed by: STUDENT IN AN ORGANIZED HEALTH CARE EDUCATION/TRAINING PROGRAM

## 2024-01-01 PROCEDURE — 25000003 PHARM REV CODE 250: Mod: HCNC | Performed by: NURSE ANESTHETIST, CERTIFIED REGISTERED

## 2024-01-01 PROCEDURE — 25000003 PHARM REV CODE 250: Mod: HCNC | Performed by: NURSE PRACTITIONER

## 2024-01-01 PROCEDURE — 90945 DIALYSIS ONE EVALUATION: CPT | Mod: HCNC

## 2024-01-01 PROCEDURE — 99999 PR PBB SHADOW E&M-EST. PATIENT-LVL V: CPT | Mod: PBBFAC,HCNC,, | Performed by: INTERNAL MEDICINE

## 2024-01-01 PROCEDURE — 95700 EEG CONT REC W/VID EEG TECH: CPT | Mod: HCNC

## 2024-01-01 PROCEDURE — 84478 ASSAY OF TRIGLYCERIDES: CPT | Mod: HCNC

## 2024-01-01 PROCEDURE — 25000003 PHARM REV CODE 250: Mod: HCNC

## 2024-01-01 PROCEDURE — 99233 SBSQ HOSP IP/OBS HIGH 50: CPT | Mod: HCNC,,, | Performed by: INTERNAL MEDICINE

## 2024-01-01 PROCEDURE — 82330 ASSAY OF CALCIUM: CPT | Mod: HCNC | Performed by: STUDENT IN AN ORGANIZED HEALTH CARE EDUCATION/TRAINING PROGRAM

## 2024-01-01 PROCEDURE — 0F798DZ DILATION OF COMMON BILE DUCT WITH INTRALUMINAL DEVICE, VIA NATURAL OR ARTIFICIAL OPENING ENDOSCOPIC: ICD-10-PCS | Performed by: INTERNAL MEDICINE

## 2024-01-01 PROCEDURE — 25000242 PHARM REV CODE 250 ALT 637 W/ HCPCS: Mod: HCNC

## 2024-01-01 PROCEDURE — 99900035 HC TECH TIME PER 15 MIN (STAT): Mod: HCNC

## 2024-01-01 PROCEDURE — 63600175 PHARM REV CODE 636 W HCPCS: Mod: HCNC | Performed by: INTERNAL MEDICINE

## 2024-01-01 PROCEDURE — 93010 ELECTROCARDIOGRAM REPORT: CPT | Mod: HCNC,,, | Performed by: INTERNAL MEDICINE

## 2024-01-01 PROCEDURE — 25000003 PHARM REV CODE 250: Mod: HCNC | Performed by: STUDENT IN AN ORGANIZED HEALTH CARE EDUCATION/TRAINING PROGRAM

## 2024-01-01 PROCEDURE — 63600175 PHARM REV CODE 636 W HCPCS: Mod: JZ,JG,HCNC | Performed by: STUDENT IN AN ORGANIZED HEALTH CARE EDUCATION/TRAINING PROGRAM

## 2024-01-01 PROCEDURE — 94660 CPAP INITIATION&MGMT: CPT | Mod: HCNC,XB

## 2024-01-01 PROCEDURE — 94761 N-INVAS EAR/PLS OXIMETRY MLT: CPT | Mod: HCNC

## 2024-01-01 PROCEDURE — 83605 ASSAY OF LACTIC ACID: CPT | Mod: 91,HCNC | Performed by: NURSE PRACTITIONER

## 2024-01-01 PROCEDURE — 84100 ASSAY OF PHOSPHORUS: CPT | Mod: HCNC

## 2024-01-01 PROCEDURE — 5A1D70Z PERFORMANCE OF URINARY FILTRATION, INTERMITTENT, LESS THAN 6 HOURS PER DAY: ICD-10-PCS | Performed by: INTERNAL MEDICINE

## 2024-01-01 PROCEDURE — 0D9670Z DRAINAGE OF STOMACH WITH DRAINAGE DEVICE, VIA NATURAL OR ARTIFICIAL OPENING: ICD-10-PCS | Performed by: INTERNAL MEDICINE

## 2024-01-01 PROCEDURE — 85027 COMPLETE CBC AUTOMATED: CPT | Mod: HCNC

## 2024-01-01 PROCEDURE — 25500020 PHARM REV CODE 255: Mod: HCNC | Performed by: INTERNAL MEDICINE

## 2024-01-01 PROCEDURE — 02HV33Z INSERTION OF INFUSION DEVICE INTO SUPERIOR VENA CAVA, PERCUTANEOUS APPROACH: ICD-10-PCS | Performed by: INTERNAL MEDICINE

## 2024-01-01 PROCEDURE — 63600175 PHARM REV CODE 636 W HCPCS: Mod: JG,HCNC | Performed by: STUDENT IN AN ORGANIZED HEALTH CARE EDUCATION/TRAINING PROGRAM

## 2024-01-01 PROCEDURE — 80069 RENAL FUNCTION PANEL: CPT | Mod: HCNC | Performed by: NURSE PRACTITIONER

## 2024-01-01 PROCEDURE — 80053 COMPREHEN METABOLIC PANEL: CPT | Mod: HCNC

## 2024-01-01 PROCEDURE — 25000003 PHARM REV CODE 250: Mod: HCNC | Performed by: EMERGENCY MEDICINE

## 2024-01-01 PROCEDURE — 84132 ASSAY OF SERUM POTASSIUM: CPT | Mod: HCNC

## 2024-01-01 PROCEDURE — 80053 COMPREHEN METABOLIC PANEL: CPT | Mod: 91,HCNC | Performed by: NURSE PRACTITIONER

## 2024-01-01 PROCEDURE — 84484 ASSAY OF TROPONIN QUANT: CPT | Mod: HCNC | Performed by: NURSE PRACTITIONER

## 2024-01-01 PROCEDURE — 83605 ASSAY OF LACTIC ACID: CPT | Mod: HCNC

## 2024-01-01 PROCEDURE — 63600175 PHARM REV CODE 636 W HCPCS: Mod: HCNC | Performed by: NURSE PRACTITIONER

## 2024-01-01 PROCEDURE — 1101F PT FALLS ASSESS-DOCD LE1/YR: CPT | Mod: HCNC,CPTII,S$GLB, | Performed by: INTERNAL MEDICINE

## 2024-01-01 PROCEDURE — 80069 RENAL FUNCTION PANEL: CPT | Mod: 91,HCNC | Performed by: NURSE PRACTITIONER

## 2024-01-01 PROCEDURE — 74328 X-RAY BILE DUCT ENDOSCOPY: CPT | Mod: 26,HCNC,, | Performed by: INTERNAL MEDICINE

## 2024-01-01 PROCEDURE — 82803 BLOOD GASES ANY COMBINATION: CPT | Mod: HCNC

## 2024-01-01 PROCEDURE — 37799 UNLISTED PX VASCULAR SURGERY: CPT | Mod: HCNC

## 2024-01-01 PROCEDURE — 36415 COLL VENOUS BLD VENIPUNCTURE: CPT | Mod: HCNC | Performed by: INTERNAL MEDICINE

## 2024-01-01 PROCEDURE — 63600175 PHARM REV CODE 636 W HCPCS: Mod: JG,HCNC

## 2024-01-01 PROCEDURE — 85610 PROTHROMBIN TIME: CPT | Mod: HCNC

## 2024-01-01 PROCEDURE — 36600 WITHDRAWAL OF ARTERIAL BLOOD: CPT | Mod: HCNC

## 2024-01-01 PROCEDURE — 63600175 PHARM REV CODE 636 W HCPCS: Mod: JZ,JG,HCNC | Performed by: NURSE PRACTITIONER

## 2024-01-01 PROCEDURE — C9113 INJ PANTOPRAZOLE SODIUM, VIA: HCPCS | Mod: HCNC | Performed by: STUDENT IN AN ORGANIZED HEALTH CARE EDUCATION/TRAINING PROGRAM

## 2024-01-01 PROCEDURE — 94640 AIRWAY INHALATION TREATMENT: CPT | Mod: HCNC

## 2024-01-01 PROCEDURE — 87040 BLOOD CULTURE FOR BACTERIA: CPT | Mod: HCNC

## 2024-01-01 PROCEDURE — 93005 ELECTROCARDIOGRAM TRACING: CPT | Mod: HCNC

## 2024-01-01 PROCEDURE — 82565 ASSAY OF CREATININE: CPT | Mod: HCNC

## 2024-01-01 PROCEDURE — C9113 INJ PANTOPRAZOLE SODIUM, VIA: HCPCS | Mod: HCNC

## 2024-01-01 PROCEDURE — 94761 N-INVAS EAR/PLS OXIMETRY MLT: CPT | Mod: HCNC,XB

## 2024-01-01 PROCEDURE — 84484 ASSAY OF TROPONIN QUANT: CPT | Mod: HCNC | Performed by: STUDENT IN AN ORGANIZED HEALTH CARE EDUCATION/TRAINING PROGRAM

## 2024-01-01 PROCEDURE — 85007 BL SMEAR W/DIFF WBC COUNT: CPT | Mod: HCNC

## 2024-01-01 PROCEDURE — 87040 BLOOD CULTURE FOR BACTERIA: CPT | Mod: HCNC | Performed by: STUDENT IN AN ORGANIZED HEALTH CARE EDUCATION/TRAINING PROGRAM

## 2024-01-01 PROCEDURE — 87081 CULTURE SCREEN ONLY: CPT | Mod: HCNC | Performed by: INTERNAL MEDICINE

## 2024-01-01 PROCEDURE — 71046 X-RAY EXAM CHEST 2 VIEWS: CPT | Mod: 26,HCNC,, | Performed by: RADIOLOGY

## 2024-01-01 PROCEDURE — 85730 THROMBOPLASTIN TIME PARTIAL: CPT | Mod: HCNC

## 2024-01-01 PROCEDURE — 84478 ASSAY OF TRIGLYCERIDES: CPT | Mod: HCNC | Performed by: EMERGENCY MEDICINE

## 2024-01-01 PROCEDURE — 27000923 HC TRIALYSIS CATHETER, ANY SIZE: Mod: HCNC

## 2024-01-01 PROCEDURE — 25000003 PHARM REV CODE 250: Mod: JZ,JG,HCNC

## 2024-01-01 PROCEDURE — 85025 COMPLETE CBC W/AUTO DIFF WBC: CPT | Mod: 91,HCNC

## 2024-01-01 PROCEDURE — 1125F AMNT PAIN NOTED PAIN PRSNT: CPT | Mod: HCNC,CPTII,S$GLB, | Performed by: INTERNAL MEDICINE

## 2024-01-01 PROCEDURE — C2617 STENT, NON-COR, TEM W/O DEL: HCPCS | Mod: HCNC | Performed by: INTERNAL MEDICINE

## 2024-01-01 PROCEDURE — 99900026 HC AIRWAY MAINTENANCE (STAT): Mod: HCNC

## 2024-01-01 PROCEDURE — 82330 ASSAY OF CALCIUM: CPT | Mod: HCNC | Performed by: NURSE PRACTITIONER

## 2024-01-01 PROCEDURE — 63600175 PHARM REV CODE 636 W HCPCS: Mod: HCNC

## 2024-01-01 PROCEDURE — 85025 COMPLETE CBC W/AUTO DIFF WBC: CPT | Mod: 91,HCNC | Performed by: NURSE PRACTITIONER

## 2024-01-01 PROCEDURE — 25000003 PHARM REV CODE 250: Mod: HCNC | Performed by: INTERNAL MEDICINE

## 2024-01-01 PROCEDURE — 63600175 PHARM REV CODE 636 W HCPCS: Mod: HCNC | Performed by: STUDENT IN AN ORGANIZED HEALTH CARE EDUCATION/TRAINING PROGRAM

## 2024-01-01 PROCEDURE — 84443 ASSAY THYROID STIM HORMONE: CPT | Mod: HCNC

## 2024-01-01 PROCEDURE — 3288F FALL RISK ASSESSMENT DOCD: CPT | Mod: HCNC,CPTII,S$GLB, | Performed by: INTERNAL MEDICINE

## 2024-01-01 PROCEDURE — 36620 INSERTION CATHETER ARTERY: CPT | Mod: HCNC,,, | Performed by: INTERNAL MEDICINE

## 2024-01-01 PROCEDURE — 36415 COLL VENOUS BLD VENIPUNCTURE: CPT | Mod: HCNC,XB | Performed by: INTERNAL MEDICINE

## 2024-01-01 PROCEDURE — 36415 COLL VENOUS BLD VENIPUNCTURE: CPT | Mod: HCNC | Performed by: NURSE PRACTITIONER

## 2024-01-01 PROCEDURE — 5A1955Z RESPIRATORY VENTILATION, GREATER THAN 96 CONSECUTIVE HOURS: ICD-10-PCS | Performed by: INTERNAL MEDICINE

## 2024-01-01 PROCEDURE — 80069 RENAL FUNCTION PANEL: CPT | Mod: HCNC | Performed by: INTERNAL MEDICINE

## 2024-01-01 PROCEDURE — 5A09357 ASSISTANCE WITH RESPIRATORY VENTILATION, LESS THAN 24 CONSECUTIVE HOURS, CONTINUOUS POSITIVE AIRWAY PRESSURE: ICD-10-PCS | Performed by: INTERNAL MEDICINE

## 2024-01-01 PROCEDURE — 99223 1ST HOSP IP/OBS HIGH 75: CPT | Mod: HCNC,,, | Performed by: INTERNAL MEDICINE

## 2024-01-01 PROCEDURE — 80100008 HC CRRT DAILY MAINTENANCE: Mod: HCNC

## 2024-01-01 PROCEDURE — 94003 VENT MGMT INPAT SUBQ DAY: CPT | Mod: HCNC

## 2024-01-01 PROCEDURE — 84295 ASSAY OF SERUM SODIUM: CPT | Mod: HCNC

## 2024-01-01 PROCEDURE — 21400001 HC TELEMETRY ROOM: Mod: HCNC

## 2024-01-01 PROCEDURE — 82330 ASSAY OF CALCIUM: CPT | Mod: 91,HCNC

## 2024-01-01 PROCEDURE — 37000008 HC ANESTHESIA 1ST 15 MINUTES: Mod: HCNC | Performed by: INTERNAL MEDICINE

## 2024-01-01 PROCEDURE — 83735 ASSAY OF MAGNESIUM: CPT | Mod: 91,HCNC | Performed by: STUDENT IN AN ORGANIZED HEALTH CARE EDUCATION/TRAINING PROGRAM

## 2024-01-01 PROCEDURE — 83690 ASSAY OF LIPASE: CPT | Mod: HCNC

## 2024-01-01 PROCEDURE — C9113 INJ PANTOPRAZOLE SODIUM, VIA: HCPCS | Mod: HCNC | Performed by: NURSE PRACTITIONER

## 2024-01-01 PROCEDURE — 99291 CRITICAL CARE FIRST HOUR: CPT | Mod: 25,HCNC,, | Performed by: INTERNAL MEDICINE

## 2024-01-01 PROCEDURE — 80202 ASSAY OF VANCOMYCIN: CPT | Mod: HCNC | Performed by: INTERNAL MEDICINE

## 2024-01-01 PROCEDURE — 63600175 PHARM REV CODE 636 W HCPCS: Mod: JZ,JG,HCNC

## 2024-01-01 PROCEDURE — 82800 BLOOD PH: CPT | Mod: HCNC

## 2024-01-01 PROCEDURE — 84478 ASSAY OF TRIGLYCERIDES: CPT | Mod: 91,HCNC | Performed by: STUDENT IN AN ORGANIZED HEALTH CARE EDUCATION/TRAINING PROGRAM

## 2024-01-01 PROCEDURE — 96374 THER/PROPH/DIAG INJ IV PUSH: CPT | Mod: 59,HCNC

## 2024-01-01 PROCEDURE — 84484 ASSAY OF TROPONIN QUANT: CPT | Mod: HCNC

## 2024-01-01 PROCEDURE — 31500 INSERT EMERGENCY AIRWAY: CPT | Mod: GC,,, | Performed by: INTERNAL MEDICINE

## 2024-01-01 PROCEDURE — 82248 BILIRUBIN DIRECT: CPT | Mod: HCNC | Performed by: INTERNAL MEDICINE

## 2024-01-01 PROCEDURE — 83735 ASSAY OF MAGNESIUM: CPT | Mod: HCNC | Performed by: NURSE PRACTITIONER

## 2024-01-01 PROCEDURE — 87040 BLOOD CULTURE FOR BACTERIA: CPT | Mod: 59,HCNC | Performed by: STUDENT IN AN ORGANIZED HEALTH CARE EDUCATION/TRAINING PROGRAM

## 2024-01-01 PROCEDURE — 83880 ASSAY OF NATRIURETIC PEPTIDE: CPT | Mod: HCNC | Performed by: HOSPITALIST

## 2024-01-01 PROCEDURE — 36415 COLL VENOUS BLD VENIPUNCTURE: CPT | Mod: HCNC | Performed by: HOSPITALIST

## 2024-01-01 PROCEDURE — 82150 ASSAY OF AMYLASE: CPT | Mod: HCNC

## 2024-01-01 PROCEDURE — D9220A PRA ANESTHESIA: Mod: HCNC,CRNA,, | Performed by: NURSE ANESTHETIST, CERTIFIED REGISTERED

## 2024-01-01 PROCEDURE — 93010 ELECTROCARDIOGRAM REPORT: CPT | Mod: HCNC,S$GLB,, | Performed by: INTERNAL MEDICINE

## 2024-01-01 PROCEDURE — 63600175 PHARM REV CODE 636 W HCPCS: Mod: HCNC | Performed by: HOSPITALIST

## 2024-01-01 PROCEDURE — 27200966 HC CLOSED SUCTION SYSTEM: Mod: HCNC

## 2024-01-01 PROCEDURE — 82550 ASSAY OF CK (CPK): CPT | Mod: HCNC | Performed by: STUDENT IN AN ORGANIZED HEALTH CARE EDUCATION/TRAINING PROGRAM

## 2024-01-01 PROCEDURE — D9220A PRA ANESTHESIA: Mod: HCNC,ANES,, | Performed by: ANESTHESIOLOGY

## 2024-01-01 PROCEDURE — 83735 ASSAY OF MAGNESIUM: CPT | Mod: 91,HCNC | Performed by: NURSE PRACTITIONER

## 2024-01-01 PROCEDURE — 99233 SBSQ HOSP IP/OBS HIGH 50: CPT | Mod: HCNC,,, | Performed by: STUDENT IN AN ORGANIZED HEALTH CARE EDUCATION/TRAINING PROGRAM

## 2024-01-01 PROCEDURE — 84100 ASSAY OF PHOSPHORUS: CPT | Mod: HCNC | Performed by: NURSE PRACTITIONER

## 2024-01-01 PROCEDURE — 85025 COMPLETE CBC W/AUTO DIFF WBC: CPT | Mod: HCNC | Performed by: EMERGENCY MEDICINE

## 2024-01-01 PROCEDURE — 96365 THER/PROPH/DIAG IV INF INIT: CPT | Mod: HCNC

## 2024-01-01 PROCEDURE — 96375 TX/PRO/DX INJ NEW DRUG ADDON: CPT | Mod: HCNC

## 2024-01-01 PROCEDURE — 82330 ASSAY OF CALCIUM: CPT | Mod: 91,HCNC | Performed by: STUDENT IN AN ORGANIZED HEALTH CARE EDUCATION/TRAINING PROGRAM

## 2024-01-01 PROCEDURE — 63600175 PHARM REV CODE 636 W HCPCS: Mod: HCNC | Performed by: EMERGENCY MEDICINE

## 2024-01-01 PROCEDURE — 83605 ASSAY OF LACTIC ACID: CPT | Mod: HCNC | Performed by: STUDENT IN AN ORGANIZED HEALTH CARE EDUCATION/TRAINING PROGRAM

## 2024-01-01 PROCEDURE — 74328 X-RAY BILE DUCT ENDOSCOPY: CPT | Mod: TC,HCNC | Performed by: INTERNAL MEDICINE

## 2024-01-01 PROCEDURE — 37000009 HC ANESTHESIA EA ADD 15 MINS: Mod: HCNC | Performed by: INTERNAL MEDICINE

## 2024-01-01 PROCEDURE — 83735 ASSAY OF MAGNESIUM: CPT | Mod: 91,HCNC | Performed by: INTERNAL MEDICINE

## 2024-01-01 PROCEDURE — 83880 ASSAY OF NATRIURETIC PEPTIDE: CPT | Mod: HCNC

## 2024-01-01 PROCEDURE — 36620 INSERTION CATHETER ARTERY: CPT | Mod: HCNC

## 2024-01-01 PROCEDURE — 83735 ASSAY OF MAGNESIUM: CPT | Mod: HCNC

## 2024-01-01 PROCEDURE — 1160F RVW MEDS BY RX/DR IN RCRD: CPT | Mod: HCNC,CPTII,S$GLB, | Performed by: INTERNAL MEDICINE

## 2024-01-01 PROCEDURE — 99223 1ST HOSP IP/OBS HIGH 75: CPT | Mod: 25,HCNC,GC, | Performed by: INTERNAL MEDICINE

## 2024-01-01 PROCEDURE — 85027 COMPLETE CBC AUTOMATED: CPT | Mod: 91,HCNC

## 2024-01-01 PROCEDURE — A4217 STERILE WATER/SALINE, 500 ML: HCPCS | Mod: HCNC | Performed by: NURSE PRACTITIONER

## 2024-01-01 PROCEDURE — 87150 DNA/RNA AMPLIFIED PROBE: CPT | Mod: HCNC | Performed by: STUDENT IN AN ORGANIZED HEALTH CARE EDUCATION/TRAINING PROGRAM

## 2024-01-01 PROCEDURE — 86901 BLOOD TYPING SEROLOGIC RH(D): CPT | Mod: HCNC | Performed by: NURSE PRACTITIONER

## 2024-01-01 PROCEDURE — 83735 ASSAY OF MAGNESIUM: CPT | Mod: HCNC | Performed by: STUDENT IN AN ORGANIZED HEALTH CARE EDUCATION/TRAINING PROGRAM

## 2024-01-01 PROCEDURE — 80053 COMPREHEN METABOLIC PANEL: CPT | Mod: 91,HCNC | Performed by: INTERNAL MEDICINE

## 2024-01-01 PROCEDURE — 63600175 PHARM REV CODE 636 W HCPCS: Mod: HCNC | Performed by: NURSE ANESTHETIST, CERTIFIED REGISTERED

## 2024-01-01 PROCEDURE — 83690 ASSAY OF LIPASE: CPT | Mod: HCNC | Performed by: EMERGENCY MEDICINE

## 2024-01-01 PROCEDURE — 85025 COMPLETE CBC W/AUTO DIFF WBC: CPT | Mod: HCNC

## 2024-01-01 PROCEDURE — 83605 ASSAY OF LACTIC ACID: CPT | Mod: 91,HCNC

## 2024-01-01 PROCEDURE — 81001 URINALYSIS AUTO W/SCOPE: CPT | Mod: HCNC

## 2024-01-01 PROCEDURE — 3074F SYST BP LT 130 MM HG: CPT | Mod: HCNC,CPTII,S$GLB, | Performed by: INTERNAL MEDICINE

## 2024-01-01 PROCEDURE — 80069 RENAL FUNCTION PANEL: CPT | Mod: 91,HCNC | Performed by: STUDENT IN AN ORGANIZED HEALTH CARE EDUCATION/TRAINING PROGRAM

## 2024-01-01 PROCEDURE — C1769 GUIDE WIRE: HCPCS | Mod: HCNC | Performed by: INTERNAL MEDICINE

## 2024-01-01 PROCEDURE — 82550 ASSAY OF CK (CPK): CPT | Mod: HCNC | Performed by: NURSE PRACTITIONER

## 2024-01-01 PROCEDURE — 36620 INSERTION CATHETER ARTERY: CPT | Mod: ,,,

## 2024-01-01 PROCEDURE — 82150 ASSAY OF AMYLASE: CPT | Mod: HCNC | Performed by: INTERNAL MEDICINE

## 2024-01-01 PROCEDURE — 36573 INSJ PICC RS&I 5 YR+: CPT | Mod: HCNC

## 2024-01-01 PROCEDURE — 82248 BILIRUBIN DIRECT: CPT | Mod: HCNC | Performed by: NURSE PRACTITIONER

## 2024-01-01 PROCEDURE — 85025 COMPLETE CBC W/AUTO DIFF WBC: CPT | Mod: HCNC | Performed by: NURSE PRACTITIONER

## 2024-01-01 PROCEDURE — 85018 HEMOGLOBIN: CPT | Mod: HCNC | Performed by: NURSE PRACTITIONER

## 2024-01-01 PROCEDURE — 3E033XZ INTRODUCTION OF VASOPRESSOR INTO PERIPHERAL VEIN, PERCUTANEOUS APPROACH: ICD-10-PCS | Performed by: INTERNAL MEDICINE

## 2024-01-01 PROCEDURE — 1157F ADVNC CARE PLAN IN RCRD: CPT | Mod: HCNC,CPTII,S$GLB, | Performed by: INTERNAL MEDICINE

## 2024-01-01 PROCEDURE — 36556 INSERT NON-TUNNEL CV CATH: CPT | Mod: HCNC,,, | Performed by: INTERNAL MEDICINE

## 2024-01-01 PROCEDURE — 3008F BODY MASS INDEX DOCD: CPT | Mod: HCNC,CPTII,S$GLB, | Performed by: INTERNAL MEDICINE

## 2024-01-01 PROCEDURE — 95714 VEEG EA 12-26 HR UNMNTR: CPT | Mod: HCNC

## 2024-01-01 PROCEDURE — 80053 COMPREHEN METABOLIC PANEL: CPT | Mod: HCNC | Performed by: EMERGENCY MEDICINE

## 2024-01-01 PROCEDURE — BF101ZZ FLUOROSCOPY OF BILE DUCTS USING LOW OSMOLAR CONTRAST: ICD-10-PCS | Performed by: INTERNAL MEDICINE

## 2024-01-01 PROCEDURE — 85007 BL SMEAR W/DIFF WBC COUNT: CPT | Mod: 91,HCNC

## 2024-01-01 PROCEDURE — 11000001 HC ACUTE MED/SURG PRIVATE ROOM: Mod: HCNC

## 2024-01-01 PROCEDURE — 80048 BASIC METABOLIC PNL TOTAL CA: CPT | Mod: HCNC,XB

## 2024-01-01 PROCEDURE — 99285 EMERGENCY DEPT VISIT HI MDM: CPT | Mod: 25,HCNC

## 2024-01-01 PROCEDURE — 94002 VENT MGMT INPAT INIT DAY: CPT | Mod: HCNC

## 2024-01-01 PROCEDURE — 43274 ERCP DUCT STENT PLACEMENT: CPT | Mod: HCNC,,, | Performed by: INTERNAL MEDICINE

## 2024-01-01 PROCEDURE — 3079F DIAST BP 80-89 MM HG: CPT | Mod: HCNC,CPTII,S$GLB, | Performed by: INTERNAL MEDICINE

## 2024-01-01 PROCEDURE — 83605 ASSAY OF LACTIC ACID: CPT | Mod: HCNC | Performed by: EMERGENCY MEDICINE

## 2024-01-01 PROCEDURE — 95720 EEG PHY/QHP EA INCR W/VEEG: CPT | Mod: HCNC,,, | Performed by: PSYCHIATRY & NEUROLOGY

## 2024-01-01 PROCEDURE — 99214 OFFICE O/P EST MOD 30 MIN: CPT | Mod: HCNC,S$GLB,, | Performed by: INTERNAL MEDICINE

## 2024-01-01 PROCEDURE — 84439 ASSAY OF FREE THYROXINE: CPT | Mod: HCNC

## 2024-01-01 PROCEDURE — 85014 HEMATOCRIT: CPT | Mod: HCNC | Performed by: NURSE PRACTITIONER

## 2024-01-01 PROCEDURE — 80053 COMPREHEN METABOLIC PANEL: CPT | Mod: HCNC | Performed by: NURSE PRACTITIONER

## 2024-01-01 PROCEDURE — C9113 INJ PANTOPRAZOLE SODIUM, VIA: HCPCS | Mod: HCNC | Performed by: EMERGENCY MEDICINE

## 2024-01-01 PROCEDURE — 1159F MED LIST DOCD IN RCRD: CPT | Mod: HCNC,CPTII,S$GLB, | Performed by: INTERNAL MEDICINE

## 2024-01-01 PROCEDURE — 27202127 HC STENT INTRODUCER: Mod: HCNC | Performed by: INTERNAL MEDICINE

## 2024-01-01 PROCEDURE — 25000242 PHARM REV CODE 250 ALT 637 W/ HCPCS: Mod: HCNC | Performed by: NURSE ANESTHETIST, CERTIFIED REGISTERED

## 2024-01-01 PROCEDURE — 99233 SBSQ HOSP IP/OBS HIGH 50: CPT | Mod: HCNC,,, | Performed by: NURSE PRACTITIONER

## 2024-01-01 PROCEDURE — C1751 CATH, INF, PER/CENT/MIDLINE: HCPCS | Mod: HCNC

## 2024-01-01 PROCEDURE — 27201674 HC SPHINCTERTOME: Mod: HCNC | Performed by: INTERNAL MEDICINE

## 2024-01-01 PROCEDURE — 0BH17EZ INSERTION OF ENDOTRACHEAL AIRWAY INTO TRACHEA, VIA NATURAL OR ARTIFICIAL OPENING: ICD-10-PCS | Performed by: INTERNAL MEDICINE

## 2024-01-01 PROCEDURE — XFJB8A7 INSPECTION OF HEPATOBILIARY DUCT USING SINGLE-USE DUODENOSCOPE, NEW TECHNOLOGY GROUP 7: ICD-10-PCS | Performed by: INTERNAL MEDICINE

## 2024-01-01 PROCEDURE — 80202 ASSAY OF VANCOMYCIN: CPT | Mod: HCNC | Performed by: STUDENT IN AN ORGANIZED HEALTH CARE EDUCATION/TRAINING PROGRAM

## 2024-01-01 PROCEDURE — 83690 ASSAY OF LIPASE: CPT | Mod: HCNC | Performed by: INTERNAL MEDICINE

## 2024-01-01 PROCEDURE — 84484 ASSAY OF TROPONIN QUANT: CPT | Mod: 91,HCNC

## 2024-01-01 RX ORDER — HEPARIN SODIUM,PORCINE/D5W 25000/250
0-40 INTRAVENOUS SOLUTION INTRAVENOUS CONTINUOUS
Status: DISCONTINUED | OUTPATIENT
Start: 2024-01-01 | End: 2024-01-01

## 2024-01-01 RX ORDER — HYDROCODONE BITARTRATE AND ACETAMINOPHEN 500; 5 MG/1; MG/1
TABLET ORAL CONTINUOUS
Status: DISCONTINUED | OUTPATIENT
Start: 2024-01-01 | End: 2024-01-01

## 2024-01-01 RX ORDER — FUROSEMIDE 10 MG/ML
40 INJECTION INTRAMUSCULAR; INTRAVENOUS ONCE
Status: COMPLETED | OUTPATIENT
Start: 2024-01-01 | End: 2024-01-01

## 2024-01-01 RX ORDER — PROPOFOL 10 MG/ML
INJECTION, EMULSION INTRAVENOUS
Status: DISPENSED
Start: 2024-01-01 | End: 2024-01-01

## 2024-01-01 RX ORDER — HYDROMORPHONE HYDROCHLORIDE 1 MG/ML
0.2 INJECTION, SOLUTION INTRAMUSCULAR; INTRAVENOUS; SUBCUTANEOUS EVERY 5 MIN PRN
Status: CANCELLED | OUTPATIENT
Start: 2024-01-01

## 2024-01-01 RX ORDER — VASOPRESSIN 20 [USP'U]/ML
INJECTION, SOLUTION INTRAMUSCULAR; SUBCUTANEOUS
Status: DISCONTINUED | OUTPATIENT
Start: 2024-01-01 | End: 2024-01-01

## 2024-01-01 RX ORDER — CALCIUM GLUCONATE 20 MG/ML
1 INJECTION, SOLUTION INTRAVENOUS ONCE
Status: COMPLETED | OUTPATIENT
Start: 2024-01-01 | End: 2024-01-01

## 2024-01-01 RX ORDER — PHENYLEPHRINE HYDROCHLORIDE 10 MG/ML
INJECTION INTRAVENOUS
Status: DISCONTINUED | OUTPATIENT
Start: 2024-01-01 | End: 2024-01-01

## 2024-01-01 RX ORDER — ETOMIDATE 2 MG/ML
30 INJECTION INTRAVENOUS ONCE
Status: COMPLETED | OUTPATIENT
Start: 2024-01-01 | End: 2024-01-01

## 2024-01-01 RX ORDER — PHENYLEPHRINE HCL IN 0.9% NACL 1 MG/10 ML
SYRINGE (ML) INTRAVENOUS
Status: DISCONTINUED
Start: 2024-01-01 | End: 2024-01-01 | Stop reason: WASHOUT

## 2024-01-01 RX ORDER — HEPARIN SODIUM 5000 [USP'U]/ML
5000 INJECTION, SOLUTION INTRAVENOUS; SUBCUTANEOUS EVERY 8 HOURS
Status: DISCONTINUED | OUTPATIENT
Start: 2024-01-01 | End: 2024-01-01

## 2024-01-01 RX ORDER — MORPHINE SULFATE 2 MG/ML
4 INJECTION, SOLUTION INTRAMUSCULAR; INTRAVENOUS
Status: DISCONTINUED | OUTPATIENT
Start: 2024-01-01 | End: 2024-01-01 | Stop reason: HOSPADM

## 2024-01-01 RX ORDER — MUPIROCIN 20 MG/G
OINTMENT TOPICAL 2 TIMES DAILY
Status: DISCONTINUED | OUTPATIENT
Start: 2024-01-01 | End: 2024-01-01 | Stop reason: HOSPADM

## 2024-01-01 RX ORDER — ALBUTEROL SULFATE 90 UG/1
AEROSOL, METERED RESPIRATORY (INHALATION)
Status: DISCONTINUED | OUTPATIENT
Start: 2024-01-01 | End: 2024-01-01

## 2024-01-01 RX ORDER — FUROSEMIDE 40 MG/1
40 TABLET ORAL DAILY
Qty: 90 TABLET | Refills: 3 | Status: SHIPPED | OUTPATIENT
Start: 2024-01-01

## 2024-01-01 RX ORDER — ONDANSETRON HYDROCHLORIDE 2 MG/ML
4 INJECTION, SOLUTION INTRAVENOUS ONCE AS NEEDED
Status: CANCELLED | OUTPATIENT
Start: 2024-01-01 | End: 2035-09-03

## 2024-01-01 RX ORDER — PANTOPRAZOLE SODIUM 40 MG/10ML
40 INJECTION, POWDER, LYOPHILIZED, FOR SOLUTION INTRAVENOUS 2 TIMES DAILY
Status: DISCONTINUED | OUTPATIENT
Start: 2024-01-01 | End: 2024-01-01 | Stop reason: HOSPADM

## 2024-01-01 RX ORDER — ONDANSETRON HYDROCHLORIDE 2 MG/ML
4 INJECTION, SOLUTION INTRAVENOUS
Status: COMPLETED | OUTPATIENT
Start: 2024-01-01 | End: 2024-01-01

## 2024-01-01 RX ORDER — CHLORHEXIDINE GLUCONATE ORAL RINSE 1.2 MG/ML
15 SOLUTION DENTAL 2 TIMES DAILY
Status: DISCONTINUED | OUTPATIENT
Start: 2024-01-01 | End: 2024-01-01

## 2024-01-01 RX ORDER — INSULIN ASPART 100 [IU]/ML
7 INJECTION, SOLUTION INTRAVENOUS; SUBCUTANEOUS
Status: DISCONTINUED | OUTPATIENT
Start: 2024-01-01 | End: 2024-01-01 | Stop reason: HOSPADM

## 2024-01-01 RX ORDER — ACETAMINOPHEN 325 MG/1
650 TABLET ORAL EVERY 4 HOURS PRN
Status: DISCONTINUED | OUTPATIENT
Start: 2024-01-01 | End: 2024-01-01 | Stop reason: HOSPADM

## 2024-01-01 RX ORDER — DEXAMETHASONE SODIUM PHOSPHATE 4 MG/ML
10 INJECTION, SOLUTION INTRA-ARTICULAR; INTRALESIONAL; INTRAMUSCULAR; INTRAVENOUS; SOFT TISSUE EVERY 24 HOURS
Status: DISCONTINUED | OUTPATIENT
Start: 2024-01-01 | End: 2024-01-01

## 2024-01-01 RX ORDER — ROCURONIUM BROMIDE 10 MG/ML
120 INJECTION, SOLUTION INTRAVENOUS ONCE
Status: COMPLETED | OUTPATIENT
Start: 2024-01-01 | End: 2024-01-01

## 2024-01-01 RX ORDER — MIDAZOLAM HYDROCHLORIDE 1 MG/ML
0-5 INJECTION, SOLUTION INTRAVENOUS CONTINUOUS
Status: DISCONTINUED | OUTPATIENT
Start: 2024-01-01 | End: 2024-01-01

## 2024-01-01 RX ORDER — PHENYLEPHRINE HCL IN 0.9% NACL 1 MG/10 ML
SYRINGE (ML) INTRAVENOUS
Status: DISPENSED
Start: 2024-01-01 | End: 2024-01-01

## 2024-01-01 RX ORDER — POTASSIUM CHLORIDE 29.8 MG/ML
40 INJECTION INTRAVENOUS EVERY 4 HOURS
Status: DISCONTINUED | OUTPATIENT
Start: 2024-01-01 | End: 2024-01-01

## 2024-01-01 RX ORDER — DEXAMETHASONE SODIUM PHOSPHATE 4 MG/ML
20 INJECTION, SOLUTION INTRA-ARTICULAR; INTRALESIONAL; INTRAMUSCULAR; INTRAVENOUS; SOFT TISSUE EVERY 24 HOURS
Status: DISCONTINUED | OUTPATIENT
Start: 2024-01-01 | End: 2024-01-01

## 2024-01-01 RX ORDER — FENTANYL CITRATE-0.9 % NACL/PF 10 MCG/ML
0-250 PLASTIC BAG, INJECTION (ML) INTRAVENOUS CONTINUOUS
Status: DISCONTINUED | OUTPATIENT
Start: 2024-01-01 | End: 2024-01-01

## 2024-01-01 RX ORDER — LACTULOSE 10 G/15ML
200 SOLUTION ORAL; RECTAL 3 TIMES DAILY
Status: DISCONTINUED | OUTPATIENT
Start: 2024-01-01 | End: 2024-01-01

## 2024-01-01 RX ORDER — GLUCAGON 1 MG
1 KIT INJECTION
Status: DISCONTINUED | OUTPATIENT
Start: 2024-01-01 | End: 2024-01-01

## 2024-01-01 RX ORDER — GLUCAGON 1 MG
1 KIT INJECTION
Status: DISCONTINUED | OUTPATIENT
Start: 2024-01-01 | End: 2024-01-01 | Stop reason: HOSPADM

## 2024-01-01 RX ORDER — DROPERIDOL 2.5 MG/ML
0.62 INJECTION, SOLUTION INTRAMUSCULAR; INTRAVENOUS ONCE AS NEEDED
Status: CANCELLED | OUTPATIENT
Start: 2024-01-01 | End: 2035-09-03

## 2024-01-01 RX ORDER — CALCIUM GLUCONATE 20 MG/ML
1 INJECTION, SOLUTION INTRAVENOUS
Status: DISCONTINUED | OUTPATIENT
Start: 2024-01-01 | End: 2024-01-01

## 2024-01-01 RX ORDER — LIDOCAINE HYDROCHLORIDE 10 MG/ML
INJECTION, SOLUTION EPIDURAL; INFILTRATION; INTRACAUDAL; PERINEURAL
Status: COMPLETED
Start: 2024-01-01 | End: 2024-01-01

## 2024-01-01 RX ORDER — SODIUM CHLORIDE 9 MG/ML
INJECTION, SOLUTION INTRAVENOUS CONTINUOUS
Status: DISCONTINUED | OUTPATIENT
Start: 2024-01-01 | End: 2024-01-01

## 2024-01-01 RX ORDER — POLYETHYLENE GLYCOL 3350 17 G/17G
17 POWDER, FOR SOLUTION ORAL 2 TIMES DAILY
Status: DISCONTINUED | OUTPATIENT
Start: 2024-01-01 | End: 2024-01-01 | Stop reason: HOSPADM

## 2024-01-01 RX ORDER — PROCHLORPERAZINE EDISYLATE 5 MG/ML
2.5 INJECTION INTRAMUSCULAR; INTRAVENOUS EVERY 6 HOURS PRN
Status: DISCONTINUED | OUTPATIENT
Start: 2024-01-01 | End: 2024-01-01 | Stop reason: HOSPADM

## 2024-01-01 RX ORDER — HYDRALAZINE HYDROCHLORIDE 20 MG/ML
10 INJECTION INTRAMUSCULAR; INTRAVENOUS EVERY 6 HOURS PRN
Status: DISCONTINUED | OUTPATIENT
Start: 2024-01-01 | End: 2024-01-01 | Stop reason: HOSPADM

## 2024-01-01 RX ORDER — SODIUM CHLORIDE 0.9 % (FLUSH) 0.9 %
10 SYRINGE (ML) INJECTION
Status: DISCONTINUED | OUTPATIENT
Start: 2024-01-01 | End: 2024-01-01 | Stop reason: HOSPADM

## 2024-01-01 RX ORDER — MIDAZOLAM HYDROCHLORIDE 1 MG/ML
2 INJECTION, SOLUTION INTRAMUSCULAR; INTRAVENOUS ONCE
Status: COMPLETED | OUTPATIENT
Start: 2024-01-01 | End: 2024-01-01

## 2024-01-01 RX ORDER — PANTOPRAZOLE SODIUM 40 MG/10ML
40 INJECTION, POWDER, LYOPHILIZED, FOR SOLUTION INTRAVENOUS 2 TIMES DAILY
Status: DISCONTINUED | OUTPATIENT
Start: 2024-01-01 | End: 2024-01-01

## 2024-01-01 RX ORDER — LORAZEPAM 2 MG/ML
1 INJECTION INTRAMUSCULAR EVERY 4 HOURS PRN
Status: DISCONTINUED | OUTPATIENT
Start: 2024-01-01 | End: 2024-01-01 | Stop reason: HOSPADM

## 2024-01-01 RX ORDER — CHLORHEXIDINE GLUCONATE ORAL RINSE 1.2 MG/ML
15 SOLUTION DENTAL 2 TIMES DAILY
Status: DISCONTINUED | OUTPATIENT
Start: 2024-01-01 | End: 2024-01-01 | Stop reason: HOSPADM

## 2024-01-01 RX ORDER — FLUDROCORTISONE ACETATE 0.1 MG/1
100 TABLET ORAL DAILY
Status: DISCONTINUED | OUTPATIENT
Start: 2024-01-01 | End: 2024-01-01 | Stop reason: HOSPADM

## 2024-01-01 RX ORDER — ALBUTEROL SULFATE 2.5 MG/.5ML
10 SOLUTION RESPIRATORY (INHALATION) ONCE
Status: COMPLETED | OUTPATIENT
Start: 2024-01-01 | End: 2024-01-01

## 2024-01-01 RX ORDER — CALCIUM GLUCONATE 20 MG/ML
1 INJECTION, SOLUTION INTRAVENOUS
Status: DISPENSED | OUTPATIENT
Start: 2024-01-01 | End: 2024-01-01

## 2024-01-01 RX ORDER — MAGNESIUM SULFATE HEPTAHYDRATE 40 MG/ML
2 INJECTION, SOLUTION INTRAVENOUS
Status: DISCONTINUED | OUTPATIENT
Start: 2024-01-01 | End: 2024-01-01

## 2024-01-01 RX ORDER — PROPOFOL 10 MG/ML
0-50 INJECTION, EMULSION INTRAVENOUS CONTINUOUS
Status: DISCONTINUED | OUTPATIENT
Start: 2024-01-01 | End: 2024-01-01

## 2024-01-01 RX ORDER — FLUCONAZOLE 2 MG/ML
400 INJECTION, SOLUTION INTRAVENOUS
Status: DISCONTINUED | OUTPATIENT
Start: 2024-01-01 | End: 2024-01-01

## 2024-01-01 RX ORDER — INSULIN ASPART 100 [IU]/ML
0-15 INJECTION, SOLUTION INTRAVENOUS; SUBCUTANEOUS EVERY 4 HOURS PRN
Status: DISCONTINUED | OUTPATIENT
Start: 2024-01-01 | End: 2024-01-01 | Stop reason: HOSPADM

## 2024-01-01 RX ORDER — HYDROMORPHONE HYDROCHLORIDE 2 MG/ML
1 INJECTION, SOLUTION INTRAMUSCULAR; INTRAVENOUS; SUBCUTANEOUS
Status: COMPLETED | OUTPATIENT
Start: 2024-01-01 | End: 2024-01-01

## 2024-01-01 RX ORDER — FENTANYL CITRATE 50 UG/ML
50 INJECTION, SOLUTION INTRAMUSCULAR; INTRAVENOUS
Status: DISCONTINUED | OUTPATIENT
Start: 2024-01-01 | End: 2024-01-01 | Stop reason: HOSPADM

## 2024-01-01 RX ORDER — AMOXICILLIN 250 MG
1 CAPSULE ORAL DAILY
Status: DISCONTINUED | OUTPATIENT
Start: 2024-01-01 | End: 2024-01-01

## 2024-01-01 RX ORDER — DEXAMETHASONE SODIUM PHOSPHATE 100 MG/10ML
10 INJECTION INTRAMUSCULAR; INTRAVENOUS DAILY
Status: DISCONTINUED | OUTPATIENT
Start: 2024-01-01 | End: 2024-01-01 | Stop reason: HOSPADM

## 2024-01-01 RX ORDER — ONDANSETRON HYDROCHLORIDE 2 MG/ML
8 INJECTION, SOLUTION INTRAVENOUS EVERY 8 HOURS PRN
Status: DISCONTINUED | OUTPATIENT
Start: 2024-01-01 | End: 2024-01-01 | Stop reason: HOSPADM

## 2024-01-01 RX ORDER — CALCIUM GLUCONATE 20 MG/ML
1 INJECTION, SOLUTION INTRAVENOUS EVERY 10 MIN PRN
Status: DISCONTINUED | OUTPATIENT
Start: 2024-01-01 | End: 2024-01-01

## 2024-01-01 RX ORDER — NOREPINEPHRINE BITARTRATE/D5W 4MG/250ML
0-3 PLASTIC BAG, INJECTION (ML) INTRAVENOUS CONTINUOUS
Status: DISCONTINUED | OUTPATIENT
Start: 2024-01-01 | End: 2024-01-01

## 2024-01-01 RX ORDER — FENTANYL CITRATE 50 UG/ML
50 INJECTION, SOLUTION INTRAMUSCULAR; INTRAVENOUS
Status: ACTIVE | OUTPATIENT
Start: 2024-01-01 | End: 2024-01-01

## 2024-01-01 RX ORDER — NOREPINEPHRINE BITARTRATE/D5W 4MG/250ML
0-3 PLASTIC BAG, INJECTION (ML) INTRAVENOUS CONTINUOUS
Status: DISCONTINUED | OUTPATIENT
Start: 2024-01-01 | End: 2024-01-01 | Stop reason: HOSPADM

## 2024-01-01 RX ORDER — POLYETHYLENE GLYCOL 3350 17 G/17G
17 POWDER, FOR SOLUTION ORAL DAILY
Status: DISCONTINUED | OUTPATIENT
Start: 2024-01-01 | End: 2024-01-01

## 2024-01-01 RX ORDER — PANTOPRAZOLE SODIUM 40 MG/10ML
40 INJECTION, POWDER, LYOPHILIZED, FOR SOLUTION INTRAVENOUS DAILY
Status: DISCONTINUED | OUTPATIENT
Start: 2024-01-01 | End: 2024-01-01 | Stop reason: HOSPADM

## 2024-01-01 RX ORDER — SODIUM CHLORIDE 9 MG/ML
INJECTION, SOLUTION INTRAVENOUS CONTINUOUS
Status: DISCONTINUED | OUTPATIENT
Start: 2024-01-01 | End: 2024-01-01 | Stop reason: HOSPADM

## 2024-01-01 RX ORDER — FENTANYL CITRATE-0.9 % NACL/PF 10 MCG/ML
0-250 PLASTIC BAG, INJECTION (ML) INTRAVENOUS CONTINUOUS
Status: DISCONTINUED | OUTPATIENT
Start: 2024-01-01 | End: 2024-01-01 | Stop reason: HOSPADM

## 2024-01-01 RX ORDER — METHOCARBAMOL 750 MG/1
750 TABLET, FILM COATED ORAL
COMMUNITY
Start: 2024-01-01 | End: 2024-01-01

## 2024-01-01 RX ORDER — FLUDROCORTISONE ACETATE 0.1 MG/1
100 TABLET ORAL DAILY
Status: DISCONTINUED | OUTPATIENT
Start: 2024-01-01 | End: 2024-01-01

## 2024-01-01 RX ORDER — LIDOCAINE HYDROCHLORIDE 10 MG/ML
INJECTION INFILTRATION; PERINEURAL
Status: COMPLETED
Start: 2024-01-01 | End: 2024-01-01

## 2024-01-01 RX ORDER — MUPIROCIN 20 MG/G
OINTMENT TOPICAL 2 TIMES DAILY
Status: DISCONTINUED | OUTPATIENT
Start: 2024-01-01 | End: 2024-01-01

## 2024-01-01 RX ORDER — MORPHINE SULFATE 4 MG/ML
4 INJECTION, SOLUTION INTRAMUSCULAR; INTRAVENOUS ONCE AS NEEDED
Status: COMPLETED | OUTPATIENT
Start: 2024-01-01 | End: 2024-01-01

## 2024-01-01 RX ORDER — AMOXICILLIN 250 MG
1 CAPSULE ORAL 2 TIMES DAILY
Status: DISCONTINUED | OUTPATIENT
Start: 2024-01-01 | End: 2024-01-01 | Stop reason: HOSPADM

## 2024-01-01 RX ORDER — ONDANSETRON HYDROCHLORIDE 2 MG/ML
4 INJECTION, SOLUTION INTRAVENOUS EVERY 6 HOURS PRN
Status: DISCONTINUED | OUTPATIENT
Start: 2024-01-01 | End: 2024-01-01 | Stop reason: HOSPADM

## 2024-01-01 RX ORDER — INSULIN ASPART 100 [IU]/ML
0-5 INJECTION, SOLUTION INTRAVENOUS; SUBCUTANEOUS EVERY 6 HOURS PRN
Status: DISCONTINUED | OUTPATIENT
Start: 2024-01-01 | End: 2024-01-01

## 2024-01-01 RX ORDER — MELOXICAM 15 MG/1
15 TABLET ORAL DAILY PRN
COMMUNITY
Start: 2024-01-01 | End: 2024-01-01

## 2024-01-01 RX ORDER — HEPARIN SODIUM 5000 [USP'U]/ML
7500 INJECTION, SOLUTION INTRAVENOUS; SUBCUTANEOUS EVERY 8 HOURS
Status: DISCONTINUED | OUTPATIENT
Start: 2024-01-01 | End: 2024-01-01

## 2024-01-01 RX ORDER — TIMOLOL MALEATE 5 MG/ML
1 SOLUTION/ DROPS OPHTHALMIC DAILY
Status: DISCONTINUED | OUTPATIENT
Start: 2024-01-01 | End: 2024-01-01 | Stop reason: HOSPADM

## 2024-01-01 RX ORDER — INSULIN ASPART 100 [IU]/ML
4 INJECTION, SOLUTION INTRAVENOUS; SUBCUTANEOUS
Status: DISCONTINUED | OUTPATIENT
Start: 2024-01-01 | End: 2024-01-01

## 2024-01-01 RX ORDER — MAGNESIUM SULFATE HEPTAHYDRATE 40 MG/ML
2 INJECTION, SOLUTION INTRAVENOUS
Status: DISPENSED | OUTPATIENT
Start: 2024-01-01 | End: 2024-01-01

## 2024-01-01 RX ORDER — VASOPRESSIN IN DEXTROSE 5 % 25/250 ML
0.04 PLASTIC BAG, INJECTION (ML) INTRAVENOUS CONTINUOUS
Status: DISCONTINUED | OUTPATIENT
Start: 2024-01-01 | End: 2024-01-01 | Stop reason: HOSPADM

## 2024-01-01 RX ORDER — DEXTROSE MONOHYDRATE 100 MG/ML
INJECTION, SOLUTION INTRAVENOUS CONTINUOUS PRN
Status: DISCONTINUED | OUTPATIENT
Start: 2024-01-01 | End: 2024-01-01 | Stop reason: HOSPADM

## 2024-01-01 RX ORDER — FAMOTIDINE 20 MG/1
20 TABLET, FILM COATED ORAL 2 TIMES DAILY
Status: DISCONTINUED | OUTPATIENT
Start: 2024-01-01 | End: 2024-01-01

## 2024-01-01 RX ORDER — GLYCOPYRROLATE 0.2 MG/ML
0.1 INJECTION INTRAMUSCULAR; INTRAVENOUS 3 TIMES DAILY PRN
Status: DISCONTINUED | OUTPATIENT
Start: 2024-01-01 | End: 2024-01-01 | Stop reason: HOSPADM

## 2024-01-01 RX ORDER — INSULIN ASPART 100 [IU]/ML
0-15 INJECTION, SOLUTION INTRAVENOUS; SUBCUTANEOUS EVERY 6 HOURS PRN
Status: DISCONTINUED | OUTPATIENT
Start: 2024-01-01 | End: 2024-01-01

## 2024-01-01 RX ORDER — HEPARIN SODIUM 5000 [USP'U]/ML
7500 INJECTION, SOLUTION INTRAVENOUS; SUBCUTANEOUS EVERY 8 HOURS
Status: DISCONTINUED | OUTPATIENT
Start: 2024-01-01 | End: 2024-01-01 | Stop reason: HOSPADM

## 2024-01-01 RX ORDER — HYDROMORPHONE HYDROCHLORIDE 2 MG/ML
1 INJECTION, SOLUTION INTRAMUSCULAR; INTRAVENOUS; SUBCUTANEOUS
Status: DISCONTINUED | OUTPATIENT
Start: 2024-01-01 | End: 2024-01-01 | Stop reason: HOSPADM

## 2024-01-01 RX ORDER — PANTOPRAZOLE SODIUM 40 MG/10ML
80 INJECTION, POWDER, LYOPHILIZED, FOR SOLUTION INTRAVENOUS
Status: COMPLETED | OUTPATIENT
Start: 2024-01-01 | End: 2024-01-01

## 2024-01-01 RX ORDER — TRAMADOL HYDROCHLORIDE 50 MG/1
50 TABLET ORAL EVERY 6 HOURS
COMMUNITY
Start: 2024-01-01 | End: 2024-01-01

## 2024-01-01 RX ORDER — MAGNESIUM SULFATE HEPTAHYDRATE 40 MG/ML
2 INJECTION, SOLUTION INTRAVENOUS
Status: DISCONTINUED | OUTPATIENT
Start: 2024-01-01 | End: 2024-01-01 | Stop reason: HOSPADM

## 2024-01-01 RX ORDER — MUPIROCIN 20 MG/G
OINTMENT TOPICAL 2 TIMES DAILY
Status: DISPENSED | OUTPATIENT
Start: 2024-01-01 | End: 2024-01-01

## 2024-01-01 RX ORDER — INSULIN ASPART 100 [IU]/ML
0-10 INJECTION, SOLUTION INTRAVENOUS; SUBCUTANEOUS EVERY 6 HOURS PRN
Status: DISCONTINUED | OUTPATIENT
Start: 2024-01-01 | End: 2024-01-01 | Stop reason: HOSPADM

## 2024-01-01 RX ORDER — MORPHINE SULFATE 4 MG/ML
4 INJECTION, SOLUTION INTRAMUSCULAR; INTRAVENOUS
Status: COMPLETED | OUTPATIENT
Start: 2024-01-01 | End: 2024-01-01

## 2024-01-01 RX ORDER — INSULIN ASPART 100 [IU]/ML
5 INJECTION, SOLUTION INTRAVENOUS; SUBCUTANEOUS
Status: DISCONTINUED | OUTPATIENT
Start: 2024-01-01 | End: 2024-01-01

## 2024-01-01 RX ORDER — ROCURONIUM BROMIDE 10 MG/ML
INJECTION, SOLUTION INTRAVENOUS
Status: DISCONTINUED | OUTPATIENT
Start: 2024-01-01 | End: 2024-01-01

## 2024-01-01 RX ORDER — METOPROLOL TARTRATE 1 MG/ML
5 INJECTION, SOLUTION INTRAVENOUS EVERY 12 HOURS
Status: DISCONTINUED | OUTPATIENT
Start: 2024-01-01 | End: 2024-01-01

## 2024-01-01 RX ORDER — ONDANSETRON 4 MG/1
4 TABLET, ORALLY DISINTEGRATING ORAL EVERY 6 HOURS PRN
COMMUNITY
Start: 2024-01-01 | End: 2024-01-01

## 2024-01-01 RX ORDER — INSULIN ASPART 100 [IU]/ML
0-10 INJECTION, SOLUTION INTRAVENOUS; SUBCUTANEOUS EVERY 6 HOURS PRN
Status: DISCONTINUED | OUTPATIENT
Start: 2024-01-01 | End: 2024-01-01

## 2024-01-01 RX ORDER — CALCIUM GLUCONATE 20 MG/ML
1 INJECTION, SOLUTION INTRAVENOUS EVERY 10 MIN PRN
Status: COMPLETED | OUTPATIENT
Start: 2024-01-01 | End: 2024-01-01

## 2024-01-01 RX ADMIN — PROPOFOL 45 MCG/KG/MIN: 10 INJECTION, EMULSION INTRAVENOUS at 06:04

## 2024-01-01 RX ADMIN — PROPOFOL 10 MCG/KG/MIN: 10 INJECTION, EMULSION INTRAVENOUS at 05:04

## 2024-01-01 RX ADMIN — INSULIN ASPART 2 UNITS: 100 INJECTION, SOLUTION INTRAVENOUS; SUBCUTANEOUS at 12:04

## 2024-01-01 RX ADMIN — MEROPENEM 2 G: 1 INJECTION INTRAVENOUS at 02:04

## 2024-01-01 RX ADMIN — MINERAL OIL AND WHITE PETROLATUM: 30; 940 OINTMENT OPHTHALMIC at 01:04

## 2024-01-01 RX ADMIN — NOREPINEPHRINE BITARTRATE 1.16 MCG/KG/MIN: 1 INJECTION, SOLUTION, CONCENTRATE INTRAVENOUS at 04:04

## 2024-01-01 RX ADMIN — VASOPRESSIN 1 UNITS: 20 INJECTION INTRAVENOUS at 07:04

## 2024-01-01 RX ADMIN — HEPARIN SODIUM 7500 UNITS: 5000 INJECTION INTRAVENOUS; SUBCUTANEOUS at 09:04

## 2024-01-01 RX ADMIN — INSULIN ASPART 5 UNITS: 100 INJECTION, SOLUTION INTRAVENOUS; SUBCUTANEOUS at 03:04

## 2024-01-01 RX ADMIN — Medication 25 MCG/HR: at 06:04

## 2024-01-01 RX ADMIN — INSULIN ASPART 4 UNITS: 100 INJECTION, SOLUTION INTRAVENOUS; SUBCUTANEOUS at 12:04

## 2024-01-01 RX ADMIN — KETAMINE HYDROCHLORIDE 20 MCG/KG/MIN: 100 INJECTION INTRAMUSCULAR; INTRAVENOUS at 06:04

## 2024-01-01 RX ADMIN — PANTOPRAZOLE SODIUM 40 MG: 40 INJECTION, POWDER, FOR SOLUTION INTRAVENOUS at 08:04

## 2024-01-01 RX ADMIN — Medication 250 MCG/HR: at 11:04

## 2024-01-01 RX ADMIN — INSULIN ASPART 3 UNITS: 100 INJECTION, SOLUTION INTRAVENOUS; SUBCUTANEOUS at 08:04

## 2024-01-01 RX ADMIN — TIMOLOL MALEATE 1 DROP: 5 SOLUTION OPHTHALMIC at 01:04

## 2024-01-01 RX ADMIN — INSULIN ASPART 6 UNITS: 100 INJECTION, SOLUTION INTRAVENOUS; SUBCUTANEOUS at 04:04

## 2024-01-01 RX ADMIN — CALCIUM GLUCONATE: 98 INJECTION, SOLUTION INTRAVENOUS at 02:04

## 2024-01-01 RX ADMIN — MAGNESIUM SULFATE HEPTAHYDRATE 2 G: 40 INJECTION, SOLUTION INTRAVENOUS at 12:04

## 2024-01-01 RX ADMIN — MUPIROCIN: 20 OINTMENT TOPICAL at 08:04

## 2024-01-01 RX ADMIN — MICAFUNGIN SODIUM 100 MG: 100 INJECTION, POWDER, LYOPHILIZED, FOR SOLUTION INTRAVENOUS at 12:04

## 2024-01-01 RX ADMIN — INSULIN ASPART 5 UNITS: 100 INJECTION, SOLUTION INTRAVENOUS; SUBCUTANEOUS at 08:04

## 2024-01-01 RX ADMIN — MEROPENEM 2 G: 1 INJECTION INTRAVENOUS at 09:04

## 2024-01-01 RX ADMIN — CALCIUM GLUCONATE: 98 INJECTION, SOLUTION INTRAVENOUS at 03:04

## 2024-01-01 RX ADMIN — CALCIUM GLUCONATE: 98 INJECTION, SOLUTION INTRAVENOUS at 10:04

## 2024-01-01 RX ADMIN — MORPHINE SULFATE 4 MG: 4 INJECTION INTRAVENOUS at 02:04

## 2024-01-01 RX ADMIN — ALBUTEROL SULFATE 6 PUFF: 108 AEROSOL, METERED RESPIRATORY (INHALATION) at 08:04

## 2024-01-01 RX ADMIN — MEROPENEM 2 G: 1 INJECTION INTRAVENOUS at 06:04

## 2024-01-01 RX ADMIN — KETAMINE HYDROCHLORIDE 20 MCG/KG/MIN: 100 INJECTION INTRAMUSCULAR; INTRAVENOUS at 10:04

## 2024-01-01 RX ADMIN — MUPIROCIN: 20 OINTMENT TOPICAL at 11:04

## 2024-01-01 RX ADMIN — Medication 250 MCG/HR: at 03:04

## 2024-01-01 RX ADMIN — NOREPINEPHRINE BITARTRATE 0.48 MCG/KG/MIN: 1 INJECTION, SOLUTION, CONCENTRATE INTRAVENOUS at 05:04

## 2024-01-01 RX ADMIN — NOREPINEPHRINE BITARTRATE 0.8 MCG/KG/MIN: 1 INJECTION, SOLUTION, CONCENTRATE INTRAVENOUS at 10:04

## 2024-01-01 RX ADMIN — Medication 250 MCG/HR: at 12:04

## 2024-01-01 RX ADMIN — VANCOMYCIN HYDROCHLORIDE 750 MG: 750 INJECTION, POWDER, LYOPHILIZED, FOR SOLUTION INTRAVENOUS at 12:04

## 2024-01-01 RX ADMIN — VASOPRESSIN 0.04 UNITS/MIN: 20 INJECTION INTRAVENOUS at 12:04

## 2024-01-01 RX ADMIN — INSULIN ASPART 2 UNITS: 100 INJECTION, SOLUTION INTRAVENOUS; SUBCUTANEOUS at 08:04

## 2024-01-01 RX ADMIN — Medication 250 MCG/HR: at 09:04

## 2024-01-01 RX ADMIN — PROPOFOL 45 MCG/KG/MIN: 10 INJECTION, EMULSION INTRAVENOUS at 02:04

## 2024-01-01 RX ADMIN — DEXAMETHASONE SODIUM PHOSPHATE 20 MG: 10 INJECTION INTRAMUSCULAR; INTRAVENOUS at 09:04

## 2024-01-01 RX ADMIN — SODIUM CHLORIDE 1000 ML: 0.9 INJECTION, SOLUTION INTRAVENOUS at 07:04

## 2024-01-01 RX ADMIN — POLYETHYLENE GLYCOL 3350 17 G: 17 POWDER, FOR SOLUTION ORAL at 09:04

## 2024-01-01 RX ADMIN — HYDROCORTISONE SODIUM SUCCINATE 100 MG: 100 INJECTION, POWDER, FOR SOLUTION INTRAMUSCULAR; INTRAVENOUS at 10:04

## 2024-01-01 RX ADMIN — IOHEXOL 100 ML: 350 INJECTION, SOLUTION INTRAVENOUS at 07:04

## 2024-01-01 RX ADMIN — PROPOFOL 25 MCG/KG/MIN: 10 INJECTION, EMULSION INTRAVENOUS at 10:04

## 2024-01-01 RX ADMIN — DEXAMETHASONE SODIUM PHOSPHATE 20 MG: 10 INJECTION INTRAMUSCULAR; INTRAVENOUS at 10:04

## 2024-01-01 RX ADMIN — HEPARIN SODIUM 7500 UNITS: 5000 INJECTION INTRAVENOUS; SUBCUTANEOUS at 10:04

## 2024-01-01 RX ADMIN — NOREPINEPHRINE BITARTRATE 0.42 MCG/KG/MIN: 1 INJECTION, SOLUTION, CONCENTRATE INTRAVENOUS at 06:04

## 2024-01-01 RX ADMIN — INSULIN HUMAN 13.15 UNITS: 100 INJECTION, SOLUTION PARENTERAL at 11:04

## 2024-01-01 RX ADMIN — HEPARIN SODIUM 7500 UNITS: 5000 INJECTION INTRAVENOUS; SUBCUTANEOUS at 05:04

## 2024-01-01 RX ADMIN — NOREPINEPHRINE BITARTRATE 0.56 MCG/KG/MIN: 1 INJECTION, SOLUTION, CONCENTRATE INTRAVENOUS at 05:04

## 2024-01-01 RX ADMIN — LACTULOSE 200 G: 10 SOLUTION ORAL at 09:04

## 2024-01-01 RX ADMIN — DEXAMETHASONE SODIUM PHOSPHATE 10 MG: 10 INJECTION INTRAMUSCULAR; INTRAVENOUS at 09:04

## 2024-01-01 RX ADMIN — FUROSEMIDE 40 MG: 10 INJECTION, SOLUTION INTRAMUSCULAR; INTRAVENOUS at 06:04

## 2024-01-01 RX ADMIN — INSULIN ASPART 5 UNITS: 100 INJECTION, SOLUTION INTRAVENOUS; SUBCUTANEOUS at 12:04

## 2024-01-01 RX ADMIN — MINERAL OIL AND WHITE PETROLATUM: 30; 940 OINTMENT OPHTHALMIC at 09:04

## 2024-01-01 RX ADMIN — FLUDROCORTISONE ACETATE 100 MCG: 0.1 TABLET ORAL at 09:04

## 2024-01-01 RX ADMIN — VASOPRESSIN 0.04 UNITS/MIN: 20 INJECTION INTRAVENOUS at 08:04

## 2024-01-01 RX ADMIN — AMIODARONE HYDROCHLORIDE 0.5 MG/MIN: 1.8 INJECTION, SOLUTION INTRAVENOUS at 05:04

## 2024-01-01 RX ADMIN — CISATRACURIUM BESYLATE 1.5 MCG/KG/MIN: 10 INJECTION INTRAVENOUS at 09:04

## 2024-01-01 RX ADMIN — KETAMINE HYDROCHLORIDE 10 MCG/KG/MIN: 100 INJECTION INTRAMUSCULAR; INTRAVENOUS at 04:04

## 2024-01-01 RX ADMIN — MEROPENEM 1 G: 1 INJECTION INTRAVENOUS at 03:04

## 2024-01-01 RX ADMIN — VASOPRESSIN 0.04 UNITS/MIN: 20 INJECTION INTRAVENOUS at 01:04

## 2024-01-01 RX ADMIN — VASOPRESSIN 0.04 UNITS/MIN: 20 INJECTION INTRAVENOUS at 05:04

## 2024-01-01 RX ADMIN — AMIODARONE HYDROCHLORIDE 1 MG/MIN: 1.8 INJECTION, SOLUTION INTRAVENOUS at 04:04

## 2024-01-01 RX ADMIN — DEXTROSE MONOHYDRATE 500 ML: 100 INJECTION, SOLUTION INTRAVENOUS at 11:04

## 2024-01-01 RX ADMIN — DEXTROSE MONOHYDRATE, SODIUM CITRATE, AND CITRIC ACID MONOHYDRATE: 2.45; 2.2; .8 INJECTION, SOLUTION INTRAVENOUS at 02:04

## 2024-01-01 RX ADMIN — AMIODARONE HYDROCHLORIDE 0.5 MG/MIN: 1.8 INJECTION, SOLUTION INTRAVENOUS at 09:04

## 2024-01-01 RX ADMIN — CALCIUM GLUCONATE: 98 INJECTION, SOLUTION INTRAVENOUS at 09:04

## 2024-01-01 RX ADMIN — MINERAL OIL AND WHITE PETROLATUM: 30; 940 OINTMENT OPHTHALMIC at 07:04

## 2024-01-01 RX ADMIN — HYDROCORTISONE SODIUM SUCCINATE 100 MG: 100 INJECTION, POWDER, FOR SOLUTION INTRAMUSCULAR; INTRAVENOUS at 05:04

## 2024-01-01 RX ADMIN — VASOPRESSIN 0.04 UNITS/MIN: 20 INJECTION INTRAVENOUS at 04:04

## 2024-01-01 RX ADMIN — CALCIUM GLUCONATE: 98 INJECTION, SOLUTION INTRAVENOUS at 06:04

## 2024-01-01 RX ADMIN — SODIUM CHLORIDE: 9 INJECTION, SOLUTION INTRAVENOUS at 10:04

## 2024-01-01 RX ADMIN — MIDAZOLAM 2 MG: 1 INJECTION INTRAMUSCULAR; INTRAVENOUS at 04:04

## 2024-01-01 RX ADMIN — CALCIUM GLUCONATE: 98 INJECTION, SOLUTION INTRAVENOUS at 01:04

## 2024-01-01 RX ADMIN — NOREPINEPHRINE BITARTRATE 0.44 MCG/KG/MIN: 1 INJECTION, SOLUTION, CONCENTRATE INTRAVENOUS at 05:04

## 2024-01-01 RX ADMIN — NOREPINEPHRINE BITARTRATE 0.66 MCG/KG/MIN: 1 INJECTION, SOLUTION, CONCENTRATE INTRAVENOUS at 05:04

## 2024-01-01 RX ADMIN — KETAMINE HYDROCHLORIDE 15 MCG/KG/MIN: 100 INJECTION INTRAMUSCULAR; INTRAVENOUS at 10:04

## 2024-01-01 RX ADMIN — CALCIUM GLUCONATE: 98 INJECTION, SOLUTION INTRAVENOUS at 07:04

## 2024-01-01 RX ADMIN — HYDROMORPHONE HYDROCHLORIDE 1 MG: 2 INJECTION INTRAMUSCULAR; INTRAVENOUS; SUBCUTANEOUS at 08:04

## 2024-01-01 RX ADMIN — DEXTROSE MONOHYDRATE, SODIUM CITRATE, AND CITRIC ACID MONOHYDRATE: 2.45; 2.2; .8 INJECTION, SOLUTION INTRAVENOUS at 11:04

## 2024-01-01 RX ADMIN — NOREPINEPHRINE BITARTRATE 0.38 MCG/KG/MIN: 1 INJECTION, SOLUTION, CONCENTRATE INTRAVENOUS at 05:04

## 2024-01-01 RX ADMIN — PROPOFOL 50 MCG/KG/MIN: 10 INJECTION, EMULSION INTRAVENOUS at 06:04

## 2024-01-01 RX ADMIN — CALCIUM GLUCONATE: 98 INJECTION, SOLUTION INTRAVENOUS at 12:04

## 2024-01-01 RX ADMIN — VANCOMYCIN HYDROCHLORIDE 1250 MG: 1.25 INJECTION, POWDER, LYOPHILIZED, FOR SOLUTION INTRAVENOUS at 07:04

## 2024-01-01 RX ADMIN — PROPOFOL 45 MCG/KG/MIN: 10 INJECTION, EMULSION INTRAVENOUS at 08:04

## 2024-01-01 RX ADMIN — PHENYLEPHRINE HYDROCHLORIDE 100 MCG: 10 INJECTION INTRAVENOUS at 08:04

## 2024-01-01 RX ADMIN — CISATRACURIUM BESYLATE 1 MCG/KG/MIN: 10 INJECTION INTRAVENOUS at 01:04

## 2024-01-01 RX ADMIN — CALCIUM GLUCONATE: 98 INJECTION, SOLUTION INTRAVENOUS at 04:04

## 2024-01-01 RX ADMIN — INSULIN ASPART 1 UNITS: 100 INJECTION, SOLUTION INTRAVENOUS; SUBCUTANEOUS at 11:04

## 2024-01-01 RX ADMIN — INSULIN ASPART 2 UNITS: 100 INJECTION, SOLUTION INTRAVENOUS; SUBCUTANEOUS at 06:04

## 2024-01-01 RX ADMIN — VASOPRESSIN 1 UNITS: 20 INJECTION INTRAVENOUS at 08:04

## 2024-01-01 RX ADMIN — VASOPRESSIN 0.04 UNITS/MIN: 0.2 INJECTION INTRAVENOUS at 02:04

## 2024-01-01 RX ADMIN — VASOPRESSIN 0.04 UNITS/MIN: 20 INJECTION INTRAVENOUS at 02:04

## 2024-01-01 RX ADMIN — PROPOFOL 45 MCG/KG/MIN: 10 INJECTION, EMULSION INTRAVENOUS at 03:04

## 2024-01-01 RX ADMIN — ETOMIDATE 30 MG: 2 INJECTION INTRAVENOUS at 05:04

## 2024-01-01 RX ADMIN — KETAMINE HYDROCHLORIDE 20 MCG/KG/MIN: 100 INJECTION INTRAMUSCULAR; INTRAVENOUS at 02:04

## 2024-01-01 RX ADMIN — TIMOLOL MALEATE 1 DROP: 5 SOLUTION OPHTHALMIC at 08:04

## 2024-01-01 RX ADMIN — PANTOPRAZOLE SODIUM 40 MG: 40 INJECTION, POWDER, FOR SOLUTION INTRAVENOUS at 09:04

## 2024-01-01 RX ADMIN — MAGNESIUM SULFATE HEPTAHYDRATE 2 G: 40 INJECTION, SOLUTION INTRAVENOUS at 10:04

## 2024-01-01 RX ADMIN — ROCURONIUM BROMIDE 50 MG: 10 INJECTION, SOLUTION INTRAVENOUS at 07:04

## 2024-01-01 RX ADMIN — MEROPENEM 2 G: 1 INJECTION INTRAVENOUS at 11:04

## 2024-01-01 RX ADMIN — MINERAL OIL AND WHITE PETROLATUM: 30; 940 OINTMENT OPHTHALMIC at 06:04

## 2024-01-01 RX ADMIN — PROPOFOL 45 MCG/KG/MIN: 10 INJECTION, EMULSION INTRAVENOUS at 11:04

## 2024-01-01 RX ADMIN — AMIODARONE HYDROCHLORIDE 0.5 MG/MIN: 1.8 INJECTION, SOLUTION INTRAVENOUS at 04:04

## 2024-01-01 RX ADMIN — FLUDROCORTISONE ACETATE 100 MCG: 0.1 TABLET ORAL at 08:04

## 2024-01-01 RX ADMIN — KETAMINE HYDROCHLORIDE 15 MCG/KG/MIN: 100 INJECTION INTRAMUSCULAR; INTRAVENOUS at 12:04

## 2024-01-01 RX ADMIN — HUMAN ALBUMIN MICROSPHERES AND PERFLUTREN 0.66 MG: 10; .22 INJECTION, SOLUTION INTRAVENOUS at 11:04

## 2024-01-01 RX ADMIN — HEPARIN SODIUM 7500 UNITS: 5000 INJECTION INTRAVENOUS; SUBCUTANEOUS at 01:04

## 2024-01-01 RX ADMIN — POLYETHYLENE GLYCOL 3350 17 G: 17 POWDER, FOR SOLUTION ORAL at 08:04

## 2024-01-01 RX ADMIN — INSULIN ASPART 3 UNITS: 100 INJECTION, SOLUTION INTRAVENOUS; SUBCUTANEOUS at 12:04

## 2024-01-01 RX ADMIN — NOREPINEPHRINE BITARTRATE 0.02 MCG/KG/MIN: 4 INJECTION, SOLUTION INTRAVENOUS at 11:04

## 2024-01-01 RX ADMIN — ALBUTEROL SULFATE 8 PUFF: 108 AEROSOL, METERED RESPIRATORY (INHALATION) at 07:04

## 2024-01-01 RX ADMIN — HEPARIN SODIUM 5000 UNITS: 5000 INJECTION INTRAVENOUS; SUBCUTANEOUS at 01:04

## 2024-01-01 RX ADMIN — MINERAL OIL AND WHITE PETROLATUM: 30; 940 OINTMENT OPHTHALMIC at 05:04

## 2024-01-01 RX ADMIN — CISATRACURIUM BESYLATE 3 MCG/KG/MIN: 10 INJECTION INTRAVENOUS at 11:04

## 2024-01-01 RX ADMIN — INSULIN ASPART 8 UNITS: 100 INJECTION, SOLUTION INTRAVENOUS; SUBCUTANEOUS at 06:04

## 2024-01-01 RX ADMIN — ACETAMINOPHEN 650 MG: 325 TABLET ORAL at 02:04

## 2024-01-01 RX ADMIN — PIPERACILLIN SODIUM AND TAZOBACTAM SODIUM 4.5 G: 4; .5 INJECTION, POWDER, FOR SOLUTION INTRAVENOUS at 07:04

## 2024-01-01 RX ADMIN — MIDAZOLAM 2 MG: 1 INJECTION INTRAMUSCULAR; INTRAVENOUS at 03:04

## 2024-01-01 RX ADMIN — POLYETHYLENE GLYCOL 3350 17 G: 17 POWDER, FOR SOLUTION ORAL at 10:04

## 2024-01-01 RX ADMIN — MUPIROCIN: 20 OINTMENT TOPICAL at 10:04

## 2024-01-01 RX ADMIN — DEXTROSE MONOHYDRATE, SODIUM CITRATE, AND CITRIC ACID MONOHYDRATE: 2.45; 2.2; .8 INJECTION, SOLUTION INTRAVENOUS at 01:04

## 2024-01-01 RX ADMIN — NOREPINEPHRINE BITARTRATE 0.5 MCG/KG/MIN: 1 INJECTION, SOLUTION, CONCENTRATE INTRAVENOUS at 09:04

## 2024-01-01 RX ADMIN — NOREPINEPHRINE BITARTRATE 0.62 MCG/KG/MIN: 1 INJECTION, SOLUTION, CONCENTRATE INTRAVENOUS at 03:04

## 2024-01-01 RX ADMIN — TIMOLOL MALEATE 1 DROP: 5 SOLUTION OPHTHALMIC at 11:04

## 2024-01-01 RX ADMIN — MAGNESIUM SULFATE HEPTAHYDRATE 2 G: 40 INJECTION, SOLUTION INTRAVENOUS at 05:04

## 2024-01-01 RX ADMIN — NOREPINEPHRINE BITARTRATE 0.6 MCG/KG/MIN: 1 INJECTION, SOLUTION, CONCENTRATE INTRAVENOUS at 12:04

## 2024-01-01 RX ADMIN — INSULIN ASPART 2 UNITS: 100 INJECTION, SOLUTION INTRAVENOUS; SUBCUTANEOUS at 01:04

## 2024-01-01 RX ADMIN — DEXTROSE MONOHYDRATE, SODIUM CITRATE, AND CITRIC ACID MONOHYDRATE: 2.45; 2.2; .8 INJECTION, SOLUTION INTRAVENOUS at 08:04

## 2024-01-01 RX ADMIN — MICAFUNGIN SODIUM 100 MG: 100 INJECTION, POWDER, LYOPHILIZED, FOR SOLUTION INTRAVENOUS at 11:04

## 2024-01-01 RX ADMIN — VASOPRESSIN 0.04 UNITS/MIN: 20 INJECTION INTRAVENOUS at 11:04

## 2024-01-01 RX ADMIN — KETAMINE HYDROCHLORIDE 2.5 MCG/KG/MIN: 50 INJECTION INTRAMUSCULAR; INTRAVENOUS at 12:04

## 2024-01-01 RX ADMIN — CALCIUM GLUCONATE 1 G: 20 INJECTION, SOLUTION INTRAVENOUS at 10:04

## 2024-01-01 RX ADMIN — INSULIN ASPART 2 UNITS: 100 INJECTION, SOLUTION INTRAVENOUS; SUBCUTANEOUS at 09:04

## 2024-01-01 RX ADMIN — KETAMINE HYDROCHLORIDE 15 MCG/KG/MIN: 100 INJECTION INTRAMUSCULAR; INTRAVENOUS at 03:04

## 2024-01-01 RX ADMIN — PROPOFOL 45 MCG/KG/MIN: 10 INJECTION, EMULSION INTRAVENOUS at 05:04

## 2024-01-01 RX ADMIN — INSULIN ASPART 5 UNITS: 100 INJECTION, SOLUTION INTRAVENOUS; SUBCUTANEOUS at 01:04

## 2024-01-01 RX ADMIN — VANCOMYCIN HYDROCHLORIDE 2000 MG: 500 INJECTION, POWDER, LYOPHILIZED, FOR SOLUTION INTRAVENOUS at 07:04

## 2024-01-01 RX ADMIN — TIMOLOL MALEATE 1 DROP: 5 SOLUTION OPHTHALMIC at 09:04

## 2024-01-01 RX ADMIN — CALCIUM GLUCONATE 1 G: 20 INJECTION, SOLUTION INTRAVENOUS at 06:04

## 2024-01-01 RX ADMIN — INSULIN ASPART 7 UNITS: 100 INJECTION, SOLUTION INTRAVENOUS; SUBCUTANEOUS at 01:04

## 2024-01-01 RX ADMIN — INSULIN ASPART 6 UNITS: 100 INJECTION, SOLUTION INTRAVENOUS; SUBCUTANEOUS at 06:04

## 2024-01-01 RX ADMIN — Medication 200 MCG/HR: at 10:04

## 2024-01-01 RX ADMIN — CALCIUM GLUCONATE: 98 INJECTION, SOLUTION INTRAVENOUS at 11:04

## 2024-01-01 RX ADMIN — MUPIROCIN: 20 OINTMENT TOPICAL at 09:04

## 2024-01-01 RX ADMIN — ACETAMINOPHEN 650 MG: 325 TABLET ORAL at 11:04

## 2024-01-01 RX ADMIN — MINERAL OIL AND WHITE PETROLATUM: 30; 940 OINTMENT OPHTHALMIC at 02:04

## 2024-01-01 RX ADMIN — HEPARIN SODIUM 7500 UNITS: 5000 INJECTION INTRAVENOUS; SUBCUTANEOUS at 06:04

## 2024-01-01 RX ADMIN — CALCIUM GLUCONATE 1 G: 20 INJECTION, SOLUTION INTRAVENOUS at 09:04

## 2024-01-01 RX ADMIN — MINERAL OIL AND WHITE PETROLATUM: 30; 940 OINTMENT OPHTHALMIC at 10:04

## 2024-01-01 RX ADMIN — INSULIN ASPART 6 UNITS: 100 INJECTION, SOLUTION INTRAVENOUS; SUBCUTANEOUS at 12:04

## 2024-01-01 RX ADMIN — NOREPINEPHRINE BITARTRATE 0.65 MCG/KG/MIN: 1 INJECTION, SOLUTION, CONCENTRATE INTRAVENOUS at 08:04

## 2024-01-01 RX ADMIN — VASOPRESSIN 0.04 UNITS/MIN: 20 INJECTION INTRAVENOUS at 09:04

## 2024-01-01 RX ADMIN — INSULIN ASPART 5 UNITS: 100 INJECTION, SOLUTION INTRAVENOUS; SUBCUTANEOUS at 05:04

## 2024-01-01 RX ADMIN — KETAMINE HYDROCHLORIDE 15 MCG/KG/MIN: 100 INJECTION INTRAMUSCULAR; INTRAVENOUS at 05:04

## 2024-01-01 RX ADMIN — ALBUTEROL SULFATE 10 MG: 2.5 SOLUTION RESPIRATORY (INHALATION) at 11:04

## 2024-01-01 RX ADMIN — MORPHINE SULFATE 4 MG: 2 INJECTION, SOLUTION INTRAMUSCULAR; INTRAVENOUS at 07:04

## 2024-01-01 RX ADMIN — AMIODARONE HYDROCHLORIDE 0.5 MG/MIN: 1.8 INJECTION, SOLUTION INTRAVENOUS at 12:04

## 2024-01-01 RX ADMIN — CALCIUM GLUCONATE: 98 INJECTION, SOLUTION INTRAVENOUS at 05:04

## 2024-01-01 RX ADMIN — MAGNESIUM SULFATE HEPTAHYDRATE 2 G: 40 INJECTION, SOLUTION INTRAVENOUS at 01:04

## 2024-01-01 RX ADMIN — HEPARIN SODIUM 7500 UNITS: 5000 INJECTION INTRAVENOUS; SUBCUTANEOUS at 02:04

## 2024-01-01 RX ADMIN — KETAMINE HYDROCHLORIDE 20 MCG/KG/MIN: 100 INJECTION INTRAMUSCULAR; INTRAVENOUS at 01:04

## 2024-01-01 RX ADMIN — SENNOSIDES AND DOCUSATE SODIUM 1 TABLET: 8.6; 5 TABLET ORAL at 08:04

## 2024-01-01 RX ADMIN — CISATRACURIUM BESYLATE 1 MCG/KG/MIN: 10 INJECTION INTRAVENOUS at 05:04

## 2024-01-01 RX ADMIN — INSULIN ASPART 7 UNITS: 100 INJECTION, SOLUTION INTRAVENOUS; SUBCUTANEOUS at 08:04

## 2024-01-01 RX ADMIN — KETAMINE HYDROCHLORIDE 15 MCG/KG/MIN: 100 INJECTION INTRAMUSCULAR; INTRAVENOUS at 07:04

## 2024-01-01 RX ADMIN — PROPOFOL 50 MCG/KG/MIN: 10 INJECTION, EMULSION INTRAVENOUS at 08:04

## 2024-01-01 RX ADMIN — VASOPRESSIN 0.04 UNITS/MIN: 20 INJECTION INTRAVENOUS at 10:04

## 2024-01-01 RX ADMIN — METHYLNALTREXONE BROMIDE 12 MG: 12 INJECTION, SOLUTION SUBCUTANEOUS at 03:04

## 2024-01-01 RX ADMIN — PANTOPRAZOLE SODIUM 80 MG: 40 INJECTION, POWDER, FOR SOLUTION INTRAVENOUS at 07:04

## 2024-01-01 RX ADMIN — PANTOPRAZOLE SODIUM 40 MG: 40 INJECTION, POWDER, FOR SOLUTION INTRAVENOUS at 10:04

## 2024-01-01 RX ADMIN — SODIUM PHOSPHATE, MONOBASIC, MONOHYDRATE AND SODIUM PHOSPHATE, DIBASIC, ANHYDROUS 20.01 MMOL: 142; 276 INJECTION, SOLUTION INTRAVENOUS at 05:04

## 2024-01-01 RX ADMIN — INSULIN ASPART 6 UNITS: 100 INJECTION, SOLUTION INTRAVENOUS; SUBCUTANEOUS at 11:04

## 2024-01-01 RX ADMIN — INSULIN ASPART 8 UNITS: 100 INJECTION, SOLUTION INTRAVENOUS; SUBCUTANEOUS at 12:04

## 2024-01-01 RX ADMIN — INSULIN ASPART 3 UNITS: 100 INJECTION, SOLUTION INTRAVENOUS; SUBCUTANEOUS at 03:04

## 2024-01-01 RX ADMIN — DEXAMETHASONE SODIUM PHOSPHATE 10 MG: 10 INJECTION INTRAMUSCULAR; INTRAVENOUS at 08:04

## 2024-01-01 RX ADMIN — INSULIN ASPART 3 UNITS: 100 INJECTION, SOLUTION INTRAVENOUS; SUBCUTANEOUS at 04:04

## 2024-01-01 RX ADMIN — CALCIUM GLUCONATE 1 G: 20 INJECTION, SOLUTION INTRAVENOUS at 05:04

## 2024-01-01 RX ADMIN — FLUDROCORTISONE ACETATE 100 MCG: 0.1 TABLET ORAL at 01:04

## 2024-01-01 RX ADMIN — MORPHINE SULFATE 4 MG: 2 INJECTION, SOLUTION INTRAMUSCULAR; INTRAVENOUS at 06:04

## 2024-01-01 RX ADMIN — NOREPINEPHRINE BITARTRATE 1.2 MCG/KG/MIN: 1 INJECTION, SOLUTION, CONCENTRATE INTRAVENOUS at 02:04

## 2024-01-01 RX ADMIN — ONDANSETRON 4 MG: 2 INJECTION INTRAMUSCULAR; INTRAVENOUS at 02:04

## 2024-01-01 RX ADMIN — HEPARIN SODIUM 5000 UNITS: 5000 INJECTION INTRAVENOUS; SUBCUTANEOUS at 09:04

## 2024-01-01 RX ADMIN — NOREPINEPHRINE BITARTRATE 0.3 MCG/KG/MIN: 4 INJECTION, SOLUTION INTRAVENOUS at 04:04

## 2024-01-01 RX ADMIN — NOREPINEPHRINE BITARTRATE 0.9 MCG/KG/MIN: 1 INJECTION, SOLUTION, CONCENTRATE INTRAVENOUS at 06:04

## 2024-01-01 RX ADMIN — ROCURONIUM BROMIDE 120 MG: 10 INJECTION, SOLUTION INTRAVENOUS at 05:04

## 2024-01-01 RX ADMIN — HYDROMORPHONE HYDROCHLORIDE 1 MG: 2 INJECTION INTRAMUSCULAR; INTRAVENOUS; SUBCUTANEOUS at 12:04

## 2024-01-01 RX ADMIN — NOREPINEPHRINE BITARTRATE 0.34 MCG/KG/MIN: 1 INJECTION, SOLUTION, CONCENTRATE INTRAVENOUS at 06:04

## 2024-01-01 RX ADMIN — INSULIN ASPART 5 UNITS: 100 INJECTION, SOLUTION INTRAVENOUS; SUBCUTANEOUS at 09:04

## 2024-01-01 RX ADMIN — SODIUM BICARBONATE: 84 INJECTION, SOLUTION INTRAVENOUS at 12:04

## 2024-01-01 RX ADMIN — DEXTROSE MONOHYDRATE, SODIUM CITRATE, AND CITRIC ACID MONOHYDRATE: 2.45; 2.2; .8 INJECTION, SOLUTION INTRAVENOUS at 12:04

## 2024-01-01 RX ADMIN — INSULIN ASPART 9 UNITS: 100 INJECTION, SOLUTION INTRAVENOUS; SUBCUTANEOUS at 07:04

## 2024-01-01 RX ADMIN — MIDAZOLAM 2 MG: 1 INJECTION INTRAMUSCULAR; INTRAVENOUS at 10:04

## 2024-01-01 RX ADMIN — AMIODARONE HYDROCHLORIDE 0.5 MG/MIN: 1.8 INJECTION, SOLUTION INTRAVENOUS at 07:04

## 2024-01-01 RX ADMIN — DOCUSATE SODIUM 100 MG: 50 CAPSULE, LIQUID FILLED ORAL at 08:04

## 2024-01-01 RX ADMIN — PROPOFOL 45 MCG/KG/MIN: 10 INJECTION, EMULSION INTRAVENOUS at 10:04

## 2024-01-01 RX ADMIN — Medication 150 MCG/HR: at 12:04

## 2024-01-01 RX ADMIN — NOREPINEPHRINE BITARTRATE 0.9 MCG/KG/MIN: 1 INJECTION, SOLUTION, CONCENTRATE INTRAVENOUS at 01:04

## 2024-01-01 RX ADMIN — Medication 250 MCG/HR: at 07:04

## 2024-01-01 RX ADMIN — INSULIN ASPART 4 UNITS: 100 INJECTION, SOLUTION INTRAVENOUS; SUBCUTANEOUS at 11:04

## 2024-01-01 RX ADMIN — PROCHLORPERAZINE EDISYLATE 2.5 MG: 5 INJECTION INTRAMUSCULAR; INTRAVENOUS at 10:04

## 2024-01-01 RX ADMIN — KETAMINE HYDROCHLORIDE 20 MCG/KG/MIN: 100 INJECTION INTRAMUSCULAR; INTRAVENOUS at 05:04

## 2024-01-01 RX ADMIN — PIPERACILLIN SODIUM AND TAZOBACTAM SODIUM 4.5 G: 4; .5 INJECTION, POWDER, FOR SOLUTION INTRAVENOUS at 11:04

## 2024-01-01 RX ADMIN — MEROPENEM 2 G: 1 INJECTION INTRAVENOUS at 01:04

## 2024-01-01 RX ADMIN — NOREPINEPHRINE BITARTRATE 0.46 MCG/KG/MIN: 1 INJECTION, SOLUTION, CONCENTRATE INTRAVENOUS at 11:04

## 2024-01-01 RX ADMIN — NOREPINEPHRINE BITARTRATE 0.6 MCG/KG/MIN: 1 INJECTION, SOLUTION, CONCENTRATE INTRAVENOUS at 02:04

## 2024-01-01 RX ADMIN — NOREPINEPHRINE BITARTRATE 0.56 MCG/KG/MIN: 1 INJECTION, SOLUTION, CONCENTRATE INTRAVENOUS at 10:04

## 2024-01-01 RX ADMIN — INSULIN ASPART 3 UNITS: 100 INJECTION, SOLUTION INTRAVENOUS; SUBCUTANEOUS at 05:04

## 2024-01-01 RX ADMIN — SODIUM CHLORIDE: 9 INJECTION, SOLUTION INTRAVENOUS at 09:04

## 2024-01-01 RX ADMIN — INSULIN ASPART 6 UNITS: 100 INJECTION, SOLUTION INTRAVENOUS; SUBCUTANEOUS at 08:04

## 2024-01-01 RX ADMIN — SODIUM CHLORIDE 1000 ML: 9 INJECTION, SOLUTION INTRAVENOUS at 06:04

## 2024-01-01 RX ADMIN — ONDANSETRON 4 MG: 2 INJECTION INTRAMUSCULAR; INTRAVENOUS at 06:04

## 2024-01-01 RX ADMIN — NOREPINEPHRINE BITARTRATE 0.3 MCG/KG/MIN: 4 INJECTION, SOLUTION INTRAVENOUS at 02:04

## 2024-01-01 RX ADMIN — PIPERACILLIN SODIUM AND TAZOBACTAM SODIUM 4.5 G: 4; .5 INJECTION, POWDER, FOR SOLUTION INTRAVENOUS at 04:04

## 2024-01-01 RX ADMIN — AMIODARONE HYDROCHLORIDE 150 MG: 1.5 INJECTION, SOLUTION INTRAVENOUS at 03:04

## 2024-01-01 RX ADMIN — PIPERACILLIN SODIUM AND TAZOBACTAM SODIUM 4.5 G: 4; .5 INJECTION, POWDER, FOR SOLUTION INTRAVENOUS at 08:04

## 2024-01-01 RX ADMIN — DEXTROSE MONOHYDRATE, SODIUM CITRATE, AND CITRIC ACID MONOHYDRATE: 2.45; 2.2; .8 INJECTION, SOLUTION INTRAVENOUS at 06:04

## 2024-01-01 RX ADMIN — INSULIN HUMAN 10 UNITS: 100 INJECTION, SOLUTION PARENTERAL at 09:04

## 2024-01-01 RX ADMIN — ALTEPLASE 4 MG: 2.2 INJECTION, POWDER, LYOPHILIZED, FOR SOLUTION INTRAVENOUS at 09:04

## 2024-01-01 RX ADMIN — SODIUM ZIRCONIUM CYCLOSILICATE 10 G: 5 POWDER, FOR SUSPENSION ORAL at 12:04

## 2024-01-01 RX ADMIN — LORAZEPAM 1 MG: 2 INJECTION INTRAMUSCULAR; INTRAVENOUS at 06:04

## 2024-01-01 RX ADMIN — PROPOFOL 45 MCG/KG/MIN: 10 INJECTION, EMULSION INTRAVENOUS at 12:04

## 2024-01-01 RX ADMIN — SENNOSIDES AND DOCUSATE SODIUM 1 TABLET: 8.6; 5 TABLET ORAL at 09:04

## 2024-01-01 RX ADMIN — PROPOFOL 45 MCG/KG/MIN: 10 INJECTION, EMULSION INTRAVENOUS at 07:04

## 2024-01-01 RX ADMIN — DOCUSATE SODIUM 100 MG: 50 CAPSULE, LIQUID FILLED ORAL at 09:04

## 2024-01-01 RX ADMIN — CALCIUM GLUCONATE: 98 INJECTION, SOLUTION INTRAVENOUS at 08:04

## 2024-01-01 RX ADMIN — SODIUM CHLORIDE: 9 INJECTION, SOLUTION INTRAVENOUS at 07:04

## 2024-01-01 RX ADMIN — AMIODARONE HYDROCHLORIDE 0.5 MG/MIN: 1.8 INJECTION, SOLUTION INTRAVENOUS at 03:04

## 2024-01-01 RX ADMIN — HEPARIN SODIUM 5000 UNITS: 5000 INJECTION INTRAVENOUS; SUBCUTANEOUS at 06:04

## 2024-01-01 RX ADMIN — SODIUM CHLORIDE 1000 ML: 9 INJECTION, SOLUTION INTRAVENOUS at 04:04

## 2024-01-01 RX ADMIN — DEXTROSE MONOHYDRATE 500 ML: 100 INJECTION, SOLUTION INTRAVENOUS at 09:04

## 2024-01-01 RX ADMIN — CISATRACURIUM BESYLATE 2.5 MCG/KG/MIN: 10 INJECTION INTRAVENOUS at 10:04

## 2024-01-01 RX ADMIN — CISATRACURIUM BESYLATE 2 MCG/KG/MIN: 10 INJECTION INTRAVENOUS at 03:04

## 2024-01-01 RX ADMIN — HYDROMORPHONE HYDROCHLORIDE 1 MG: 2 INJECTION INTRAMUSCULAR; INTRAVENOUS; SUBCUTANEOUS at 05:04

## 2024-01-01 RX ADMIN — NOREPINEPHRINE BITARTRATE 0.74 MCG/KG/MIN: 1 INJECTION, SOLUTION, CONCENTRATE INTRAVENOUS at 11:04

## 2024-01-01 RX ADMIN — INSULIN ASPART 4 UNITS: 100 INJECTION, SOLUTION INTRAVENOUS; SUBCUTANEOUS at 08:04

## 2024-01-01 RX ADMIN — INSULIN ASPART 5 UNITS: 100 INJECTION, SOLUTION INTRAVENOUS; SUBCUTANEOUS at 04:04

## 2024-01-01 RX ADMIN — DEXAMETHASONE SODIUM PHOSPHATE 20 MG: 10 INJECTION INTRAMUSCULAR; INTRAVENOUS at 08:04

## 2024-01-01 RX ADMIN — DOCUSATE SODIUM AND SENNOSIDES 1 TABLET: 8.6; 5 TABLET, FILM COATED ORAL at 08:04

## 2024-01-01 RX ADMIN — IOHEXOL 100 ML: 350 INJECTION, SOLUTION INTRAVENOUS at 12:04

## 2024-01-01 RX ADMIN — METOROPROLOL TARTRATE 5 MG: 5 INJECTION, SOLUTION INTRAVENOUS at 08:04

## 2024-01-01 RX ADMIN — NOREPINEPHRINE BITARTRATE 0.58 MCG/KG/MIN: 1 INJECTION, SOLUTION, CONCENTRATE INTRAVENOUS at 06:04

## 2024-01-01 RX ADMIN — NOREPINEPHRINE BITARTRATE 0.82 MCG/KG/MIN: 1 INJECTION, SOLUTION, CONCENTRATE INTRAVENOUS at 11:04

## 2024-01-01 RX ADMIN — SODIUM BICARBONATE: 84 INJECTION, SOLUTION INTRAVENOUS at 04:04

## 2024-01-01 RX ADMIN — DEXTROSE MONOHYDRATE 500 ML: 100 INJECTION, SOLUTION INTRAVENOUS at 10:04

## 2024-01-01 RX ADMIN — LIDOCAINE HYDROCHLORIDE 50 MG: 10 SOLUTION INTRAVENOUS at 04:04

## 2024-01-01 RX ADMIN — CHLORHEXIDINE GLUCONATE 0.12% ORAL RINSE 15 ML: 1.2 LIQUID ORAL at 10:04

## 2024-01-01 RX ADMIN — SODIUM BICARBONATE: 84 INJECTION, SOLUTION INTRAVENOUS at 11:04

## 2024-01-01 RX ADMIN — POTASSIUM CHLORIDE 40 MEQ: 29.8 INJECTION, SOLUTION INTRAVENOUS at 01:04

## 2024-01-01 RX ADMIN — DEXTROSE MONOHYDRATE, SODIUM CITRATE, AND CITRIC ACID MONOHYDRATE: 2.45; 2.2; .8 INJECTION, SOLUTION INTRAVENOUS at 10:04

## 2024-01-01 RX ADMIN — PROPOFOL 50 MCG/KG/MIN: 10 INJECTION, EMULSION INTRAVENOUS at 04:04

## 2024-01-01 RX ADMIN — DEXAMETHASONE SODIUM PHOSPHATE 20 MG: 4 INJECTION INTRA-ARTICULAR; INTRALESIONAL; INTRAMUSCULAR; INTRAVENOUS; SOFT TISSUE at 12:04

## 2024-01-01 RX ADMIN — NOREPINEPHRINE BITARTRATE 0.8 MCG/KG/MIN: 1 INJECTION, SOLUTION, CONCENTRATE INTRAVENOUS at 01:04

## 2024-01-01 RX ADMIN — INSULIN ASPART 3 UNITS: 100 INJECTION, SOLUTION INTRAVENOUS; SUBCUTANEOUS at 06:04

## 2024-01-01 RX ADMIN — MAGNESIUM SULFATE HEPTAHYDRATE 2 G: 40 INJECTION, SOLUTION INTRAVENOUS at 11:04

## 2024-01-01 RX ADMIN — EPINEPHRINE 0.02 MCG/KG/MIN: 1 INJECTION INTRAMUSCULAR; INTRAVENOUS; SUBCUTANEOUS at 03:04

## 2024-01-01 RX ADMIN — VANCOMYCIN HYDROCHLORIDE 2000 MG: 10 INJECTION, POWDER, LYOPHILIZED, FOR SOLUTION INTRAVENOUS at 12:04

## 2024-01-01 RX ADMIN — CISATRACURIUM BESYLATE 2 MCG/KG/MIN: 10 INJECTION INTRAVENOUS at 12:04

## 2024-01-01 RX ADMIN — INSULIN HUMAN 13.15 UNITS: 100 INJECTION, SOLUTION PARENTERAL at 10:04

## 2024-01-01 RX ADMIN — Medication 250 MCG/HR: at 05:04

## 2024-01-01 RX ADMIN — SODIUM CHLORIDE, SODIUM GLUCONATE, SODIUM ACETATE, POTASSIUM CHLORIDE, MAGNESIUM CHLORIDE, SODIUM PHOSPHATE, DIBASIC, AND POTASSIUM PHOSPHATE: .53; .5; .37; .037; .03; .012; .00082 INJECTION, SOLUTION INTRAVENOUS at 07:04

## 2024-01-01 RX ADMIN — Medication 50 MCG/HR: at 12:04

## 2024-01-01 RX ADMIN — HEPARIN SODIUM AND DEXTROSE 12 UNITS/KG/HR: 10000; 5 INJECTION INTRAVENOUS at 11:04

## 2024-01-01 RX ADMIN — AMIODARONE HYDROCHLORIDE 150 MG: 1.5 INJECTION, SOLUTION INTRAVENOUS at 08:04

## 2024-01-01 RX ADMIN — MIDAZOLAM HYDROCHLORIDE 0.5 MG/HR: 1 INJECTION, SOLUTION INTRAVENOUS at 04:04

## 2024-01-01 RX ADMIN — GLYCOPYRROLATE 0.1 MG: 0.2 INJECTION INTRAMUSCULAR; INTRAVENOUS at 06:04

## 2024-01-01 RX ADMIN — MEROPENEM 1 G: 1 INJECTION INTRAVENOUS at 02:04

## 2024-01-01 RX ADMIN — AMIODARONE HYDROCHLORIDE 1 MG/MIN: 1.8 INJECTION, SOLUTION INTRAVENOUS at 11:04

## 2024-01-01 RX ADMIN — DEXTROSE MONOHYDRATE, SODIUM CITRATE, AND CITRIC ACID MONOHYDRATE: 2.45; 2.2; .8 INJECTION, SOLUTION INTRAVENOUS at 09:04

## 2024-01-01 RX ADMIN — KETAMINE HYDROCHLORIDE 15 MCG/KG/MIN: 100 INJECTION INTRAMUSCULAR; INTRAVENOUS at 08:04

## 2024-01-02 NOTE — PROGRESS NOTES
HPI:  Patient is a 73-year-old gentleman who comes today for preoperative clearance to have left she older replacement.  Patient has been doing fairly well.  He denies any problems with blood pressure.  He denies any chest pains or shortness of breath.  There have been no other problems or complaints at this time      Current MEDS: medcard review, verified and update  Allergies: Per the electronic medical record    Past Medical History:   Diagnosis Date    BPH (benign prostatic hyperplasia)     CKD (chronic kidney disease), stage III     Colon polyps 2015    Diarrhea in adult patient 02/10/2021    Generalized osteoarthrosis, involving multiple sites     History of gout     Hyperlipidemia     Hypertension     Hypogonadism male     IFG (impaired fasting glucose)     Lumbar disc disease with radiculopathy     Morbid obesity with BMI of 40.0-44.9, adult        Past Surgical History:   Procedure Laterality Date    COLONOSCOPY N/A 6/22/2020    Procedure: COLONOSCOPY;  Surgeon: Montserrat Conroy MD;  Location: [facility];  Service: Endoscopy;  Laterality: N/A;    COLONOSCOPY N/A 2/10/2021    Procedure: COLONOSCOPY;  Surgeon: Jones Lemus III, MD;  Location: [facility];  Service: Endoscopy;  Laterality: N/A;    ESOPHAGOGASTRODUODENOSCOPY N/A 2/10/2021    Procedure: EGD (ESOPHAGOGASTRODUODENOSCOPY);  Surgeon: Jones Lemus III, MD;  Location: [facility];  Service: Endoscopy;  Laterality: N/A;    EYE SURGERY      JOINT REPLACEMENT      KNEE SCOPE      open globe      right eye 1980    ROTATOR CUFF REPAIR      SHOULDER SURGERY Right        SHx: per the electronic medical record    FHx: recorded in the electronic medical record    ROS:    denies any chest pains or shortness of breath. Denies any nausea, vomiting or diarrhea. Denies any fever, chills or sweats. Denies any change in weight, voice, stool, skin or hair. Denies any dysuria, dyspepsia or dysphagia. Denies any change in vision, hearing or headaches. Denies any  "swollen lymph nodes or loss of memory.    PE:  /82 (BP Location: Right arm, Patient Position: Sitting, BP Method: Large (Manual))   Pulse 72   Ht 5' 9" (1.753 m)   Wt 129.1 kg (284 lb 9.8 oz)   SpO2 96%   BMI 42.03 kg/m²   Gen: Well-developed, well-nourished, male, in no acute distress, oriented x3  HEENT: neck is supple, no adenopathy, carotids 2+ equal without bruits, thyroid exam normal size without nodules.  CHEST: clear to auscultation and percussion  CVS: regular rate and rhythm without significant murmur, gallop, or rubs  ABD: soft, benign, no rebound no guarding, no distention.  Bowel sounds are normal.     nontender.  No palpable masses.  No organomegaly and no audible bruits.    Lab Results   Component Value Date    WBC 7.19 01/02/2024    HGB 17.1 01/02/2024    HCT 52.1 01/02/2024     01/02/2024    CHOL 171 10/19/2023    TRIG 117 10/19/2023    HDL 31 (L) 10/19/2023    ALT 57 (H) 10/19/2023    AST 30 10/19/2023     01/02/2024    K 4.5 01/02/2024     01/02/2024    CREATININE 1.1 01/02/2024    BUN 14 01/02/2024    CO2 28 01/02/2024    TSH 0.218 (L) 10/19/2023    PSA 3.3 04/17/2023    INR 1.1 01/02/2024    HGBA1C 5.7 (H) 10/26/2023    URICACID 6.4 10/19/2023   EKG shows normal sinus rhythm with nonspecific STT wave changes.  CXR no acute process.     Impression:  Shoulder pain, needing shoulder replacement  Extensive medical problems as identified below, all have been medically optimized.  Patient Active Problem List   Diagnosis    Essential hypertension    Hyperlipidemia    Generalized osteoarthrosis, involving multiple sites    Hypogonadism male    History of gout    BPH (benign prostatic hyperplasia)    Lumbar disc disease with radiculopathy    Morbid obesity with BMI of 40.0-44.9, adult    CKD (chronic kidney disease), stage III    Personal history of colonic polyps    Cervical myelopathy    Aortic atherosclerosis    IFG (impaired fasting glucose)       Plan:   Orders Placed " This Encounter    Culture, MRSA    X-Ray Chest PA And Lateral    Basic Metabolic Panel    CBC Auto Differential    Urinalysis    PROTIME-INR    APTT    EKG 12-lead     Patient is cleared for his anesthesia and surgery.  He has no absolute contraindications.  All his medical problems in the maximally optimized.  Copy of his note has been sent to his surgeon.  Needs to stop the aspirin 1 week prior but all other medications may be continued throughout the perioperative.  This note is generated with speech recognition software and is subject to transcription error and sound alike phrases that may be missed by proofreading.

## 2024-01-19 NOTE — TELEPHONE ENCOUNTER
Pt requesting rx to be sent to [facility] for insurance/savings. Please review and advise.     LV 01/02/2024  NV 05/01/2024   LF 01/09/2024

## 2024-02-16 NOTE — TELEPHONE ENCOUNTER
----- Message from Hilda Rossi sent at 2/16/2024 11:00 AM CST -----  Nurse Valeri Mccray [facility] abnormal high Bp 140/100 asymptomatic two and half hours after meds. 130 /93 current. Call back if needed [phone].Thanks

## 2024-02-16 NOTE — TELEPHONE ENCOUNTER
Patient notified to check b/p every day for 2 weeks and to send results through [employer].  Patient verbally understands.

## 2024-04-05 PROBLEM — R79.89 ELEVATED LFTS: Status: ACTIVE | Noted: 2024-01-01

## 2024-04-05 PROBLEM — K85.90 ACUTE PANCREATITIS: Status: ACTIVE | Noted: 2024-01-01

## 2024-04-05 PROBLEM — K92.0 HEMATEMESIS: Status: ACTIVE | Noted: 2024-01-01

## 2024-04-05 PROBLEM — E87.20 LACTIC ACIDOSIS: Status: ACTIVE | Noted: 2024-01-01

## 2024-04-05 NOTE — SUBJECTIVE & OBJECTIVE
Past Medical History:   Diagnosis Date    BPH (benign prostatic hyperplasia)     CKD (chronic kidney disease), stage III     Colon polyps 2015    Diarrhea in adult patient 02/10/2021    Generalized osteoarthrosis, involving multiple sites     History of gout     Hyperlipidemia     Hypertension     Hypogonadism male     IFG (impaired fasting glucose)     Lumbar disc disease with radiculopathy     Morbid obesity with BMI of 40.0-44.9, adult        Past Surgical History:   Procedure Laterality Date    COLONOSCOPY N/A 6/22/2020    Procedure: COLONOSCOPY;  Surgeon: Montserrat Conroy MD;  Location: [facility] ENDO;  Service: Endoscopy;  Laterality: N/A;    COLONOSCOPY N/A 2/10/2021    Procedure: COLONOSCOPY;  Surgeon: Jones Lemus III, MD;  Location: [facility] ENDO;  Service: Endoscopy;  Laterality: N/A;    ESOPHAGOGASTRODUODENOSCOPY N/A 2/10/2021    Procedure: EGD (ESOPHAGOGASTRODUODENOSCOPY);  Surgeon: Jones Lemus III, MD;  Location: [facility];  Service: Endoscopy;  Laterality: N/A;    EYE SURGERY      JOINT REPLACEMENT      KNEE SCOPE      open globe      right eye 1980    ROTATOR CUFF REPAIR      SHOULDER SURGERY Right        Review of patient's allergies indicates:   Allergen Reactions    No known drug allergies        No current facility-administered medications on file prior to encounter.     Current Outpatient Medications on File Prior to Encounter   Medication Sig    allopurinoL (ZYLOPRIM) 300 MG tablet Take 1 tablet (300 mg total) by mouth once daily.    amLODIPine (NORVASC) 10 MG tablet Take 1 tablet (10 mg total) by mouth once daily.    ascorbic acid, vitamin C, (VITAMIN C) 100 MG tablet Take 100 mg by mouth once daily.    aspirin 325 MG tablet Take 325 mg by mouth once daily.    carvediloL (COREG) 25 MG tablet Take 1 tablet (25 mg total) by mouth 2 (two) times daily with meals.    empagliflozin (JARDIANCE) 10 mg tablet Take 1 tablet (10 mg total) by mouth once daily.    furosemide (LASIX) 40 MG tablet Take 1  "tablet (40 mg total) by mouth once daily.    gabapentin (NEURONTIN) 300 MG capsule TAKE 1 CAPSULE THREE TIMES DAILY    HYDROcodone-acetaminophen (NORCO)  mg per tablet TAKE 1 TABLET BY MOUTH EVERY 8 (EIGHT) HOURS AS NEEDED FOR PAIN.    lisinopriL (PRINIVIL,ZESTRIL) 40 MG tablet Take 1 tablet (40 mg total) by mouth once daily.    multivitamin capsule Take 1 capsule by mouth once daily.    pravastatin (PRAVACHOL) 20 MG tablet TAKE 1 TABLET EVERY DAY    sildenafil (REVATIO) 20 mg Tab 2-4 tablets as needed one hour prior to intercourse    syringe with needle, safety 3 mL 21 gauge x 1 1/2" Syrg 1 Syringe by Misc.(Non-Drug; Combo Route) route every 21 days.    testosterone cypionate (DEPOTESTOTERONE CYPIONATE) 200 mg/mL injection Inject 1.5 mLs (300 mg total) into the muscle every 21 days.    timolol maleate 0.5% (TIMOPTIC-XE) 0.5 % SolG Place 1 drop into both eyes every morning.     Family History       Problem Relation (Age of Onset)    Cancer Cousin    Cataracts Father, Mother    Heart attack Father    Heart disease Brother    Hypertension Father, Mother          Tobacco Use    Smoking status: Former     Current packs/day: 0.00     Average packs/day: 1 pack/day for 30.0 years (30.0 ttl pk-yrs)     Types: Cigarettes     Start date: 1970     Quit date: 2000     Years since quittin.1    Smokeless tobacco: Current     Types: Snuff   Substance and Sexual Activity    Alcohol use: No    Drug use: No    Sexual activity: Not on file     Review of Systems   Constitutional:  Positive for fatigue.   Gastrointestinal:  Positive for abdominal distention, abdominal pain, nausea and vomiting.   Musculoskeletal:  Positive for arthralgias.   All other systems reviewed and are negative.    Objective:     Vital Signs (Most Recent):  Pulse: (!) 58 (24 1312)  Resp: (!) 22 (24 1710)  BP: (!) 153/97 (24 1312)  SpO2: 95 % (24 1312) Vital Signs (24h Range):  Pulse:  [58] 58  Resp:  [18-22] 22  SpO2:  " [95 %] 95 %  BP: (153)/(97) 153/97     Weight: 131.5 kg (290 lb)  Body mass index is 42.83 kg/m².     Physical Exam  Vitals and nursing note reviewed.   Constitutional:       General: He is not in acute distress.     Appearance: He is overweight. He is ill-appearing. He is not diaphoretic.   HENT:      Head: Normocephalic and atraumatic.      Right Ear: Hearing and external ear normal.      Left Ear: Hearing and external ear normal.      Nose: Nose normal. No mucosal edema or rhinorrhea.      Mouth/Throat:      Pharynx: Uvula midline.   Eyes:      General:         Right eye: No discharge.         Left eye: No discharge.      Conjunctiva/sclera: Conjunctivae normal.      Right eye: No chemosis.     Left eye: No chemosis.     Pupils: Pupils are equal, round, and reactive to light.   Neck:      Thyroid: No thyroid mass or thyromegaly.      Trachea: Trachea normal.   Cardiovascular:      Rate and Rhythm: Normal rate and regular rhythm.      Pulses:           Dorsalis pedis pulses are 2+ on the right side and 2+ on the left side.      Heart sounds: Normal heart sounds. No murmur heard.  Pulmonary:      Effort: Pulmonary effort is normal. No respiratory distress.      Breath sounds: Examination of the right-lower field reveals decreased breath sounds. Examination of the left-lower field reveals decreased breath sounds. Decreased breath sounds present. No wheezing.   Abdominal:      General: Abdomen is protuberant. Bowel sounds are normal. There is distension.      Palpations: Abdomen is soft.      Tenderness: There is generalized abdominal tenderness and tenderness in the epigastric area and left upper quadrant.   Musculoskeletal:         General: Normal range of motion.      Cervical back: Normal range of motion and neck supple.   Lymphadenopathy:      Cervical: No cervical adenopathy.      Upper Body:      Right upper body: No supraclavicular adenopathy.      Left upper body: No supraclavicular adenopathy.   Skin:      General: Skin is warm and dry.      Capillary Refill: Capillary refill takes less than 2 seconds.      Findings: No rash.   Neurological:      Mental Status: He is alert and oriented to person, place, and time.   Psychiatric:         Cognition and Memory: Cognition and memory normal.              CRANIAL NERVES     CN III, IV, VI   Pupils are equal, round, and reactive to light.       Significant Labs: All pertinent labs within the past 24 hours have been reviewed.  CBC:   Recent Labs   Lab 04/05/24  1454   WBC 14.43*   HGB 15.8   HCT 48.0        CMP:   Recent Labs   Lab 04/05/24  1454      K 4.7      CO2 21*   *   BUN 22   CREATININE 1.6*   CALCIUM 9.1   PROT 6.4   ALBUMIN 3.5   BILITOT 3.6*   ALKPHOS 98   *   *   ANIONGAP 14     Lactic Acid:   Recent Labs   Lab 04/05/24  1458   LACTATE 2.9*       Significant Imaging: I have reviewed all pertinent imaging results/findings within the past 24 hours.

## 2024-04-05 NOTE — ADMISSIONCARE
AdmissionCare    Guideline: Pancreatitis - INPT, Inpatient    Based on the indications selected for the patient, the bed status of Inpatient was determined to be MET    The following indications were selected as present at the time of evaluation of the patient:      - Acute pancreatitis, as indicated by 2 or more of the following:    - Abdominal pain    - Serum lipase greater than 3 times the upper limit of normal, or urinary trypsinogen-2 greater than 50 ng/mL    - Findings on imaging indicative of acute pancreatitis (eg, pancreatic inflammation, pancreatic necrosis, peripancreatic fluid collection)    AdmissionCare documentation entered by: Nathalie Grayson    [facility], 27th edition, Copyright © 2023 [facility], [facility] All Rights Reserved.  [id number]:19-05:00

## 2024-04-05 NOTE — ASSESSMENT & PLAN NOTE
Likely secondary to pancreatitis, hepatic steatosis    --Hold statin  --IV fluids  --Repeat labs in AM

## 2024-04-05 NOTE — H&P
[facility] - Emergency Dept[facility]  [facility]  History & Physical    Patient Name: Enoc Collado  MRN: [id number]  Patient Class: IP- Inpatient  Admission Date: 4/5/2024  Attending Physician: Katerina Cevallos MD   Primary Care Provider: Frantz Mason MD         Patient information was obtained from patient, spouse/SO, past medical records, and ER records.     Subjective:     Principal Problem:Acute pancreatitis    Chief Complaint:   Chief Complaint   Patient presents with    Abdominal Pain     Pt c/o abdominal pain, N/V        HPI: 73 y.o. male patient with a PMHx of HTN, HLD, Gout, CKD, morbid obesity. Presented to the Emergency Department for evaluation of abdominal pain which onset this morning. Pt has no Hx of abdominal surgery, and denies alcohol use. Symptoms are constant and moderate in severity. No mitigating or exacerbating factors reported. Associated sxs include vomiting, abdominal distention, and diarrhea. Patient denies any fever, cough, weakness, dysuria, sore throat, and all other sxs at this time. In the ED, vitals: 153/97, 58, 18, 90% RA. Significant Labs: WBC: 14.43, Cr: 1.6, Bili: 3.6, AST/ALT: 184/148, Lipase >1000. Triglyceride: 161, LA: 2.9. CT Abd/Pel: Acute pancreatitis (large amount of inflammatory change around the pancreas), US Abdomen: Hepatic steatosis with mild hepatomegaly. Normal gallbladder. Minimal perihepatic ascites. Treated with IV fluids, anti-emetics, pain meds, IV antibiotics, PPI. Witnessed hematemesis in ED. Gastroenterology consulted. Patient is a full code. Admitted to Hospital Medicine for management of Pancreatitis, Hematemesis.     Past Medical History:   Diagnosis Date    BPH (benign prostatic hyperplasia)     CKD (chronic kidney disease), stage III     Colon polyps 2015    Diarrhea in adult patient 02/10/2021    Generalized osteoarthrosis, involving multiple sites     History of gout     Hyperlipidemia     Hypertension     Hypogonadism male     IFG (impaired  fasting glucose)     Lumbar disc disease with radiculopathy     Morbid obesity with BMI of 40.0-44.9, adult        Past Surgical History:   Procedure Laterality Date    COLONOSCOPY N/A 6/22/2020    Procedure: COLONOSCOPY;  Surgeon: Montserrat Conroy MD;  Location: [facility] ENDO;  Service: Endoscopy;  Laterality: N/A;    COLONOSCOPY N/A 2/10/2021    Procedure: COLONOSCOPY;  Surgeon: Jones Lemus III, MD;  Location: [facility] ENDO;  Service: Endoscopy;  Laterality: N/A;    ESOPHAGOGASTRODUODENOSCOPY N/A 2/10/2021    Procedure: EGD (ESOPHAGOGASTRODUODENOSCOPY);  Surgeon: Jones Lemus III, MD;  Location: [facility] ENDO;  Service: Endoscopy;  Laterality: N/A;    EYE SURGERY      JOINT REPLACEMENT      KNEE SCOPE      open globe      right eye 1980    ROTATOR CUFF REPAIR      SHOULDER SURGERY Right        Review of patient's allergies indicates:   Allergen Reactions    No known drug allergies        No current facility-administered medications on file prior to encounter.     Current Outpatient Medications on File Prior to Encounter   Medication Sig    allopurinoL (ZYLOPRIM) 300 MG tablet Take 1 tablet (300 mg total) by mouth once daily.    amLODIPine (NORVASC) 10 MG tablet Take 1 tablet (10 mg total) by mouth once daily.    ascorbic acid, vitamin C, (VITAMIN C) 100 MG tablet Take 100 mg by mouth once daily.    aspirin 325 MG tablet Take 325 mg by mouth once daily.    carvediloL (COREG) 25 MG tablet Take 1 tablet (25 mg total) by mouth 2 (two) times daily with meals.    empagliflozin (JARDIANCE) 10 mg tablet Take 1 tablet (10 mg total) by mouth once daily.    furosemide (LASIX) 40 MG tablet Take 1 tablet (40 mg total) by mouth once daily.    gabapentin (NEURONTIN) 300 MG capsule TAKE 1 CAPSULE THREE TIMES DAILY    HYDROcodone-acetaminophen (NORCO)  mg per tablet TAKE 1 TABLET BY MOUTH EVERY 8 (EIGHT) HOURS AS NEEDED FOR PAIN.    lisinopriL (PRINIVIL,ZESTRIL) 40 MG tablet Take 1 tablet (40 mg total) by mouth once daily.  "   multivitamin capsule Take 1 capsule by mouth once daily.    pravastatin (PRAVACHOL) 20 MG tablet TAKE 1 TABLET EVERY DAY    sildenafil (REVATIO) 20 mg Tab 2-4 tablets as needed one hour prior to intercourse    syringe with needle, safety 3 mL 21 gauge x 1 1/2" Syrg 1 Syringe by Misc.(Non-Drug; Combo Route) route every 21 days.    testosterone cypionate (DEPOTESTOTERONE CYPIONATE) 200 mg/mL injection Inject 1.5 mLs (300 mg total) into the muscle every 21 days.    timolol maleate 0.5% (TIMOPTIC-XE) 0.5 % SolG Place 1 drop into both eyes every morning.     Family History       Problem Relation (Age of Onset)    Cancer Cousin    Cataracts Father, Mother    Heart attack Father    Heart disease Brother    Hypertension Father, Mother          Tobacco Use    Smoking status: Former     Current packs/day: 0.00     Average packs/day: 1 pack/day for 30.0 years (30.0 ttl pk-yrs)     Types: Cigarettes     Start date: 1970     Quit date: 2000     Years since quittin.1    Smokeless tobacco: Current     Types: Snuff   Substance and Sexual Activity    Alcohol use: No    Drug use: No    Sexual activity: Not on file     Review of Systems   Constitutional:  Positive for fatigue.   Gastrointestinal:  Positive for abdominal distention, abdominal pain, nausea and vomiting.   Musculoskeletal:  Positive for arthralgias.   All other systems reviewed and are negative.    Objective:     Vital Signs (Most Recent):  Pulse: (!) 58 (24 1312)  Resp: (!) 22 (24 1710)  BP: (!) 153/97 (24 1312)  SpO2: 95 % (24 1312) Vital Signs (24h Range):  Pulse:  [58] 58  Resp:  [18-22] 22  SpO2:  [95 %] 95 %  BP: (153)/(97) 153/97     Weight: 131.5 kg (290 lb)  Body mass index is 42.83 kg/m².     Physical Exam  Vitals and nursing note reviewed.   Constitutional:       General: He is not in acute distress.     Appearance: He is overweight. He is ill-appearing. He is not diaphoretic.   HENT:      Head: Normocephalic and " atraumatic.      Right Ear: Hearing and external ear normal.      Left Ear: Hearing and external ear normal.      Nose: Nose normal. No mucosal edema or rhinorrhea.      Mouth/Throat:      Pharynx: Uvula midline.   Eyes:      General:         Right eye: No discharge.         Left eye: No discharge.      Conjunctiva/sclera: Conjunctivae normal.      Right eye: No chemosis.     Left eye: No chemosis.     Pupils: Pupils are equal, round, and reactive to light.   Neck:      Thyroid: No thyroid mass or thyromegaly.      Trachea: Trachea normal.   Cardiovascular:      Rate and Rhythm: Normal rate and regular rhythm.      Pulses:           Dorsalis pedis pulses are 2+ on the right side and 2+ on the left side.      Heart sounds: Normal heart sounds. No murmur heard.  Pulmonary:      Effort: Pulmonary effort is normal. No respiratory distress.      Breath sounds: Examination of the right-lower field reveals decreased breath sounds. Examination of the left-lower field reveals decreased breath sounds. Decreased breath sounds present. No wheezing.   Abdominal:      General: Abdomen is protuberant. Bowel sounds are normal. There is distension.      Palpations: Abdomen is soft.      Tenderness: There is generalized abdominal tenderness and tenderness in the epigastric area and left upper quadrant.   Musculoskeletal:         General: Normal range of motion.      Cervical back: Normal range of motion and neck supple.   Lymphadenopathy:      Cervical: No cervical adenopathy.      Upper Body:      Right upper body: No supraclavicular adenopathy.      Left upper body: No supraclavicular adenopathy.   Skin:     General: Skin is warm and dry.      Capillary Refill: Capillary refill takes less than 2 seconds.      Findings: No rash.   Neurological:      Mental Status: He is alert and oriented to person, place, and time.   Psychiatric:         Cognition and Memory: Cognition and memory normal.              CRANIAL NERVES     CN III, IV,  VI   Pupils are equal, round, and reactive to light.       Significant Labs: All pertinent labs within the past 24 hours have been reviewed.  CBC:   Recent Labs   Lab 04/05/24  1454   WBC 14.43*   HGB 15.8   HCT 48.0        CMP:   Recent Labs   Lab 04/05/24  1454      K 4.7      CO2 21*   *   BUN 22   CREATININE 1.6*   CALCIUM 9.1   PROT 6.4   ALBUMIN 3.5   BILITOT 3.6*   ALKPHOS 98   *   *   ANIONGAP 14     Lactic Acid:   Recent Labs   Lab 04/05/24  1458   LACTATE 2.9*       Significant Imaging: I have reviewed all pertinent imaging results/findings within the past 24 hours.  Assessment/Plan:     * Acute pancreatitis  Denies previous episodes, denies new medications, denies long-acting gliptin. Does take Jardiance    --NPO  --IV fluids  --IV pain med  --Repeat labs in AM  --Tele  --vitals q4h  --PPI      Lactic acidosis  Likely secondary to vomiting, dehydration    --Repeat LA at 1930  --Continue IV fluids      Elevated LFTs  Likely secondary to pancreatitis, hepatic steatosis    --Hold statin  --IV fluids  --Repeat labs in AM      Hematemesis  Reported 1 episode at home PTA, witnessed hematemesis in ED. Vitals stable. Does take ASA daily    --H/H Stat  --Consulted GI  --PPI IV        Morbid obesity with BMI of 40.0-44.9, adult  Body mass index is 42.83 kg/m². Morbid obesity complicates all aspects of disease management from diagnostic modalities to treatment. Weight loss encouraged and health benefits explained to patient.         Hyperlipidemia  Chronic, stable, treated with statin    --Hold statin      Essential hypertension  Chronic, controlled. Latest blood pressure and vitals reviewed-     Pulse:  [58]   Resp:  [18-22]   BP: (153)/(97)   SpO2:  [95 %] .   Home meds for hypertension were reviewed and noted below.   Hypertension Medications               amLODIPine (NORVASC) 10 MG tablet Take 1 tablet (10 mg total) by mouth once daily.    carvediloL (COREG) 25 MG tablet  Take 1 tablet (25 mg total) by mouth 2 (two) times daily with meals.    furosemide (LASIX) 40 MG tablet Take 1 tablet (40 mg total) by mouth once daily.    lisinopriL (PRINIVIL,ZESTRIL) 40 MG tablet Take 1 tablet (40 mg total) by mouth once daily.            While in the hospital, will manage blood pressure as follows; Adjust home antihypertensive regimen as follows- IV HTN meds for now    Will utilize p.r.n. blood pressure medication only if patient's blood pressure greater than 160/100 and he develops symptoms such as worsening chest pain or shortness of breath.      VTE Risk Mitigation (From admission, onward)           Ordered     IP VTE HIGH RISK PATIENT  Once         04/05/24 1819     Place sequential compression device  Until discontinued         04/05/24 1819                               AdmissionCare    Guideline: Pancreatitis - INPT, Inpatient    Based on the indications selected for the patient, the bed status of Inpatient was determined to be MET    The following indications were selected as present at the time of evaluation of the patient:      - Acute pancreatitis, as indicated by 2 or more of the following:    - Abdominal pain    - Serum lipase greater than 3 times the upper limit of normal, or urinary trypsinogen-2 greater than 50 ng/mL    - Findings on imaging indicative of acute pancreatitis (eg, pancreatic inflammation, pancreatic necrosis, peripancreatic fluid collection)    AdmissionCare documentation entered by: Nathalie Grayson    [facility] [place] [facility], 27th edition, Copyright © 2023 [employer] All Rights Reserved.  [id number]:00    Nathalie Grayson NP  Department of Hospital Medicine  [facility] - Emergency Dept.

## 2024-04-05 NOTE — ASSESSMENT & PLAN NOTE
Reported 1 episode at home PTA, witnessed hematemesis in ED. Vitals stable. Does take ASA daily    --H/H Stat  --Consulted GI  --PPI IV

## 2024-04-05 NOTE — ED PROVIDER NOTES
SCRIBE #1 NOTE: ILulu, am scribing for, and in the presence of, Ivan Wood MD. I have scribed the entire note.       History     Chief Complaint   Patient presents with    Abdominal Pain     Pt c/o abdominal pain, N/V     Review of patient's allergies indicates:   Allergen Reactions    No known drug allergies          History of Present Illness     HPI    4/5/2024, 2:48 PM  History obtained from the patient      History of Present Illness: Enoc Collado is a 73 y.o. male patient with a PMHx of HTN, HLD, Gout, CKD who presents to the Emergency Department for evaluation of abdominal pain which onset this morning. Pt has no Hx of abdominal surgery and denies alcohol use. Symptoms are constant and moderate in severity. No mitigating or exacerbating factors reported. Associated sxs include vomiting, abdominal distention, and diarrhea. Patient denies any fever, cough, weakness, dysuria, sore throat, and all other sxs at this time. No prior tx reported. No further complaints or concerns at this time.       Arrival mode: EMS    PCP: Frantz Mason MD        Past Medical History:  Past Medical History:   Diagnosis Date    BPH (benign prostatic hyperplasia)     CKD (chronic kidney disease), stage III     Colon polyps 2015    Diarrhea in adult patient 02/10/2021    Generalized osteoarthrosis, involving multiple sites     History of gout     Hyperlipidemia     Hypertension     Hypogonadism male     IFG (impaired fasting glucose)     Lumbar disc disease with radiculopathy     Morbid obesity with BMI of 40.0-44.9, adult        Past Surgical History:  Past Surgical History:   Procedure Laterality Date    COLONOSCOPY N/A 6/22/2020    Procedure: COLONOSCOPY;  Surgeon: Montserrat Conroy MD;  Location: [facility] ENDO;  Service: Endoscopy;  Laterality: N/A;    COLONOSCOPY N/A 2/10/2021    Procedure: COLONOSCOPY;  Surgeon: Jones Lemus III, MD;  Location: [facility] ENDO;  Service: Endoscopy;  Laterality: N/A;     ESOPHAGOGASTRODUODENOSCOPY N/A 2/10/2021    Procedure: EGD (ESOPHAGOGASTRODUODENOSCOPY);  Surgeon: Jones Lemus III, MD;  Location: [facility];  Service: Endoscopy;  Laterality: N/A;    EYE SURGERY      JOINT REPLACEMENT      KNEE SCOPE      open globe      right eye     ROTATOR CUFF REPAIR      SHOULDER SURGERY Right          Family History:  Family History   Problem Relation Age of Onset    Hypertension Father     Cataracts Father     Heart attack Father     Cataracts Mother     Hypertension Mother     Cancer Cousin         colon    Heart disease Brother        Social History:  Social History     Tobacco Use    Smoking status: Former     Current packs/day: 0.00     Average packs/day: 1 pack/day for 30.0 years (30.0 ttl pk-yrs)     Types: Cigarettes     Start date: 1970     Quit date: 2000     Years since quittin.1    Smokeless tobacco: Current     Types: Snuff   Substance and Sexual Activity    Alcohol use: No    Drug use: No    Sexual activity: Not on file        Review of Systems     Review of Systems   Constitutional:  Negative for fever.   HENT:  Negative for sore throat.    Respiratory:  Negative for shortness of breath.    Cardiovascular:  Negative for chest pain.   Gastrointestinal:  Positive for abdominal distention, abdominal pain, diarrhea and vomiting. Negative for nausea.   Genitourinary:  Negative for dysuria.   Musculoskeletal:  Negative for back pain.   Skin:  Negative for rash.   Neurological:  Negative for weakness.   Hematological:  Does not bruise/bleed easily.   All other systems reviewed and are negative.       Physical Exam     Initial Vitals [24 1312]   BP Pulse Resp Temp SpO2   (!) 153/97 (!) 58 18 -- 95 %      MAP       --          Physical Exam  Nursing Notes and Vital Signs Reviewed.  Constitutional: Patient is in no acute distress. Well-developed and well-nourished.  Head: Atraumatic. Normocephalic.  Eyes: PERRL. EOM intact. Conjunctivae are not pale. No  scleral icterus.  ENT: Mucous membranes are moist.   Neck: Supple. Full ROM.  Cardiovascular: Regular rate. Regular rhythm. No murmurs, rubs, or gallops. Distal pulses are 2+ and symmetric.  Pulmonary/Chest: No respiratory distress. Clear to auscultation bilaterally. No wheezing or rales.  Abdominal: Epigastric and RUQ tenderness. Firm and distended abdomen.  No rebound, guarding, or rigidity.   Genitourinary: No CVA tenderness  Musculoskeletal: Moves all extremities. No obvious deformities. No edema.   Skin: Warm and dry.  Neurological:  Alert, awake, and appropriate.  Normal speech.  No acute focal neurological deficits are appreciated.  Psychiatric: Normal affect. Good eye contact. Appropriate in content.     ED Course   Procedures  ED Vital Signs:  Vitals:    04/05/24 1312 04/05/24 1449 04/05/24 1459 04/05/24 1624   BP: (!) 153/97      Pulse: (!) 58      Resp: 18 18 18    SpO2: 95%      Weight:    131.5 kg (290 lb)    04/05/24 1710   BP:    Pulse:    Resp: (!) 22   SpO2:    Weight:        Abnormal Lab Results:  Labs Reviewed   CBC W/ AUTO DIFFERENTIAL - Abnormal; Notable for the following components:       Result Value    WBC 14.43 (*)     MPV 8.7 (*)     Gran # (ANC) 11.9 (*)     Immature Grans (Abs) 0.07 (*)     Mono # 1.2 (*)     Gran % 82.6 (*)     Lymph % 7.3 (*)     All other components within normal limits   COMPREHENSIVE METABOLIC PANEL - Abnormal; Notable for the following components:    CO2 21 (*)     Glucose 118 (*)     Creatinine 1.6 (*)     Total Bilirubin 3.6 (*)      (*)      (*)     eGFR 45 (*)     All other components within normal limits   LIPASE - Abnormal; Notable for the following components:    Lipase >1000 (*)     All other components within normal limits   LACTIC ACID, PLASMA - Abnormal; Notable for the following components:    Lactate (Lactic Acid) 2.9 (*)     All other components within normal limits   TRIGLYCERIDES - Abnormal; Notable for the following components:     Triglycerides 161 (*)     All other components within normal limits   TRIGLYCERIDES   URINALYSIS, REFLEX TO URINE CULTURE        All Lab Results:  Results for orders placed or performed during the hospital encounter of 04/05/24   CBC auto differential   Result Value Ref Range    WBC 14.43 (H) 3.90 - 12.70 K/uL    RBC 5.16 4.60 - 6.20 M/uL    Hemoglobin 15.8 14.0 - 18.0 g/dL    Hematocrit 48.0 40.0 - 54.0 %    MCV 93 82 - 98 fL    MCH 30.6 27.0 - 31.0 pg    MCHC 32.9 32.0 - 36.0 g/dL    RDW 13.8 11.5 - 14.5 %    Platelets 237 150 - 450 K/uL    MPV 8.7 (L) 9.2 - 12.9 fL    Immature Granulocytes 0.5 0.0 - 0.5 %    Gran # (ANC) 11.9 (H) 1.8 - 7.7 K/uL    Immature Grans (Abs) 0.07 (H) 0.00 - 0.04 K/uL    Lymph # 1.1 1.0 - 4.8 K/uL    Mono # 1.2 (H) 0.3 - 1.0 K/uL    Eos # 0.2 0.0 - 0.5 K/uL    Baso # 0.05 0.00 - 0.20 K/uL    nRBC 0 0 /100 WBC    Gran % 82.6 (H) 38.0 - 73.0 %    Lymph % 7.3 (L) 18.0 - 48.0 %    Mono % 8.2 4.0 - 15.0 %    Eosinophil % 1.1 0.0 - 8.0 %    Basophil % 0.3 0.0 - 1.9 %    Differential Method Automated    Comprehensive metabolic panel   Result Value Ref Range    Sodium 140 136 - 145 mmol/L    Potassium 4.7 3.5 - 5.1 mmol/L    Chloride 105 95 - 110 mmol/L    CO2 21 (L) 23 - 29 mmol/L    Glucose 118 (H) 70 - 110 mg/dL    BUN 22 8 - 23 mg/dL    Creatinine 1.6 (H) 0.5 - 1.4 mg/dL    Calcium 9.1 8.7 - 10.5 mg/dL    Total Protein 6.4 6.0 - 8.4 g/dL    Albumin 3.5 3.5 - 5.2 g/dL    Total Bilirubin 3.6 (H) 0.1 - 1.0 mg/dL    Alkaline Phosphatase 98 55 - 135 U/L     (H) 10 - 40 U/L     (H) 10 - 44 U/L    eGFR 45 (A) >60 mL/min/1.73 m^2    Anion Gap 14 8 - 16 mmol/L   Lipase   Result Value Ref Range    Lipase >1000 (H) 4 - 60 U/L   Lactic acid, plasma   Result Value Ref Range    Lactate (Lactic Acid) 2.9 (H) 0.5 - 2.2 mmol/L   Triglycerides   Result Value Ref Range    Triglycerides 161 (H) 30 - 150 mg/dL   EKG 12-lead   Result Value Ref Range    QRS Duration 90 ms    [facility] QTC Calculation 410  ms         Imaging Results:  Imaging Results              CT Abdomen Pelvis  Without Contrast (Final result)  Result time 04/05/24 16:45:23   Procedure changed from CT Abdomen Pelvis With IV Contrast NO Oral Contrast     Final result by Shilo Dow MD (04/05/24 16:45:23)                   Impression:      Acute pancreatitis (large amount of inflammatory change around the pancreas).  Negative for pseudocyst.    Trace perihepatic ascites.    All CT scans at this facility are performed  using dose modulation techniques as appropriate to performed exam including the following:  automated exposure control; adjustment of mA and/or kV according to the patients size (this includes techniques or standardized protocols for targeted exams where dose is matched to indication/reason for exam: i.e. extremities or head);  iterative reconstruction technique.      Electronically signed by: Shilo Dow MD  Date:    04/05/2024  Time:    16:45               Narrative:    EXAMINATION:  CT ABDOMEN PELVIS WITHOUT CONTRAST    CLINICAL HISTORY:  Epigastric pain;    TECHNIQUE:  Axial images obtained of the abdomen and pelvis without IV contrast and without oral contrast.  Sagittal and coronal reformats obtained.    COMPARISON:  Abdomen and pelvis CT 04/10/2023    FINDINGS:  ABDOMEN:    - Lung bases: Lungs bases are clear.  Coronary arterial calcifications    - Liver: No contour deformities.    - Gallbladder: No calcified gallstones.    - Bile Ducts: No evidence of intra or extra hepatic biliary ductal dilation.    - Spleen: No contour deformities.    - Kidneys: No hydronephrosis or calculi.  Large bilateral renal cyst with larger on the right exophytic off the lower pole measuring 10.1 cm.    - Adrenals: Normal adrenals.    - Pancreas: Large amount of inflammatory changes around the entire pancreas greater around the head.  No fluid collections    - Bowel: Nonobstructed.  Scattered diverticulosis in the colon.  No surrounding  inflammatory change.    - Retroperitoneum:  Unremarkable.    - Vascular: No abdominal aortic aneurysm. Mild scattered calcified arterial plaque.    - Abdominal wall:  Unremarkable.    PELVIS:    - Bladder: Normal.    - : Moderately enlarged prostate.    BONES:  No acute osseous findings.  Moderate degenerative changes lumbar spine                                       US Abdomen Limited (Final result)  Result time 04/05/24 15:56:25      Final result by Shilo Dow MD (04/05/24 15:56:25)                   Impression:      Hepatic steatosis with mild hepatomegaly.    Normal gallbladder.    Minimal perihepatic ascites.      Electronically signed by: Shilo Dow MD  Date:    04/05/2024  Time:    15:56               Narrative:    EXAMINATION:  US ABDOMEN LIMITED    CLINICAL HISTORY:  RUQ evaluation;    TECHNIQUE:  Limited ultrasound of the right upper quadrant of the abdomen (including pancreas, liver, gallbladder, common bile duct, and right kidney) was performed.    COMPARISON:  CT abdomen 04/10/2023    FINDINGS:  Liver: Mildly enlarged at 18.7 cm.  Diffuse increased echogenicity.    Gallbladder: No calculi, wall thickening, or pericholecystic fluid.  Negative sonographic Harris's sign.    Biliary system: The common duct is not dilated, measuring 7 mm.  No intrahepatic ductal dilatation.    Pancreas: Mostly obscured.    Right kidney: Normal in size with no hydronephrosis and measures 16.4 cm though this measurement includes a large 9.7 cm exophytic cyst off the lower pole..    Miscellaneous: Trace perihepatic ascites                                       The EKG was ordered, reviewed, and independently interpreted by the ED provider.  Interpretation time: 14:57  Rate: 58 BPM  Rhythm: sinus bradycardia  Interpretation: Inferior infarct; age undetermined. No STEMI.           The Emergency Provider reviewed the vital signs and test results, which are outlined above.     ED Discussion       5:08 PM: Consulted with  Dr. Cevallos ([facility]) who will visit the pt at bedside,    5:26 PM: Pt is experiencing some hematemesis. PPI added on and HM notified.    5:51 PM: Discussed case with Dr. Cevallos ([facility]). Dr. Cevallos agrees with current care and management of pt and accepts admission after recording the pt's temperature.   Admitting Service: [facility]  Admitting Physician: Dr. Cevallos  Admit to: inpatient med tele     5:51 PM: Re-evaluated pt. I have discussed test results, shared treatment plan, and the need for admission with patient and family at bedside. Pt and family express understanding at this time and agree with all information. All questions answered. Pt and family have no further questions or concerns at this time. Pt is ready for admit.      Medical Decision Making  73-year-old female presenting with complaints of epigastric abdominal pain, nausea/vomiting, abdominal distention.  Differential diagnosis includes but not limited to acute pancreatitis, acute cholecystitis, biliary colic, gallstone pancreatitis, alcohol pancreatitis, hyper triglyceride pancreatitis, small-bowel obstruction, enteritis, myocardial infarction.  ED workup revealed acute pancreatitis.  No reports of alcohol use.  Gallbladder within normal limits on ultrasound.  Triglycerides only minimally elevated.  Patient treated with IV pain medications, IV fluids, and admission to Hospital Medicine    Amount and/or Complexity of Data Reviewed  External Data Reviewed: notes.     Details: Past medical history, medications, and allergies reviewed  Labs: ordered. Decision-making details documented in ED Course.  Radiology: ordered. Decision-making details documented in ED Course.  ECG/medicine tests: ordered and independent interpretation performed. Decision-making details documented in ED Course.    Risk  Prescription drug management.  Parenteral controlled substances.  Decision regarding hospitalization.                ED  Medication(s):  Medications   sodium chloride 0.9% bolus 1,000 mL 1,000 mL (has no administration in time range)   pantoprazole injection 80 mg (has no administration in time range)   morphine injection 4 mg (4 mg Intravenous Given by Other 4/5/24 1449)   ondansetron injection 4 mg (4 mg Intravenous Given by Other 4/5/24 1453)   morphine injection 4 mg (4 mg Intravenous Given by Other 4/5/24 1459)   piperacillin-tazobactam (ZOSYN) 4.5 g in dextrose 5 % in water (D5W) 100 mL IVPB (MB+) (0 g Intravenous Stopped 4/5/24 1727)   sodium chloride 0.9% bolus 1,000 mL 1,000 mL (1,000 mLs Intravenous New Bag 4/5/24 1622)   HYDROmorphone (PF) injection 1 mg (1 mg Intravenous Given 4/5/24 1710)       New Prescriptions    No medications on file               Scribe Attestation:   Scribe #1: I performed the above scribed service and the documentation accurately describes the services I performed. I attest to the accuracy of the note.     Attending:   Physician Attestation Statement for Scribe #1: I, Ivan Wood MD, personally performed the services described in this documentation, as scribed by Lulu Lazo, in my presence, and it is both accurate and complete.           Clinical Impression       ICD-10-CM ICD-9-CM   1. Acute pancreatitis, unspecified complication status, unspecified pancreatitis type  K85.90 577.0   2. Epigastric pain  R10.13 789.06   3. STEFANIE (acute kidney injury)  N17.9 584.9       Disposition:   Disposition: Admitted  Condition: Ivan Goldstein MD  04/06/24 7681

## 2024-04-05 NOTE — HPI
73 y.o. male patient with a PMHx of HTN, HLD, Gout, CKD, morbid obesity. Presented to the Emergency Department for evaluation of abdominal pain which onset this morning. Pt has no Hx of abdominal surgery, and denies alcohol use. Symptoms are constant and moderate in severity. No mitigating or exacerbating factors reported. Associated sxs include vomiting, abdominal distention, and diarrhea. Patient denies any fever, cough, weakness, dysuria, sore throat, and all other sxs at this time. In the ED, vitals: 153/97, 58, 18, 90% RA. Significant Labs: WBC: 14.43, Cr: 1.6, Bili: 3.6, AST/ALT: 184/148, Lipase >1000. Triglyceride: 161, LA: 2.9. CT Abd/Pel: Acute pancreatitis (large amount of inflammatory change around the pancreas), US Abdomen: Hepatic steatosis with mild hepatomegaly. Normal gallbladder. Minimal perihepatic ascites. Treated with IV fluids, anti-emetics, pain meds, IV antibiotics, PPI. Witnessed hematemesis in ED. Gastroenterology consulted. Patient is a full code. Admitted to Hospital Medicine for management of Pancreatitis, Hematemesis.

## 2024-04-05 NOTE — ASSESSMENT & PLAN NOTE
Chronic, controlled. Latest blood pressure and vitals reviewed-     Pulse:  [58]   Resp:  [18-22]   BP: (153)/(97)   SpO2:  [95 %] .   Home meds for hypertension were reviewed and noted below.   Hypertension Medications               amLODIPine (NORVASC) 10 MG tablet Take 1 tablet (10 mg total) by mouth once daily.    carvediloL (COREG) 25 MG tablet Take 1 tablet (25 mg total) by mouth 2 (two) times daily with meals.    furosemide (LASIX) 40 MG tablet Take 1 tablet (40 mg total) by mouth once daily.    lisinopriL (PRINIVIL,ZESTRIL) 40 MG tablet Take 1 tablet (40 mg total) by mouth once daily.            While in the hospital, will manage blood pressure as follows; Adjust home antihypertensive regimen as follows- IV HTN meds for now    Will utilize p.r.n. blood pressure medication only if patient's blood pressure greater than 160/100 and he develops symptoms such as worsening chest pain or shortness of breath.

## 2024-04-05 NOTE — ASSESSMENT & PLAN NOTE
Denies previous episodes, denies new medications, denies long-acting gliptin. Does take Jardiance    --NPO  --IV fluids  --IV pain med  --Repeat labs in AM  --Tele  --vitals q4h  --PPI

## 2024-04-06 PROBLEM — E87.5 HYPERKALEMIA: Status: ACTIVE | Noted: 2024-01-01

## 2024-04-06 PROBLEM — R57.9 SHOCK: Status: ACTIVE | Noted: 2024-01-01

## 2024-04-06 PROBLEM — Z71.89 ACP (ADVANCE CARE PLANNING): Status: ACTIVE | Noted: 2024-01-01

## 2024-04-06 PROBLEM — N17.9 AKI (ACUTE KIDNEY INJURY): Status: ACTIVE | Noted: 2024-01-01

## 2024-04-06 PROBLEM — J96.01 ACUTE HYPOXEMIC RESPIRATORY FAILURE: Status: ACTIVE | Noted: 2024-01-01

## 2024-04-06 PROBLEM — K85.10 ACUTE BILIARY PANCREATITIS WITHOUT INFECTION OR NECROSIS: Status: ACTIVE | Noted: 2024-01-01

## 2024-04-06 PROBLEM — N18.9 ACUTE RENAL FAILURE SUPERIMPOSED ON CHRONIC KIDNEY DISEASE: Status: ACTIVE | Noted: 2024-01-01

## 2024-04-06 NOTE — NURSING
During 0400 rounding, patient abd/mouth breathing complaining of SOB, actively pursed lip breathing, was sating at 85% on 4L NC, increased O2 to 5L NC, O2 sat at 90-91%. BP stable at 127/71, HR 62, . Contacted ROB Schofield for bedside assessment. Breath sounds diminished, wet. MD ordered ABG, IV Diuretic, repeat labs, BIPAP continue plan of care.

## 2024-04-06 NOTE — ASSESSMENT & PLAN NOTE
STEFANIE on CKD stage 3  STEFANIE likely due to complication of acute illness and pancreatitis, pt may have an intraabdominal infection, leading to bacteremia, hypotension, and sepsis.    Hyperkalemia is concerning  Pt is on BiPAP, and lokelma could not be easily given  Agree with IV Ca and insulin  Continue NS IVF's at 150 ml/hr  Repeal labs  May need acute dialysis if K higher  Recommend inotropic support  Agree with ICU transfer

## 2024-04-06 NOTE — ASSESSMENT & PLAN NOTE
Self limiting  2/2 recurrent N/V with acute onset of pancreatitis symptoms  ASA until admission  Protonix 80mg IV bolus on admission  No further episodes  H&H stable

## 2024-04-06 NOTE — ASSESSMENT & PLAN NOTE
- placed on NIPPV AM of 4/6  - continues with minimal settings and remains awake   - high risk for further decompensation and need for intubation for expected course

## 2024-04-06 NOTE — SUBJECTIVE & OBJECTIVE
Past Medical History:   Diagnosis Date    BPH (benign prostatic hyperplasia)     CKD (chronic kidney disease), stage III     Colon polyps 2015    Diarrhea in adult patient 02/10/2021    Generalized osteoarthrosis, involving multiple sites     History of gout     Hyperlipidemia     Hypertension     Hypogonadism male     IFG (impaired fasting glucose)     Lumbar disc disease with radiculopathy     Morbid obesity with BMI of 40.0-44.9, adult        Past Surgical History:   Procedure Laterality Date    COLONOSCOPY N/A 2020    Procedure: COLONOSCOPY;  Surgeon: Montserrat Conroy MD;  Location: [facility] ENDO;  Service: Endoscopy;  Laterality: N/A;    COLONOSCOPY N/A 2/10/2021    Procedure: COLONOSCOPY;  Surgeon: Jones Lemus III, MD;  Location: [facility] ENDO;  Service: Endoscopy;  Laterality: N/A;    ESOPHAGOGASTRODUODENOSCOPY N/A 2/10/2021    Procedure: EGD (ESOPHAGOGASTRODUODENOSCOPY);  Surgeon: Jones Lemus III, MD;  Location: [facility];  Service: Endoscopy;  Laterality: N/A;    EYE SURGERY      JOINT REPLACEMENT      KNEE SCOPE      open globe      right eye 1980    ROTATOR CUFF REPAIR      SHOULDER SURGERY Right        Review of patient's allergies indicates:   Allergen Reactions    No known drug allergies        Family History       Problem Relation (Age of Onset)    Cancer Cousin    Cataracts Father, Mother    Heart attack Father    Heart disease Brother    Hypertension Father, Mother          Tobacco Use    Smoking status: Former     Current packs/day: 0.00     Average packs/day: 1 pack/day for 30.0 years (30.0 ttl pk-yrs)     Types: Cigarettes     Start date: 1970     Quit date: 2000     Years since quittin.1    Smokeless tobacco: Current     Types: Snuff   Substance and Sexual Activity    Alcohol use: No    Drug use: No    Sexual activity: Not Currently         Review of Systems  Objective:     Vital Signs (Most Recent):  Temp: 97.5 °F (36.4 °C) (24 0340)  Pulse: 66 (24  0810)  Resp: 20 (04/06/24 0810)  BP: (!) 117/57 (04/06/24 0810)  SpO2: (!) 91 % (04/06/24 0810) Vital Signs (24h Range):  Temp:  [97.5 °F (36.4 °C)-98.7 °F (37.1 °C)] 97.5 °F (36.4 °C)  Pulse:  [57-66] 66  Resp:  [18-23] 20  SpO2:  [83 %-95 %] 91 %  BP: ()/() 117/57     Weight: 131.5 kg (290 lb)  Body mass index is 42.83 kg/m².      Intake/Output Summary (Last 24 hours) at 4/6/2024 0932  Last data filed at 4/5/2024 1727  Gross per 24 hour   Intake 100 ml   Output --   Net 100 ml        Physical Exam     Vents:  Oxygen Concentration (%): 40 (04/06/24 0610)    Lines/Drains/Airways       Peripheral Intravenous Line  Duration                  Peripheral IV - Single Lumen 04/06/24 0041 20 G Anterior;Left Forearm <1 day                    Significant Labs:    CBC/Anemia Profile:  Recent Labs   Lab 04/05/24  1454 04/05/24 1844 04/06/24  0644   WBC 14.43*  --  14.13*   HGB 15.8 15.8 15.7   HCT 48.0 47.4 48.8     --  217   MCV 93  --  97   RDW 13.8  --  14.8*        Chemistries:  Recent Labs   Lab 04/05/24 1454 04/06/24  0644    137   K 4.7 6.7*    106   CO2 21* 17*   BUN 22 38*   CREATININE 1.6* 3.2*   CALCIUM 9.1 8.0*   ALBUMIN 3.5 3.5   PROT 6.4 6.6   BILITOT 3.6* 12.5*   ALKPHOS 98 98   * 249*   * 278*       All pertinent labs within the past 24 hours have been reviewed.    Significant Imaging:   I have reviewed all pertinent imaging results/findings within the past 24 hours.

## 2024-04-06 NOTE — CONSULTS
[facility] ([facility])  Gastroenterology  Consult Note    Patient Name: Enoc Collado  MRN: [id number]  Admission Date: 4/5/2024  Hospital Length of Stay: 1 days  Code Status: Full Code   Attending Provider: Juan David Keith MD   Consulting Provider: Marley Parks MD  Primary Care Physician: Frantz Mason MD  Principal Problem:Acute biliary pancreatitis without infection or necrosis    Inpatient consult to Gastroenterology  Consult performed by: Marley Parks MD  Consult ordered by: Nathlaie Grayson NP  Reason for consult: acute pancreatitis        Subjective:     HPI:  73 y.o male with morbid obesity, hyperlipidemia, gout, CKD stage III, lumbar disc radiculopathy and impaired fasting glucose presented to the ED yesterday with acute onset abdominal pain that began yesterday morning. There was associated nausea, emesis and diarrhea. Wife reported episode of hematemesis at home. Labs on presentation showed elevated liver enzymes with , , AP 98 and Tbili 3.6 with DB 2.0. Lipase >1000. H&H was 15.8/48 and repeat was similar. US of the abdomen showed normal size CBD 7mm and no intrahepatic dilation. No concerns for acute cholecystitis. The liver appeared fatty. Non-CT contrast without contrast was initially performed that confirmed significant inflammatory changes around the pancreas and consistent with acute pancreatitis. A subsequent IV contrast CT was ordered to rule out necrosis and was negative. Again, there was no gross evidence of biliary obstruction. Patient's admission creatinine was 1.6, (baseline 1.1). In the ED he was administered Protonix 80mg IV bolus, bolused 2L of IV fluids and placed on a rate of 125cc/hour. Overnight the patient's respiratory status changed. He became confused and was working to breathe. ABG noted a pH of 7.2 CO2 58 and O2 68. He complaints of abdominal pain but could not be given analgesics due to concerns for worsening his respiratory drive.  Fluids were stopped and he was given a dose of IV Lasix. BIPAP initiated.    Patient seen this morning with wife and mother in law at bedside. They deny he has a history of any etoh use. No previous episodes of pancreatitis. No new medications. Discussed possible causes and waiting on repeat labs this morning. Zosyn initiated. Triglycerides 161. Wearing BIPAP and complaining of dry mouth. Patient is trying to get out of bed. Wife reports he has a bad back and is uncomfortable. Communicated with ICU and transfer initiated given his worsening respiratory status. Repeat labs show worsening liver enzymes with , , AP 98 and TB 12.5 with DB 8.9.  Creatinine has worsened to 3.2 and potassium is 6.7. Family updated including brother and daughter who are now at the bedside. Contacted Dr. Ramirez, advance GI who agrees patient warrants EUS/ERCP today. With leukocytosis there is concerns for cholangitis. ICU service made aware of our recommendations to transfer to [facility] main campus.    Past Medical History:   Diagnosis Date    BPH (benign prostatic hyperplasia)     CKD (chronic kidney disease), stage III     Colon polyps 2015    Diarrhea in adult patient 02/10/2021    Generalized osteoarthrosis, involving multiple sites     History of gout     Hyperlipidemia     Hypertension     Hypogonadism male     IFG (impaired fasting glucose)     Lumbar disc disease with radiculopathy     Morbid obesity with BMI of 40.0-44.9, adult        Past Surgical History:   Procedure Laterality Date    COLONOSCOPY N/A 6/22/2020    Procedure: COLONOSCOPY;  Surgeon: Montserrat Conroy MD;  Location: [facility];  Service: Endoscopy;  Laterality: N/A;    COLONOSCOPY N/A 2/10/2021    Procedure: COLONOSCOPY;  Surgeon: Jones Lemus III, MD;  Location: [facility];  Service: Endoscopy;  Laterality: N/A;    ESOPHAGOGASTRODUODENOSCOPY N/A 2/10/2021    Procedure: EGD (ESOPHAGOGASTRODUODENOSCOPY);  Surgeon: Jones Lemus III, MD;  Location:  [facility] ENDO;  Service: Endoscopy;  Laterality: N/A;    EYE SURGERY      JOINT REPLACEMENT      KNEE SCOPE      open globe      right eye     ROTATOR CUFF REPAIR      SHOULDER SURGERY Right        Review of patient's allergies indicates:   Allergen Reactions    No known drug allergies      Family History       Problem Relation (Age of Onset)    Cancer Cousin    Cataracts Father, Mother    Heart attack Father    Heart disease Brother    Hypertension Father, Mother          Tobacco Use    Smoking status: Former     Current packs/day: 0.00     Average packs/day: 1 pack/day for 30.0 years (30.0 ttl pk-yrs)     Types: Cigarettes     Start date: 1970     Quit date: 2000     Years since quittin.1    Smokeless tobacco: Current     Types: Snuff   Substance and Sexual Activity    Alcohol use: No    Drug use: No    Sexual activity: Not Currently     Review of Systems   Respiratory:  Positive for shortness of breath.    Gastrointestinal:  Positive for abdominal distention, abdominal pain, diarrhea, nausea and vomiting.   All other systems reviewed and are negative.    Objective:     Vital Signs (Most Recent):  Temp: 97.5 °F (36.4 °C) (24 0340)  Pulse: 66 (24 0810)  Resp: 20 (24 0810)  BP: (!) 117/57 (24 0810)  SpO2: (!) 91 % (24 0810) Vital Signs (24h Range):  Temp:  [97.5 °F (36.4 °C)-98.7 °F (37.1 °C)] 97.5 °F (36.4 °C)  Pulse:  [57-66] 66  Resp:  [18-23] 20  SpO2:  [83 %-95 %] 91 %  BP: ()/() 117/57     Weight: 131.5 kg (290 lb) (24 1624)  Body mass index is 42.83 kg/m².      Intake/Output Summary (Last 24 hours) at 2024 0950  Last data filed at 2024 1727  Gross per 24 hour   Intake 100 ml   Output --   Net 100 ml       Lines/Drains/Airways       Peripheral Intravenous Line  Duration                  Peripheral IV - Single Lumen 24 0041 20 G Anterior;Left Forearm <1 day                     Physical Exam  Constitutional:       Appearance: He is  "morbidly obese. He is ill-appearing and toxic-appearing.      Interventions: Face mask in place.   Cardiovascular:      Rate and Rhythm: Regular rhythm. Bradycardia present.      Heart sounds: Heart sounds are distant.   Pulmonary:      Effort: Pulmonary effort is normal.      Breath sounds: Decreased air movement present. Decreased breath sounds present.      Comments: Wearing BIPAP  Abdominal:      General: Abdomen is protuberant. Bowel sounds are decreased. There is distension.      Tenderness: There is generalized abdominal tenderness.   Musculoskeletal:      Right lower leg: No edema.      Left lower leg: No edema.   Neurological:      General: No focal deficit present.      Mental Status: He is lethargic and disoriented.   Psychiatric:         Mood and Affect: Mood normal.         Speech: Speech normal.         Behavior: Behavior is cooperative.         Thought Content: Thought content normal.         Cognition and Memory: Cognition normal.         Judgment: Judgment is impulsive.          Significant Labs:  CBC:   Recent Labs   Lab 04/05/24  1454 04/05/24  1844 04/06/24  0644   WBC 14.43*  --  14.13*   HGB 15.8 15.8 15.7   HCT 48.0 47.4 48.8     --  217     CMP:   Recent Labs   Lab 04/06/24  0644   *   CALCIUM 8.0*   ALBUMIN 3.5   PROT 6.6      K 6.7*   CO2 17*      BUN 38*   CREATININE 3.2*   ALKPHOS 98   *   *   BILITOT 12.5*     Coagulation: No results for input(s): "PT", "INR", "APTT" in the last 48 hours.  Lipase:   Recent Labs   Lab 04/05/24  1454   LIPASE >1000*       Significant Imaging:  Imaging results within the past 24 hours have been reviewed.  Assessment/Plan:     Pulmonary  Acute hypoxemic respiratory failure  Patient with Hypercapnic Respiratory failure which is Acute.  He is not on home oxygen. Supplemental oxygen was provided and noted- Oxygen Concentration (%):  [40] 40    .   Signs/symptoms of respiratory failure include- increased work of breathing " and lethargy. Contributing diagnoses includes - Obesity Hypoventilation and volume overload  Labs and images were reviewed. Patient Has recent ABG, which has been reviewed. Will treat underlying causes and adjust management of respiratory failure as follows- BIPAP    Transferred to ICU    Renal/  STEFANIE (acute kidney injury)  Baseline creatinine 1.1  Hx CKD stage III  Did receive IV contrast with CT on admission  Crea 3.2 today (4/6/24)    GI  * Acute biliary pancreatitis without infection or necrosis  Likely biliary etiology  US and CT showed no evidence of CBD dilation or intrahepatic dilation however TB climbed from 3.6 to 12.5  No obvious stones seen in gallbladder however microlithiasis can cause pancreatitis  No necrosis on imaging  Bolused IV fluids in ED and was on 125cc/hr until 6am today (4/6/24)  Zosyn  NPO    Elevated LFTs  2/2 #1  Trend daily    Hematemesis  Self limiting  2/2 recurrent N/V with acute onset of pancreatitis symptoms  ASA until admission  Protonix 80mg IV bolus on admission  No further episodes  H&H stable        Recommendations:  Transfer to [facility] for EUS/ERCP  Continue antibiotics  Agree with Protonix        Discussed with multiple services including Hospital medicine, ICU and advance GI. Patient is being transferred to ICU because of worsening pulmonary status. He will eventually need transfer to [facility] [place] for EUS/ERCP today due to concerns for cholangitis. Continue IV antibiotics. Isolated episode of hematemesis with onset of symptoms yesterday. None since admission. This appears self limiting. Hold ASA and continue IV Protonix This may be assessed at the time of his EUS/ERCP. I will follow-up with patient. Please contact us if you have any additional questions.    Marley Parks MD  Gastroenterology  [facility] ([facility])

## 2024-04-06 NOTE — ASSESSMENT & PLAN NOTE
Advance Care Planning  Code Status  Advance Care Planning     Date: 04/06/2024    Code Status  In light of the patients advanced and life limiting illness,I engaged the the  pt's spouse, Annel Collado  in a voluntary conversation about the patient's preferences for care  at the very end of life. The pt's spouse stated patient will be FULL CODE. Along those lines, the patient does wish to have CPR or other invasive treatments performed when his heart and/or breathing stops. I communicated to the  pt's spouse, Annel Collado  that a FULL CODE STATUS will remain on the patient's chart. I spent a total of 30 minutes engaging the patient in this advance care planning discussion.

## 2024-04-06 NOTE — PROCEDURES
"Enoc Collado is a 73 y.o. male patient.    Temp: 100 °F (37.8 °C) (24 1606)  Pulse: 73 (24 1606)  Resp: 20 (24 160)  BP: 104/71 (24 1606)  SpO2: (!) 93 % (24 1606)  Weight: 131.5 kg (289 lb 14.5 oz) (24 1615)       Intubation    Date/Time: 2024 5:59 PM  Location procedure was performed: [facility] CRITICAL CARE MEDICINE    Performed by: Junie Schofield MD  Authorized by: Junie Schofield MD  Assisting provider: Aaron Salazar MD  Pre-operative diagnosis: hypoxemic respiratory failure  Post-operative diagnosis: hypoxemic respiratory failure  Consent Done: Yes  Consent: Verbal consent obtained.  Risks and benefits: risks, benefits and alternatives were discussed  Consent given by: spouse  Patient identity confirmed: , MRN and name  Time out: Immediately prior to procedure a "time out" was called to verify the correct patient, procedure, equipment, support staff and site/side marked as required.  Indications: hypoxemia  Intubation method: video-assisted  Patient status: paralyzed (RSI)  Preoxygenation: BiPAP.  Sedatives: etomidate  Paralytic: rocuronium  Laryngoscope size: S4.  Tube size: 8.0 mm  Tube type: cuffed  Number of attempts: 1  Cords visualized: yes  Post-procedure assessment: CO2 detector  Breath sounds: clear  ETT to lip: 23 cm  Tube secured with: ETT jacobo  Chest x-ray findings: endotracheal tube in appropriate position  Patient tolerance: Patient tolerated the procedure well with no immediate complications  Complications: No          2024    "

## 2024-04-06 NOTE — PLAN OF CARE
Problem: Adult Inpatient Plan of Care  Goal: Plan of Care Review  Outcome: Ongoing, Progressing  Goal: Patient-Specific Goal (Individualized)  Outcome: Ongoing, Progressing  Goal: Absence of Hospital-Acquired Illness or Injury  Outcome: Ongoing, Progressing  Goal: Optimal Comfort and Wellbeing  Outcome: Ongoing, Progressing  Goal: Readiness for Transition of Care  Outcome: Ongoing, Progressing     Problem: Bariatric Environmental Safety  Goal: Safety Maintained with Care  Outcome: Ongoing, Progressing     Problem: Adjustment to Illness (Gastrointestinal Bleeding)  Goal: Optimal Coping with Acute Illness  Outcome: Ongoing, Progressing     Problem: Bleeding (Gastrointestinal Bleeding)  Goal: Hemostasis  Outcome: Ongoing, Progressing     Problem: Skin Injury Risk Increased  Goal: Skin Health and Integrity  Outcome: Ongoing, Progressing

## 2024-04-06 NOTE — ASSESSMENT & PLAN NOTE
- placed on NIPPV AM of 4/6  - minimal settings  - pt with rate of 20 and volumes of 500  - monitor for decompensation and need for intubation

## 2024-04-06 NOTE — ASSESSMENT & PLAN NOTE
- worsening w/hyperkalemia, shift K  - serial labs  - consider renal consult when gets to main campus  - IVF  - monitor UOP, lytes, and RFP

## 2024-04-06 NOTE — H&P
[facility] - Cardiac Medical ICU  Critical Care Medicine  History & Physical    Patient Name: Enoc Collado  MRN: [id number]  Admission Date: 4/6/2024  Hospital Length of Stay: 0 days  Code Status: Full Code  Attending Physician: Aaron Salazar MD   Primary Care Provider: Frantz Mason MD   Principal Problem: Acute biliary pancreatitis without infection or necrosis    Subjective:     HPI:  Mr. Collado is a 73yoM with HFpEF, HTN, HLD, CKD, gout, morbid obesity who initially presented to [facility] with acute onset abdominal pain for 1 day on 4/5 associated with n/v/d.  In ED at OSH, elevated LFTs to 140-180s with T bili 3.6/ direct bilirubin of 2.0.  Lipase >1000.  Abdominal US with CBD of 7mm and no concerns of acute cholecystitis.  CT consistent with acute pancreatitis without gross evidence of biliary obstruction.  Patient does not have previous episodes of pancreatitis or no new medications recently started.  .  Patient started on IVF.  Noted to have worsening respiratory status and confused at OSH.  ABG with pH of 7.2, CO2 58, O2 68.  Fluids were stopped and IV Lasix was given with patient placed on Bipap.  Repeat blood work revealing of worsening LFTs and total bilirubin to 12.5.  Patient transferred to [facility] MICU for GI services with EUS/ERCP.    Hospital/ICU Course:  No notes on file     Past Medical History:   Diagnosis Date    BPH (benign prostatic hyperplasia)     CKD (chronic kidney disease), stage III     Colon polyps 2015    Diarrhea in adult patient 02/10/2021    Generalized osteoarthrosis, involving multiple sites     History of gout     Hyperlipidemia     Hypertension     Hypogonadism male     IFG (impaired fasting glucose)     Lumbar disc disease with radiculopathy     Morbid obesity with BMI of 40.0-44.9, adult        Past Surgical History:   Procedure Laterality Date    COLONOSCOPY N/A 6/22/2020    Procedure: COLONOSCOPY;  Surgeon: Montserrat Conroy MD;  Location: [facility];   Service: Endoscopy;  Laterality: N/A;    COLONOSCOPY N/A 2/10/2021    Procedure: COLONOSCOPY;  Surgeon: Jones Lemus III, MD;  Location: [facility];  Service: Endoscopy;  Laterality: N/A;    ESOPHAGOGASTRODUODENOSCOPY N/A 2/10/2021    Procedure: EGD (ESOPHAGOGASTRODUODENOSCOPY);  Surgeon: Jones Lemus III, MD;  Location: [facility] ENDO;  Service: Endoscopy;  Laterality: N/A;    EYE SURGERY      JOINT REPLACEMENT      KNEE SCOPE      open globe      right eye 1980    ROTATOR CUFF REPAIR      SHOULDER SURGERY Right        Review of patient's allergies indicates:   Allergen Reactions    No known drug allergies        Family History       Problem Relation (Age of Onset)    Cancer Cousin    Cataracts Father, Mother    Heart attack Father    Heart disease Brother    Hypertension Father, Mother          Tobacco Use    Smoking status: Former     Current packs/day: 0.00     Average packs/day: 1 pack/day for 30.0 years (30.0 ttl pk-yrs)     Types: Cigarettes     Start date: 1970     Quit date: 2000     Years since quittin.1    Smokeless tobacco: Current     Types: Snuff   Substance and Sexual Activity    Alcohol use: No    Drug use: No    Sexual activity: Not Currently      Review of Systems   Unable to perform ROS: Acuity of condition     Objective:     Vital Signs (Most Recent):  Temp: 100 °F (37.8 °C) (24 1606)  Pulse: 73 (24 1606)  Resp: 20 (24 1606)  BP: 104/71 (24 1606)  SpO2: (!) 93 % (24 1606) Vital Signs (24h Range):  Temp:  [97.5 °F (36.4 °C)-100 °F (37.8 °C)] 100 °F (37.8 °C)  Pulse:  [57-74] 73  Resp:  [10-39] 20  SpO2:  [83 %-99 %] 93 %  BP: ()/() 104/71   Weight: 131.5 kg (289 lb 14.5 oz)  Body mass index is 42.81 kg/m².      Intake/Output Summary (Last 24 hours) at 2024 1633  Last data filed at 2024 1600  Gross per 24 hour   Intake 0 ml   Output --   Net 0 ml          Physical Exam  Vitals and nursing note reviewed.   Constitutional:        Appearance: He is obese.   HENT:      Head: Normocephalic and atraumatic.      Mouth/Throat:      Mouth: Mucous membranes are dry.      Pharynx: Oropharynx is clear.   Eyes:      Extraocular Movements: Extraocular movements intact.      Conjunctiva/sclera: Conjunctivae normal.   Cardiovascular:      Rate and Rhythm: Normal rate and regular rhythm.      Pulses: Normal pulses.   Pulmonary:      Effort: Respiratory distress present.      Breath sounds: No wheezing.   Chest:      Chest wall: No tenderness.   Abdominal:      Palpations: Abdomen is soft.      Tenderness: There is abdominal tenderness (LUQ). There is no guarding.   Musculoskeletal:         General: No tenderness. Normal range of motion.      Cervical back: Normal range of motion and neck supple.      Right lower leg: No edema.      Left lower leg: No edema.   Skin:     General: Skin is warm and dry.   Neurological:      Mental Status: He is lethargic.   Psychiatric:         Behavior: Behavior is cooperative.            Vents:  Oxygen Concentration (%): 100 (04/06/24 1606)  Lines/Drains/Airways       Peripherally Inserted Central Catheter Line  Duration             PICC Double Lumen 04/06/24 1136 right basilic <1 day              Drain  Duration                  Urethral Catheter 04/06/24 1000 <1 day              Peripheral Intravenous Line  Duration                  Peripheral IV - Single Lumen 04/06/24 0041 20 G Anterior;Right Forearm <1 day         Peripheral IV - Single Lumen 04/06/24 1021 20 G Anterior;Left Shoulder <1 day                  Significant Labs:    CBC/Anemia Profile:  Recent Labs   Lab 04/05/24  1454 04/05/24  1844 04/06/24  0644   WBC 14.43*  --  14.13*   HGB 15.8 15.8 15.7   HCT 48.0 47.4 48.8     --  217   MCV 93  --  97   RDW 13.8  --  14.8*        Chemistries:  Recent Labs   Lab 04/06/24  0644 04/06/24  0949 04/06/24  1338    138 135*   K 6.7* 6.2* 5.0    104 103   CO2 17* 18* 18*   BUN 38* 42* 47*   CREATININE 3.2*  3.7* 4.1*   CALCIUM 8.0* 7.8* 7.6*   ALBUMIN 3.5 3.2* 3.1*   PROT 6.6 6.2 5.6*   BILITOT 12.5* 12.9* 14.8*   ALKPHOS 98 91 93   * 236* 232*   * 306* 318*       All pertinent labs within the past 24 hours have been reviewed.    Significant Imaging: I have reviewed all pertinent imaging results/findings within the past 24 hours.  Assessment/Plan:     Pulmonary  Acute hypoxemic respiratory failure  Patient with Hypercapnic and Hypoxic Respiratory failure which is Acute.  he is not on home oxygen. Supplemental oxygen was provided and noted- Oxygen Concentration (%):  [] (P) 100    .   Signs/symptoms of respiratory failure include- tachypnea, increased work of breathing, respiratory distress, and lethargy. Contributing diagnoses includes - ARDS, Obesity Hypoventilation, and Pneumonia Labs and images were reviewed. Patient Has recent ABG, which has been reviewed. Will treat underlying causes and adjust management of respiratory failure as follows-     Transferred to [facility] MICU on Bipap with worsening O2 requirements  Plan for urgent intubation on arrival  -- a-line in place   -- wean O2 as tolerated  -- anticipate need for HD following intubation and ERCP    Cardiac/Vascular  Shock  See Acute biliary pancreatitis plan    Hyperlipidemia  -- most recent lipid panel on 10/2023 with   -- on home pravastatin 20mg qd  -- holding on admission given elevated LFTs    Essential hypertension  Chronic, uncontrolled. Latest blood pressure and vitals reviewed-     Temp:  [97.5 °F (36.4 °C)-99.5 °F (37.5 °C)]   Pulse:  [57-74]   Resp:  [10-39]   BP: ()/()   SpO2:  [83 %-99 %] .   Home meds for hypertension were reviewed and noted below.   Hypertension Medications               amLODIPine (NORVASC) 10 MG tablet Take 1 tablet (10 mg total) by mouth once daily.    carvediloL (COREG) 25 MG tablet Take 1 tablet (25 mg total) by mouth 2 (two) times daily with meals.    furosemide (LASIX) 40 MG tablet Take  "1 tablet (40 mg total) by mouth once daily.    lisinopriL (PRINIVIL,ZESTRIL) 40 MG tablet Take 1 tablet (40 mg total) by mouth once daily.            While in the hospital, will manage blood pressure as follows; Adjust home antihypertensive regimen as follows- holding all home antihypertensive meds given shock. Can resume as clinically indicated when shock resolves.     Will utilize p.r.n. blood pressure medication only if patient's blood pressure greater than 180/110 and he develops symptoms such as worsening chest pain or shortness of breath.      Renal/  Acute renal failure superimposed on chronic kidney disease  Patient with acute kidney injury/acute renal failure likely due to acute tubular necrosis caused by severe shock and acute infectious process.  STEFANIE is currently worsening. Baseline creatinine  1.0  - Labs reviewed- Renal function/electrolytes with CrCl cannot be calculated (Unknown ideal weight.). according to latest data. Monitor urine output and serial BMP and adjust therapy as needed. Avoid nephrotoxins and renally dose meds for GFR listed above.    Worsening renal failure during admission likely ischemic ATN s/o shock and sepsis. RF c/b hyperkalemia and acidosis. Nephrology following at OSH. Anticipate patient will likely need RRT on current admission.    -- nephrology consulted  -- will place trialysis line  -- maintain MAP > 65  -- renally dose meds and avoid nephrotoxic meds       CKD (chronic kidney disease), stage III  -- see "acute renal failure"    Endocrine  Morbid obesity with BMI of 40.0-44.9, adult  There is no height or weight on file to calculate BMI. Morbid obesity complicates all aspects of disease management from diagnostic modalities to treatment. Weight loss encouraged and health benefits explained to patient.       GI  * Acute biliary pancreatitis without infection or necrosis  Concern for cholangitis with worsening pressor requirements and septic shock.  Transferred to [facility] MICU " with worsening O2 requirements on Bipap.  Lipase >1000 with worsening LFTs and bilirubin.  . Likely biliary etiology.  Imaging without clear evidence of CBD dilation or obvious stones/necrosis.  Acute pancreatitis seen on imaging. AES consulted on admission, plan to take patient for emergent ERCP (4/6/24)    - Zosyn  - AES following  - F/u Blood cultures  - NPO  - IVF    Orthopedic  History of gout  -- hold home allopurinol s/o acute renal failure    Palliative Care  ACP (advance care planning)    Advance Care Planning Code Status  Advance Care Planning    Date: 04/06/2024    Code Status  In light of the patients advanced and life limiting illness,I engaged the the  pt's spouse, Annel Collado  in a voluntary conversation about the patient's preferences for care  at the very end of life. The pt's spouse stated patient will be FULL CODE. Along those lines, the patient does wish to have CPR or other invasive treatments performed when his heart and/or breathing stops. I communicated to the  pt's spouse, Annel Collado  that a FULL CODE STATUS will remain on the patient's chart. I spent a total of 30 minutes engaging the patient in this advance care planning discussion.                  Critical Care Daily Checklist:    A: Awake: RASS Goal/Actual Goal:    Actual:     B: Spontaneous Breathing Trial Performed?     C: SAT & SBT Coordinated?  na                      D: Delirium: CAM-ICU     E: Early Mobility Performed? No   F: Feeding Goal:    Status:     Current Diet Order   Procedures    Diet NPO      AS: Analgesia/Sedation na   T: Thromboembolic Prophylaxis Hold due to emergent EUS/ERCP   H: HOB > 300 Yes   U: Stress Ulcer Prophylaxis (if needed) ppi   G: Glucose Control Na, will consider after procedure   B: Bowel Function     I: Indwelling Catheter (Lines & Oneill) Necessity Picc right basilic, 2 piv, urethral catheter, right radial line   D: De-escalation of Antimicrobials/Pharmacotherapies zosyn    Plan for the day/ETD  Line placement and emergent ERCP/EUS    Code Status:  Family/Goals of Care: Full Code         Critical secondary to Patient has a condition that poses threat to life and bodily function: Acute Renal Failure and acute interstitial pancreatitis, concern for cholangitis    Critical care was time spent personally by me on the following activities: development of treatment plan with patient or surrogate and bedside caregivers, discussions with consultants, evaluation of patient's response to treatment, examination of patient, ordering and performing treatments and interventions, ordering and review of laboratory studies, ordering and review of radiographic studies, pulse oximetry, re-evaluation of patient's condition. This critical care time did not overlap with that of any other provider or involve time for any procedures.     Eugenia James MD  Critical Care Medicine  [facility] - Cardiac Medical ICU

## 2024-04-06 NOTE — HPI
Thank you for referring the pt to us. H/o and chart were reviewed. Pt was seen and examined in ICU. Pt is a 74 y/o male with h/o of CKD stage 3, IGT, HTN, morbid obesity, hypercholesterolemia, and gout, who presented with abdominal pain and was admitted for acute pancreatitis. No direct h/o can be obtained. Per chart, no h/o of alcohol dependence, no new meds, TG not very high, Lipase >1000, s Cr has worsened, pt has hyperkalemia. This am, pt became more SOB, was transferred to ICU and was placed on BiPAP.

## 2024-04-06 NOTE — PROCEDURES
Enoc Collado is a 73 y.o. male patient.    Temp: 99.9 °F (37.7 °C) (04/06/24 1758)  Pulse: 88 (04/06/24 1758)  Resp: (!) 26 (04/06/24 1758)  BP: 104/71 (04/06/24 1606)  SpO2: (!) 91 % (04/06/24 1758)  Weight: 131.5 kg (289 lb 14.5 oz) (04/06/24 1615)       Central Line    Date/Time: 4/6/2024 6:52 PM    Performed by: Yusef Vazquez MD  Authorized by: Yusef Vazquez MD    Location procedure was performed:  [facility] CRITICAL CARE MEDICINE  Consent Done ?:  Yes  Time out complete?: Verified correct patient, procedure, equipment, staff, and site/side    Indications:  Hemodialysis and vascular access  Preparation:  Skin prepped with ChloraPrep  Location:  Right internal jugular  Catheter size:  13 Fr  Ultrasound guidance: Yes    Vessel Caliber:  Large   patent  Comprressibility:  Normal  Needle advanced into vessel with real time ultrasound guidance.    Guidewire confirmed in vessel.    Steril sheath on probe.    Sterile gel used.  Manometry: Yes    Number of attempts:  1  Securement:  Line sutured, chlorhexidine patch, sterile dressing applied and blood return through all ports  Complications: No    Estimated blood loss (mL):  10  XRay:  Placement verified by x-ray, no pneumothorax on x-ray, tip termination and successful placement  Adverse Events:  NoneTermination Site: superior vena cava      4/6/2024

## 2024-04-06 NOTE — PROCEDURES
Enoc Collado is a 73 y.o. male patient.    Temp: 100 °F (37.8 °C) (04/06/24 1606)  Pulse: 73 (04/06/24 1606)  Resp: 20 (04/06/24 1606)  BP: 104/71 (04/06/24 1606)  SpO2: (!) 93 % (04/06/24 1606)  Weight: 131.5 kg (289 lb 14.5 oz) (04/06/24 1615)       Arterial Line    Date/Time: 4/6/2024 5:35 PM  Location procedure was performed: [facility] CRITICAL CARE MEDICINE    Performed by: Yusef Vazquez MD  Authorized by: Yusef Vazquez MD  Assisting provider: Aaron Salazar MD  Preparation: Patient was prepped and draped in the usual sterile fashion.  Indications: multiple ABGs, respiratory failure and hemodynamic monitoring  Location: right radial  Needle gauge: 20  Number of attempts: 3  Complications: No  Estimated blood loss (mL): 10  Post-procedure: line sutured and dressing applied  Post-procedure CMS: unchanged  Patient tolerance: Patient tolerated the procedure well with no immediate complications          4/6/2024

## 2024-04-06 NOTE — HPI
73 y.o male with morbid obesity, hyperlipidemia, gout, CKD stage III, lumbar disc radiculopathy and impaired fasting glucose presented to the ED yesterday with acute onset abdominal pain that began yesterday morning. There was associated nausea, emesis and diarrhea. Wife reported episode of hematemesis at home. Labs on presentation showed elevated liver enzymes with , , AP 98 and Tbili 3.6 with DB 2.0. Lipase >1000. H&H was 15.8/48 and repeat was similar. US of the abdomen showed normal size CBD 7mm and no intrahepatic dilation. No concerns for acute cholecystitis. The liver appeared fatty. Non-CT contrast without contrast was initially performed that confirmed significant inflammatory changes around the pancreas and consistent with acute pancreatitis. A subsequent IV contrast CT was ordered to rule out necrosis and was negative. Again, there was no gross evidence of biliary obstruction. Patient's admission creatinine was 1.6, (baseline 1.1). In the ED he was administered Protonix 80mg IV bolus, bolused 2L of IV fluids and placed on a rate of 125cc/hour. Overnight the patient's respiratory status changed. He became confused and was working to breathe. ABG noted a pH of 7.2 CO2 58 and O2 68. He complaints of abdominal pain but could not be given analgesics due to concerns for worsening his respiratory drive. Fluids were stopped and he was given a dose of IV Lasix. BIPAP initiated.    Patient seen this morning with wife and mother in law at bedside. They deny he has a history of any etoh use. No previous episodes of pancreatitis. No new medications. Discussed possible causes and waiting on repeat labs this morning. Zosyn initiated. Triglycerides 161. Wearing BIPAP and complaining of dry mouth. Patient is trying to get out of bed. Wife reports he has a bad back and is uncomfortable. Communicated with ICU and transfer initiated given his worsening respiratory status. Repeat labs show worsening liver  enzymes with , , AP 98 and TB 12.5 with DB 8.9.  Creatinine has worsened to 3.2 and potassium is 6.7. Family updated including brother and daughter who are now at the bedside. Contacted Dr. Ramirez, advance GI who agrees patient warrants EUS/ERCP today. With leukocytosis there is concerns for cholangitis. ICU service made aware of our recommendations to transfer to [facility] main campus.

## 2024-04-06 NOTE — CONSULTS
[facility] - Intensive Care ([facility])  Critical Care Medicine  Consult Note    Patient Name: Enoc Collado  MRN: [id number]  Admission Date: 4/5/2024  Hospital Length of Stay: 1 days  Code Status: Full Code  Attending Physician: Juan David Keith MD   Primary Care Provider: Frantz Mason MD   Principal Problem: Acute biliary pancreatitis without infection or necrosis    Inpatient consult to Critical Care Medicine  Consult performed by: Cortez Melara NP  Consult ordered by: Juan David Keith MD        Subjective:     HPI:  73-year-old male with a known past medical history of hypertension, hyperlipidemia, gout, CKD, morbid obesity with a BMI of 42.8 that presented to the ER 4/5 for evaluation of abdominal pain with onset earlier that same morning.  Patient at the time of admission endorse vomiting, abdominal distention, and diarrhea.  Patient was noted to [place] [address] and creatinine of 1.6, elevated liver enzymes, lipase greater than 1000, triglyceride of 161, bili of 3.6, and a lactic acidosis of 2.9.  CT of the abdomen and pelvis revealed acute pancreatitis.  Abdominal ultrasound with hepatic steatosis and mild hepatomegaly with a normal gallbladder.  Patient was treated with IV fluids, antiemetics, pain meds, ppi, and antibiotics.  GI was consulted.  Patient was admitted to the hospital under the care of Hospital Medicine.  Early in the morning 4/6 patient with increased work of breathing and there was concern for volume overload.  Patient was placed on NIPPV and given a dose of diuretics and IV fluids were held.  Patient continued to need NIPPV and transfer orders placed to the ICU with consultation to critical care team.    Case was discussed with the GI team, Dr. Parks, who has spoken with the advanced GI [place] and they would like the patient transferred to [facility] for higher level of care and advanced GI.  Repeat labs with worsening renal failure, hyperkalemia, worsening liver  enzymes as well as elevated T bili.  Orders to shift potassium has been placed.  IV fluids restarted.  Serial labs ordered.  [facility] has been consulted for transfer process.    Hospital/ICU Course:  4/6: pt transferred to ICU for NIPPV and increased WOB, repeat labs worse, spoke with GI and transfer process started to get pt to main campus for advanced GI services     Past Medical History:   Diagnosis Date    BPH (benign prostatic hyperplasia)     CKD (chronic kidney disease), stage III     Colon polyps 2015    Diarrhea in adult patient 02/10/2021    Generalized osteoarthrosis, involving multiple sites     History of gout     Hyperlipidemia     Hypertension     Hypogonadism male     IFG (impaired fasting glucose)     Lumbar disc disease with radiculopathy     Morbid obesity with BMI of 40.0-44.9, adult        Past Surgical History:   Procedure Laterality Date    COLONOSCOPY N/A 6/22/2020    Procedure: COLONOSCOPY;  Surgeon: Montserrat Conroy MD;  Location: [facility];  Service: Endoscopy;  Laterality: N/A;    COLONOSCOPY N/A 2/10/2021    Procedure: COLONOSCOPY;  Surgeon: Jones Lemus III, MD;  Location: [facility];  Service: Endoscopy;  Laterality: N/A;    ESOPHAGOGASTRODUODENOSCOPY N/A 2/10/2021    Procedure: EGD (ESOPHAGOGASTRODUODENOSCOPY);  Surgeon: Jones Lemus III, MD;  Location: [facility];  Service: Endoscopy;  Laterality: N/A;    EYE SURGERY      JOINT REPLACEMENT      KNEE SCOPE      open globe      right eye 1980    ROTATOR CUFF REPAIR      SHOULDER SURGERY Right        Review of patient's allergies indicates:   Allergen Reactions    No known drug allergies        Family History       Problem Relation (Age of Onset)    Cancer Cousin    Cataracts Father, Mother    Heart attack Father    Heart disease Brother    Hypertension Father, Mother          Tobacco Use    Smoking status: Former     Current packs/day: 0.00     Average packs/day: 1 pack/day for 30.0 years (30.0 ttl pk-yrs)     Types: Cigarettes      Start date: 1970     Quit date: 2000     Years since quittin.1    Smokeless tobacco: Current     Types: Snuff   Substance and Sexual Activity    Alcohol use: No    Drug use: No    Sexual activity: Not Currently         Review of Systems  Objective:     Vital Signs (Most Recent):  Temp: 97.5 °F (36.4 °C) (24 0340)  Pulse: 66 (24 0810)  Resp: 20 (24 0810)  BP: (!) 117/57 (24 0810)  SpO2: (!) 91 % (24 0810) Vital Signs (24h Range):  Temp:  [97.5 °F (36.4 °C)-98.7 °F (37.1 °C)] 97.5 °F (36.4 °C)  Pulse:  [57-66] 66  Resp:  [18-23] 20  SpO2:  [83 %-95 %] 91 %  BP: ()/() 117/57     Weight: 131.5 kg (290 lb)  Body mass index is 42.83 kg/m².      Intake/Output Summary (Last 24 hours) at 2024 0932  Last data filed at 2024 1727  Gross per 24 hour   Intake 100 ml   Output --   Net 100 ml        Physical Exam     Vents:  Oxygen Concentration (%): 40 (24 0610)    Lines/Drains/Airways       Peripheral Intravenous Line  Duration                  Peripheral IV - Single Lumen 24 0041 20 G Anterior;Left Forearm <1 day                    Significant Labs:    CBC/Anemia Profile:  Recent Labs   Lab 24  1454 24  1844 24  0644   WBC 14.43*  --  14.13*   HGB 15.8 15.8 15.7   HCT 48.0 47.4 48.8     --  217   MCV 93  --  97   RDW 13.8  --  14.8*        Chemistries:  Recent Labs   Lab 24  1454 24  0644    137   K 4.7 6.7*    106   CO2 21* 17*   BUN 22 38*   CREATININE 1.6* 3.2*   CALCIUM 9.1 8.0*   ALBUMIN 3.5 3.5   PROT 6.4 6.6   BILITOT 3.6* 12.5*   ALKPHOS 98 98   * 249*   * 278*       All pertinent labs within the past 24 hours have been reviewed.    Significant Imaging:   I have reviewed all pertinent imaging results/findings within the past 24 hours.    [facility]  Recent Labs   Lab 24  0556   PH 7.203*   PO2 68*   PCO2 58.4*   HCO3 23.0*   BE -5*     Assessment/Plan:     Pulmonary  Acute hypoxemic  respiratory failure  - placed on NIPPV AM of 4/6  - minimal settings  - pt with rate of 20 and volumes of 500  - monitor for decompensation and need for intubation     Cardiac/Vascular  Hyperlipidemia  - NPO for now, resume statin when clinically able       Essential hypertension  - NPO for now  - PRN meds available if needed for SBP >160    Renal/  Hyperkalemia  - shift K, serial labs, telemetry     STEFANIE (acute kidney injury)  - worsening w/hyperkalemia, shift K  - serial labs  - consider renal consult when gets to [facility]  - IVF  - monitor UOP, lytes, and RFP    Lactic acidosis  - repeat pending, see plan as outlined elsewhere    Endocrine  Morbid obesity with BMI of 40.0-44.9, adult  Body mass index is 42.83 kg/m². Morbid obesity complicates all aspects of disease management from diagnostic modalities to treatment. Weight loss education when appropriate.     GI  * Acute biliary pancreatitis without infection or necrosis  - lipase >1000  - Tbili 12.5  - worsening STEFANIE and hyperkalemia   - zosyn  - continue IVF and pain meds  - triglycerides 161  - transfer to [facility] in progress for advanced GI services     Elevated LFTs  - trend, see plan for acute pancreatitis     Hematemesis  - protonix  - GI following    Prophylaxis Measures:  GI ppx: Pantoprazole  VTE ppx: Heparin  Glucose control: Insulin subcutaneous    Code Status: Full Code    Critical Care Time: 41 minutes  Critical secondary to Patient has a condition that poses threat to life and bodily function: acute pancreatitis and resp failure on NIPPV      Critical care was time spent personally by me on the following activities: development of treatment plan with patient or surrogate and bedside caregivers, discussions with consultants, evaluation of patient's response to treatment, examination of patient, ordering and performing treatments and interventions, ordering and review of laboratory studies, ordering and review of radiographic studies, pulse  oximetry, re-evaluation of patient's condition. This critical care time did not overlap with that of any other provider or involve time for any procedures.    Thank you for your consult. I will follow-up with patient. Please contact us if you have any additional questions.     Cortez Melara NP  Critical Care Medicine  [facility] - Intensive Care ([facility])

## 2024-04-06 NOTE — PROVIDER TRANSFER
Outside Transfer Acceptance Note / Regional Referral Center    Referring facility: [facility]   Referring provider: SOFIA NDIAYE  Accepting facility: [facility]  Accepting provider: Barrett Woods MD  Admitting provider: Dr. Seth  Reason for transfer:  Higher LOC  Transfer diagnosis: Acute biliary pancreatitis  Transfer specialty requested: Gastroenterology, [facility]  Transfer specialty notified: Yes  Transfer level: NUMBER 1-5: 2  Bed type requested: ICU  Isolation status: No active isolations   Admission class or status: IP- Inpatient      Narrative     Enoc Collado is a 72yo M with a PMH of HFpEF, HTN, HLD, CKD, gout, and morbid obesity who presented to the [facility] ED on 4/5/24 with complaints of acute-onset abdominal pain. Labs were remarkable for WBCs 14.43, Cr 1.6, Tbili 3.6, direct bili 2.0, , , and lactate 2.9. CTAP at that time was diagnostic for acute pancreatitis.    Over the course of his hospitalization, his labs were noted to further worsen with K 6.7, Cr 3.2, BUN 38, tbili 12.5, direct bili 8.9, , and . In the early morning of 4/6/2024, pt was noted to become more altered, had increased work of breathing, and hypoxia noted (85% on 3L NC). Pt required initiation of continuous BIPAP, and was sent to their ICU.    [facility] contacted to facilitate transfer for higher level of care and specialty services. Dr. Ramirez with [facility] agreed to consult. [facility] midlevel instructed to recheck K, obtain stat CXR, and stat MRCP. Pt deemed appropriate for transfer to [facility] ICU for further care and management.      Objective     Vitals: Temp: 97.5 °F (36.4 °C) (04/06/24 0340)  Pulse: 66 (04/06/24 0810)  Resp: 20 (04/06/24 0810)  BP: (!) 117/57 (04/06/24 0810)  SpO2: (!) 91 % (04/06/24 0810)  Recent Labs: All pertinent labs within the past 24 hours have been reviewed.    Vent settings: Oxygen Concentration (%):  [40] 40       IV access:         Peripheral IV - Single Lumen 04/06/24 0041 20 G Anterior;Left Forearm (Active)   Site Assessment Clean;Dry;Intact;No redness;No swelling 04/06/24 0340   Extremity Assessment Distal to IV No redness;No abnormal discoloration;No swelling;No warmth 04/06/24 0340   Line Status Saline locked 04/06/24 0340   Dressing Status Clean;Dry;Intact 04/06/24 0340   Dressing Intervention Integrity maintained 04/06/24 0340       Allergies:   Review of patient's allergies indicates:   Allergen Reactions    No known drug allergies       NPO: Yes    Anticoagulation:   Anticoagulants       None             Instructions      Upon patient arrival to the ICU, please contact Critical Care Medicine on call.      Barrett Woods MD  Attending Physician  Medical Director - [facility] Observation Unit  Department of Hospital Medicine  4/6/2024

## 2024-04-06 NOTE — ASSESSMENT & PLAN NOTE
Patient with acute kidney injury/acute renal failure likely due to acute tubular necrosis caused by severe shock and acute infectious process.  STEFANIE is currently worsening. Baseline creatinine  1.0  - Labs reviewed- Renal function/electrolytes with CrCl cannot be calculated (Unknown ideal weight.). according to latest data. Monitor urine output and serial BMP and adjust therapy as needed. Avoid nephrotoxins and renally dose meds for GFR listed above.    Worsening renal failure during admission likely ischemic ATN s/o shock and sepsis. RF c/b hyperkalemia and acidosis. Nephrology following at OSH. Anticipate patient will likely need RRT on current admission.    -- nephrology consulted  -- will place trialysis line  -- maintain MAP > 65  -- renally dose meds and avoid nephrotoxic meds

## 2024-04-06 NOTE — HOSPITAL COURSE
4/6: pt transferred to ICU for NIPPV and increased WOB, repeat labs worse, spoke with GI and transfer process started to get pt to main campus for advanced GI services     Bed assignment received at [facility]. Flight team here to transport pt to main [facility]. Pt remains on BIPAP. Increasing requirements of levo, adding vaso in the event it's needed in route to main [facility].

## 2024-04-06 NOTE — ASSESSMENT & PLAN NOTE
- lipase >1000  - Tbili 12.5  - worsening STEFANIE and hyperkalemia   - zosyn  - continue IVF with bicarb and pain meds  - triglycerides 161  - transfer to main campus in progress for advanced GI services

## 2024-04-06 NOTE — PHARMACY MED REC
"Admission Medication History     The home medication history was taken by Alexus Mcrae.    You may go to "Admission" then "Reconcile Home Medications" tabs to review and/or act upon these items.     The home medication list has been updated by the Pharmacy department.   Please read ALL comments highlighted in yellow.   Please address this information as you see fit.    Feel free to contact us if you have any questions or require assistance.      The medications listed below were removed from the home medication list. Please reorder if appropriate:  Patient reports no longer taking the following medication(s):  Vitamin C 100mg  Norco  mg  Mobic 15 mg  Methocarbamol 750 mg  Multivitamin capsule  Zofran 4 mg  Tramadol 50 mg      Medications listed below were obtained from: Patient/family and Analytic software- Simone,wife at bedside had active medication list      LAST MED REC COMPLETED:         Alexus Mcrae  [id number]    Current Outpatient Medications on File Prior to Encounter   Medication Sig Dispense Refill Last Dose    allopurinoL (ZYLOPRIM) 300 MG tablet Take 1 tablet (300 mg total) by mouth once daily. 90 tablet 3 4/4/2024    amLODIPine (NORVASC) 10 MG tablet Take 1 tablet (10 mg total) by mouth once daily. 90 tablet 3 4/5/2024    aspirin 325 MG tablet Take 325 mg by mouth once daily.   4/4/2024    carvediloL (COREG) 25 MG tablet Take 1 tablet (25 mg total) by mouth 2 (two) times daily with meals. 180 tablet 3 4/5/2024    empagliflozin (JARDIANCE) 10 mg tablet Take 1 tablet (10 mg total) by mouth once daily. 90 tablet 3 4/5/2024    furosemide (LASIX) 40 MG tablet Take 1 tablet (40 mg total) by mouth once daily. 90 tablet 3 4/5/2024    gabapentin (NEURONTIN) 300 MG capsule TAKE 1 CAPSULE THREE TIMES DAILY 270 capsule 3 4/5/2024    lisinopriL (PRINIVIL,ZESTRIL) 40 MG tablet Take 1 tablet (40 mg total) by mouth once daily. 90 tablet 3 4/4/2024    methocarbamoL (ROBAXIN) 750 MG Tab Take 750 mg by " "mouth.   4/5/2024    pravastatin (PRAVACHOL) 20 MG tablet TAKE 1 TABLET EVERY DAY 90 tablet 3 4/4/2024    testosterone cypionate (DEPOTESTOTERONE CYPIONATE) 200 mg/mL injection Inject 1.5 mLs (300 mg total) into the muscle every 21 days. 10 mL 1 Past Month    timolol maleate 0.5% (TIMOPTIC-XE) 0.5 % SolG Place 1 drop into both eyes every morning.   4/4/2024    sildenafil (REVATIO) 20 mg Tab 2-4 tablets as needed one hour prior to intercourse 40 tablet 11 Unknown    syringe with needle, safety 3 mL 21 gauge x 1 1/2" Syrg 1 Syringe by Misc.(Non-Drug; Combo Route) route every 21 days. 10 Syringe 1                            .          "

## 2024-04-06 NOTE — PROCEDURES
Enoc Collado is a 73 y.o. male patient.    Temp: 97.9 °F (36.6 °C) (04/06/24 1030)  Pulse: 68 (04/06/24 1030)  Resp: (!) 39 (04/06/24 1030)  BP: (!) 115/54 (04/06/24 1030)  SpO2: (!) 87 % (04/06/24 1030)  Weight: 131.5 kg (290 lb) (04/05/24 1624)    PICC  Date/Time: 4/6/2024 11:20 AM  Performed by: Barrett Rock, RN  Consent Done: Yes  Time out: Immediately prior to procedure a time out was called to verify the correct patient, procedure, equipment, support staff and site/side marked as required  Indications: med administration and vascular access  Anesthesia: local infiltration  Local anesthetic: lidocaine 1% without epinephrine  Anesthetic Total (mL): 2  Preparation: skin prepped with chlorhexidine (without alcohol)  Skin prep agent dried: skin prep agent completely dried prior to procedure  Sterile barriers: all five maximum sterile barriers used - cap, mask, sterile gown, sterile gloves, and large sterile sheet  Hand hygiene: hand hygiene performed prior to central venous catheter insertion  Location details: right basilic  Catheter type: double lumen  Catheter size: 4 Fr  Catheter Length: 40cm    Ultrasound guidance: yes  Vessel Caliber: large and patent, compressibility normal  Needle advanced into vessel with real time Ultrasound guidance.  Guidewire confirmed in vessel.  Sterile sheath used.  Number of attempts: 1  Post-procedure: blood return through all ports, chlorhexidine patch and sterile dressing applied  Specimens: No  Implants: No  Assessment: placement verified by x-ray, free fluid flow, no pneumothorax on x-ray and successful placement          Barrett Rock RN  4/6/2024

## 2024-04-06 NOTE — ASSESSMENT & PLAN NOTE
Baseline creatinine 1.1  Hx CKD stage III  Did receive IV contrast with CT on admission  Crea 3.2 today (4/6/24)

## 2024-04-06 NOTE — HPI
Mr. Collado is a 73 year old male with chronic HTN, HLD, morbid obesity. Patient is intubated on multiple pressors at the time of my evaluation. All history was obtained from chart review and by family. Patient initially presented to [facility] with acute onset abdominal pain for 1 day on 4/5 associated with n/v/d. In ED at OSH, elevated LFTs to 140-180s with T bili 3.6/ direct bilirubin of 2.0. Lipase >1000. Abdominal US with CBD of 7mm and no concerns of acute cholecystitis. CT with contrast on 4/5 is consistent with acute pancreatitis without gross evidence of biliary obstruction. Patient does not have previous episodes of pancreatitis or no new medications recently started. . Patient started on IVF. Noted to have worsening respiratory status and confused at OSH. ABG with pH of 7.2, CO2 58, O2 68. Fluids were stopped and IV Lasix was given with patient placed on Bipap. Patient transferred to [facility] for GI services. with EUS/ERCP.     Nephrology has been consulted for STEFANIE, hyperkalemia, and oliguria following IV contrast, pancreatitis, and hypotension. As per the wife, she is not aware of any prior history of kidney disease, and review of his labs available to me show a eGFR above 60 prior to this hospitalization. At the outside facility, he was hyperkalemic at 6.7, and was shifted with improvement in his potassium to 5.0, creatinine on arrival was 1.6 (baseline 1.0), and trended up to 4.1 at the time of the consult. Total bilirubin continues to trend up, currently at 14.8, and LFTs worsening. Most recent ABG prior to intubation showed 7.25/47/93/21. He was intubated in the ICU, and is currently going for emergent ERCP. Wife denies the patient taking any NSAIDs, having decreased PO intake or decreased urinary output prior to presentation. Upon my initial encounter, the patient has produced only 150 mL of urine in the macias bag over the past 12 hours.

## 2024-04-06 NOTE — CONSULTS
[facility]  Nephrology  Consult Note    Patient Name: Enoc Collado  MRN: [id number]  Admission Date: 4/6/2024  Hospital Length of Stay: 0 days  Attending Provider: Aaron Salazar MD   Primary Care Physician: Frantz Mason MD  Principal Problem:Acute biliary pancreatitis without infection or necrosis    Inpatient consult to Nephrology  Consult performed by: Sincere Vera MD  Consult ordered by: Nigel Camacho MD  Reason for consult: STEFANIE        Subjective:     HPI: Mr. Collado is a 73 year old male with chronic HTN, HLD, morbid obesity. Patient is intubated on multiple pressors at the time of my evaluation. All history was obtained from chart review and by family. Patient initially presented to [facility] with acute onset abdominal pain for 1 day on 4/5 associated with n/v/d. In ED at OSH, elevated LFTs to 140-180s with T bili 3.6/ direct bilirubin of 2.0. Lipase >1000. Abdominal US with CBD of 7mm and no concerns of acute cholecystitis. CT with contrast on 4/5 is consistent with acute pancreatitis without gross evidence of biliary obstruction. Patient does not have previous episodes of pancreatitis or no new medications recently started. . Patient started on IVF. Noted to have worsening respiratory status and confused at OSH. ABG with pH of 7.2, CO2 58, O2 68. Fluids were stopped and IV Lasix was given with patient placed on Bipap. Patient transferred to [facility] MICU for GI services. with EUS/ERCP.     Nephrology has been consulted for STEFANIE, hyperkalemia, and oliguria following IV contrast, pancreatitis, and hypotension. As per the wife, she is not aware of any prior history of kidney disease, and review of his labs available to me show a eGFR above 60 prior to this hospitalization. At the outside facility, he was hyperkalemic at 6.7, and was shifted with improvement in his potassium to 5.0, creatinine on arrival was 1.6 (baseline 1.0), and trended up to 4.1 at the time of  the consult. Total bilirubin continues to trend up, currently at 14.8, and LFTs worsening. Most recent ABG prior to intubation showed 7.25/47/93/21. He was intubated in the ICU, and is currently going for emergent ERCP. Wife denies the patient taking any NSAIDs, having decreased PO intake or decreased urinary output prior to presentation. Upon my initial encounter, the patient has produced only 150 mL of urine in the macias bag over the past 12 hours.     Past Medical History:   Diagnosis Date    BPH (benign prostatic hyperplasia)     CKD (chronic kidney disease), stage III     Colon polyps 2015    Diarrhea in adult patient 02/10/2021    Generalized osteoarthrosis, involving multiple sites     History of gout     Hyperlipidemia     Hypertension     Hypogonadism male     IFG (impaired fasting glucose)     Lumbar disc disease with radiculopathy     Morbid obesity with BMI of 40.0-44.9, adult        Past Surgical History:   Procedure Laterality Date    COLONOSCOPY N/A 6/22/2020    Procedure: COLONOSCOPY;  Surgeon: Montserrat Conroy MD;  Location: [facility];  Service: Endoscopy;  Laterality: N/A;    COLONOSCOPY N/A 2/10/2021    Procedure: COLONOSCOPY;  Surgeon: Jones Lemus III, MD;  Location: [facility];  Service: Endoscopy;  Laterality: N/A;    ESOPHAGOGASTRODUODENOSCOPY N/A 2/10/2021    Procedure: EGD (ESOPHAGOGASTRODUODENOSCOPY);  Surgeon: Jones Lemus III, MD;  Location: [facility];  Service: Endoscopy;  Laterality: N/A;    EYE SURGERY      JOINT REPLACEMENT      KNEE SCOPE      open globe      right eye 1980    ROTATOR CUFF REPAIR      SHOULDER SURGERY Right        Review of patient's allergies indicates:   Allergen Reactions    No known drug allergies      Current Facility-Administered Medications   Medication Frequency    acetaminophen tablet 650 mg Q4H PRN    chlorhexidine 0.12 % solution 15 mL BID    fentaNYL 2500 mcg in 0.9% sodium chloride 250 mL infusion premix (titrating) Continuous     hydrocortisone sodium succinate injection 100 mg Q8H    mupirocin 2 % ointment BID    NORepinephrine 32 mg in dextrose 5 % (D5W) 250 mL infusion Continuous    pantoprazole injection 40 mg BID    phenylephrine HCl in 0.9% NaCl 1 mg/10 mL (100 mcg/mL) syringe     piperacillin-tazobactam (ZOSYN) 4.5 g in dextrose 5 % in water (D5W) 100 mL IVPB (MB+) Q12H    propofoL (DIPRIVAN) 10 mg/mL infusion     propofol (DIPRIVAN) 10 mg/mL infusion Continuous    sodium chloride 0.9% bolus 1,000 mL 1,000 mL Once    sodium chloride 0.9% flush 10 mL PRN    [START ON 2024] timolol maleate 0.5% ophthalmic solution 1 drop Daily    vasopressin (PITRESSIN) 0.2 Units/mL in dextrose 5 % (D5W) 100 mL infusion Continuous     Family History       Problem Relation (Age of Onset)    Cancer Cousin    Cataracts Father, Mother    Heart attack Father    Heart disease Brother    Hypertension Father, Mother          Tobacco Use    Smoking status: Former     Current packs/day: 0.00     Average packs/day: 1 pack/day for 30.0 years (30.0 ttl pk-yrs)     Types: Cigarettes     Start date: 1970     Quit date: 2000     Years since quittin.1    Smokeless tobacco: Current     Types: Snuff   Substance and Sexual Activity    Alcohol use: No    Drug use: No    Sexual activity: Not Currently     Review of Systems   Reason unable to perform ROS: Intubated and sedated.     Objective:     Vital Signs (Most Recent):  Temp: 99.9 °F (37.7 °C) (24)  Pulse: 88 (24)  Resp: (!) 26 (24)  BP: 104/71 (24 1606)  SpO2: (!) 91 % (24) Vital Signs (24h Range):  Temp:  [97.5 °F (36.4 °C)-100 °F (37.8 °C)] 99.9 °F (37.7 °C)  Pulse:  [57-88] 88  Resp:  [10-39] 26  SpO2:  [83 %-99 %] 91 %  BP: ()/() 104/71     Weight: 131.5 kg (289 lb 14.5 oz) (24 1615)  Body mass index is 42.81 kg/m².  Body surface area is 2.53 meters squared.    No intake/output data recorded.     Physical Exam  Constitutional:        Comments: Sedated   HENT:      Head: Atraumatic.      Nose: Nose normal.      Mouth/Throat:      Mouth: Mucous membranes are moist.      Comments: Intubated   Cardiovascular:      Rate and Rhythm: Normal rate and regular rhythm.      Pulses: Normal pulses.   Pulmonary:      Comments: Mechanical breathsounds  Abdominal:      General: There is distension.   Musculoskeletal:      Right lower leg: Edema present.      Left lower leg: Edema present.   Skin:     Capillary Refill: Capillary refill takes less than 2 seconds.          Significant Labs:  CBC:   Recent Labs   Lab 04/06/24  0644 04/06/24  1700   WBC 14.13*  --    RBC 5.02  --    HGB 15.7  --    HCT 48.8 48     --    MCV 97  --    MCH 31.3*  --    MCHC 32.2  --      CMP:   Recent Labs   Lab 04/06/24  1338   *   CALCIUM 7.6*   ALBUMIN 3.1*   PROT 5.6*   *   K 5.0   CO2 18*      BUN 47*   CREATININE 4.1*   ALKPHOS 93   *   *   BILITOT 14.8*     All labs within the past 24 hours have been reviewed.    Significant Imaging:  CT: Reviewed    Assessment/Plan:     Pulmonary  Acute hypoxemic respiratory failure  -Per primary    Cardiac/Vascular  Shock  -Per primary    Essential hypertension  -Per primary    Renal/  STEFANIE (acute kidney injury)  As per wife, patient with no prior history of CKD, baseline creatinine around 1.0 and eGFR>60 base on labs available to me. He presented to outside hospital for abdominal pain, received a dose of IV contrast for a CT of the abdomen, and found to have pancreatitis. Patient subsequently became hypotensive requiring the initiation of vasopressin and levophed for pressure support. Creatinine is now trending up, most recent potassium was noted to be 5.0 prior to transfer, and he is oliguric at the time of this consult.     Recommendations  -Recommend repeating renal function panel as soon as possible  -Dialysis consent was obtained from his wife, pending renal function panel and volume status after  his procedure I anticipate him requiring SLED for metabolic clearance and volume management.  -Please send UA once urine is collected  -Avoid nephrotoxic agents (IV contrast, NSAID's, gadolinium contrast, PPI's) if able.   -Maintain MAP above 65 mmHg.   -Strict I's and O's  -Daily renal function panel  -Renally dose all medications    Endocrine  Morbid obesity with BMI of 40.0-44.9, adult  -Per primary    GI  * Acute biliary pancreatitis without infection or necrosis  -Per primary        Thank you for your consult. I will follow-up with patient. Please contact us if you have any additional questions.    Sincere Vera MD  Nephrology  [facility]

## 2024-04-06 NOTE — ASSESSMENT & PLAN NOTE
- worsening with oliguria  - hyperkalemia improved with shifting x2  - continue serial labs  - renal consult   - IVF with bicarb   - monitor UOP, lytes, and RFP

## 2024-04-06 NOTE — ASSESSMENT & PLAN NOTE
Body mass index is 42.83 kg/m². Morbid obesity complicates all aspects of disease management from diagnostic modalities to treatment. Weight loss education when appropriate.

## 2024-04-06 NOTE — ASSESSMENT & PLAN NOTE
There is no height or weight on file to calculate BMI. Morbid obesity complicates all aspects of disease management from diagnostic modalities to treatment. Weight loss encouraged and health benefits explained to patient.

## 2024-04-06 NOTE — ASSESSMENT & PLAN NOTE
- lipase >1000  - Tbili 12.5  - worsening STEFANIE and hyperkalemia   - zosyn  - continue IVF and pain meds  - triglycerides 161  - transfer to main campus in progress for advanced GI services

## 2024-04-06 NOTE — ASSESSMENT & PLAN NOTE
Respiratory distress. Transferred to ICU  Worsened respiratory status likely due to acute illness, infection, obesity-related hypoventilation  Hypoxia, hypercapnia  ABG shows respiratory acidosis + metabolic acidosis  Is on BiPAP

## 2024-04-06 NOTE — NURSING
Patient transferred to ICU bed 19 per MD order. Patient transported via bed, with BiPap And IV abx, patient transported by myself, primary nurse, and respiratory staff. Report given to ICU RN.

## 2024-04-06 NOTE — ED NOTES
Messaged MD Wheatley for order Clarification. Notified of previous/current Bp/Hr. Per MD given 120mg Pantoprazole & 5 mg Metoprolol as ordered.

## 2024-04-06 NOTE — DISCHARGE SUMMARY
[facility] - Intensive Care ([facility])  Critical Care Medicine  Discharge Summary      Patient Name: Enoc Collado  MRN: [id number]  Admission Date: 4/5/2024  Hospital Length of Stay: 1 days  Discharge Date and Time:  04/06/2024 2:35 PM  Attending Physician: Robin Armstrong MD   Discharging Provider: Cortez Melara NP  Primary Care Provider: Frantz Mason MD  Reason for Admission: acute pancreatitis     HPI:   73-year-old male with a known past medical history of hypertension, hyperlipidemia, gout, CKD, morbid obesity with a BMI of 42.8 that presented to the ER 4/5 for evaluation of abdominal pain with onset earlier that same morning.  Patient at the time of admission endorse vomiting, abdominal distention, and diarrhea.  Patient was noted to [place] [address] and creatinine of 1.6, elevated liver enzymes, lipase greater than 1000, triglyceride of 161, bili of 3.6, and a lactic acidosis of 2.9.  CT of the abdomen and pelvis revealed acute pancreatitis.  Abdominal ultrasound with hepatic steatosis and mild hepatomegaly with a normal gallbladder.  Patient was treated with IV fluids, antiemetics, pain meds, ppi, and antibiotics.  GI was consulted.  Patient was admitted to the hospital under the care of Hospital Medicine.  Early in the morning 4/6 patient with increased work of breathing and there was concern for volume overload.  Patient was placed on NIPPV and given a dose of diuretics and IV fluids were held.  Patient continued to need NIPPV and transfer orders placed to the ICU with consultation to critical care team.    Case was discussed with the GI team, Dr. Parks, who has spoken with the advanced GI [facility] and they would like the patient transferred to [facility] for higher level of care and advanced GI.  Repeat labs with worsening renal failure, hyperkalemia, worsening liver enzymes as well as elevated T bili.  Orders to shift potassium has been placed.  IV fluids restarted.  Serial labs  ordered.  [facility] has been consulted for transfer process.    * No surgery found *    Indwelling Lines/Drains at Time of Discharge:   Lines/Drains/Airways       Peripherally Inserted Central Catheter Line  Duration             PICC Double Lumen 04/06/24 1136 right basilic <1 day              Drain  Duration                  Urethral Catheter 04/06/24 1000 <1 day                  Hospital Course:   4/6: pt transferred to ICU for NIPPV and increased WOB, repeat labs worse, spoke with GI and transfer process started to get pt to [facility] for advanced GI services     Bed assignment received at [facility]. Flight team here to transport pt to [facility]. Pt remains on BIPAP. Increasing requirements of levo, adding vaso in the event it's needed in route to [facility].     Consults (From admission, onward)          Status Ordering Provider     Inpatient consult to PICC team ([facility])  Once        Provider:  (Not yet assigned)    Acknowledged ADEBAYO ROLON     Inpatient consult to Critical Care Medicine  Once        Provider:  (Not yet assigned)    Completed JOHNNA RDORIGUEZ     Inpatient consult to Nephrology  Once        Provider:  (Not yet assigned)    Acknowledged SOFIA NDIAYE     Inpatient consult to Gastroenterology  Once        Provider:  Marley Parks MD    Completed TOY MIKE          Significant Labs:  All pertinent labs within the past 24 hours have been reviewed.    Significant Imaging:  I have reviewed all pertinent imaging results/findings within the past 24 hours.    Pending Diagnostic Studies:       Procedure Component Value Units Date/Time    MRI MRCP Without Contrast [[id number]]     Order Status: Sent Lab Status: No result     Urinalysis, Reflex to Urine Culture Urine, Clean Catch [[id number]] Collected: 04/05/24 1457    Order Status: Sent Lab Status: In process Updated: 04/05/24 1455    Specimen: Urine, Clean Catch           Final Active Diagnoses:    Diagnosis Date Noted POA     PRINCIPAL PROBLEM:  Acute biliary pancreatitis without infection or necrosis [K85.10] 04/05/2024 Yes    STEFANIE (acute kidney injury) [N17.9] 04/06/2024 Yes    Acute hypoxemic respiratory failure [J96.01] 04/06/2024 Yes    Hyperkalemia [E87.5] 04/06/2024 Yes    Hematemesis [K92.0] 04/05/2024 Yes    Elevated LFTs [R79.89] 04/05/2024 Yes    Lactic acidosis [E87.20] 04/05/2024 Yes    Morbid obesity with BMI of 40.0-44.9, adult [E66.01, Z68.41]  Not Applicable    Essential hypertension [I10]  Yes    Hyperlipidemia [E78.5]  Yes      Problems Resolved During this Admission:     Pulmonary  Acute hypoxemic respiratory failure  - placed on NIPPV AM of 4/6  - continues with minimal settings and remains awake   - high risk for further decompensation and need for intubation for expected course     Cardiac/Vascular  Hyperlipidemia  - NPO for now, resume statin when clinically able       Essential hypertension  - non issue as pt now on pressor    Renal/  Hyperkalemia  - shifted x2 with improvement to 5  - see plan or STEFANIE    STEFANIE (acute kidney injury)  - worsening with oliguria  - hyperkalemia improved with shifting x2  - continue serial labs  - renal consult   - IVF with bicarb   - monitor UOP, lytes, and RFP    Lactic acidosis  - trends favorable, monitor     Endocrine  Morbid obesity with BMI of 40.0-44.9, adult  Body mass index is 42.83 kg/m². Morbid obesity complicates all aspects of disease management from diagnostic modalities to treatment. Weight loss education when appropriate.     GI  * Acute biliary pancreatitis without infection or necrosis  - lipase >1000  - Tbili 12.5  - worsening STEFANIE and hyperkalemia   - zosyn  - continue IVF with bicarb and pain meds  - triglycerides 161  - transfer to [facility] in progress for advanced GI services     Elevated LFTs  - trend, see plan for acute pancreatitis     Hematemesis  - protonix  - GI following      Discharged Condition:  guarded    Disposition: [facility] for advanced GI  services       Patient Instructions:   No discharge procedures on file.    >30 minutes spend on d/c     Cortez Melara NP  Critical Care Medicine  '[place] - Intensive Care ([facility])

## 2024-04-06 NOTE — PLAN OF CARE
[facility] - Intensive Care (Hospital)  Discharge Final Note    Primary Care Provider: Frantz Mason MD    Expected Discharge Date: 4/6/2024    Final Discharge Note (most recent)       Final Note - 04/06/24 1450          Final Note    Assessment Type Final Discharge Note     Anticipated Discharge Disposition Home or Self Care        Post-Acute Status    Discharge Delays None known at this time                   No needs at time of chart review. KM,MSW    Important Message from Medicare

## 2024-04-06 NOTE — CARE UPDATE
FLIGHT C[facility] TRANSPORT NOTE     Date of Transport: 2024  : 1950  Age: 73 y.o.  Medication Dosing Weight: 131.5kg  Sex: male  Race: White    MRN: [id number]  Time Of Patient Handoff: 1550    ASSESSMENT/INTERVENTIONS     This patient was transported by [facility] from the ICU of [facility]. The patient's overall condition remained unchanged throughout transport, with all vital signs remaining stable per the patient's current baseline. All lines, tubes, and devices remained patent and intact.     LDAs:  Dual-lumen PICC - RUE,  PIV - 20G. R. FA,  PIV - 20G. L. Shoulder,  Indwelling Oneill Catheter.    GTTS:  Levo gtt @ 0.3mcg/kg/min,  Vaso gtt @ 0.04u/min,  Bicarb gtt @ 75ml/hr.    **Was given 4mg Zofran IVP and 50mcg Fentanyl IVP @ 1500 by [facility] team for back/abd pain.    **Was given 50mcg Fentanyl IVP @ 1540 by [facility] team for ongoing back/abd pain.    The patient was transferred from the [facility] stretcher to  MICU  bed 6072 where care was transitioned to [facility]side [facility]  without incident. The patient was sent without personal belongings or paperwork.    Vital signs prior to  departure:  HR: 76, NSR w/o ectopy,  BP: Automatic: 92/69 (76),  SpO2: 94% Bipap,  RR: 22,  Afebrile.    Bipap Settings:  14/6 @ 60% FiO2.    FOLLOW-UP     Call [facility], Leonela Song RN at [phone] (Adult Team) or [phone] (Pediatric Team) for additional questions or concerns.

## 2024-04-06 NOTE — CONSULTS
[facility] ([facility])  Nephrology  Consult Note      Patient Name: Enoc Collado  MRN: [id number]  Admission Date: 4/5/2024  Hospital Length of Stay: 1 days  Attending Provider: Robin Armstrong MD   Primary Care Physician: Frantz Mason MD  Principal Problem:Acute biliary pancreatitis without infection or necrosis    Reason for consult: STEFANIE, hyperkalemia    Consults  Subjective:     HPI: Thank you for referring the pt to us. H/o and chart were reviewed. Pt was seen and examined in ICU. Pt is a 72 y/o male with h/o of CKD stage 3, IGT, HTN, morbid obesity, hypercholesterolemia, and gout, who presented with abdominal pain and was admitted for acute pancreatitis. No direct h/o can be obtained. Per chart, no h/o of alcohol dependence, no new meds, TG not very high, Lipase >1000, s Cr has worsened, pt has hyperkalemia. This am, pt became more SOB, was transferred to ICU and was placed on BiPAP.       Past Medical History:   Diagnosis Date    BPH (benign prostatic hyperplasia)     CKD (chronic kidney disease), stage III     Colon polyps 2015    Diarrhea in adult patient 02/10/2021    Generalized osteoarthrosis, involving multiple sites     History of gout     Hyperlipidemia     Hypertension     Hypogonadism male     IFG (impaired fasting glucose)     Lumbar disc disease with radiculopathy     Morbid obesity with BMI of 40.0-44.9, adult        Past Surgical History:   Procedure Laterality Date    COLONOSCOPY N/A 6/22/2020    Procedure: COLONOSCOPY;  Surgeon: Montserrat Conroy MD;  Location: [facility];  Service: Endoscopy;  Laterality: N/A;    COLONOSCOPY N/A 2/10/2021    Procedure: COLONOSCOPY;  Surgeon: Jones Lemus III, MD;  Location: [facility];  Service: Endoscopy;  Laterality: N/A;    ESOPHAGOGASTRODUODENOSCOPY N/A 2/10/2021    Procedure: EGD (ESOPHAGOGASTRODUODENOSCOPY);  Surgeon: Jones Lemus III, MD;  Location: [facility];  Service: Endoscopy;  Laterality: N/A;    EYE SURGERY      JOINT  REPLACEMENT      KNEE SCOPE      open globe      right eye     ROTATOR CUFF REPAIR      SHOULDER SURGERY Right        Review of patient's allergies indicates:   Allergen Reactions    No known drug allergies      Current Facility-Administered Medications   Medication Frequency    chlorhexidine 0.12 % solution 15 mL BID    dextrose 10% bolus 125 mL 125 mL PRN    dextrose 10% bolus 125 mL 125 mL PRN    dextrose 10% bolus 250 mL 250 mL PRN    dextrose 10% bolus 250 mL 250 mL PRN    dextrose 10% bolus 250 mL 250 mL PRN    dextrose 10% bolus 500 mL 500 mL Once    And    dextrose 10% bolus 250 mL 250 mL PRN    And    insulin regular injection 13.15 Units 0.1315 mL Once    glucagon (human recombinant) injection 1 mg PRN    glucagon (human recombinant) injection 1 mg PRN    hydrALAZINE injection 10 mg Q6H PRN    HYDROmorphone (PF) injection 1 mg Q3H PRN    insulin aspart U-100 pen 0-10 Units Q6H PRN    mupirocin 2 % ointment BID    NORepinephrine 4 mg in dextrose 5% 250 mL infusion (premix) Continuous    ondansetron injection 4 mg Q6H PRN    pantoprazole injection 40 mg BID    piperacillin-tazobactam (ZOSYN) 4.5 g in dextrose 5 % in water (D5W) 100 mL IVPB (MB+) Q8H    sodium bicarbonate 150 mEq in sterile water 1,150 mL infusion Continuous    sodium zirconium cyclosilicate packet 10 g BID     Family History       Problem Relation (Age of Onset)    Cancer Cousin    Cataracts Father, Mother    Heart attack Father    Heart disease Brother    Hypertension Father, Mother          Tobacco Use    Smoking status: Former     Current packs/day: 0.00     Average packs/day: 1 pack/day for 30.0 years (30.0 ttl pk-yrs)     Types: Cigarettes     Start date: 1970     Quit date: 2000     Years since quittin.1    Smokeless tobacco: Current     Types: Snuff   Substance and Sexual Activity    Alcohol use: No    Drug use: No    Sexual activity: Not Currently     Review of Systems   Unable to perform ROS: Acuity of condition      Objective:     Vital Signs (Most Recent):  Temp: 97.9 °F (36.6 °C) (04/06/24 1030)  Pulse: 68 (04/06/24 1030)  Resp: (!) 39 (04/06/24 1030)  BP: (!) 115/54 (04/06/24 1030)  SpO2: (!) 87 % (04/06/24 1030) Vital Signs (24h Range):  Temp:  [97.5 °F (36.4 °C)-98.7 °F (37.1 °C)] 97.9 °F (36.6 °C)  Pulse:  [57-68] 68  Resp:  [18-39] 39  SpO2:  [83 %-95 %] 87 %  BP: ()/() 115/54     Weight: 131.5 kg (290 lb) (04/05/24 1624)  Body mass index is 42.83 kg/m².  Body surface area is 2.53 meters squared.    I/O last 3 completed shifts:  In: 100 [IV Piggyback:100]  Out: -      Physical Exam  Vitals and nursing note reviewed.   Constitutional:       Appearance: Normal appearance. He is obese. He is ill-appearing.   Cardiovascular:      Rate and Rhythm: Normal rate and regular rhythm.      Pulses: Normal pulses.      Heart sounds: Normal heart sounds.   Pulmonary:      Effort: Pulmonary effort is normal.      Breath sounds: Normal breath sounds.   Abdominal:      Palpations: Abdomen is soft.      Tenderness: There is no abdominal tenderness.   Musculoskeletal:      Right lower leg: No edema.      Left lower leg: No edema.   Neurological:      Mental Status: He is alert.          Significant Labs: reviewed  BMP  Lab Results   Component Value Date     04/06/2024    K 6.2 (H) 04/06/2024     04/06/2024    CO2 18 (L) 04/06/2024    BUN 42 (H) 04/06/2024    CREATININE 3.7 (H) 04/06/2024    CALCIUM 7.8 (L) 04/06/2024    ANIONGAP 16 04/06/2024    EGFRNORACEVR 17 (A) 04/06/2024     Lab Results   Component Value Date    WBC 14.13 (H) 04/06/2024    HGB 15.7 04/06/2024    HCT 48.8 04/06/2024    MCV 97 04/06/2024     04/06/2024     Lab Results   Component Value Date    LIPASE >1000 (H) 04/05/2024     ABG  Recent Labs   Lab 04/06/24  0556   PH 7.203*   PO2 68*   PCO2 58.4*   HCO3 23.0*   BE -5*       Significant Imaging: reviewed, CT abd c/w acute pancreatitis  Assessment/Plan:     72 y/o male with IGT and  obesity presented with acute pancreatitis and was transferred to ICU for respiratory distress. Pt has STEFANIE and hyperkalemia:    STEFANIE (acute kidney injury)  STEFANIE on CKD stage 3  STEFANIE likely due to complication of acute illness and pancreatitis, pt may have an intraabdominal infection, leading to bacteremia, hypotension, and sepsis.    Hyperkalemia is concerning (repeat K is lower)  Pt is on BiPAP, and lokelma could not be easily given  Agree with IV Ca and insulin  Continue NS IVF's at 150 ml/hr  Repeal labs  May need acute dialysis if K higher  Recommend inotropic support  Empiric abx  Agree with ICU transfer    Acute biliary pancreatitis without infection or necrosis  Presented acutely  H/o of IGT  No h/o of alcohol  Pt is obese  Reviewed GI note  Noted pending transfer to  for ERCP    Acute hypoxemic respiratory failure  Respiratory distress. Transferred to ICU  Worsened respiratory status likely due to acute illness, infection, obesity-related hypoventilation  Hypoxia, hypercapnia  ABG shows respiratory acidosis + metabolic acidosis  Is on BiPAP    Morbid obesity with BMI of 40.0-44.9, adult  Reviewed the chart      Plans and recommendations:  As discussed above  Total critical care time spent 70 minutes including time needed to review the records, the   patient evaluation, documentation, face-to-face discussion with the patient,   more than 50% of the time was spent on coordination of care and counseling.    Level V visit.      Thank you for your consult.     Drew Bentley MD   Nephrology  [facility] ([facility])

## 2024-04-06 NOTE — CONSULTS
[facility]  AES  Consult Note    Patient Name: Enoc Collado  MRN: [id number]  Admission Date: 4/6/2024  Hospital Length of Stay: 0 days  Code Status: Full Code   Attending Provider: Aaron Salazar MD   Consulting Provider: Salomon Cantu MD  Primary Care Physician: Frantz Mason MD  Principal Problem:Acute biliary pancreatitis without infection or necrosis    Inpatient consult to [facility] ([facility])  Consult performed by: Salomon Cantu MD  Consult ordered by: Nigel Camacho MD        Subjective:     HPI: Enoc Collado is a 73 y.o. male with history of HTN, gout, CKD, morbid obesity who presents for acute abd pain onset morning of presentation 4/5. Initially presented to [facility]. WBC 14, lipase >1000, TB 3.6 ALP 98  . Lactate 2.9. CT showed acute interstitial pancreatitis. US showed normal gallbladder without cholelithiasis or ductal dilatation. US showed no ductal dilatation with CBD measuring 7 mm. Early morning of 4/6 respiratory status worsened, required NIPPV and diuresis was started but later needed intubation. Repeat labs show developing renal failure and worsening LFTs. TB now 14.8   ALP 93.     Requiring 0.3 levophed + vasopressin on arrival  Still on BIPAP but ICU team planning intubation. Placing arterial and trialysis lines  On zosyn.  Transferred to [facility] for AES evaluation.        Objective:     Vitals:    04/06/24 1606   BP: 104/71   Pulse: 73   Resp: 20   Temp: 100 °F (37.8 °C)         Constitutional:  acutely ill-appearing  HENT: Head: Normal, normocephalic.  Eyes: scleral icterus  GI: distended, firm  Musculoskeletal: no muscular tenderness noted  Skin: jaundiced  Neurological: confused    Significant Labs:  Reviewed the following pertinent laboratory tests:   TB 14.8  ALP 93      Lipase >1000  WBC 14      Significant Imaging:  Imaging reviewed: CTAP wo contrast 4/5. Interpretation:    Acute pancreatitis  (large amount of inflammatory change around the pancreas).  Negative for pseudocyst.     Trace perihepatic ascites.    US abd  Gallbladder: No calculi, wall thickening, or pericholecystic fluid.  Negative sonographic Harris's sign.     Biliary system: The common duct is not dilated, measuring 7 mm.  No intrahepatic ductal dilatation    CTAP w IV contrast 4/5  Again findings consistent with uncomplicated acute interstitial pancreatitis     Assessment/Plan:     Enoc Collado is a 73 y.o. male with history of CKD, gout, HTN, HLD, obesity admitted for acute interstitial pancreatitis. Transferred to [facility] for developing shock, respiratory failure, and renal failure with concern for acute cholangitis given abrupt rise in bilirubin. Imaging without evidence of biliary obstruction thus far but will proceed with ERCP given critical illness and bilirubin trend.     Problem List:  Acute cholangitis  Acute interstitial pancreatitis  Acute hypoxic respiratory failure  STEFANIE on CKD    Recommendations:  - Plan for urgent ERCP 4/6  - Continue zosyn  - Supportive care per ICU - intubation planned, please ensure adequate vascular access    Thank you for involving us in the care of Enoc Collado. Please call with any additional questions, concerns or changes in the patient's clinical status. We will continue to follow.     Salomon Cantu MD  Gastroenterology Fellow PGY V  [facility]

## 2024-04-06 NOTE — SUBJECTIVE & OBJECTIVE
Past Medical History:   Diagnosis Date    BPH (benign prostatic hyperplasia)     CKD (chronic kidney disease), stage III     Colon polyps 2015    Diarrhea in adult patient 02/10/2021    Generalized osteoarthrosis, involving multiple sites     History of gout     Hyperlipidemia     Hypertension     Hypogonadism male     IFG (impaired fasting glucose)     Lumbar disc disease with radiculopathy     Morbid obesity with BMI of 40.0-44.9, adult        Past Surgical History:   Procedure Laterality Date    COLONOSCOPY N/A 6/22/2020    Procedure: COLONOSCOPY;  Surgeon: Montserrat Conroy MD;  Location: [facility] ENDO;  Service: Endoscopy;  Laterality: N/A;    COLONOSCOPY N/A 2/10/2021    Procedure: COLONOSCOPY;  Surgeon: Jones Lemus III, MD;  Location: [facility] ENDO;  Service: Endoscopy;  Laterality: N/A;    ESOPHAGOGASTRODUODENOSCOPY N/A 2/10/2021    Procedure: EGD (ESOPHAGOGASTRODUODENOSCOPY);  Surgeon: Jones Lemus III, MD;  Location: [facility];  Service: Endoscopy;  Laterality: N/A;    EYE SURGERY      JOINT REPLACEMENT      KNEE SCOPE      open globe      right eye 1980    ROTATOR CUFF REPAIR      SHOULDER SURGERY Right        Review of patient's allergies indicates:   Allergen Reactions    No known drug allergies      Current Facility-Administered Medications   Medication Frequency    acetaminophen tablet 650 mg Q4H PRN    chlorhexidine 0.12 % solution 15 mL BID    fentaNYL 2500 mcg in 0.9% sodium chloride 250 mL infusion premix (titrating) Continuous    hydrocortisone sodium succinate injection 100 mg Q8H    mupirocin 2 % ointment BID    NORepinephrine 32 mg in dextrose 5 % (D5W) 250 mL infusion Continuous    pantoprazole injection 40 mg BID    phenylephrine HCl in 0.9% NaCl 1 mg/10 mL (100 mcg/mL) syringe     piperacillin-tazobactam (ZOSYN) 4.5 g in dextrose 5 % in water (D5W) 100 mL IVPB (MB+) Q12H    propofoL (DIPRIVAN) 10 mg/mL infusion     propofol (DIPRIVAN) 10 mg/mL infusion Continuous    sodium chloride 0.9%  bolus 1,000 mL 1,000 mL Once    sodium chloride 0.9% flush 10 mL PRN    [START ON 2024] timolol maleate 0.5% ophthalmic solution 1 drop Daily    vasopressin (PITRESSIN) 0.2 Units/mL in dextrose 5 % (D5W) 100 mL infusion Continuous     Family History       Problem Relation (Age of Onset)    Cancer Cousin    Cataracts Father, Mother    Heart attack Father    Heart disease Brother    Hypertension Father, Mother          Tobacco Use    Smoking status: Former     Current packs/day: 0.00     Average packs/day: 1 pack/day for 30.0 years (30.0 ttl pk-yrs)     Types: Cigarettes     Start date: 1970     Quit date: 2000     Years since quittin.1    Smokeless tobacco: Current     Types: Snuff   Substance and Sexual Activity    Alcohol use: No    Drug use: No    Sexual activity: Not Currently     Review of Systems   Reason unable to perform ROS: Intubated and sedated.     Objective:     Vital Signs (Most Recent):  Temp: 99.9 °F (37.7 °C) (24)  Pulse: 88 (24 175)  Resp: (!) 26 (24 175)  BP: 104/71 (24 1606)  SpO2: (!) 91 % (24) Vital Signs (24h Range):  Temp:  [97.5 °F (36.4 °C)-100 °F (37.8 °C)] 99.9 °F (37.7 °C)  Pulse:  [57-88] 88  Resp:  [10-39] 26  SpO2:  [83 %-99 %] 91 %  BP: ()/() 104/71     Weight: 131.5 kg (289 lb 14.5 oz) (24 1615)  Body mass index is 42.81 kg/m².  Body surface area is 2.53 meters squared.    No intake/output data recorded.     Physical Exam  Constitutional:       Comments: Sedated   HENT:      Head: Atraumatic.      Nose: Nose normal.      Mouth/Throat:      Mouth: Mucous membranes are moist.      Comments: Intubated   Cardiovascular:      Rate and Rhythm: Normal rate and regular rhythm.      Pulses: Normal pulses.   Pulmonary:      Comments: Mechanical breathsounds  Abdominal:      General: There is distension.   Musculoskeletal:      Right lower leg: Edema present.      Left lower leg: Edema present.   Skin:     Capillary  Refill: Capillary refill takes less than 2 seconds.          Significant Labs:  CBC:   Recent Labs   Lab 04/06/24  0644 04/06/24  1700   WBC 14.13*  --    RBC 5.02  --    HGB 15.7  --    HCT 48.8 48     --    MCV 97  --    MCH 31.3*  --    MCHC 32.2  --      CMP:   Recent Labs   Lab 04/06/24  1338   *   CALCIUM 7.6*   ALBUMIN 3.1*   PROT 5.6*   *   K 5.0   CO2 18*      BUN 47*   CREATININE 4.1*   ALKPHOS 93   *   *   BILITOT 14.8*     All labs within the past 24 hours have been reviewed.    Significant Imaging:  CT: Reviewed

## 2024-04-06 NOTE — ASSESSMENT & PLAN NOTE
-- most recent lipid panel on 10/2023 with   -- on home pravastatin 20mg qd  -- holding on admission given elevated LFTs

## 2024-04-06 NOTE — ASSESSMENT & PLAN NOTE
Concern for cholangitis with worsening pressor requirements and septic shock.  Transferred to [facility] MICU with worsening O2 requirements on Bipap.  Lipase >1000 with worsening LFTs and bilirubin.  . Likely biliary etiology.  Imaging without clear evidence of CBD dilation or obvious stones/necrosis.  Acute pancreatitis seen on imaging. AES consulted on admission, plan to take patient for emergent ERCP (4/6/24)    - Zosyn  - AES following  - F/u Blood cultures  - NPO  - IVF

## 2024-04-06 NOTE — SUBJECTIVE & OBJECTIVE
Past Medical History:   Diagnosis Date    BPH (benign prostatic hyperplasia)     CKD (chronic kidney disease), stage III     Colon polyps 2015    Diarrhea in adult patient 02/10/2021    Generalized osteoarthrosis, involving multiple sites     History of gout     Hyperlipidemia     Hypertension     Hypogonadism male     IFG (impaired fasting glucose)     Lumbar disc disease with radiculopathy     Morbid obesity with BMI of 40.0-44.9, adult        Past Surgical History:   Procedure Laterality Date    COLONOSCOPY N/A 2020    Procedure: COLONOSCOPY;  Surgeon: Montserrat Conroy MD;  Location: [facility] ENDO;  Service: Endoscopy;  Laterality: N/A;    COLONOSCOPY N/A 2/10/2021    Procedure: COLONOSCOPY;  Surgeon: Jones Lemus III, MD;  Location: [facility] ENDO;  Service: Endoscopy;  Laterality: N/A;    ESOPHAGOGASTRODUODENOSCOPY N/A 2/10/2021    Procedure: EGD (ESOPHAGOGASTRODUODENOSCOPY);  Surgeon: Jones Lemus III, MD;  Location: [facility];  Service: Endoscopy;  Laterality: N/A;    EYE SURGERY      JOINT REPLACEMENT      KNEE SCOPE      open globe      right eye 1980    ROTATOR CUFF REPAIR      SHOULDER SURGERY Right        Review of patient's allergies indicates:   Allergen Reactions    No known drug allergies      Family History       Problem Relation (Age of Onset)    Cancer Cousin    Cataracts Father, Mother    Heart attack Father    Heart disease Brother    Hypertension Father, Mother          Tobacco Use    Smoking status: Former     Current packs/day: 0.00     Average packs/day: 1 pack/day for 30.0 years (30.0 ttl pk-yrs)     Types: Cigarettes     Start date: 1970     Quit date: 2000     Years since quittin.1    Smokeless tobacco: Current     Types: Snuff   Substance and Sexual Activity    Alcohol use: No    Drug use: No    Sexual activity: Not Currently     Review of Systems   Respiratory:  Positive for shortness of breath.    Gastrointestinal:  Positive for abdominal distention, abdominal pain,  diarrhea, nausea and vomiting.   All other systems reviewed and are negative.    Objective:     Vital Signs (Most Recent):  Temp: 97.5 °F (36.4 °C) (04/06/24 0340)  Pulse: 66 (04/06/24 0810)  Resp: 20 (04/06/24 0810)  BP: (!) 117/57 (04/06/24 0810)  SpO2: (!) 91 % (04/06/24 0810) Vital Signs (24h Range):  Temp:  [97.5 °F (36.4 °C)-98.7 °F (37.1 °C)] 97.5 °F (36.4 °C)  Pulse:  [57-66] 66  Resp:  [18-23] 20  SpO2:  [83 %-95 %] 91 %  BP: ()/() 117/57     Weight: 131.5 kg (290 lb) (04/05/24 1624)  Body mass index is 42.83 kg/m².      Intake/Output Summary (Last 24 hours) at 4/6/2024 0950  Last data filed at 4/5/2024 1727  Gross per 24 hour   Intake 100 ml   Output --   Net 100 ml       Lines/Drains/Airways       Peripheral Intravenous Line  Duration                  Peripheral IV - Single Lumen 04/06/24 0041 20 G Anterior;Left Forearm <1 day                     Physical Exam  Constitutional:       Appearance: He is morbidly obese. He is ill-appearing and toxic-appearing.      Interventions: Face mask in place.   Cardiovascular:      Rate and Rhythm: Regular rhythm. Bradycardia present.      Heart sounds: Heart sounds are distant.   Pulmonary:      Effort: Pulmonary effort is normal.      Breath sounds: Decreased air movement present. Decreased breath sounds present.      Comments: Wearing BIPAP  Abdominal:      General: Abdomen is protuberant. Bowel sounds are decreased. There is distension.      Tenderness: There is generalized abdominal tenderness.   Musculoskeletal:      Right lower leg: No edema.      Left lower leg: No edema.   Neurological:      General: No focal deficit present.      Mental Status: He is lethargic and disoriented.   Psychiatric:         Mood and Affect: Mood normal.         Speech: Speech normal.         Behavior: Behavior is cooperative.         Thought Content: Thought content normal.         Cognition and Memory: Cognition normal.         Judgment: Judgment is impulsive.         "  Significant Labs:  CBC:   Recent Labs   Lab 04/05/24  1454 04/05/24  1844 04/06/24  0644   WBC 14.43*  --  14.13*   HGB 15.8 15.8 15.7   HCT 48.0 47.4 48.8     --  217     CMP:   Recent Labs   Lab 04/06/24  0644   *   CALCIUM 8.0*   ALBUMIN 3.5   PROT 6.6      K 6.7*   CO2 17*      BUN 38*   CREATININE 3.2*   ALKPHOS 98   *   *   BILITOT 12.5*     Coagulation: No results for input(s): "PT", "INR", "APTT" in the last 48 hours.  Lipase:   Recent Labs   Lab 04/05/24  1454   LIPASE >1000*       Significant Imaging:  Imaging results within the past 24 hours have been reviewed.  "

## 2024-04-06 NOTE — ANESTHESIA PREPROCEDURE EVALUATION
[facility]  Anesthesia Pre-Operative Evaluation     Patient Name: Enoc Collado  YOB: 1950  MRN: [id number]  [facility]: [id number]       Admit Date: 4/6/2024   Admit Team: Networked reference to record PCT   Hospital Day: 1  Date of Procedure: 4/6/2024  Anesthesia: General/MAC Procedure: Procedure(s) (LRB):  ERCP (ENDOSCOPIC RETROGRADE CHOLANGIOPANCREATOGRAPHY) (N/A)  ULTRASOUND, UPPER GI TRACT, ENDOSCOPIC (N/A)  Pre-Operative Diagnosis: Shock [R57.9]  Acute biliary pancreatitis without infection or necrosis [K85.10]  Proceduralist:Surgeon(s) and Role:     * Shilo Ramirez MD - Primary  Code Status: Full Code   Advanced Directive: Received  Isolation Precautions: No active isolations  Capacity: Full capacity     SUBJECTIVE:   Enoc Collado is a 73 y.o. male who  has a past medical history of BPH (benign prostatic hyperplasia), CKD (chronic kidney disease), stage III, Colon polyps (2015), Diarrhea in adult patient (02/10/2021), Generalized osteoarthrosis, involving multiple sites, History of gout, Hyperlipidemia, Hypertension, Hypogonadism male, IFG (impaired fasting glucose), Lumbar disc disease with radiculopathy, and Morbid obesity with BMI of 40.0-44.9, adult.  Mr. Collado is a 73 year old male with chronic HTN, HLD, morbid obesity. Patient is intubated on multiple pressors at the time of my evaluation. All history was obtained from chart review and by family. Patient initially presented to [facility] with acute onset abdominal pain for 1 day on 4/5 associated with n/v/d. In ED at OSH, elevated LFTs to 140-180s with T bili 3.6/ direct bilirubin of 2.0. Lipase >1000. Abdominal US with CBD of 7mm and no concerns of acute cholecystitis. CT with contrast consistent with acute pancreatitis without gross evidence of biliary obstruction. Patient does not have previous episodes of pancreatitis or no new medications recently started. . Patient started on IVF. Noted to have  worsening respiratory status and confused at OSH. ABG with pH of 7.2, CO2 58, O2 68. Fluids were stopped and IV Lasix was given with patient placed on Bipap. Patient transferred to [facility] MICU for GI services. with EUS/ERCP.     Nephrology has been consulted for STEFANIE, hyperkalemia, and oliguria following IV contrast, pancreatitis, and hypotension. As per the wife, she is not aware of any prior history of kidney disease, and review of his labs available to me show a eGFR above 60 prior to this hospitalization.     HPI 04/06/2024:  Enoc Collado is a 73 y.o. male with hx of HfpEF, HTN, HLD, CKD, morbid obesity here with acute pancreatitis. Transferred on bipap with increased oxygen requirements. Intubated on arrival. On 0.38 levo and 0.04 vaso.     fentanyl      NORepinephrine bitartrate-D5W 0.38 mcg/kg/min (04/06/24 1722)    propofoL 10 mcg/kg/min (04/06/24 1754)    vasopressin 0.04 Units/min (04/06/24 1753)     Hospital LOS: 0 days  ICU LOS: 2h    he has a current medication list which includes the following long-term medication(s): amlodipine, aspirin, carvedilol, furosemide, gabapentin, lisinopril, pravastatin, sildenafil, testosterone cypionate, and timolol maleate 0.5%.     ALLERGIES:     Review of patient's allergies indicates:   Allergen Reactions    No known drug allergies      LDA:   AIRWAY:   Vent Mode: A/C  Oxygen Concentration (%):  [] 80  Resp Rate Total:  [26 br/min] 26 br/min  Vt Set:  [450 mL] 450 mL  PEEP/CPAP:  [5 cmH20] 5 cmH20  Mean Airway Pressure:  [12 cmH20] 12 cmH20          Airway - Non-Surgical 04/06/24 1748 Endotracheal Tube (Active)   Secured at 23 cm 04/06/24 1758   Measured At Lips 04/06/24 1758   Secured Location Center 04/06/24 1758   Secured by Commercial tube jacobo 04/06/24 1758   Bite Block none 04/06/24 1758   Site Condition Cool;Dry 04/06/24 1758   Status Intact;Secured;Patent 04/06/24 1758   Site Assessment Dry;Clean 04/06/24 1758         Lines/Drains/Airways        Peripherally Inserted Central Catheter Line  Duration             PICC Double Lumen 04/06/24 1136 right basilic <1 day              Central Venous Catheter Line  Duration             Trialysis (Dialysis) Catheter 04/06/24 1836 right internal jugular <1 day              Drain  Duration                  Urethral Catheter 04/06/24 1000 <1 day              Airway  Duration                  Airway - Non-Surgical 04/06/24 1748 Endotracheal Tube <1 day              Arterial Line  Duration             Arterial Line 04/06/24 1647 Right Radial <1 day              Peripheral Intravenous Line  Duration                  Peripheral IV - Single Lumen 04/06/24 0041 20 G Anterior;Right Forearm <1 day         Peripheral IV - Single Lumen 04/06/24 1021 20 G Anterior;Left Shoulder <1 day                   Anesthesia Evaluation      Airway   Mallampati: unable to assess  Dental      Pulmonary    (-) COPD, asthma  Cardiovascular   (+) hypertension  (-) CABG/stent    Neuro/Psych      GI/Hepatic/Renal    (+) chronic renal disease CKD    Endo/Other    (+) arthritis  (-) diabetes mellitus  Abdominal                    MEDICATIONS:     Current Outpatient Medications on File Prior to Encounter   Medication Sig Dispense Refill Last Dose    allopurinoL (ZYLOPRIM) 300 MG tablet Take 1 tablet (300 mg total) by mouth once daily. 90 tablet 3     amLODIPine (NORVASC) 10 MG tablet Take 1 tablet (10 mg total) by mouth once daily. 90 tablet 3     aspirin 325 MG tablet Take 325 mg by mouth once daily.       carvediloL (COREG) 25 MG tablet Take 1 tablet (25 mg total) by mouth 2 (two) times daily with meals. 180 tablet 3     empagliflozin (JARDIANCE) 10 mg tablet Take 1 tablet (10 mg total) by mouth once daily. 90 tablet 3     furosemide (LASIX) 40 MG tablet Take 1 tablet (40 mg total) by mouth once daily. 90 tablet 3     gabapentin (NEURONTIN) 300 MG capsule TAKE 1 CAPSULE THREE TIMES DAILY (Patient taking differently: Take 300 mg by mouth 3 (three) times  "daily.) 270 capsule 3     lisinopriL (PRINIVIL,ZESTRIL) 40 MG tablet Take 1 tablet (40 mg total) by mouth once daily. 90 tablet 3     methocarbamoL (ROBAXIN) 750 MG Tab Take 750 mg by mouth. (Patient not taking: Reported on 4/5/2024)       pravastatin (PRAVACHOL) 20 MG tablet TAKE 1 TABLET EVERY DAY 90 tablet 3     sildenafil (REVATIO) 20 mg Tab 2-4 tablets as needed one hour prior to intercourse 40 tablet 11     syringe with needle, safety 3 mL 21 gauge x 1 1/2" Syrg 1 Syringe by Misc.(Non-Drug; Combo Route) route every 21 days. 10 Syringe 1     testosterone cypionate (DEPOTESTOTERONE CYPIONATE) 200 mg/mL injection Inject 1.5 mLs (300 mg total) into the muscle every 21 days. 10 mL 1     timolol maleate 0.5% (TIMOPTIC-XE) 0.5 % SolG Place 1 drop into both eyes every morning.         Inpatient Medications:  Antibiotics (From admission, onward)      Start     Stop Route Frequency Ordered    04/06/24 2100  mupirocin 2 % ointment         04/11/24 2059 Nasl 2 times daily 04/06/24 1616    04/06/24 2030  piperacillin-tazobactam (ZOSYN) 4.5 g in dextrose 5 % in water (D5W) 100 mL IVPB (MB+)         -- IV Every 12 hours (non-standard times) 04/06/24 1615          VTE Risk Mitigation (From admission, onward)           Ordered     IP VTE LOW RISK PATIENT  Once         04/06/24 1810     Place sequential compression device  Until discontinued         04/06/24 1614                   chlorhexidine  15 mL Mouth/Throat BID    hydrocortisone sodium succinate  100 mg Intravenous Q8H    mupirocin   Nasal BID    pantoprazole  40 mg Intravenous BID    phenylephrine HCl in 0.9% NaCl        piperacillin-tazobactam (Zosyn) IV (PEDS and ADULTS) (extended infusion is not appropriate)  4.5 g Intravenous Q12H    propofoL        sodium chloride 0.9%  1,000 mL Intravenous Once    [START ON 4/7/2024] timolol maleate 0.5%  1 drop Both Eyes Daily       Current Facility-Administered Medications   Medication Dose Route Frequency Provider Last Rate " Last Admin    acetaminophen tablet 650 mg  650 mg Oral Q4H PRN Nigel Camacho MD        chlorhexidine 0.12 % solution 15 mL  15 mL Mouth/Throat BID Junie Schofield MD        fentaNYL 2500 mcg in 0.9% sodium chloride 250 mL infusion premix (titrating)  0-250 mcg/hr Intravenous Continuous Nigel Camacho MD        hydrocortisone sodium succinate injection 100 mg  100 mg Intravenous Q8H Nigel Camacho MD   100 mg at 04/06/24 1726    mupirocin 2 % ointment   Nasal BID Aaron Salazar MD        NORepinephrine 32 mg in dextrose 5 % (D5W) 250 mL infusion  0-3 mcg/kg/min Intravenous Continuous Nigel Camcaho MD 23.4 mL/hr at 04/06/24 1722 0.38 mcg/kg/min at 04/06/24 1722    pantoprazole injection 40 mg  40 mg Intravenous BID Nigel Camacho MD        phenylephrine HCl in 0.9% NaCl 1 mg/10 mL (100 mcg/mL) syringe             piperacillin-tazobactam (ZOSYN) 4.5 g in dextrose 5 % in water (D5W) 100 mL IVPB (MB+)  4.5 g Intravenous Q12H Nigel Camacho MD        propofoL (DIPRIVAN) 10 mg/mL infusion             propofol (DIPRIVAN) 10 mg/mL infusion  0-50 mcg/kg/min Intravenous Continuous Junie Schofield MD 7.9 mL/hr at 04/06/24 1754 10 mcg/kg/min at 04/06/24 1754    sodium chloride 0.9% bolus 1,000 mL 1,000 mL  1,000 mL Intravenous Once Yusef Vazquez MD   Stopped at 04/06/24 1902    sodium chloride 0.9% flush 10 mL  10 mL Intravenous PRN Nigel Camacho MD        [START ON 4/7/2024] timolol maleate 0.5% ophthalmic solution 1 drop  1 drop Both Eyes Daily Junie Schofield MD        vasopressin (PITRESSIN) 0.2 Units/mL in dextrose 5 % (D5W) 100 mL infusion  0.04 Units/min Intravenous Continuous Junie Schofield MD 12 mL/hr at 04/06/24 1753 0.04 Units/min at 04/06/24 1753          History:     Active Hospital Problems    Diagnosis  POA    *Acute biliary pancreatitis without infection or necrosis [K85.10]  Yes    Acute hypoxemic respiratory failure [J96.01]  Yes    Acute renal failure superimposed on chronic kidney disease  [N17.9, N18.9]  Yes    Shock [R57.9]  Yes    ACP (advance care planning) [Z71.89]  Not Applicable    CKD (chronic kidney disease), stage III [N18.30]  Yes    Morbid obesity with BMI of 40.0-44.9, adult [E66.01, Z68.41]  Not Applicable    History of gout [Z87.39]  Yes    Essential hypertension [I10]  Yes    Hyperlipidemia [E78.5]  Yes      Resolved Hospital Problems   No resolved problems to display.     Surgical History:    has a past surgical history that includes open globe; Rotator cuff repair; KNEE SCOPE; Eye surgery; Joint replacement; Shoulder surgery (Right); Colonoscopy (N/A, 6/22/2020); Esophagogastroduodenoscopy (N/A, 2/10/2021); and Colonoscopy (N/A, 2/10/2021).   Social History:    reports that he is not currently sexually active.  reports that he quit smoking about 24 years ago. His smoking use included cigarettes. He started smoking about 54 years ago. He has a 30.0 pack-year smoking history. His smokeless tobacco use includes snuff. He reports that he does not drink alcohol and does not use drugs.    Vitals:    04/06/24 1600 04/06/24 1606 04/06/24 1615 04/06/24 1758   BP: 94/67 104/71     BP Location: Left arm      Patient Position: Lying      Pulse: 70 73  88   Resp: 20 20  (!) 26   Temp: 37.8 °C (100 °F) 37.8 °C (100 °F)  37.7 °C (99.9 °F)   TempSrc: Core Bladder      SpO2: (!) 90% (!) 93%  (!) 91%   Weight:   131.5 kg (289 lb 14.5 oz)      Vital Signs Range (Last 24H):  Temp:  [36.4 °C (97.5 °F)-37.8 °C (100 °F)]   Pulse:  [57-88]   Resp:  [10-39]   BP: ()/()   SpO2:  [83 %-99 %]     Body mass index is 42.81 kg/m².  Wt Readings from Last 4 Encounters:   04/06/24 131.5 kg (289 lb 14.5 oz)   04/05/24 131.5 kg (290 lb)   01/02/24 129.1 kg (284 lb 9.8 oz)   10/26/23 133.1 kg (293 lb 6.9 oz)        Intake/Output - Last 3 Shifts         04/04 0700 04/05 0659 04/05 0700 04/06 0659 04/06 0700 04/07 0659    P.O.   0    Total Intake(mL/kg)   0 (0)    Urine (mL/kg/hr)   175    Total Output    175    Net   -175                 Lab Results   Component Value Date    WBC 14.13 (H) 04/06/2024    HGB 15.7 04/06/2024    HCT 48 04/06/2024     04/06/2024     (L) 04/06/2024    K 5.0 04/06/2024     04/06/2024    CREATININE 4.1 (H) 04/06/2024    BUN 47 (H) 04/06/2024    CO2 18 (L) 04/06/2024     (H) 04/06/2024    CALCIUM 7.6 (L) 04/06/2024    ALKPHOS 93 04/06/2024     (H) 04/06/2024     (H) 04/06/2024    ALBUMIN 3.1 (L) 04/06/2024    INR 1.3 (H) 04/06/2024    APTT 25.7 01/02/2024    HGBA1C 5.7 (H) 10/26/2023    TROPONINI 0.172 (H) 04/06/2024    BNP 28 04/06/2024     Recent Results (from the past 12 hour(s))   CBC auto differential    Collection Time: 04/06/24  6:44 AM   Result Value Ref Range    WBC 14.13 (H) 3.90 - 12.70 K/uL    RBC 5.02 4.60 - 6.20 M/uL    Hemoglobin 15.7 14.0 - 18.0 g/dL    Hematocrit 48.8 40.0 - 54.0 %    MCV 97 82 - 98 fL    MCH 31.3 (H) 27.0 - 31.0 pg    MCHC 32.2 32.0 - 36.0 g/dL    RDW 14.8 (H) 11.5 - 14.5 %    Platelets 217 150 - 450 K/uL    MPV 9.4 9.2 - 12.9 fL    Immature Granulocytes 0.5 0.0 - 0.5 %    Gran # (ANC) 12.3 (H) 1.8 - 7.7 K/uL    Immature Grans (Abs) 0.07 (H) 0.00 - 0.04 K/uL    Lymph # 0.6 (L) 1.0 - 4.8 K/uL    Mono # 1.0 0.3 - 1.0 K/uL    Eos # 0.1 0.0 - 0.5 K/uL    Baso # 0.04 0.00 - 0.20 K/uL    nRBC 0 0 /100 WBC    Gran % 87.1 (H) 38.0 - 73.0 %    Lymph % 4.5 (L) 18.0 - 48.0 %    Mono % 6.8 4.0 - 15.0 %    Eosinophil % 0.8 0.0 - 8.0 %    Basophil % 0.3 0.0 - 1.9 %    Differential Method Automated    Brain natriuretic peptide    Collection Time: 04/06/24  6:44 AM   Result Value Ref Range    BNP 28 0 - 99 pg/mL   Comprehensive metabolic panel    Collection Time: 04/06/24  6:44 AM   Result Value Ref Range    Sodium 137 136 - 145 mmol/L    Potassium 6.7 (HH) 3.5 - 5.1 mmol/L    Chloride 106 95 - 110 mmol/L    CO2 17 (L) 23 - 29 mmol/L    Glucose 137 (H) 70 - 110 mg/dL    BUN 38 (H) 8 - 23 mg/dL    Creatinine 3.2 (H) 0.5 - 1.4 mg/dL     Calcium 8.0 (L) 8.7 - 10.5 mg/dL    Total Protein 6.6 6.0 - 8.4 g/dL    Albumin 3.5 3.5 - 5.2 g/dL    Total Bilirubin 12.5 (H) 0.1 - 1.0 mg/dL    Alkaline Phosphatase 98 55 - 135 U/L     (H) 10 - 40 U/L     (H) 10 - 44 U/L    eGFR 20 (A) >60 mL/min/1.73 m^2    Anion Gap 14 8 - 16 mmol/L   Bilirubin, Direct    Collection Time: 04/06/24  6:44 AM   Result Value Ref Range    Bilirubin, Direct 8.9 (H) 0.1 - 0.3 mg/dL   Lactic Acid, Plasma    Collection Time: 04/06/24  9:49 AM   Result Value Ref Range    Lactate (Lactic Acid) 3.8 (HH) 0.5 - 2.2 mmol/L   Comprehensive metabolic panel    Collection Time: 04/06/24  9:49 AM   Result Value Ref Range    Sodium 138 136 - 145 mmol/L    Potassium 6.2 (H) 3.5 - 5.1 mmol/L    Chloride 104 95 - 110 mmol/L    CO2 18 (L) 23 - 29 mmol/L    Glucose 155 (H) 70 - 110 mg/dL    BUN 42 (H) 8 - 23 mg/dL    Creatinine 3.7 (H) 0.5 - 1.4 mg/dL    Calcium 7.8 (L) 8.7 - 10.5 mg/dL    Total Protein 6.2 6.0 - 8.4 g/dL    Albumin 3.2 (L) 3.5 - 5.2 g/dL    Total Bilirubin 12.9 (H) 0.1 - 1.0 mg/dL    Alkaline Phosphatase 91 55 - 135 U/L     (H) 10 - 40 U/L     (H) 10 - 44 U/L    eGFR 17 (A) >60 mL/min/1.73 m^2    Anion Gap 16 8 - 16 mmol/L   POCT glucose    Collection Time: 04/06/24 10:15 AM   Result Value Ref Range    POCT Glucose 136 (H) 70 - 110 mg/dL   POCT glucose    Collection Time: 04/06/24 11:47 AM   Result Value Ref Range    POCT Glucose 225 (H) 70 - 110 mg/dL   Lactic Acid, Plasma    Collection Time: 04/06/24  1:38 PM   Result Value Ref Range    Lactate (Lactic Acid) 3.0 (H) 0.5 - 2.2 mmol/L   Comprehensive metabolic panel    Collection Time: 04/06/24  1:38 PM   Result Value Ref Range    Sodium 135 (L) 136 - 145 mmol/L    Potassium 5.0 3.5 - 5.1 mmol/L    Chloride 103 95 - 110 mmol/L    CO2 18 (L) 23 - 29 mmol/L    Glucose 199 (H) 70 - 110 mg/dL    BUN 47 (H) 8 - 23 mg/dL    Creatinine 4.1 (H) 0.5 - 1.4 mg/dL    Calcium 7.6 (L) 8.7 - 10.5 mg/dL    Total Protein 5.6  (L) 6.0 - 8.4 g/dL    Albumin 3.1 (L) 3.5 - 5.2 g/dL    Total Bilirubin 14.8 (H) 0.1 - 1.0 mg/dL    Alkaline Phosphatase 93 55 - 135 U/L     (H) 10 - 40 U/L     (H) 10 - 44 U/L    eGFR 15 (A) >60 mL/min/1.73 m^2    Anion Gap 14 8 - 16 mmol/L   Lactic acid, plasma    Collection Time: 04/06/24  4:28 PM   Result Value Ref Range    Lactate (Lactic Acid) 2.8 (H) 0.5 - 2.2 mmol/L   POCT glucose    Collection Time: 04/06/24  4:37 PM   Result Value Ref Range    POCT Glucose 185 (H) 70 - 110 mg/dL   Protime-INR    Collection Time: 04/06/24  4:38 PM   Result Value Ref Range    Prothrombin Time 13.4 (H) 9.0 - 12.5 sec    INR 1.3 (H) 0.8 - 1.2   Troponin I    Collection Time: 04/06/24  4:38 PM   Result Value Ref Range    Troponin I 0.172 (H) 0.000 - 0.026 ng/mL   ISTAT PROCEDURE    Collection Time: 04/06/24  5:00 PM   Result Value Ref Range    POC PH 7.253 (LL) 7.35 - 7.45    POC PCO2 47.4 (H) 35 - 45 mmHg    POC PO2 93 80 - 100 mmHg    POC HCO3 21.0 (L) 24 - 28 mmol/L    POC BE -6 (L) -2 to 2 mmol/L    POC SATURATED O2 96 95 - 100 %    POC Sodium 133 (L) 136 - 145 mmol/L    POC Potassium 5.8 (H) 3.5 - 5.1 mmol/L    POC TCO2 22 (L) 23 - 27 mmol/L    POC Ionized Calcium 1.01 (L) 1.06 - 1.42 mmol/L    POC Hematocrit 48 36 - 54 %PCV    Rate 15     Sample ARTERIAL     Site Collette/UAC     Allens Test N/A     DelSys CPAP/BiPAP     Mode BiPAP     FiO2 100     IP 14     EP 8    Urinalysis, Reflex to Urine Culture Urine, Catheterized    Collection Time: 04/06/24  5:09 PM    Specimen: Urine   Result Value Ref Range    Specimen UA Urine, Catheterized     Color, UA Yellow Yellow, Straw, Gina    Appearance, UA Hazy (A) Clear    pH, UA 6.0 5.0 - 8.0    Specific Gravity, UA >1.030 (A) 1.005 - 1.030    Protein, UA 1+ (A) Negative    Glucose, UA 3+ (A) Negative    Ketones, UA Negative Negative    Bilirubin (UA) 1+ (A) Negative    Occult Blood UA Negative Negative    Nitrite, UA Negative Negative    Leukocytes, UA Negative Negative    Urinalysis Microscopic    Collection Time: 04/06/24  5:09 PM   Result Value Ref Range    RBC, UA 3 0 - 4 /hpf    WBC, UA 7 (H) 0 - 5 /hpf    Bacteria Rare None-Occ /hpf    Yeast, UA Rare (A) None    Squam Epithel, UA 0 /hpf    Hyaline Casts, UA 5 (A) 0-1/lpf /lpf    Microscopic Comment SEE COMMENT    Amylase    Collection Time: 04/06/24  6:01 PM   Result Value Ref Range    Amylase 1,207 (H) 20 - 110 U/L     Recent Labs   Lab 04/05/24  1454 04/05/24  1844 04/06/24  0644 04/06/24  0949 04/06/24  1338 04/06/24  1638 04/06/24  1700   WBC 14.43*  --  14.13*  --   --   --   --    HGB 15.8 15.8 15.7  --   --   --   --    HCT 48.0 47.4 48.8  --   --   --  48     --  217  --   --   --   --      --  137 138 135*  --   --    K 4.7  --  6.7* 6.2* 5.0  --   --    CREATININE 1.6*  --  3.2* 3.7* 4.1*  --   --    *  --  137* 155* 199*  --   --    INR  --   --   --   --   --  1.3*  --      No LMP for male patient.    EKG:   Results for orders placed or performed during the hospital encounter of 04/05/24   EKG 12-lead    Collection Time: 04/05/24  2:57 PM   Result Value Ref Range    QRS Duration 90 ms    OHS QTC Calculation 410 ms    Narrative    Test Reason : R10.13,    Vent. Rate : 058 BPM     Atrial Rate : 058 BPM     P-R Int : 192 ms          QRS Dur : 090 ms      QT Int : 418 ms       P-R-T Axes : 055 010 062 degrees     QTc Int : 410 ms    Sinus bradycardia  Inferior infarct ,age undetermined  Abnormal ECG  When compared with ECG of 02-JAN-2024 10:27,  No significant change was found  Confirmed by ANICETO BROCK MD ([id number]) on 4/5/2024 4:36:00 PM    Referred By: [email or web address]   SELF           Confirmed By:ANICETO BROCK MD     TTE:  Results for orders placed or performed during the hospital encounter of 03/31/23   Echo   Result Value Ref Range    BSA 2.42 m2    TDI SEPTAL 0.06 m/s    LV LATERAL E/E' RATIO 6.88 m/s    LV SEPTAL E/E' RATIO 9.17 m/s    LA WIDTH 4.80 cm    Left Ventricular Outflow Tract Mean  Velocity 0.81 cm/s    Left Ventricular Outflow Tract Mean Gradient 2.97 mmHg    Pulmonary Valve Mean Velocity 0.74 m/s    TDI LATERAL 0.08 m/s    PV PEAK VELOCITY 1.01 cm/s    LVIDd 5.63 3.5 - 6.0 cm    IVS 1.46 (A) 0.6 - 1.1 cm    Posterior Wall 1.37 (A) 0.6 - 1.1 cm    Ao root annulus 3.77 cm    LVIDs 3.95 2.1 - 4.0 cm    FS 30 28 - 44 %    LA volume 119.17 cm3    Sinus 3.98 cm    STJ 3.96 cm    LV mass 355.90 g    LA size 4.59 cm    RVDD 4.66 cm    TAPSE 2.77 cm    Left Ventricle Relative Wall Thickness 0.49 cm    AV mean gradient 6 mmHg    AV valve area 2.25 cm2    AV Velocity Ratio 0.64     AV index (prosthetic) 0.64     MV valve area p 1/2 method 2.96 cm2    E/A ratio 0.63     Mean e' 0.07 m/s    E wave deceleration time 256.70 msec    IVRT 97.05 msec    Pulm vein S/D ratio 1.00     LVOT diameter 2.11 cm    LVOT area 3.5 cm2    LVOT peak dominic 1.12 m/s    LVOT peak VTI 21.90 cm    Ao peak dominic 1.74 m/s    Ao VTI 34.0 cm    RVOT peak dominic 0.76 m/s    RVOT peak VTI 17.3 cm    LVOT stroke volume 76.54 cm3    AV peak gradient 12 mmHg    PV mean gradient 1.24 mmHg    E/E' ratio 7.86 m/s    MV Peak E Dominic 0.55 m/s    MV stenosis pressure 1/2 time 74.44 ms    MV Peak A Dominic 0.88 m/s    PV Peak S Dominic 0.38 m/s    PV Peak D Dominic 0.38 m/s    LV Systolic Volume 67.98 mL    LV Systolic Volume Index 29.2 mL/m2    LV Diastolic Volume 155.40 mL    LV Diastolic Volume Index 66.70 mL/m2    LA Volume Index 51.1 mL/m2    LV Mass Index 153 g/m2    RA Major Axis 5.71 cm    Left Atrium Minor Axis 6.27 cm    Left Atrium Major Axis 6.46 cm    LA Volume Index (Mod) 18.7 mL/m2    LA volume (mod) 43.47 cm3    RA Width 3.54 cm    Right Atrial Pressure (from IVC) 8 mmHg    EF 55 %    Narrative    · The left ventricle is normal in size with concentric hypertrophy and   normal systolic function.  · Severe left atrial enlargement.  · The estimated ejection fraction is 55%.  · Indeterminate left ventricular diastolic function.  · Normal right  "ventricular size with normal right ventricular systolic   function.  · Intermediate central venous pressure (8 mmHg).  · The sinuses of Valsalva is mildly dilated.  · The aortic root is mildly dilated.        No results found for this or any previous visit.  LESLI:  No results found for this or any previous visit.  Stress Test:  Results for orders placed during the hospital encounter of 05/26/20    Nuclear Stress - Cardiology Interpreted    Interpretation Summary    The perfusion scan is free of evidence from myocardial ischemia or injury.    There is mild global hypokinesis at rest and stress.    LV cavity size is mildly enlarged at rest and mildly enlarged at stress.    The EKG portion of this study is negative for ischemia.     LHC:  No results found for this or any previous visit.     PFT:  No results found for: "FEV1", "FVC", "XAX4FRL", "TLC", "DLCO"           Pre-op Assessment    I have reviewed the Patient Summary Reports.     I have reviewed the Nursing Notes.       Review of Systems  Anesthesia Hx:  No problems with previous Anesthesia   History of prior surgery of interest to airway management or planning:  Previous anesthesia: General        Denies Family Hx of Anesthesia complications.    Denies Personal Hx of Anesthesia complications.                    Cardiovascular:     Hypertension    Denies CABG/stent.                                     Pulmonary:    Denies COPD.  Denies Asthma.                    Renal/:  Chronic Renal Disease, CKD                Musculoskeletal:  Arthritis          Spine Disorders: lumbar            Endocrine:  Denies Diabetes.         Morbid Obesity / BMI > 40      Physical Exam  General: Well nourished    Airway:  Mallampati: unable to assess   Pre-Existing Airway: Oral Endotracheal tube        Anesthesia Plan  Type of Anesthesia, risks & benefits discussed:    Anesthesia Type: Gen ETT  Intra-op Monitoring Plan: Standard ASA Monitors and Art Line  Post Op Pain Control Plan: " multimodal analgesia  Induction:  IV  Informed Consent: Informed consent signed with the Patient representative and all parties understand the risks and agree with anesthesia plan.  All questions answered.   ASA Score: 4  Day of Surgery Review of History & Physical: H&P Update referred to the surgeon/provider.    Ready For Surgery From Anesthesia Perspective.     .

## 2024-04-06 NOTE — SUBJECTIVE & OBJECTIVE
Past Medical History:   Diagnosis Date    BPH (benign prostatic hyperplasia)     CKD (chronic kidney disease), stage III     Colon polyps 2015    Diarrhea in adult patient 02/10/2021    Generalized osteoarthrosis, involving multiple sites     History of gout     Hyperlipidemia     Hypertension     Hypogonadism male     IFG (impaired fasting glucose)     Lumbar disc disease with radiculopathy     Morbid obesity with BMI of 40.0-44.9, adult        Past Surgical History:   Procedure Laterality Date    COLONOSCOPY N/A 2020    Procedure: COLONOSCOPY;  Surgeon: Montserrat Conroy MD;  Location: [facility] ENDO;  Service: Endoscopy;  Laterality: N/A;    COLONOSCOPY N/A 2/10/2021    Procedure: COLONOSCOPY;  Surgeon: Jones Lemus III, MD;  Location: [facility] ENDO;  Service: Endoscopy;  Laterality: N/A;    ESOPHAGOGASTRODUODENOSCOPY N/A 2/10/2021    Procedure: EGD (ESOPHAGOGASTRODUODENOSCOPY);  Surgeon: Jones Lemus III, MD;  Location: [facility];  Service: Endoscopy;  Laterality: N/A;    EYE SURGERY      JOINT REPLACEMENT      KNEE SCOPE      open globe      right eye 1980    ROTATOR CUFF REPAIR      SHOULDER SURGERY Right        Review of patient's allergies indicates:   Allergen Reactions    No known drug allergies        Family History       Problem Relation (Age of Onset)    Cancer Cousin    Cataracts Father, Mother    Heart attack Father    Heart disease Brother    Hypertension Father, Mother          Tobacco Use    Smoking status: Former     Current packs/day: 0.00     Average packs/day: 1 pack/day for 30.0 years (30.0 ttl pk-yrs)     Types: Cigarettes     Start date: 1970     Quit date: 2000     Years since quittin.1    Smokeless tobacco: Current     Types: Snuff   Substance and Sexual Activity    Alcohol use: No    Drug use: No    Sexual activity: Not Currently      Review of Systems   Unable to perform ROS: Acuity of condition     Objective:     Vital Signs (Most Recent):  Temp: 100 °F (37.8 °C)  (04/06/24 1606)  Pulse: 73 (04/06/24 1606)  Resp: 20 (04/06/24 1606)  BP: 104/71 (04/06/24 1606)  SpO2: (!) 93 % (04/06/24 1606) Vital Signs (24h Range):  Temp:  [97.5 °F (36.4 °C)-100 °F (37.8 °C)] 100 °F (37.8 °C)  Pulse:  [57-74] 73  Resp:  [10-39] 20  SpO2:  [83 %-99 %] 93 %  BP: ()/() 104/71   Weight: 131.5 kg (289 lb 14.5 oz)  Body mass index is 42.81 kg/m².      Intake/Output Summary (Last 24 hours) at 4/6/2024 1633  Last data filed at 4/6/2024 1600  Gross per 24 hour   Intake 0 ml   Output --   Net 0 ml          Physical Exam  Vitals and nursing note reviewed.   Constitutional:       Appearance: He is obese.   HENT:      Head: Normocephalic and atraumatic.      Mouth/Throat:      Mouth: Mucous membranes are dry.      Pharynx: Oropharynx is clear.   Eyes:      Extraocular Movements: Extraocular movements intact.      Conjunctiva/sclera: Conjunctivae normal.   Cardiovascular:      Rate and Rhythm: Normal rate and regular rhythm.      Pulses: Normal pulses.   Pulmonary:      Effort: Respiratory distress present.      Breath sounds: No wheezing.   Chest:      Chest wall: No tenderness.   Abdominal:      Palpations: Abdomen is soft.      Tenderness: There is abdominal tenderness (LUQ). There is no guarding.   Musculoskeletal:         General: No tenderness. Normal range of motion.      Cervical back: Normal range of motion and neck supple.      Right lower leg: No edema.      Left lower leg: No edema.   Skin:     General: Skin is warm and dry.   Neurological:      Mental Status: He is lethargic.   Psychiatric:         Behavior: Behavior is cooperative.            Vents:  Oxygen Concentration (%): 100 (04/06/24 1606)  Lines/Drains/Airways       Peripherally Inserted Central Catheter Line  Duration             PICC Double Lumen 04/06/24 1136 right basilic <1 day              Drain  Duration                  Urethral Catheter 04/06/24 1000 <1 day              Peripheral Intravenous Line  Duration                   Peripheral IV - Single Lumen 04/06/24 0041 20 G Anterior;Right Forearm <1 day         Peripheral IV - Single Lumen 04/06/24 1021 20 G Anterior;Left Shoulder <1 day                  Significant Labs:    CBC/Anemia Profile:  Recent Labs   Lab 04/05/24  1454 04/05/24  1844 04/06/24  0644   WBC 14.43*  --  14.13*   HGB 15.8 15.8 15.7   HCT 48.0 47.4 48.8     --  217   MCV 93  --  97   RDW 13.8  --  14.8*        Chemistries:  Recent Labs   Lab 04/06/24  0644 04/06/24  0949 04/06/24  1338    138 135*   K 6.7* 6.2* 5.0    104 103   CO2 17* 18* 18*   BUN 38* 42* 47*   CREATININE 3.2* 3.7* 4.1*   CALCIUM 8.0* 7.8* 7.6*   ALBUMIN 3.5 3.2* 3.1*   PROT 6.6 6.2 5.6*   BILITOT 12.5* 12.9* 14.8*   ALKPHOS 98 91 93   * 236* 232*   * 306* 318*       All pertinent labs within the past 24 hours have been reviewed.    Significant Imaging: I have reviewed all pertinent imaging results/findings within the past 24 hours.

## 2024-04-06 NOTE — ASSESSMENT & PLAN NOTE
Chronic, uncontrolled. Latest blood pressure and vitals reviewed-     Temp:  [97.5 °F (36.4 °C)-99.5 °F (37.5 °C)]   Pulse:  [57-74]   Resp:  [10-39]   BP: ()/()   SpO2:  [83 %-99 %] .   Home meds for hypertension were reviewed and noted below.   Hypertension Medications               amLODIPine (NORVASC) 10 MG tablet Take 1 tablet (10 mg total) by mouth once daily.    carvediloL (COREG) 25 MG tablet Take 1 tablet (25 mg total) by mouth 2 (two) times daily with meals.    furosemide (LASIX) 40 MG tablet Take 1 tablet (40 mg total) by mouth once daily.    lisinopriL (PRINIVIL,ZESTRIL) 40 MG tablet Take 1 tablet (40 mg total) by mouth once daily.            While in the hospital, will manage blood pressure as follows; Adjust home antihypertensive regimen as follows- holding all home antihypertensive meds given shock. Can resume as clinically indicated when shock resolves.     Will utilize p.r.n. blood pressure medication only if patient's blood pressure greater than 180/110 and he develops symptoms such as worsening chest pain or shortness of breath.

## 2024-04-06 NOTE — HPI
73-year-old male with a known past medical history of hypertension, hyperlipidemia, gout, CKD, morbid obesity with a BMI of 42.8 that presented to the ER 4/5 for evaluation of abdominal pain with onset earlier that same morning.  Patient at the time of admission endorse vomiting, abdominal distention, and diarrhea.  Patient was noted to have a [place] [address] and creatinine of 1.6, elevated liver enzymes, lipase greater than 1000, triglyceride of 161, bili of 3.6, and a lactic acidosis of 2.9.  CT of the abdomen and pelvis revealed acute pancreatitis.  Abdominal ultrasound with hepatic steatosis and mild hepatomegaly with a normal gallbladder.  Patient was treated with IV fluids, antiemetics, pain meds, ppi, and antibiotics.  GI was consulted.  Patient was admitted to the hospital under the care of Hospital Medicine.  Early in the morning 4/6 patient with increased work of breathing and there was concern for volume overload.  Patient was placed on NIPPV and given a dose of diuretics and IV fluids were held.  Patient continued to need NIPPV and transfer orders placed to the ICU with consultation to critical care team.    Case was discussed with the GI team, Dr. Parks, who has spoken with the advanced GI [place] and they would like the patient transferred to [facility] for higher level of care and advanced GI.  Repeat labs with worsening renal failure, hyperkalemia, worsening liver enzymes as well as elevated T bili.  Orders to shift potassium has been placed.  IV fluids restarted.  Serial labs ordered.  [facility] has been consulted for transfer process.

## 2024-04-06 NOTE — ASSESSMENT & PLAN NOTE
Patient with Hypercapnic and Hypoxic Respiratory failure which is Acute.  he is not on home oxygen. Supplemental oxygen was provided and noted- Oxygen Concentration (%):  [] (P) 100    .   Signs/symptoms of respiratory failure include- tachypnea, increased work of breathing, respiratory distress, and lethargy. Contributing diagnoses includes - ARDS, Obesity Hypoventilation, and Pneumonia Labs and images were reviewed. Patient Has recent ABG, which has been reviewed. Will treat underlying causes and adjust management of respiratory failure as follows-     Transferred to [facility] MICU on Bipap with worsening O2 requirements  Plan for urgent intubation on arrival  -- a-line in place   -- wean O2 as tolerated  -- anticipate need for HD following intubation and ERCP

## 2024-04-06 NOTE — ASSESSMENT & PLAN NOTE
As per wife, patient with no prior history of CKD, baseline creatinine around 1.0 and eGFR>60 base on labs available to me. He presented to outside hospital for abdominal pain, received a dose of IV contrast for a CT of the abdomen, and found to have pancreatitis. Patient subsequently became hypotensive requiring the initiation of vasopressin and levophed for pressure support. Creatinine is now trending up, most recent potassium was noted to be 5.0 prior to transfer, and he is oliguric at the time of this consult.     Recommendations  -Recommend repeating renal function panel as soon as possible  -Dialysis consent was obtained from his wife, pending renal function panel and volume status after his procedure I anticipate him requiring SLED for metabolic clearance and volume management.  -Please send UA once urine is collected  -Avoid nephrotoxic agents (IV contrast, NSAID's, gadolinium contrast, PPI's) if able.   -Maintain MAP above 65 mmHg.   -Strict I's and O's  -Daily renal function panel  -Renally dose all medications

## 2024-04-06 NOTE — ASSESSMENT & PLAN NOTE
Likely biliary etiology  US and CT showed no evidence of CBD dilation or intrahepatic dilation however TB climbed from 3.6 to 12.5  No obvious stones seen in gallbladder however microlithiasis can cause pancreatitis  No necrosis on imaging  Bolused IV fluids in ED and was on 125cc/hr until 6am today (4/6/24)  Zosyn  NPO

## 2024-04-06 NOTE — HPI
Mr. Collado is a 73yoM with HFpEF, HTN, HLD, CKD, gout, morbid obesity who initially presented to [facility] with acute onset abdominal pain for 1 day on 4/5 associated with n/v/d.  In ED at OSH, elevated LFTs to 140-180s with T bili 3.6/ direct bilirubin of 2.0.  Lipase >1000.  Abdominal US with CBD of 7mm and no concerns of acute cholecystitis.  CT consistent with acute pancreatitis without gross evidence of biliary obstruction.  Patient does not have previous episodes of pancreatitis or no new medications recently started.  .  Patient started on IVF.  Noted to have worsening respiratory status and confused at OSH.  ABG with pH of 7.2, CO2 58, O2 68.  Fluids were stopped and IV Lasix was given with patient placed on Bipap.  Repeat blood work revealing of worsening LFTs and total bilirubin to 12.5.  Patient transferred to [facility] MICU for GI services with EUS/ERCP.

## 2024-04-06 NOTE — TREATMENT PLAN
Notified by nurse that patient becoming more altered, hypoxic, and increased work of breathing with audible wheezing and crackles.  Pulse oximetry decreased down to 85% on 3 L and improved back up to 92-95% on 5 L.  Patient currently admitted for acute pancreatitis and was on maintenance fluids as well as p.r.n. pain medications.  Per chart review, patient noted to have intermittent diastolic dysfunction with EF measuring 55% on 03/24/2023.  Patient also noted to have intermittent central venous pressures in setting of obesity with BMI measuring 42.83 increasing risk of underlying WISAM and pulmonary hypertension.  Fluids discontinued with stat ABG, BNP, 40 mg IV Lasix, and BiPAP ordered.  Further pain medications held.  We will monitor closely with low threshold to transfer to ICU for higher level of care if patient continues to decompensate.

## 2024-04-06 NOTE — ASSESSMENT & PLAN NOTE
Presented acutely  H/o of IGT  No h/o of alcohol  Pt is obese  Reviewed GI note  Noted pending transfer to  for ERCP

## 2024-04-06 NOTE — SUBJECTIVE & OBJECTIVE
Past Medical History:   Diagnosis Date    BPH (benign prostatic hyperplasia)     CKD (chronic kidney disease), stage III     Colon polyps 2015    Diarrhea in adult patient 02/10/2021    Generalized osteoarthrosis, involving multiple sites     History of gout     Hyperlipidemia     Hypertension     Hypogonadism male     IFG (impaired fasting glucose)     Lumbar disc disease with radiculopathy     Morbid obesity with BMI of 40.0-44.9, adult        Past Surgical History:   Procedure Laterality Date    COLONOSCOPY N/A 6/22/2020    Procedure: COLONOSCOPY;  Surgeon: Montserrat Conroy MD;  Location: [facility] ENDO;  Service: Endoscopy;  Laterality: N/A;    COLONOSCOPY N/A 2/10/2021    Procedure: COLONOSCOPY;  Surgeon: Jones Lemus III, MD;  Location: [facility] ENDO;  Service: Endoscopy;  Laterality: N/A;    ESOPHAGOGASTRODUODENOSCOPY N/A 2/10/2021    Procedure: EGD (ESOPHAGOGASTRODUODENOSCOPY);  Surgeon: Jones Lemus III, MD;  Location: [facility];  Service: Endoscopy;  Laterality: N/A;    EYE SURGERY      JOINT REPLACEMENT      KNEE SCOPE      open globe      right eye 1980    ROTATOR CUFF REPAIR      SHOULDER SURGERY Right        Review of patient's allergies indicates:   Allergen Reactions    No known drug allergies      Current Facility-Administered Medications   Medication Frequency    chlorhexidine 0.12 % solution 15 mL BID    dextrose 10% bolus 125 mL 125 mL PRN    dextrose 10% bolus 125 mL 125 mL PRN    dextrose 10% bolus 250 mL 250 mL PRN    dextrose 10% bolus 250 mL 250 mL PRN    dextrose 10% bolus 250 mL 250 mL PRN    dextrose 10% bolus 500 mL 500 mL Once    And    dextrose 10% bolus 250 mL 250 mL PRN    And    insulin regular injection 13.15 Units 0.1315 mL Once    glucagon (human recombinant) injection 1 mg PRN    glucagon (human recombinant) injection 1 mg PRN    hydrALAZINE injection 10 mg Q6H PRN    HYDROmorphone (PF) injection 1 mg Q3H PRN    insulin aspart U-100 pen 0-10 Units Q6H PRN    mupirocin 2 %  ointment BID    NORepinephrine 4 mg in dextrose 5% 250 mL infusion (premix) Continuous    ondansetron injection 4 mg Q6H PRN    pantoprazole injection 40 mg BID    piperacillin-tazobactam (ZOSYN) 4.5 g in dextrose 5 % in water (D5W) 100 mL IVPB (MB+) Q8H    sodium bicarbonate 150 mEq in sterile water 1,150 mL infusion Continuous    sodium zirconium cyclosilicate packet 10 g BID     Family History       Problem Relation (Age of Onset)    Cancer Cousin    Cataracts Father, Mother    Heart attack Father    Heart disease Brother    Hypertension Father, Mother          Tobacco Use    Smoking status: Former     Current packs/day: 0.00     Average packs/day: 1 pack/day for 30.0 years (30.0 ttl pk-yrs)     Types: Cigarettes     Start date: 1970     Quit date: 2000     Years since quittin.1    Smokeless tobacco: Current     Types: Snuff   Substance and Sexual Activity    Alcohol use: No    Drug use: No    Sexual activity: Not Currently     Review of Systems   Unable to perform ROS: Acuity of condition     Objective:     Vital Signs (Most Recent):  Temp: 97.9 °F (36.6 °C) (24 1030)  Pulse: 68 (24 1030)  Resp: (!) 39 (24 1030)  BP: (!) 115/54 (24 1030)  SpO2: (!) 87 % (24 1030) Vital Signs (24h Range):  Temp:  [97.5 °F (36.4 °C)-98.7 °F (37.1 °C)] 97.9 °F (36.6 °C)  Pulse:  [57-68] 68  Resp:  [18-39] 39  SpO2:  [83 %-95 %] 87 %  BP: ()/() 115/54     Weight: 131.5 kg (290 lb) (24 1624)  Body mass index is 42.83 kg/m².  Body surface area is 2.53 meters squared.    I/O last 3 completed shifts:  In: 100 [IV Piggyback:100]  Out: -      Physical Exam  Vitals and nursing note reviewed.   Constitutional:       Appearance: Normal appearance. He is obese. He is ill-appearing.   Cardiovascular:      Rate and Rhythm: Normal rate and regular rhythm.      Pulses: Normal pulses.      Heart sounds: Normal heart sounds.   Pulmonary:      Effort: Pulmonary effort is normal.       Breath sounds: Normal breath sounds.   Abdominal:      Palpations: Abdomen is soft.      Tenderness: There is no abdominal tenderness.   Musculoskeletal:      Right lower leg: No edema.      Left lower leg: No edema.   Neurological:      Mental Status: He is alert.          Significant Labs: reviewed  BMP  Lab Results   Component Value Date     04/06/2024    K 6.2 (H) 04/06/2024     04/06/2024    CO2 18 (L) 04/06/2024    BUN 42 (H) 04/06/2024    CREATININE 3.7 (H) 04/06/2024    CALCIUM 7.8 (L) 04/06/2024    ANIONGAP 16 04/06/2024    EGFRNORACEVR 17 (A) 04/06/2024     Lab Results   Component Value Date    WBC 14.13 (H) 04/06/2024    HGB 15.7 04/06/2024    HCT 48.8 04/06/2024    MCV 97 04/06/2024     04/06/2024     Lab Results   Component Value Date    LIPASE >1000 (H) 04/05/2024     ABG  Recent Labs   Lab 04/06/24  0556   PH 7.203*   PO2 68*   PCO2 58.4*   HCO3 23.0*   BE -5*       Significant Imaging: reviewed, CT abd c/w acute pancreatitis

## 2024-04-06 NOTE — NURSING
Pt leaving with flight team to [facility] [address].  Updated with the increase of levophed to 0.3 and the addition of vasopressin for BP.

## 2024-04-06 NOTE — ASSESSMENT & PLAN NOTE
Patient with Hypercapnic Respiratory failure which is Acute.  He is not on home oxygen. Supplemental oxygen was provided and noted- Oxygen Concentration (%):  [40] 40    .   Signs/symptoms of respiratory failure include- increased work of breathing and lethargy. Contributing diagnoses includes - Obesity Hypoventilation and volume overload  Labs and images were reviewed. Patient Has recent ABG, which has been reviewed. Will treat underlying causes and adjust management of respiratory failure as follows- BIPAP    Transferred to ICU

## 2024-04-07 NOTE — PROGRESS NOTES
04/06/24 2245   Treatment   Treatment Type SLED   Treatment Status New start   Dialysis Machine Number [id number]   Dialyzer Time (hours) 0   BVP (Liters) 0 L   Solutions Labeled and Current  Yes   Access Temporary Cath;Right;IJ   Catheter Dressing Intact  Yes   Alarms Engaged Yes   CRRT Comments SLED started per MD orders   Prescription   Time (Hours)   (24)   Dialysate K + (mEq/L) 3  (3K bath for 2 hours then switch to 4K)   Dialysate CA + (mEq/L) 2.25   Dialysate HCO3 - (Bicarb) (mEq/L) 30   Dialysate Na + (mEq/L)   (134)   Cartridge Type   (R300)   Dialysate Flow Rate (mL/min) 200   UF Goal Rate 300 mL/hr     SLED for 24 hours started per MD orders. VS stable to start tx. Starting UF set at 200 with a goal of 300. Report given to primary nurse.

## 2024-04-07 NOTE — PLAN OF CARE
[facility] - Cardiac Medical ICU  Initial Discharge Assessment       Primary Care Provider: Frantz Mason MD    Admission Diagnosis: Acute pancreatitis [K85.90]    Admission Date: 4/6/2024  Expected Discharge Date:     Transition of Care Barriers: None    Payor: HU[employer] MANAGED MEDICARE / Plan: HU[employer] MEDICARE HMO / Product Type: Capitation /     Extended Emergency Contact Information  Primary Emergency Contact: Annel Collado  Address:  [address] [place] [address]  Home Phone: [phone]  Mobile Phone: [phone]  Relation: Spouse    Discharge Plan A: Home with family  Discharge Plan B: Home      [facility] [place] - [address] [place] [address]  [address] [place] [facility]  [place] [address]  Phone: [phone] Fax: [phone]    [facility] - [facility] [address] [facility] [address]  [address] [facility] [address]  Phone: [phone] Fax: [phone]      Initial Assessment (most recent)       Adult Discharge Assessment - 04/07/24 1354          Discharge Assessment    Assessment Type Discharge Planning Assessment     Confirmed/corrected address, phone number and insurance Yes     Confirmed Demographics Correct on Facesheet     Source of Information family     Does patient/caregiver understand observation status Yes     Communicated GRICELDA with patient/caregiver Yes     Reason For Admission Acute pancreatits     People in Home spouse     Facility Arrived From: home     Do you expect to return to your current living situation? Yes     Do you have help at home or someone to help you manage your care at home? Yes     Who are your caregiver(s) and their phone number(s)? Spouse Annel     Prior to hospitilization cognitive status: Unable to Assess     Current cognitive status: Unable to Assess     Walking or Climbing Stairs Difficulty no     Dressing/Bathing Difficulty no     Home Accessibility wheelchair accessible     Home Layout Able to live on 1st floor     Equipment  Currently Used at Home cane, straight     Readmission within 30 days? No     Patient currently being followed by outpatient case management? No     Do you currently have service(s) that help you manage your care at home? No     Do you take prescription medications? Yes     Do you have prescription coverage? Yes     Do you have any problems affording any of your prescribed medications? No     Is the patient taking medications as prescribed? yes     Who is going to help you get home at discharge? spouse/family     How do you get to doctors appointments? car, drives self     Are you on dialysis? No     Do you take coumadin? No     Discharge Plan A Home with family     Discharge Plan B Home     DME Needed Upon Discharge  none     Discharge Plan discussed with: Spouse/sig other     Name(s) and Number(s) Annel Tobias     Transition of Care Barriers None        Physical Activity    On average, how many days per week do you engage in moderate to strenuous exercise (like a brisk walk)? 7 days     On average, how many minutes do you engage in exercise at this level? 60 min        Financial Resource Strain    How hard is it for you to pay for the very basics like food, housing, medical care, and heating? Not hard at all        Housing Stability    In the last 12 months, was there a time when you were not able to pay the mortgage or rent on time? No     In the last 12 months, how many places have you lived? 1     In the last 12 months, was there a time when you did not have a steady place to sleep or slept in a shelter (including now)? No        Transportation Needs    In the past 12 months, has lack of transportation kept you from medical appointments or from getting medications? No     In the past 12 months, has lack of transportation kept you from meetings, work, or from getting things needed for daily living? No        Food Insecurity    Within the past 12 months, you worried that your food would run out before you got the  money to buy more. Never true     Within the past 12 months, the food you bought just didn't last and you didn't have money to get more. Never true        Stress    Do you feel stress - tense, restless, nervous, or anxious, or unable to sleep at night because your mind is troubled all the time - these days? Not at all        Social Connections    In a typical week, how many times do you talk on the phone with family, friends, or neighbors? More than three times a week     How often do you get together with friends or relatives? More than three times a week     How often do you attend Yazidi or Zoroastrianism services? More than 4 times per year     Do you belong to any clubs or organizations such as Yazidi groups, unions, fraternal or athletic groups, or school groups? Yes     How often do you attend meetings of the clubs or organizations you belong to? More than 4 times per year     Are you , , , , never , or living with a partner?         Alcohol Use    Q1: How often do you have a drink containing alcohol? Patient unable to answer     Q2: How many drinks containing alcohol do you have on a typical day when you are drinking? Patient unable to answer     Q3: How often do you have six or more drinks on one occasion? Patient unable to answer        OTHER    Name(s) of People in Home Annel Collado Spouse                   Discharge Plan A and Plan B have been determined by review of patient's clinical status, future medical and therapeutic needs, and coverage/benefits for post-acute care in coordination with multidisciplinary team members.

## 2024-04-07 NOTE — PLAN OF CARE
MICU DAILY GOALS     Family/Goals of care/Code Status   Code Status: Full Code    24H Vital Sign Range  Temp:  [98.6 °F (37 °C)-99.9 °F (37.7 °C)]   Pulse:  [66-90]   Resp:  []   SpO2:  [87 %-96 %]   Arterial Line BP: ()/(55-81)      Shift Events (include procedures and significant events)   Pt proned. Tolerated well. Pt placed back on CRRT w/ Calcium Gluconate. Tolerating well.Tube feed started at 10ml/hr. FiO2 down to 80%.    AWAKE RASS: Goal - RASS Goal: -5-->unarousable  Actual - RASS (Parry Agitation-Sedation Scale): unarousable    Restraint necessity: Not necessary   BREATHE SBT: Not attempted    Coordinate A & B, analgesics/sedatives Pain: managed   SAT: Not attempted   Delirium CAM-ICU: Overall CAM-ICU: Positive   Early(intubated/ Progressive (non-intubated) Mobility MOVE Screen (INTUBATED ONLY): Not attempted    Activity: Activity Management: Patient unable to perform activities   Feeding/Nutrition Diet order: Diet/Nutrition Received: NPO,     Thrombus DVT prophylaxis: VTE Required Core Measure: Pharmacological prophylaxis initiated/maintained   HOB Elevation Head of Bed (HOB) Positioning: HOB at 30 degrees   Ulcer Prophylaxis GI: yes   Glucose control managed     Skin Skin assessed during: Daily Assessment    Sacrum intact/not altered? Yes  Heels intact/not altered? Yes  Surgical wound? No    CHECK ONE!   (no altered skin or altered skin) and sub boxes:  [x] No Altered Skin Integrity Present    []Prevention Measures Documented    [] Altered Skin Integrity Present or Discovered   [] LDA present in EPIC, daily doc completed              [] LDA added if not in EPIC (describe wound).                    When describing wound, do not stage, use descriptive words only.    [] Wound Image Taken (required on admit,                   transfer/discharge and every Tuesday)    Wound Care Consulted? No    4 EYES:  Attending Nurse (1st set of eyes): JERRY Gomez    Second RN/Staff Member (2nd set of eyes):  Mac, RN   Bowel Function constipation    Indwelling Catheter Necessity      Urethral Catheter 04/06/24 1000-Reason for Continuing Urinary Catheterization: Urinary retention, Critically ill in ICU and requiring hourly monitoring of intake/output    PICC Double Lumen 04/06/24 1136 right basilic-Line Necessity Review: Medication caustic to vasculature, Hemodynamic instability  Trialysis (Dialysis) Catheter 04/06/24 1836 right internal jugular-Line Necessity Review: Hemodynamic instability, CRRT/HD     De-escalation Antibiotics No        VS and assessment per flow sheet, patient progressing towards goals as tolerated, plan of care reviewed with family, all concerns addressed, will continue to monitor.

## 2024-04-07 NOTE — NURSING
Proned patient at 0930 with Dr. Salazar at bedside. Pt tolerated well and is stable. Will continue to monitor.

## 2024-04-07 NOTE — PLAN OF CARE
MICU DAILY GOALS     Family/Goals of care/Code Status   Code Status: Full Code    24H Vital Sign Range  Temp:  [97.9 °F (36.6 °C)-100 °F (37.8 °C)]   Pulse:  [65-88]   Resp:  []   BP: ()/(44-71)   SpO2:  [87 %-99 %]   Arterial Line BP: ()/(55-81)      Shift Events (include procedures and significant events)   Patient taken to IR by anesthesia at beginning for shift for ERCP. Potassium shifted with insulin. Started on CRRT second half of the shift. Decrease PaO2 throughout the night. Nimbex gtt initiated.     AWAKE RASS: Goal -    Actual - RASS (Parry Agitation-Sedation Scale): unarousable    Restraint necessity: Not necessary   BREATHE SBT: Not attempted    Coordinate A & B, analgesics/sedatives Pain: managed   SAT: Not attempted   Delirium CAM-ICU: Overall CAM-ICU: Positive   Early(intubated/ Progressive (non-intubated) Mobility MOVE Screen (INTUBATED ONLY): NA    Activity: Activity Management: Patient unable to perform activities   Feeding/Nutrition Diet order: Diet/Nutrition Received: NPO,     Thrombus DVT prophylaxis: VTE Required Core Measure: Pharmacological prophylaxis initiated/maintained   HOB Elevation Head of Bed (HOB) Positioning: HOB at 30-45 degrees   Ulcer Prophylaxis GI: yes   Glucose control managed     Skin Skin assessed during: Daily Assessment    Sacrum intact/not altered? Yes  Heels intact/not altered? Yes  Surgical wound? No    CHECK ONE!   (no altered skin or altered skin) and sub boxes:  [x] No Altered Skin Integrity Present    [x]Prevention Measures Documented    [] Altered Skin Integrity Present or Discovered   [] LDA present in EPIC, daily doc completed              [] LDA added if not in EPIC (describe wound).                    When describing wound, do not stage, use descriptive words only.    [] Wound Image Taken (required on admit,                   transfer/discharge and every Tuesday)    Wound Care Consulted? No    4 EYES:  Attending Nurse (1st set of eyes):  Galina Porras, RN    Second RN/Staff Member (2nd set of eyes): Felicita Crespo RN   Bowel Function no issues    Indwelling Catheter Necessity      Urethral Catheter 04/06/24 1000-Reason for Continuing Urinary Catheterization: Urinary retention    PICC Double Lumen 04/06/24 1136 right basilic-Line Necessity Review: Medication caustic to vasculature, Hemodynamic instability  Trialysis (Dialysis) Catheter 04/06/24 1836 right internal jugular-Line Necessity Review: CRRT/HD, Hemodynamic instability     De-escalation Antibiotics No        VS and assessment per flow sheet, patient progressing towards goals as tolerated, plan of care reviewed with family, all concerns addressed, will continue to monitor.

## 2024-04-07 NOTE — CARE UPDATE
Notified by ICU team that patient returned form OR, remains intubated on 100% FiO2, concerns for pulmonary edema and ARDS, ABG showing respiratory acidosis and hypoxia. Patient still anuric with concerns of worsening fluid overload. Labs showing hyperkalemia of 6.8, unfortunately the patient is on high dose Levophed and is also on Vasopressin for pressor support.    Recommendations  -SLED for metabolic clearance and fluid removal. Will run him on at 36.0 C to assist with his hypotension, in an attempt to improve chances that he will remain on SLED.    -If patient's pressor requirements increase, then I plan to decrease UF to 200 mL/hr and not pull any fluid.  -Shift potassium until SLED can be initiated  -Please obtain repeat chest xray now. If there is not significant pulmonary edema to explain his high FiO2, then I will not remove any UF due to his high pressor requirements.

## 2024-04-07 NOTE — ASSESSMENT & PLAN NOTE
As per wife, patient with no prior history of CKD, baseline creatinine around 1.0 and eGFR>60 base on labs available to me. He presented to outside hospital for abdominal pain, received a dose of IV contrast for a CT of the abdomen, and found to have pancreatitis. Patient subsequently became hypotensive requiring the initiation of vasopressin and levophed for pressure support. Creatinine is now trending up, most recent potassium was noted to be 5.0 prior to transfer, and he is oliguric at the time of this consult.     Recommendations  -Continue SLED for 24 hours hours, UF as able, goal UF is net negative 1.0L over the next 24 hours.  -If he clots off again on SLED will add citrate at 80 with calcium supplementation.   -Dialysis consent was obtained from his wife, pending renal function panel and volume status after his procedure I anticipate him requiring SLED for metabolic clearance and volume management.  -Avoid nephrotoxic agents (IV contrast, NSAID's, gadolinium contrast, PPI's) if able.   -Maintain MAP above 65 mmHg.   -Strict I's and O's  -Daily renal function panel  -Renally dose all medications

## 2024-04-07 NOTE — RESPIRATORY THERAPY
Upon arrival to change tube jacobo (for mepilex placement under new tube jacobo for proning) pt noted to have a L lower lip bruise. RN notified.

## 2024-04-07 NOTE — ASSESSMENT & PLAN NOTE
Body mass index is 42.81 kg/m². Morbid obesity complicates all aspects of disease management from diagnostic modalities to treatment. Weight loss encouraged and health benefits explained to patient.

## 2024-04-07 NOTE — ASSESSMENT & PLAN NOTE
Concern for cholangitis with worsening pressor requirements and septic shock.  Transferred to [facility] MICU with worsening O2 requirements on Bipap.  Lipase >1000 with worsening LFTs and bilirubin.  . Likely biliary etiology.  Imaging without clear evidence of CBD dilation or obvious stones/necrosis.  Acute pancreatitis seen on imaging. AES consulted on admission, plan to take patient for emergent ERCP (4/6/24)    - on prop/fent, nimbex, levo/vaso  - Zosyn, switched to meropenem  - AES following, performed ERCP and placed cbd duct stent  - will monitor LFTs and amylase/lipase  - B/c showed GPCs, on vanc but awaiting ID/sensitivities, ID consulted  - NPO, starting trickle TFs  - IVF prn  - started stress dose steroids (dex)

## 2024-04-07 NOTE — PROGRESS NOTES
[facility] - Cardiac Medical ICU  Critical Care Medicine  Progress Note    Patient Name: Enoc Collado  MRN: [id number]  Admission Date: 4/6/2024  Hospital Length of Stay: 1 days  Code Status: Full Code  Attending Provider: Aaron Salazar MD  Primary Care Provider: Frantz Mason MD   Principal Problem: Acute biliary pancreatitis without infection or necrosis    Subjective:     HPI:  Mr. Collado is a 73yoM with HFpEF, HTN, HLD, CKD, gout, morbid obesity who initially presented to [facility] with acute onset abdominal pain for 1 day on 4/5 associated with n/v/d.  In ED at OSH, elevated LFTs to 140-180s with T bili 3.6/ direct bilirubin of 2.0.  Lipase >1000.  Abdominal US with CBD of 7mm and no concerns of acute cholecystitis.  CT consistent with acute pancreatitis without gross evidence of biliary obstruction.  Patient does not have previous episodes of pancreatitis or no new medications recently started.  .  Patient started on IVF.  Noted to have worsening respiratory status and confused at OSH.  ABG with pH of 7.2, CO2 58, O2 68.  Fluids were stopped and IV Lasix was given with patient placed on Bipap.  Repeat blood work revealing of worsening LFTs and total bilirubin to 12.5.  Patient transferred to [facility] MICU for GI services with EUS/ERCP.    Hospital/ICU Course:  Pt was transferred from [facility] to [facility] MICU for emergent EUS/ERCP due to acute interstitial pancreatitis. On arrival per, pt was requiring continuous bipap and producing no urine despite receiving lasix. Was on max peripheral levophed to maintain blood pressures. Pt required right radial arterial line and right internal jugular vein trialysis line. Continued zosyn. GI performed ERCP and placed CBD stent. Now on increased levo/vaso, propofol, fentanyl and maximum ventilator settings. Pt pO2 level in 60s on ABG. Discussed with family who consented to proning patient after being told risks/benefits. Discussed possible prognosis and  family is amenable but would like to continue full code status.     Interval History: Pt underwent arterial and trialysis line placement and was intubated. Pt underwent ERCP overnight and cbd stent placed. K found to be 6.2. Pt underwent SLED and had 2 sessions which both clotted off. CRRT was started which also clotted off. Repeat K 5.1. Concern for ARDS, PEEP increased from 8 - 14 with maxed on vent settings. Will discuss with family need to prone patient (benefits/risks). pO2 65 on ABG.     Review of Systems   Unable to perform ROS: Acuity of condition     Objective:     Vital Signs (Most Recent):  Temp: 99 °F (37.2 °C) (04/07/24 1515)  Pulse: 86 (04/07/24 1534)  Resp: (!) 30 (04/07/24 1515)  BP: 104/71 (04/06/24 1606)  SpO2: (!) 90 % (04/07/24 1515) Vital Signs (24h Range):  Temp:  [98.6 °F (37 °C)-100 °F (37.8 °C)] 99 °F (37.2 °C)  Pulse:  [66-90] 86  Resp:  [] 30  SpO2:  [87 %-96 %] 90 %  BP: ()/(67-71) 104/71  Arterial Line BP: ()/(55-81) 100/68   Weight: 131.5 kg (289 lb 14.5 oz)  Body mass index is 42.81 kg/m².      Intake/Output Summary (Last 24 hours) at 4/7/2024 1541  Last data filed at 4/7/2024 1500  Gross per 24 hour   Intake 7147.74 ml   Output 3881 ml   Net 3266.74 ml            Physical Exam  Vitals and nursing note reviewed.   Constitutional:       Appearance: He is obese.   HENT:      Head: Normocephalic and atraumatic.      Mouth/Throat:      Mouth: Mucous membranes are dry.      Pharynx: Oropharynx is clear.   Eyes:      Extraocular Movements: Extraocular movements intact.      Conjunctiva/sclera: Conjunctivae normal.   Cardiovascular:      Rate and Rhythm: Normal rate and regular rhythm.      Pulses: Normal pulses.      Comments: R IJ trialysis catheter, R radial arterial line  Pulmonary:      Effort: No respiratory distress.      Breath sounds: No wheezing.      Comments: Intubated mechanical breath sounds  Chest:      Chest wall: No tenderness.   Abdominal:      General:  There is distension.      Palpations: Abdomen is soft.      Tenderness: Tenderness: LUQ.   Genitourinary:     Comments: macias  Musculoskeletal:         General: No tenderness. Normal range of motion.      Cervical back: Normal range of motion and neck supple.      Right lower leg: No edema.      Left lower leg: No edema.   Skin:     General: Skin is warm and dry.   Neurological:      Mental Status: He is lethargic.   Psychiatric:         Behavior: Behavior is cooperative.            Vents:  Vent Mode: A/C (04/07/24 1302)  Ventilator Initiated: Yes (04/06/24 1758)  Set Rate: 32 BPM (04/07/24 1302)  Vt Set: 450 mL (04/07/24 1302)  PEEP/CPAP: 12 cmH20 (04/07/24 1302)  Oxygen Concentration (%): 80 (04/07/24 1515)  Peak Airway Pressure: 28 cmH20 (04/07/24 1302)  Plateau Pressure: 27 cmH20 (04/07/24 1302)  Total Ve: 15 L/m (04/07/24 1302)  Negative Inspiratory Force (cm H2O): 0 (04/07/24 1302)  F/VT Ratio<105 (RSBI): (!) 68.67 (04/07/24 1302)  Lines/Drains/Airways       Peripherally Inserted Central Catheter Line  Duration             PICC Double Lumen 04/06/24 1136 right basilic 1 day              Central Venous Catheter Line  Duration             Trialysis (Dialysis) Catheter 04/06/24 1836 right internal jugular <1 day              Drain  Duration                  Urethral Catheter 04/06/24 1000 1 day         NG/OG Tube 04/06/24 2300 [place] mouth <1 day              Airway  Duration                  Airway - Non-Surgical 04/06/24 1748 Endotracheal Tube <1 day              Arterial Line  Duration             Arterial Line 04/06/24 1647 Right Radial <1 day              Peripheral Intravenous Line  Duration                  Peripheral IV - Single Lumen 04/06/24 0041 20 G Anterior;Right Forearm 1 day         Peripheral IV - Single Lumen 04/06/24 1021 20 G Anterior;Left Shoulder 1 day                  Significant Labs:    CBC/Anemia Profile:  Recent Labs   Lab 04/06/24  0644 04/06/24  1700 04/06/24 2029  04/07/24 0316 04/07/24  0416 04/07/24  0815   WBC 14.13*  --  20.67* 16.74*  --   --    HGB 15.7  --  14.9 15.1  --   --    HCT 48.8   < > 46.8 46.8 50 49     --  227 242  --   --    MCV 97  --  97 98  --   --    RDW 14.8*  --  15.4* 15.4*  --   --     < > = values in this interval not displayed.          Chemistries:  Recent Labs   Lab 04/06/24  1338 04/06/24  1338 04/06/24 2029 04/06/24 2154 04/06/24 2348 04/07/24 0316 04/07/24  1409   *  --  129* 128* 130* 124* 124*   K 5.0  --  6.8* 6.9* 6.2*  6.2* 5.1 5.2*     --  101 98 99 95 94*   CO2 18*  --  18* 16* 15* 17* 17*   BUN 47*  --  52* 53* 44* 36* 26*   CREATININE 4.1*  --  4.4* 4.5* 4.0* 3.6* 3.3*   CALCIUM 7.6*  --  7.1* 7.3* 7.9* 7.2* 7.2*   ALBUMIN 3.1*  --  3.0* 2.9*  --  2.9* 2.6*   PROT 5.6*  --  6.2  --   --  6.3  --    BILITOT 14.8*  --  14.1*  --   --  12.1*  --    ALKPHOS 93  --  96  --   --  104  --    *  --  257*  --   --  251*  --    *  --  387*  --   --  421*  --    MG  --    < > 1.6 1.6  --  1.6 1.8   PHOS  --    < > 5.0* 5.1*  --  3.5 4.3    < > = values in this interval not displayed.         All pertinent labs within the past 24 hours have been reviewed.    Significant Imaging: I have reviewed all pertinent imaging results/findings within the past 24 hours.    ABG  Recent Labs   Lab 04/07/24  1102   PH 7.265*   PO2 101*   PCO2 46.8*   HCO3 21.3*   BE -6*     Assessment/Plan:     Pulmonary  Acute hypoxemic respiratory failure  Patient with Hypercapnic and Hypoxic Respiratory failure which is Acute.  he is not on home oxygen. Supplemental oxygen was provided and noted- Vent Mode: A/C  Oxygen Concentration (%):  [] 80  Resp Rate Total:  [26 br/min-32 br/min] 32 br/min  Vt Set:  [450 mL] 450 mL  PEEP/CPAP:  [5 dgG15-54 cmH20] 12 cmH20  Mean Airway Pressure:  [12 qhK59-21 cmH20] 20 cmH20    .   Signs/symptoms of respiratory failure include- tachypnea, increased work of breathing, respiratory distress, and  lethargy. Contributing diagnoses includes - ARDS, Obesity Hypoventilation, and Pneumonia Labs and images were reviewed. Patient Has recent ABG, which has been reviewed. Will treat underlying causes and adjust management of respiratory failure as follows-     Transferred to [facility] MICU on Bipap with worsening O2 requirements  Concern for ARDS 2/2 to acute interstitial pancreatitis  Intubated  - pt s/p ERCP with CBD stent placement  -- a-line in place   -- wean O2 as tolerated  - P/F ratio 65, sating 90% on max ventilator settings.   - Pt requiring maximum ventilator settings sating 90% with PO2 of 65 while supine, PEEP increase from 8 to 14. Proned patient this morning (4/7/24) @ 9:24 am. ABG 1 hr post proning showed PO2 increase to 101, weaned FIO2 down to 80% and PEEP 12. Will obtain ABG prior to repositioning overnight.       Cardiac/Vascular  Shock  See Acute biliary pancreatitis plan    Hyperlipidemia  -- most recent lipid panel on 10/2023 with   -- on home pravastatin 20mg qd  -- holding on admission given elevated LFTs    Essential hypertension  Chronic, uncontrolled. Latest blood pressure and vitals reviewed-     Temp:  [98.6 °F (37 °C)-100 °F (37.8 °C)]   Pulse:  [66-90]   Resp:  []   BP: ()/(67-71)   SpO2:  [87 %-96 %]   Arterial Line BP: ()/(55-81) .   Home meds for hypertension were reviewed and noted below.   Hypertension Medications               amLODIPine (NORVASC) 10 MG tablet Take 1 tablet (10 mg total) by mouth once daily.    carvediloL (COREG) 25 MG tablet Take 1 tablet (25 mg total) by mouth 2 (two) times daily with meals.    furosemide (LASIX) 40 MG tablet Take 1 tablet (40 mg total) by mouth once daily.    lisinopriL (PRINIVIL,ZESTRIL) 40 MG tablet Take 1 tablet (40 mg total) by mouth once daily.            While in the hospital, will manage blood pressure as follows; Adjust home antihypertensive regimen as follows- holding all home antihypertensive meds given shock. Can  "resume as clinically indicated when shock resolves.     Will utilize p.r.n. blood pressure medication only if patient's blood pressure greater than 180/110 and he develops symptoms such as worsening chest pain or shortness of breath.      Renal/  Acute renal failure superimposed on chronic kidney disease  Patient with acute kidney injury/acute renal failure likely due to acute tubular necrosis caused by severe shock and acute infectious process.  STEFANIE is currently worsening. Baseline creatinine  1.0  - Labs reviewed- Renal function/electrolytes with Estimated Creatinine Clearance: 27.2 mL/min (A) (based on SCr of 3.3 mg/dL (H)). according to latest data. Monitor urine output and serial BMP and adjust therapy as needed. Avoid nephrotoxins and renally dose meds for GFR listed above.    Worsening renal failure during admission likely ischemic ATN s/o shock and sepsis. RF c/b hyperkalemia and acidosis. Nephrology following at OSH. Anticipate patient will likely need RRT on current admission.    -- nephrology consulted, appreciate recs. Pt received SLED, CRRT, plan for SLED next 24 hrs.   --  trialysis line placed  -- maintain MAP > 65  -- renally dose meds and avoid nephrotoxic meds   -- monitoring electrolytes closely on labs      CKD (chronic kidney disease), stage III  -- see "acute renal failure"    Endocrine  Morbid obesity with BMI of 40.0-44.9, adult  Body mass index is 42.81 kg/m². Morbid obesity complicates all aspects of disease management from diagnostic modalities to treatment. Weight loss encouraged and health benefits explained to patient.       GI  * Acute biliary pancreatitis without infection or necrosis  Concern for cholangitis with worsening pressor requirements and septic shock.  [place] [facility] with worsening O2 requirements on Bipap.  Lipase >1000 with worsening LFTs and bilirubin.  . Likely biliary etiology.  Imaging without clear evidence of CBD dilation or obvious stones/necrosis. "  Acute pancreatitis seen on imaging. AES consulted on admission, plan to take patient for emergent ERCP (4/6/24)    - on prop/fent, nimbex, levo/vaso  - Zosyn, switched to meropenem  - AES following, performed ERCP and placed cbd duct stent  - will monitor LFTs and amylase/lipase  - B/c showed GPCs, on vanc but awaiting ID/sensitivities, ID consulted  - NPO, starting trickle TFs  - IVF prn  - started stress dose steroids (dex)    Orthopedic  History of gout  -- hold home allopurinol s/o acute renal failure    Palliative Care  ACP (advance care planning)    Advance Care Planning Code Status  Advance Care Planning    Date: 04/06/2024    Code Status  In light of the patients advanced and life limiting illness,I engaged the the  pt's spouse, Annel Collado  in a voluntary conversation about the patient's preferences for care  at the very end of life. The pt's spouse stated patient will be FULL CODE. Along those lines, the patient does wish to have CPR or other invasive treatments performed when his heart and/or breathing stops. I communicated to the  pt's spouse, Annel Collado  that a FULL CODE STATUS will remain on the patient's chart. I spent a total of 30 minutes engaging the patient in this advance care planning discussion.                 Critical Care Daily Checklist:    A: Awake: RASS Goal/Actual Goal: RASS Goal: -5-->unarousable  Actual:     B: Spontaneous Breathing Trial Performed?     C: SAT & SBT Coordinated?  no                      D: Delirium: CAM-ICU Overall CAM-ICU: Positive   E: Early Mobility Performed? No   F: Feeding Goal: Goals: Meet % EEN, EPN by RD f/u date  Status: Nutrition Goal Status: new   Current Diet Order   Procedures    Diet NPO      AS: Analgesia/Sedation Prop/fent/nimbex   T: Thromboembolic Prophylaxis heparin   H: HOB > 300 Yes   U: Stress Ulcer Prophylaxis (if needed) Ppi bid   G: Glucose Control ssi   B: Bowel Function     I: Indwelling Catheter (Lines & [place]) Necessity Right  basilic PICC, R IJ trialysis, 2 PIV, NG, right arterial line, macias   D: De-escalation of Antimicrobials/Pharmacotherapies Vanc/meropenem    Plan for the day/ETD Trial proning and wean ventilator as possible    Code Status:  Family/Goals of Care: Full Code         Critical secondary to Patient has a condition that poses threat to life and bodily function: Severe Respiratory Distress, Acute Renal Failure, and ARDS secondary to acute interstitial pancreatitis      Critical care was time spent personally by me on the following activities: development of treatment plan with patient or surrogate and bedside caregivers, discussions with consultants, evaluation of patient's response to treatment, examination of patient, ordering and performing treatments and interventions, ordering and review of laboratory studies, ordering and review of radiographic studies, pulse oximetry, re-evaluation of patient's condition. This critical care time did not overlap with that of any other provider or involve time for any procedures.     Eugenia James MD  Critical Care Medicine  [facility] - Cardiac Medical ICU

## 2024-04-07 NOTE — PLAN OF CARE
Recommendations     When/if able, initiate TFs. With current Propofol rate, rec'd Novasource @ 20 mL/hr to provide 1897 kcals (937 from Propofol), 44 g of protein, 344 mL fluid.  When Propofol discontinued, rec'd Novasource @ 40 mL/hr to provide 1920 kcals, 87 g of protein, 688 mL fluid.  RD to monitor & follow-up.     Goals: Meet % EEN, EPN by RD f/u date  Nutrition Goal Status: new  Communication of RD Recs: reviewed with RN

## 2024-04-07 NOTE — PROGRESS NOTES
Pharmacokinetic Initial Assessment: IV Vancomycin    Assessment/Plan:    Initiate intravenous vancomycin with loading dose of 2000 mg once with subsequent doses when random concentrations are less than 20 mcg/mL  Desired empiric serum trough concentration is 15 to 20 mcg/mL  Draw vancomycin random level on 4/7 with AM labs (0300).  Pharmacy will continue to follow and monitor vancomycin.      Please contact pharmacy at extension [phone] with any questions regarding this assessment.     Thank you for the consult,   Elissa Turneruyen       Patient brief summary:  Enoc Collado is a 73 y.o. male initiated on antimicrobial therapy with IV Vancomycin for treatment of suspected sepsis    Drug Allergies:   Review of patient's allergies indicates:   Allergen Reactions    No known drug allergies        Actual Body Weight:   131.5kg    Renal Function:   Estimated Creatinine Clearance: 19.9 mL/min (A) (based on SCr of 4.5 mg/dL (H)).,     Dialysis Method (if applicable):  SLED    CBC (last 72 hours):  Recent Labs   Lab Result Units 04/05/24  1454 04/05/24  1844 04/06/24  0644 04/06/24 2029   WBC K/uL 14.43*  --  14.13* 20.67*   Hemoglobin g/dL 15.8 15.8 15.7 14.9   Hematocrit % 48.0 47.4 48.8 46.8   Platelets K/uL 237  --  217 227   Gran % % 82.6*  --  87.1* 84.6*   Lymph % % 7.3*  --  4.5* 4.6*   Mono % % 8.2  --  6.8 9.1   Eosinophil % % 1.1  --  0.8 0.0   Basophil % % 0.3  --  0.3 0.4   Differential Method  Automated  --  Automated Automated       Metabolic Panel (last 72 hours):  Recent Labs   Lab Result Units 04/05/24  1454 04/06/24  0644 04/06/24  0949 04/06/24  1338 04/06/24  1709 04/06/24 2029 04/06/24  2154   Sodium mmol/L 140 137 138 135*  --  129* 128*   Potassium mmol/L 4.7 6.7* 6.2* 5.0  --  6.8* 6.9*   Chloride mmol/L 105 106 104 103  --  101 98   CO2 mmol/L 21* 17* 18* 18*  --  18* 16*   Glucose mg/dL 118* 137* 155* 199*  --  189* 294*   Glucose, UA   --   --   --   --  3+*  --   --    BUN mg/dL 22 38* 42* 47*  --   52* 53*   Creatinine mg/dL 1.6* 3.2* 3.7* 4.1*  --  4.4* 4.5*   Albumin g/dL 3.5 3.5 3.2* 3.1*  --  3.0* 2.9*   Total Bilirubin mg/dL 3.6* 12.5* 12.9* 14.8*  --  14.1*  --    Alkaline Phosphatase U/L 98 98 91 93  --  96  --    AST U/L 184* 278* 306* 318*  --  387*  --    ALT U/L 148* 249* 236* 232*  --  257*  --    Magnesium mg/dL  --   --   --   --   --  1.6 1.6   Phosphorus mg/dL  --   --   --   --   --  5.0* 5.1*       Drug levels (last 3 results):  Recent Labs   Lab Result Units 04/06/24  2219   Vancomycin, Random ug/mL <1.4       Microbiologic Results:  Microbiology Results (last 7 days)       Procedure Component Value Units Date/Time    Blood culture [[id number]] Collected: 04/06/24 1838    Order Status: Sent Specimen: Blood from Line, Jugular, Internal Right Updated: 04/06/24 1846    Blood culture [[id number]] Collected: 04/06/24 1732    Order Status: Sent Specimen: Blood from Peripheral, Lower Arm, Right Updated: 04/06/24 7139

## 2024-04-07 NOTE — PROGRESS NOTES
04/07/24 0001   Treatment   Treatment Type SLED   Treatment Status Blood returned;Blood lost;Clotting   Dialysis Machine Number [id number]   Dialyzer Time (hours) 1.21   BVP (Liters) 12.1 L   Solutions Labeled and Current  Yes   Access Temporary Cath;Right;IJ   Catheter Dressing Intact  Yes   Alarms Engaged Yes   CRRT Comments venous chamber clotted, unable to return venous side   CRRT Hourly Documentation   Total UF (Hourly Cleared) (mL) 202     System clotted, unable to rinse venous side d/t clotted venous chamber. Dr. Vera of nephrology informed of clotting event. Primary team with plans to prone pt. Per primary nurse, CRRT to be restarted after repositioning pt to prone.

## 2024-04-07 NOTE — ASSESSMENT & PLAN NOTE
Patient with Hypercapnic and Hypoxic Respiratory failure which is Acute.  he is not on home oxygen. Supplemental oxygen was provided and noted- Vent Mode: A/C  Oxygen Concentration (%):  [] 80  Resp Rate Total:  [26 br/min-32 br/min] 32 br/min  Vt Set:  [450 mL] 450 mL  PEEP/CPAP:  [5 pmA63-04 cmH20] 12 cmH20  Mean Airway Pressure:  [12 wbG41-88 cmH20] 20 cmH20    .   Signs/symptoms of respiratory failure include- tachypnea, increased work of breathing, respiratory distress, and lethargy. Contributing diagnoses includes - ARDS, Obesity Hypoventilation, and Pneumonia Labs and images were reviewed. Patient Has recent ABG, which has been reviewed. Will treat underlying causes and adjust management of respiratory failure as follows-     Transferred to [facility] MICU on Bipap with worsening O2 requirements  Concern for ARDS 2/2 to acute interstitial pancreatitis  Intubated  - pt s/p ERCP with CBD stent placement  -- a-line in place   -- wean O2 as tolerated  - P/F ratio 65, sating 90% on max ventilator settings.   - Pt requiring maximum ventilator settings sating 90% with PO2 of 65 while supine, PEEP increase from 8 to 14. Proned patient this morning (4/7/24) @ 9:24 am. ABG 1 hr post proning showed PO2 increase to 101, weaned FIO2 down to 80% and PEEP 12. Will obtain ABG prior to repositioning overnight.

## 2024-04-07 NOTE — PROCEDURES
"Enoc Collado is a 73 y.o. male patient.    Temp: 99.1 °F (37.3 °C) (24)  Pulse: 69 (24)  Resp: (!) 33 (24)  BP: 104/71 (24 1606)  SpO2: (!) 91 % (24)  Weight: 131.5 kg (289 lb 14.5 oz) (24 1615)       Arterial Line    Date/Time: 2024 5:00 PM  Location procedure was performed: [facility]    Performed by: Aaron Salazar MD  Authorized by: Aaron Salazar MD  Consent Done: Yes  Consent: Written consent obtained.  Risks and benefits: risks, benefits and alternatives were discussed  Consent given by: spouse  Patient identity confirmed:  and MRN  Time out: Immediately prior to procedure a "time out" was called to verify the correct patient, procedure, equipment, support staff and site/side marked as required.  Preparation: Patient was prepped and draped in the usual sterile fashion.  Indications: respiratory failure and hemodynamic monitoring  Location: right radial    Anesthesia:  Anesthetic total: 0 mL    Patient sedated: no  Madhu's test normal: yes  Needle gauge: 22  Seldinger technique: Seldinger technique used  Number of attempts: 1  Complications: No  Specimens: No  Implants: No  Post-procedure: line sutured  Post-procedure CMS: normal  Patient tolerance: Patient tolerated the procedure well with no immediate complications          2024    "

## 2024-04-07 NOTE — PROGRESS NOTES
Pharmacist Renal Dose Adjustment Note    Enoc Collado is a 73 y.o. male being treated with the medication meropenem 2 gm every 8 hours    Patient Data:    Vital Signs (Most Recent):  Temp: 99 °F (37.2 °C) (04/07/24 1400)  Pulse: 84 (04/07/24 1400)  Resp: (!) 32 (04/07/24 1400)  BP: 104/71 (04/06/24 1606)  SpO2: (!) 90 % (04/07/24 1400) Vital Signs (72h Range):  Temp:  [97.5 °F (36.4 °C)-100 °F (37.8 °C)]   Pulse:  [57-90]   Resp:  []   BP: ()/()   SpO2:  [83 %-99 %]   Arterial Line BP: ()/(55-81)      Recent Labs   Lab 04/06/24  2154 04/06/24  2348 04/07/24  0316   CREATININE 4.5* 4.0* 3.6*     Serum creatinine: 3.6 mg/dL (H) 04/07/24 0316  Estimated creatinine clearance: 24.9 mL/min (A)    Medication:meropenem 2 gm every 8 hours will be changed to meropenem 1 gm every 12 hours    Pharmacist's Name: Jayna Wisdom  Pharmacist's Extension: [phone]

## 2024-04-07 NOTE — PROGRESS NOTES
04/07/24 1012   Treatment   Treatment Type SLED   Treatment Status Restart   Dialysis Machine Number [id number]   Solutions Labeled and Current  Yes   Access Temporary Cath;Right;IJ   Catheter Dressing Intact  Yes   Alarms Engaged Yes   CRRT Comments CRRT restarted post prone     SLED started without issue, pt left in NAD/VSS. Report given to primary RN, will increase UF to goal as tolerated.

## 2024-04-07 NOTE — PROGRESS NOTES
04/07/24 0109   Treatment   Treatment Type SLED   Treatment Status Restart   Dialysis Machine Number [id number]   Dialyzer Time (hours) 0   BVP (Liters) 0 L   Solutions Labeled and Current  Yes   Access Temporary Cath;Right;IJ   Catheter Dressing Intact  Yes   Alarms Engaged Yes   CRRT Comments SLED restarted     SLED restarted per MD orders. Patient started on 2K bath will switch to 4K after 2 hours. VS stable to start tx. Starting UF set at 200 same rate prior to restart with a goal of 300. Report given to primary nurse.

## 2024-04-07 NOTE — CONSULTS
"  [facility] - Cardiac Medical ICU  Adult Nutrition  Consult Note    SUMMARY     Recommendations    When/if able, initiate TFs. With current Propofol rate, rec'd Novasource @ 20 mL/hr to provide 1897 kcals (937 from Propofol), 44 g of protein, 344 mL fluid.  When Propofol discontinued, rec'd Novasource @ 40 mL/hr to provide 1920 kcals, 87 g of protein, 688 mL fluid.  RD to monitor & follow-up.    Goals: Meet % EEN, EPN by RD f/u date  Nutrition Goal Status: new  Communication of RD Recs: reviewed with RN    Assessment and Plan    Nutrition Problem:  Inadequate energy intake    Related to (etiology):   Inability to consume sufficient energy     Signs and Symptoms (as evidenced by):   NPO    Interventions(treatment strategy):  Collaboration of nutrition care w/ other providers  EN    Nutrition Diagnosis Status:   New    Reason for Assessment    Reason For Assessment: consult  Diagnosis: other (see comments) (Pancreatitis)   Relevant Medical History: HF, HTN, HLD, CKD  Interdisciplinary Rounds: attended    General Information Comments: Intubated, sedated, receiving CRRT. Pt appears nourished w/ UBW of 280-290# per chart review; no indicators of malnutrition found (+3 edema noted).  Nutrition Discharge Planning: Pending clinical course    Nutrition/Diet History    Factors Affecting Nutritional Intake: on mechanical ventilation, NPO    Anthropometrics    Temp: 98.8 °F (37.1 °C)  Height: 5' 9" (175.3 cm)  Height (inches): 69 in  Weight: 131.5 kg (289 lb 14.5 oz)  Weight (lb): 289.91 lb  Ideal Body Weight (IBW), Male: 160 lb  % Ideal Body Weight, Male (lb): 181.19 %  BMI (Calculated): 42.8  BMI Grade: greater than 40 - morbid obesity    Lab/Procedures/Meds    Pertinent Labs Reviewed: reviewed  Pertinent Labs Comments: Na 124, Creat 3.6, GFR 17.2  Pertinent Medications Reviewed: reviewed  Pertinent Medications Comments: Nimbex, Fentanyl, Levophed, Propofol, Vasopressin    Estimated/Assessed Needs    Weight Used For " Calorie Calculations: 131.5 kg (289 lb 14.5 oz)    Energy Calorie Requirements (kcal): 1075-9307 kcal/d  Energy Need Method: Kcal/kg (11-14 kcal/kg, BMI > 40 + vent)    Protein Requirements: 109-146 g/d (1.5-2 g/kg IBW)  Weight Used For Protein Calculations: 72.7 kg (160 lb 4.4 oz)    Estimated Fluid Requirement Method: other (see comments) (Per MD)  RDA Method (mL): 1447    CHO Requirement: 206g    Nutrition Prescription Ordered    Current Diet Order: NPO    Evaluation of Received Nutrient/Fluid Intake    Other Calories (kcal): 937 (Propofol)    I/O: +2.8L since admit    Comments: LBM: 4/5    Nutrition Risk    Level of Risk/Frequency of Follow-up:  (1x/week)     Monitor and Evaluation    Food and Nutrient Intake: enteral nutrition intake, food and beverage intake, energy intake  Food and Nutrient Adminstration: diet order, enteral and parenteral nutrition administration  Physical Activity and Function: nutrition-related ADLs and IADLs  Anthropometric Measurements: weight, weight change  Biochemical Data, Medical Tests and Procedures: lipid profile, inflammatory profile, glucose/endocrine profile, gastrointestinal profile, electrolyte and renal panel  Nutrition-Focused Physical Findings: overall appearance     Nutrition Follow-Up    RD Follow-up?: Yes

## 2024-04-07 NOTE — PROGRESS NOTES
04/07/24 0636   Treatment   Treatment Type SLED   Treatment Status Clotting;Blood returned;Blood lost   Dialysis Machine Number [id number]   Dialyzer Time (hours) 5.27   BVP (Liters) 62.1 L   Solutions Labeled and Current  Yes   Access Temporary Cath;Right;IJ   Catheter Dressing Intact  Yes   Alarms Engaged Yes   CRRT Comments venous chamber clotted, primary nurse unable to return blood

## 2024-04-07 NOTE — HOSPITAL COURSE
Pt was transferred from [facility] to [facility] for emergent EUS/ERCP due to acute interstitial pancreatitis. On arrival per, pt was requiring continuous bipap and producing no urine despite receiving lasix. Was on max peripheral levophed to maintain blood pressures. Pt required right radial arterial line and right internal jugular vein trialysis line. Continued zosyn. GI performed ERCP and placed CBD stent. Now on increased levo/vaso, propofol, fentanyl; proned and paralyzed starting 4/7. RIJ trialysis line requiring replacement 4/8, restarted CRRT; remains intubated/sedated/paralyzed/proned, showing improvement in P/F ratio to 138 this morning, remains proned- daily assessment for re-prone. Blood cultures 4/8 NGTD. Proned again 4/11 but no longer paralyzed. P/F continues to improve, pending supine and ABG to determine if prone 4/12.  Discussed possible prognosis and family is amenable but would like to continue full code status.     Overnight on 4/12/24, Mr. To was noted to have agonal breathing and sedation with fentanyl, versed, and ketamine needed to be added. EKG was concerning for aflutter vs afib, and amio gtt was started. His pressor requirements doubled, leukocytosis increased, lactate was at 6.4, with ABG at 7.23/42.2/60. On exam the patient was noted to have new abdominal distention. Bedside echo was inadequate due to abdominal distention. Sputum culture was ordered along with CT Chest Abd Pelvis. The patient was noted to have focal pneumatosis in the stomach, duodenum, and jejunum. General surgery was paged. The findings were communicated to Mr. Collado's wife, Mrs. Up. Per general surgery's bedside evaluation, the patient would not be a surgical candidate. The patient was switched to DNR status. His vasopressin was switched to epi in the setting of GI ischemia. Mr. Collado then became unresponsive to pressors. This information was communicated to family members. The patient was switched to  comfort care.     Enoc Collado passed away peacefully with family at bedside at 8:15 AM on 4/13/24.

## 2024-04-07 NOTE — TREATMENT PLAN
AES Treatment Plan    Enoc Collado is a 73 y.o. male admitted to hospital 4/6/2024 (Hospital Day: 2) due to Acute biliary pancreatitis without infection or necrosis.     Interval History  Pressor requirements still uptrending overnight  Currently  0.6 levophed + vasopressin    Worsening respiratory failure, ICU team planning to prone this morning per RN at bedside    Objective  Temp:  [98.8 °F (37.1 °C)-100 °F (37.8 °C)] 98.8 °F (37.1 °C) (04/07 1045)  Pulse:  [65-89] 85 (04/07 1045)  BP: ()/(50-71) 104/71 (04/06 1606)  Resp:  [] 33 (04/07 1045)  SpO2:  [87 %-99 %] 94 % (04/07 1045)  Arterial Line BP: ()/(55-81) 122/77 (04/07 1045)    Constitutional:  intubated, sedated  HENT: Head: Normal, normocephalic, atraumatic.  Eyes: conjunctiva clear and sclera nonicteric  Respiratory: mechanical breath sounds  GI: soft, non-tender, without masses or organomegaly  Skin: normal color  Neurological: sedated      Laboratory    Recent Labs   Lab 04/06/24  0644 04/06/24 2029 04/07/24  0316   HGB 15.7 14.9 15.1         Assessment and Plan    Enoc Collado is a 73 y.o. male with history of CKD, gout, HTN, HLD, obesity admitted for acute interstitial pancreatitis. Transferred to [facility] for developing shock, respiratory failure, and renal failure with concern for acute cholangitis given abrupt rise in bilirubin. Imaging without evidence of biliary obstruction but proceeded with ERCP due to bilirubin trend.    ERCP showed mild proximal CBD dilation, stent placed with bile flowing with no pus. Mild improvement in bilirubin since procedure. Noted progressive shock and respiratory failure.     Problem List:  Suspected acute cholangitis s/p ERCP  Acute interstitial pancreatitis  Acute hypoxic respiratory failure  STEFANIE on CKD     Recommendations:  - Continue zosyn  - Trend LFTs daily  - Suspect ARDS from acute pancreatitis - management per ICU team  - We will continue to follow.    Thank you for involving us in the  care of Enoc Collado. Please call with any additional questions, concerns or changes in the patient's clinical status.    Salomon Cantu MD  Gastroenterology Fellow, PGY V

## 2024-04-07 NOTE — PROGRESS NOTES
[facility] - Cardiac Medical ICU  Nephrology  Progress Note    Patient Name: Enoc Collado  MRN: [id number]  Admission Date: 4/6/2024  Hospital Length of Stay: 1 days  Attending Provider: Aaron Salazar MD   Primary Care Physician: Frantz Mason MD  Principal Problem:Acute biliary pancreatitis without infection or necrosis    Subjective:     HPI: Mr. Collado is a 73 year old male with chronic HTN, HLD, morbid obesity. Patient is intubated on multiple pressors at the time of my evaluation. All history was obtained from chart review and by family. Patient initially presented to [facility] with acute onset abdominal pain for 1 day on 4/5 associated with n/v/d. In ED at OSH, elevated LFTs to 140-180s with T bili 3.6/ direct bilirubin of 2.0. Lipase >1000. Abdominal US with CBD of 7mm and no concerns of acute cholecystitis. CT with contrast on 4/5 is consistent with acute pancreatitis without gross evidence of biliary obstruction. Patient does not have previous episodes of pancreatitis or no new medications recently started. . Patient started on IVF. Noted to have worsening respiratory status and confused at OSH. ABG with pH of 7.2, CO2 58, O2 68. Fluids were stopped and IV Lasix was given with patient placed on Bipap. Patient transferred to [facility]U for GI services. with EUS/ERCP.     Nephrology has been consulted for SETFANIE, hyperkalemia, and oliguria following IV contrast, pancreatitis, and hypotension. As per the wife, she is not aware of any prior history of kidney disease, and review of his labs available to me show a eGFR above 60 prior to this hospitalization. At the outside facility, he was hyperkalemic at 6.7, and was shifted with improvement in his potassium to 5.0, creatinine on arrival was 1.6 (baseline 1.0), and trended up to 4.1 at the time of the consult. Total bilirubin continues to trend up, currently at 14.8, and LFTs worsening. Most recent ABG prior to intubation showed 7.25/47/93/21. He  was intubated in the ICU, and is currently going for emergent ERCP. Wife denies the patient taking any NSAIDs, having decreased PO intake or decreased urinary output prior to presentation. Upon my initial encounter, the patient has produced only 150 mL of urine in the macias bag over the past 12 hours.     Interval history; Patient seen this morning, undergoing proning, on the vent. Levophed requirements have decreased from 1.0 down to 0.74, tolerating SLED well. He did clot off once over night.    Objective:     Vital Signs (Most Recent):  Temp: 98.6 °F (37 °C) (04/07/24 1115)  Pulse: 90 (04/07/24 1115)  Resp: (!) 32 (04/07/24 1115)  BP: 104/71 (04/06/24 1606)  SpO2: 96 % (04/07/24 1115) Vital Signs (24h Range):  Temp:  [98.6 °F (37 °C)-100 °F (37.8 °C)] 98.6 °F (37 °C)  Pulse:  [65-90] 90  Resp:  [] 32  SpO2:  [87 %-99 %] 96 %  BP: ()/(50-71) 104/71  Arterial Line BP: ()/(55-81) 90/62     Weight: 131.5 kg (289 lb 14.5 oz) (04/06/24 1615)  Body mass index is 42.81 kg/m².  Body surface area is 2.53 meters squared.    I/O last 3 completed shifts:  In: 4944.5 [I.V.:2947.4; IV Piggyback:1997.2]  Out: 2214 [Urine:525; Other:1689]    Physical Exam  Constitutional:       Comments: Sedated   HENT:      Head: Atraumatic.      Nose: Nose normal.      Mouth/Throat:      Mouth: Mucous membranes are moist.      Comments: Intubated   Cardiovascular:      Rate and Rhythm: Normal rate and regular rhythm.      Pulses: Normal pulses.   Pulmonary:      Comments: Mechanical breathsounds  Musculoskeletal:      Right lower leg: Edema present.      Left lower leg: Edema present.   Skin:     Capillary Refill: Capillary refill takes less than 2 seconds.          Significant Labs:  CBC:   Recent Labs   Lab 04/07/24  0316 04/07/24  0416 04/07/24  0815   WBC 16.74*  --   --    RBC 4.78  --   --    HGB 15.1  --   --    HCT 46.8   < > 49     --   --    MCV 98  --   --    MCH 31.6*  --   --    MCHC 32.3  --   --     < > =  values in this interval not displayed.       CMP:   Recent Labs   Lab 04/07/24  0316   *   CALCIUM 7.2*   ALBUMIN 2.9*   PROT 6.3   *   K 5.1   CO2 17*   CL 95   BUN 36*   CREATININE 3.6*   ALKPHOS 104   *   *   BILITOT 12.1*       All labs within the past 24 hours have been reviewed.    Significant Imaging:  CT: Reviewed    Assessment/Plan:     Pulmonary  Acute hypoxemic respiratory failure  -Remains on FiO2 of 1.0 PEEP of 12    Cardiac/Vascular  Shock  -Per primary    Essential hypertension  -Per primary    Renal/  STEFANIE (acute kidney injury)  As per wife, patient with no prior history of CKD, baseline creatinine around 1.0 and eGFR>60 base on labs available to me. He presented to outside hospital for abdominal pain, received a dose of IV contrast for a CT of the abdomen, and found to have pancreatitis. Patient subsequently became hypotensive requiring the initiation of vasopressin and levophed for pressure support. Creatinine is now trending up, most recent potassium was noted to be 5.0 prior to transfer, and he is oliguric at the time of this consult.     Recommendations  -Continue SLED for 24 hours hours, UF as able, goal UF is net negative 1.0L over the next 24 hours.  -If he clots off again on SLED will add citrate at 80 with calcium supplementation.   -Dialysis consent was obtained from his wife, pending renal function panel and volume status after his procedure I anticipate him requiring SLED for metabolic clearance and volume management.  -Avoid nephrotoxic agents (IV contrast, NSAID's, gadolinium contrast, PPI's) if able.   -Maintain MAP above 65 mmHg.   -Strict I's and O's  -Daily renal function panel  -Renally dose all medications    Endocrine  Morbid obesity with BMI of 40.0-44.9, adult  -Per primary    GI  * Acute biliary pancreatitis without infection or necrosis  -Per primary        Thank you for your consult. I will follow-up with patient. Please contact us if you have  any additional questions.    Sincere Vera MD  Nephrology  [facility] - Cardiac Medical ICU

## 2024-04-07 NOTE — ASSESSMENT & PLAN NOTE
Chronic, uncontrolled. Latest blood pressure and vitals reviewed-     Temp:  [98.6 °F (37 °C)-100 °F (37.8 °C)]   Pulse:  [66-90]   Resp:  []   BP: ()/(67-71)   SpO2:  [87 %-96 %]   Arterial Line BP: ()/(55-81) .   Home meds for hypertension were reviewed and noted below.   Hypertension Medications               amLODIPine (NORVASC) 10 MG tablet Take 1 tablet (10 mg total) by mouth once daily.    carvediloL (COREG) 25 MG tablet Take 1 tablet (25 mg total) by mouth 2 (two) times daily with meals.    furosemide (LASIX) 40 MG tablet Take 1 tablet (40 mg total) by mouth once daily.    lisinopriL (PRINIVIL,ZESTRIL) 40 MG tablet Take 1 tablet (40 mg total) by mouth once daily.            While in the hospital, will manage blood pressure as follows; Adjust home antihypertensive regimen as follows- holding all home antihypertensive meds given shock. Can resume as clinically indicated when shock resolves.     Will utilize p.r.n. blood pressure medication only if patient's blood pressure greater than 180/110 and he develops symptoms such as worsening chest pain or shortness of breath.

## 2024-04-07 NOTE — ASSESSMENT & PLAN NOTE
Patient with acute kidney injury/acute renal failure likely due to acute tubular necrosis caused by severe shock and acute infectious process.  STEFANIE is currently worsening. Baseline creatinine  1.0  - Labs reviewed- Renal function/electrolytes with Estimated Creatinine Clearance: 27.2 mL/min (A) (based on SCr of 3.3 mg/dL (H)). according to latest data. Monitor urine output and serial BMP and adjust therapy as needed. Avoid nephrotoxins and renally dose meds for GFR listed above.    Worsening renal failure during admission likely ischemic ATN s/o shock and sepsis. RF c/b hyperkalemia and acidosis. Nephrology following at OSH. Anticipate patient will likely need RRT on current admission.    -- nephrology consulted, appreciate recs. Pt received SLED, CRRT, plan for SLED next 24 hrs.   --  trialysis line placed  -- maintain MAP > 65  -- renally dose meds and avoid nephrotoxic meds   -- monitoring electrolytes closely on labs

## 2024-04-07 NOTE — SUBJECTIVE & OBJECTIVE
Interval History: Pt underwent arterial and trialysis line placement and was intubated. Pt underwent ERCP overnight and cbd stent placed. K found to be 6.2. Pt underwent SLED and had 2 sessions which both clotted off. CRRT was started which also clotted off. Repeat K 5.1. Concern for ARDS, PEEP increased from 8 - 14 with maxed on vent settings. Will discuss with family need to prone patient (benefits/risks). pO2 65 on ABG.     Review of Systems   Unable to perform ROS: Acuity of condition     Objective:     Vital Signs (Most Recent):  Temp: 99 °F (37.2 °C) (04/07/24 1515)  Pulse: 86 (04/07/24 1534)  Resp: (!) 30 (04/07/24 1515)  BP: 104/71 (04/06/24 1606)  SpO2: (!) 90 % (04/07/24 1515) Vital Signs (24h Range):  Temp:  [98.6 °F (37 °C)-100 °F (37.8 °C)] 99 °F (37.2 °C)  Pulse:  [66-90] 86  Resp:  [] 30  SpO2:  [87 %-96 %] 90 %  BP: ()/(67-71) 104/71  Arterial Line BP: ()/(55-81) 100/68   Weight: 131.5 kg (289 lb 14.5 oz)  Body mass index is 42.81 kg/m².      Intake/Output Summary (Last 24 hours) at 4/7/2024 1541  Last data filed at 4/7/2024 1500  Gross per 24 hour   Intake 7147.74 ml   Output 3881 ml   Net 3266.74 ml            Physical Exam  Vitals and nursing note reviewed.   Constitutional:       Appearance: He is obese.   HENT:      Head: Normocephalic and atraumatic.      Mouth/Throat:      Mouth: Mucous membranes are dry.      Pharynx: Oropharynx is clear.   Eyes:      Extraocular Movements: Extraocular movements intact.      Conjunctiva/sclera: Conjunctivae normal.   Cardiovascular:      Rate and Rhythm: Normal rate and regular rhythm.      Pulses: Normal pulses.      Comments: R IJ trialysis catheter, R radial arterial line  Pulmonary:      Effort: No respiratory distress.      Breath sounds: No wheezing.      Comments: Intubated mechanical breath sounds  Chest:      Chest wall: No tenderness.   Abdominal:      General: There is distension.      Palpations: Abdomen is soft.      Tenderness:  Tenderness: LUQ.   Genitourinary:     Comments: macias  Musculoskeletal:         General: No tenderness. Normal range of motion.      Cervical back: Normal range of motion and neck supple.      Right lower leg: No edema.      Left lower leg: No edema.   Skin:     General: Skin is warm and dry.   Neurological:      Mental Status: He is lethargic.   Psychiatric:         Behavior: Behavior is cooperative.            Vents:  Vent Mode: A/C (04/07/24 1302)  Ventilator Initiated: Yes (04/06/24 1758)  Set Rate: 32 BPM (04/07/24 1302)  Vt Set: 450 mL (04/07/24 1302)  PEEP/CPAP: 12 cmH20 (04/07/24 1302)  Oxygen Concentration (%): 80 (04/07/24 1515)  Peak Airway Pressure: 28 cmH20 (04/07/24 1302)  Plateau Pressure: 27 cmH20 (04/07/24 1302)  Total Ve: 15 L/m (04/07/24 1302)  Negative Inspiratory Force (cm H2O): 0 (04/07/24 1302)  F/VT Ratio<105 (RSBI): (!) 68.67 (04/07/24 1302)  Lines/Drains/Airways       Peripherally Inserted Central Catheter Line  Duration             PICC Double Lumen 04/06/24 1136 right basilic 1 day              Central Venous Catheter Line  Duration             Trialysis (Dialysis) Catheter 04/06/24 1836 right internal jugular <1 day              Drain  Duration                  Urethral Catheter 04/06/24 1000 1 day         NG/OG Tube 04/06/24 2300 [facility] mouth <1 day              Airway  Duration                  Airway - Non-Surgical 04/06/24 1748 Endotracheal Tube <1 day              Arterial Line  Duration             Arterial Line 04/06/24 1647 Right Radial <1 day              Peripheral Intravenous Line  Duration                  Peripheral IV - Single Lumen 04/06/24 0041 20 G Anterior;Right Forearm 1 day         Peripheral IV - Single Lumen 04/06/24 1021 20 G Anterior;Left Shoulder 1 day                  Significant Labs:    CBC/Anemia Profile:  Recent Labs   Lab 04/06/24  0644 04/06/24  1700 04/06/24  2029 04/07/24  0316 04/07/24  0416 04/07/24  0815   WBC 14.13*  --  20.67* 16.74*  --    --    HGB 15.7  --  14.9 15.1  --   --    HCT 48.8   < > 46.8 46.8 50 49     --  227 242  --   --    MCV 97  --  97 98  --   --    RDW 14.8*  --  15.4* 15.4*  --   --     < > = values in this interval not displayed.          Chemistries:  Recent Labs   Lab 04/06/24  1338 04/06/24  1338 04/06/24 2029 04/06/24  2154 04/06/24  2348 04/07/24  0316 04/07/24  1409   *  --  129* 128* 130* 124* 124*   K 5.0  --  6.8* 6.9* 6.2*  6.2* 5.1 5.2*     --  101 98 99 95 94*   CO2 18*  --  18* 16* 15* 17* 17*   BUN 47*  --  52* 53* 44* 36* 26*   CREATININE 4.1*  --  4.4* 4.5* 4.0* 3.6* 3.3*   CALCIUM 7.6*  --  7.1* 7.3* 7.9* 7.2* 7.2*   ALBUMIN 3.1*  --  3.0* 2.9*  --  2.9* 2.6*   PROT 5.6*  --  6.2  --   --  6.3  --    BILITOT 14.8*  --  14.1*  --   --  12.1*  --    ALKPHOS 93  --  96  --   --  104  --    *  --  257*  --   --  251*  --    *  --  387*  --   --  421*  --    MG  --    < > 1.6 1.6  --  1.6 1.8   PHOS  --    < > 5.0* 5.1*  --  3.5 4.3    < > = values in this interval not displayed.         All pertinent labs within the past 24 hours have been reviewed.    Significant Imaging: I have reviewed all pertinent imaging results/findings within the past 24 hours.

## 2024-04-07 NOTE — SUBJECTIVE & OBJECTIVE
Interval history; Patient seen this morning, undergoing proning, on the vent. Levophed requirements have decreased from 1.0 down to 0.74, tolerating SLED well. He did clot off once over night.    Objective:     Vital Signs (Most Recent):  Temp: 98.6 °F (37 °C) (04/07/24 1115)  Pulse: 90 (04/07/24 1115)  Resp: (!) 32 (04/07/24 1115)  BP: 104/71 (04/06/24 1606)  SpO2: 96 % (04/07/24 1115) Vital Signs (24h Range):  Temp:  [98.6 °F (37 °C)-100 °F (37.8 °C)] 98.6 °F (37 °C)  Pulse:  [65-90] 90  Resp:  [] 32  SpO2:  [87 %-99 %] 96 %  BP: ()/(50-71) 104/71  Arterial Line BP: ()/(55-81) 90/62     Weight: 131.5 kg (289 lb 14.5 oz) (04/06/24 1615)  Body mass index is 42.81 kg/m².  Body surface area is 2.53 meters squared.    I/O last 3 completed shifts:  In: 4944.5 [I.V.:2947.4; IV Piggyback:1997.2]  Out: 2214 [Urine:525; Other:1689]     Physical Exam  Constitutional:       Comments: Sedated   HENT:      Head: Atraumatic.      Nose: Nose normal.      Mouth/Throat:      Mouth: Mucous membranes are moist.      Comments: Intubated   Cardiovascular:      Rate and Rhythm: Normal rate and regular rhythm.      Pulses: Normal pulses.   Pulmonary:      Comments: Mechanical breathsounds  Musculoskeletal:      Right lower leg: Edema present.      Left lower leg: Edema present.   Skin:     Capillary Refill: Capillary refill takes less than 2 seconds.          Significant Labs:  CBC:   Recent Labs   Lab 04/07/24  0316 04/07/24  0416 04/07/24  0815   WBC 16.74*  --   --    RBC 4.78  --   --    HGB 15.1  --   --    HCT 46.8   < > 49     --   --    MCV 98  --   --    MCH 31.6*  --   --    MCHC 32.3  --   --     < > = values in this interval not displayed.       CMP:   Recent Labs   Lab 04/07/24  0316   *   CALCIUM 7.2*   ALBUMIN 2.9*   PROT 6.3   *   K 5.1   CO2 17*   CL 95   BUN 36*   CREATININE 3.6*   ALKPHOS 104   *   *   BILITOT 12.1*       All labs within the past 24 hours have been  reviewed.    Significant Imaging:  CT: Reviewed

## 2024-04-07 NOTE — PROVATION PATIENT INSTRUCTIONS
Discharge Summary/Instructions after an Endoscopic Procedure  Patient Name: Enoc Collado  Patient MRN: [id number]  Patient YOB: 1950 Saturday, April 6, 2024  Shilo Ramirez MD  Dear patient,  As a result of recent federal legislation (The Federal Cures Act), you may   receive lab or pathology results from your procedure in your [facility]   account before your physician is able to contact you. Your physician or   their representative will relay the results to you with their   recommendations at their soonest availability.  Thank you,  RESTRICTIONS:  During your procedure today, you received medications for sedation.  These   medications may affect your judgment, balance and coordination.  Therefore,   for 24 hours, you have the following restrictions:   - DO NOT drive a car, operate machinery, make legal/financial decisions,   sign important papers or drink alcohol.    ACTIVITY:  Today: no heavy lifting, straining or running due to procedural   sedation/anesthesia.  The following day: return to full activity including work.  DIET:  Eat and drink normally unless instructed otherwise.     TREATMENT FOR COMMON SIDE EFFECTS:  - Mild abdominal pain, nausea, belching, bloating or excessive gas:  rest,   eat lightly and use a heating pad.  - Sore Throat: treat with throat lozenges and/or gargle with warm salt   water.  - Because air was used during the procedure, expelling large amounts of air   from your rectum or belching is normal.  - If a bowel prep was taken, you may not have a bowel movement for 1-3 days.    This is normal.  SYMPTOMS TO WATCH FOR AND REPORT TO YOUR PHYSICIAN:  1. Abdominal pain or bloating, other than gas cramps.  2. Chest pain.  3. Back pain.  4. Signs of infection such as: chills or fever occurring within 24 hours   after the procedure.  5. Rectal bleeding, which would show as bright red, maroon, or black stools.   (A tablespoon of blood from the rectum is not serious, especially if    hemorrhoids are present.)  6. Vomiting.  7. Weakness or dizziness.  GO DIRECTLY TO THE NEAREST EMERGENCY ROOM IF YOU HAVE ANY OF THE FOLLOWING:      Difficulty breathing              Chills and/or fever over 101 F   Persistent vomiting and/or vomiting blood   Severe abdominal pain   Severe chest pain   Black, tarry stools   Bleeding- more than one tablespoon   Any other symptom or condition that you feel may need urgent attention  Your doctor recommends these additional instructions:  If any biopsies were taken, your doctors clinic will contact you in 1 to 2   weeks with any results.  - Return patient to ICU for ongoing care.   - Resume previous diet.   - Continue present medications.   - Repeat ERCP in 8 weeks to remove stent, complete sphincterotomy and sweep   duct.  - The findings and recommendations were discussed with the patient's   family.  For questions, problems or results please call your physician - Shilo Ramirez MD at Work:  [phone].  [facility], EMERGENCY ROOM PHONE NUMBER: [phone]  IF A COMPLICATION OR EMERGENCY SITUATION ARISES AND YOU ARE UNABLE TO REACH   YOUR PHYSICIAN - GO DIRECTLY TO THE EMERGENCY ROOM.  Shlio Ramirez MD  4/6/2024 8:20:02 PM  This report has been verified and signed electronically.  Dear patient,  As a result of recent federal legislation (The Federal Cures Act), you may   receive lab or pathology results from your procedure in your [facility]   account before your physician is able to contact you. Your physician or   their representative will relay the results to you with their   recommendations at their soonest availability.  Thank you,  PROVATION

## 2024-04-07 NOTE — TRANSFER OF CARE
Anesthesia Transfer of Care Note    Patient: Enoc Collado    Procedure(s) Performed: Procedure(s) (LRB):  ERCP (ENDOSCOPIC RETROGRADE CHOLANGIOPANCREATOGRAPHY) (N/A)  ULTRASOUND, UPPER GI TRACT, ENDOSCOPIC (N/A)    Patient location: ICU    Transport from OR: Transported from OR intubated on 100% O2  with adequate ventilation controlled by transport ventilator. Continuous ECG monitoring in transport. Continuous SpO2 monitoring in transport. Continuos invasive BP monitoring in transport    Post pain: adequate analgesia    Post assessment: no apparent anesthetic complications    Post vital signs: unstable    Level of consciousness: sedated    Nausea/Vomiting: no nausea/vomiting    Complications: none    Transfer of care protocol was followed      Last vitals: Visit Vitals  /71   Pulse 74   Temp 37.7 °C (99.9 °F)   Resp (!) 26   Wt 131.5 kg (289 lb 14.5 oz)   SpO2 (!) 91%   BMI 42.81 kg/m²

## 2024-04-08 PROBLEM — E87.1 HYPONATREMIA: Status: ACTIVE | Noted: 2024-01-01

## 2024-04-08 PROBLEM — E83.51 HYPOCALCEMIA: Status: ACTIVE | Noted: 2024-01-01

## 2024-04-08 PROBLEM — E87.20 METABOLIC ACIDOSIS: Status: ACTIVE | Noted: 2024-01-01

## 2024-04-08 PROBLEM — R78.81 GRAM-POSITIVE BACTEREMIA: Status: ACTIVE | Noted: 2024-01-01

## 2024-04-08 NOTE — PROCEDURES
"Enoc Collado is a 73 y.o. male patient in need of a new dialysis catheter as previous HD line malfunctioned and in need or CRRT.    Temp: 99.3 °F (37.4 °C) (04/08/24 1650)  Pulse: 105 (04/08/24 1650)  Resp: (!) 32 (04/08/24 1650)  BP: 92/67 (04/08/24 1616)  SpO2: (!) 92 % (04/08/24 1650)  Weight: 131.5 kg (289 lb 14.5 oz) (04/07/24 1240)  Height: 5' 9" (175.3 cm) (04/08/24 0704)       Central Line    Date/Time: 4/8/2024 4:15 PM    Performed by: Jie Rebolledo MD  Authorized by: Jie Rebolledo MD    Supervisor Name:  Gina Green MD  Location procedure was performed:  [facility]  Assisting Provider:  Gina Green MD  Pre-operative diagnosis:  Pancreatitis  Post-operative diagnosis:  Pancreatitis  Consent Done ?:  Yes  Time out complete?: Verified correct patient, procedure, equipment, staff, and site/side    Indications:  Med administration, hemodialysis and vascular access  Anesthesia:  Local infiltration  Local anesthetic:  Lidocaine 1% without epinephrine  Anesthetic total (ml):  3  Preparation:  Skin prepped with ChloraPrep  Location:  Right internal jugular  Catheter type:  Trialysis  Catheter size:  13 Fr  Inserted Catheter Length (cm):  15  Ultrasound guidance: Yes    Vessel Caliber:  Large   patent  Comprressibility:  Normal  Doppler:  Not done  Needle advanced into vessel with real time ultrasound guidance.    Guidewire confirmed in vessel.    Image recorded and saved.    Steril sheath on probe.    Sterile gel used.  Manometry: Yes    Number of attempts:  1  Securement:  Line sutured, chlorhexidine patch, sterile dressing applied and blood return through all ports  Technical Procedures Used:  US guided, sterile technique with guidewire  Significant surgical tasks conducted by the assistant(s):  Guidance  Complications: Yes (specify)    Estimated blood loss (mL):  10  Specimens: No    Implants: No    XRay:  Placement verified by x-ray  Adverse Events:  NoneTermination " Site: superior vena cava    Patient tolerated procedure well without complication, line placement confirmed via CXR with no pneumothorax noted and tip in SVC.    4/8/2024    Jie Rebolledo MD  [employer] IM PGY III

## 2024-04-08 NOTE — ASSESSMENT & PLAN NOTE
Chronic, uncontrolled at baseline, however hypotensive on pressors 2/2 illness. Latest blood pressure and vitals reviewed-     Temp:  [98.6 °F (37 °C)-99.3 °F (37.4 °C)]   Pulse:  [71-90]   Resp:  [4-33]   SpO2:  [88 %-96 %]   Arterial Line BP: ()/(49-77) .   Home meds for hypertension were reviewed and noted below.   Hypertension Medications               amLODIPine (NORVASC) 10 MG tablet Take 1 tablet (10 mg total) by mouth once daily.    carvediloL (COREG) 25 MG tablet Take 1 tablet (25 mg total) by mouth 2 (two) times daily with meals.    furosemide (LASIX) 40 MG tablet Take 1 tablet (40 mg total) by mouth once daily.    lisinopriL (PRINIVIL,ZESTRIL) 40 MG tablet Take 1 tablet (40 mg total) by mouth once daily.          Plan:  --Holding all antihypertensives while in shock

## 2024-04-08 NOTE — SUBJECTIVE & OBJECTIVE
Interval History/Significant Events: Patient proned and paralyzed yesterday, able to decrease FiO2 to 80% though; vent settings AC/VC+ 32/450/+12/80% with ABG 7.231/52.2/77/21.9. Triglycerides elevated 1314 this morning on propofol for sedation as well as fentanyl, s/p ERCP with CBD stent 4/6. CBG's elevated on LDSSI. LE's and T. Bili improving.     Review of Systems   Unable to perform ROS: Intubated     Objective:     Vital Signs (Most Recent):  Temp: 99 °F (37.2 °C) (04/08/24 0704)  Pulse: 82 (04/08/24 0704)  Resp: (!) 32 (04/08/24 0704)  BP: 104/71 (04/06/24 1606)  SpO2: (!) 91 % (04/08/24 0704) Vital Signs (24h Range):  Temp:  [98.6 °F (37 °C)-99.3 °F (37.4 °C)] 99 °F (37.2 °C)  Pulse:  [71-90] 82  Resp:  [4-33] 32  SpO2:  [87 %-96 %] 91 %  Arterial Line BP: ()/(49-77) 89/57   Weight: 131.5 kg (289 lb 14.5 oz)  Body mass index is 42.81 kg/m².      Intake/Output Summary (Last 24 hours) at 4/8/2024 0835  Last data filed at 4/8/2024 0705  Gross per 24 hour   Intake 6475.19 ml   Output 8490 ml   Net -2014.81 ml          Physical Exam  Vitals and nursing note reviewed.   Constitutional:       General: He is in acute distress.      Appearance: He is obese. He is ill-appearing and toxic-appearing. He is not diaphoretic.   HENT:      Head: Normocephalic and atraumatic.   Cardiovascular:      Rate and Rhythm: Normal rate and regular rhythm.      Comments: Pt prone, unable to assess heart tones  Pulmonary:      Breath sounds: Rales present. No wheezing or rhonchi.      Comments: Prone, minimal breath sounds but rales appreciated bilaterally  Abdominal:      Comments: Unable to assess- pt proned   Genitourinary:     Comments: Oneill catheter in place, anuric  Musculoskeletal:      Right lower leg: Edema (trace) present.      Left lower leg: Edema (trace) present.   Skin:     General: Skin is warm and dry.      Capillary Refill: Capillary refill takes 2 to 3 seconds.   Neurological:      Comments: Sedated and  paralyzed            Vents:  Vent Mode: A/C (04/08/24 0704)  Ventilator Initiated: Yes (04/06/24 1758)  Set Rate: 32 BPM (04/08/24 0704)  Vt Set: 450 mL (04/08/24 0704)  PEEP/CPAP: 12 cmH20 (04/08/24 0704)  Oxygen Concentration (%): 80 (04/08/24 0704)  Peak Airway Pressure: 27 cmH20 (04/08/24 0704)  Plateau Pressure: 27 cmH20 (04/08/24 0704)  Total Ve: 14.4 L/m (04/08/24 0704)  Negative Inspiratory Force (cm H2O): 0 (04/08/24 0704)  F/VT Ratio<105 (RSBI): (!) 70.33 (04/08/24 0704)  Lines/Drains/Airways       Peripherally Inserted Central Catheter Line  Duration             PICC Double Lumen 04/06/24 1136 right basilic 1 day              Central Venous Catheter Line  Duration             Trialysis (Dialysis) Catheter 04/06/24 1836 right internal jugular 1 day              Drain  Duration                  NG/OG Tube 04/06/24 2300 [facility] mouth 1 day         Urethral Catheter 04/06/24 1000 1 day              Airway  Duration                  Airway - Non-Surgical 04/06/24 1748 Endotracheal Tube 1 day              Arterial Line  Duration             Arterial Line 04/06/24 1647 Right Radial 1 day              Peripheral Intravenous Line  Duration                  Peripheral IV - Single Lumen 04/06/24 0041 20 G Anterior;Right Forearm 2 days         Peripheral IV - Single Lumen 04/06/24 1021 20 G Anterior;Left Shoulder 1 day                  Significant Labs:    CBC/Anemia Profile:  Recent Labs   Lab 04/06/24 2029 04/07/24 0316 04/07/24  0416 04/07/24  0815 04/08/24  0237   WBC 20.67* 16.74*  --   --  19.73*   HGB 14.9 15.1  --   --  15.4   HCT 46.8 46.8 50 49 45.5    242  --   --  159   MCV 97 98  --   --  94   RDW 15.4* 15.4*  --   --  14.7*        Chemistries:  Recent Labs   Lab 04/06/24 2029 04/06/24  2154 04/07/24  0316 04/07/24  1409 04/07/24  2148 04/08/24  0237   *   < > 124* 124* 124* 121*   K 6.8*   < > 5.1 5.2* 5.4* 5.5*      < > 95 94* 94* 92*   CO2 18*   < > 17* 17* 16* 12*    BUN 52*   < > 36* 26* 23 23   CREATININE 4.4*   < > 3.6* 3.3* 3.1* 3.4*   CALCIUM 7.1*   < > 7.2* 7.2* 7.6* 7.8*   ALBUMIN 3.0*   < > 2.9* 2.6* 2.5* 2.5*   PROT 6.2  --  6.3  --   --  7.2   BILITOT 14.1*  --  12.1*  --   --  9.3*   ALKPHOS 96  --  104  --   --  111   *  --  251*  --   --  207*   *  --  421*  --   --  326*   MG 1.6   < > 1.6 1.8 1.7  1.7 2.3   PHOS 5.0*   < > 3.5 4.3 4.6* 5.1*    < > = values in this interval not displayed.       All pertinent labs within the past 24 hours have been reviewed.    Significant Imaging:  I have reviewed all pertinent imaging results/findings within the past 24 hours.

## 2024-04-08 NOTE — ASSESSMENT & PLAN NOTE
most recent lipid panel on 10/2023 with . Hypertriglyceridemia today; etiology likely post-ERCP vs propofol induced    Plan:  --on home pravastatin 20mg qd  -- holding statin therapy on admission given elevated LFTs  -- On propofol for sedation- will switch to ketamine  -- Repeat triglycerides, may need insulin gtt to decrease levels

## 2024-04-08 NOTE — CONSULTS
[facility] Cardiac Medical ICU  Infectious Disease  Consult Note    Patient Name: Enoc Collado  MRN: [id number]  Admission Date: 4/6/2024  Hospital Length of Stay: 2 days  Attending Physician: Mynor Perkins MD  Primary Care Provider: Frantz Mason MD     Isolation Status: No active isolations    Patient information was obtained from spouse/SO, relative(s), past medical records, and ER records.      Inpatient consult to Infectious Diseases  Consult performed by: Brad Whitley MD  Consult ordered by: Eugenia James MD        Assessment/Plan:     Shock    Mr. Kline a 74 yo man form the [facility], transferred to [facility] [place] for a higher level of care/AES evaluation. He presented to the ED in  and was diagnosed with pancreatitis. He was started on empiric abx. Patient was transferred here yesterday and admitted tot he ICU. The patient's abx were escalated to meropenem and ID is consulted for that. History obtained from family. Underwent ERCP showed mild proximal CBD dilation, stent placed with bile flowing with no pus. Now intubated with suspected shock and ARDS. Abx escalated to meropenem.    Biliary pancreatitis, cholangitis, shock and ARDS    Recommendations  Agree with meropenem (normal dose while on CRRT), micafungin, and vancomycin  to cover the usual suspects  Follow pending blood cultures  Trend WBC  Discussed with patient's family - all questions answered    Thank you for your consult. I will follow-up with patient. Please contact us if you have any additional questions.    Brad Whitley MD  Infectious Disease  [facility] Cardiac Medical [facility]    Subjective:     Principal Problem: Acute biliary pancreatitis without infection or necrosis    HPI: No notes on file    Past Medical History:   Diagnosis Date    BPH (benign prostatic hyperplasia)     CKD (chronic kidney disease), stage III     Colon polyps 2015    Diarrhea in adult patient 02/10/2021    Generalized osteoarthrosis, involving multiple  sites     History of gout     Hyperlipidemia     Hypertension     Hypogonadism male     IFG (impaired fasting glucose)     Lumbar disc disease with radiculopathy     Morbid obesity with BMI of 40.0-44.9, adult        Past Surgical History:   Procedure Laterality Date    COLONOSCOPY N/A 6/22/2020    Procedure: COLONOSCOPY;  Surgeon: Montserrat Conroy MD;  Location: [facility] ENDO;  Service: Endoscopy;  Laterality: N/A;    COLONOSCOPY N/A 2/10/2021    Procedure: COLONOSCOPY;  Surgeon: Jones Lemus III, MD;  Location: [facility] ENDO;  Service: Endoscopy;  Laterality: N/A;    ENDOSCOPIC ULTRASOUND OF UPPER GASTROINTESTINAL TRACT N/A 4/6/2024    Procedure: ULTRASOUND, UPPER GI TRACT, ENDOSCOPIC;  Surgeon: Shilo Ramirez MD;  Location: [facility] ENDO (2ND FLR);  Service: Endoscopy;  Laterality: N/A;    ERCP N/A 4/6/2024    Procedure: ERCP (ENDOSCOPIC RETROGRADE CHOLANGIOPANCREATOGRAPHY);  Surgeon: Shilo Ramirez MD;  Location: [facility] (2ND FLR);  Service: Endoscopy;  Laterality: N/A;    ESOPHAGOGASTRODUODENOSCOPY N/A 2/10/2021    Procedure: EGD (ESOPHAGOGASTRODUODENOSCOPY);  Surgeon: Jones Lemus III, MD;  Location: [facility];  Service: Endoscopy;  Laterality: N/A;    EYE SURGERY      JOINT REPLACEMENT      KNEE SCOPE      open globe      right eye 1980    ROTATOR CUFF REPAIR      SHOULDER SURGERY Right        Review of patient's allergies indicates:   Allergen Reactions    No known drug allergies        Medications:  Medications Prior to Admission   Medication Sig    allopurinoL (ZYLOPRIM) 300 MG tablet Take 1 tablet (300 mg total) by mouth once daily.    amLODIPine (NORVASC) 10 MG tablet Take 1 tablet (10 mg total) by mouth once daily.    aspirin 325 MG tablet Take 325 mg by mouth once daily.    carvediloL (COREG) 25 MG tablet Take 1 tablet (25 mg total) by mouth 2 (two) times daily with meals.    empagliflozin (JARDIANCE) 10 mg tablet Take 1 tablet (10 mg total) by mouth once daily.    furosemide (LASIX) 40 MG tablet  "Take 1 tablet (40 mg total) by mouth once daily.    gabapentin (NEURONTIN) 300 MG capsule TAKE 1 CAPSULE THREE TIMES DAILY (Patient taking differently: Take 300 mg by mouth 3 (three) times daily.)    lisinopriL (PRINIVIL,ZESTRIL) 40 MG tablet Take 1 tablet (40 mg total) by mouth once daily.    methocarbamoL (ROBAXIN) 750 MG Tab Take 750 mg by mouth. (Patient not taking: Reported on 4/5/2024)    pravastatin (PRAVACHOL) 20 MG tablet TAKE 1 TABLET EVERY DAY    sildenafil (REVATIO) 20 mg Tab 2-4 tablets as needed one hour prior to intercourse    syringe with needle, safety 3 mL 21 gauge x 1 1/2" Syrg 1 Syringe by Misc.(Non-Drug; Combo Route) route every 21 days.    testosterone cypionate (DEPOTESTOTERONE CYPIONATE) 200 mg/mL injection Inject 1.5 mLs (300 mg total) into the muscle every 21 days.    timolol maleate 0.5% (TIMOPTIC-XE) 0.5 % SolG Place 1 drop into both eyes every morning.     Antibiotics (From admission, onward)      Start     Stop Route Frequency Ordered    04/08/24 1027  meropenem (MERREM) 2 g in sodium chloride 0.9% 100 mL IVPB         -- IV Every 8 hours (non-standard times) 04/08/24 0535    04/07/24 0035  vancomycin - pharmacy to dose  (vancomycin IVPB (PEDS and ADULTS))        See Hyperspace for full Linked Orders Report.    -- IV pharmacy to manage frequency 04/06/24 2335    04/06/24 2100  mupirocin 2 % ointment         04/11/24 2059 Nasl 2 times daily 04/06/24 1616          Antifungals (From admission, onward)      Start     Stop Route Frequency Ordered    04/08/24 1145  micafungin 100 mg in sodium chloride 0.9 % 100 mL IVPB (MB+)         -- IV Every 24 hours (non-standard times) 04/08/24 1045          Antivirals (From admission, onward)      None             Immunization History   Administered Date(s) Administered    COVID-19 Vaccine 01/20/2021, 02/24/2021, 11/04/2021    COVID-19, MRNA, LN-S, PF (MODERNA FULL 0.5 ML DOSE) 01/20/2021, 02/24/2021, 11/04/2021    Pneumococcal Conjugate - 13 Valent " 2016    Pneumococcal Polysaccharide - 23 Valent 2017    Td (ADULT) 2017    Tetanus 2017    Zoster 2015    Zoster Recombinant 01/10/2022, 03/10/2022       Family History       Problem Relation (Age of Onset)    Cancer Cousin    Cataracts Father, Mother    Heart attack Father    Heart disease Brother    Hypertension Father, Mother          Social History     Socioeconomic History    Marital status:    Occupational History    Occupation: Retired   Tobacco Use    Smoking status: Former     Current packs/day: 0.00     Average packs/day: 1 pack/day for 30.0 years (30.0 ttl pk-yrs)     Types: Cigarettes     Start date: 1970     Quit date: 2000     Years since quittin.1    Smokeless tobacco: Current     Types: Snuff   Substance and Sexual Activity    Alcohol use: No    Drug use: No    Sexual activity: Not Currently   Social History Narrative    Live w/ spouse. No pets      Social Determinants of Health     Financial Resource Strain: Low Risk  (2024)    Overall Financial Resource Strain (CARDIA)     Difficulty of Paying Living Expenses: Not hard at all   Food Insecurity: No Food Insecurity (2024)    Hunger Vital Sign     Worried About Running Out of Food in the Last Year: Never true     Ran Out of Food in the Last Year: Never true   Transportation Needs: No Transportation Needs (2024)    PRAPARE - Transportation     Lack of Transportation (Medical): No     Lack of Transportation (Non-Medical): No   Physical Activity: Sufficiently Active (2024)    Exercise Vital Sign     Days of Exercise per Week: 7 days     Minutes of Exercise per Session: 60 min   Stress: No Stress Concern Present (2024)    [employer] [place] [employer] - Occupational Stress Questionnaire     Feeling of Stress : Not at all   Social Connections: Socially Integrated (2024)    Social Connection and Isolation Panel [NHANES]     Frequency of Communication with Friends and  Family: More than three times a week     Frequency of Social Gatherings with Friends and Family: More than three times a week     Attends [employer] Services: More than 4 times per year     Active Member of Clubs or Organizations: Yes     Attends Club or Organization Meetings: More than 4 times per year     Marital Status:    Housing Stability: Low Risk  (4/7/2024)    Housing Stability Vital Sign     Unable to Pay for Housing in the Last Year: No     Number of Places Lived in the Last Year: 1     Unstable Housing in the Last Year: No     Review of Systems   Unable to perform ROS: Intubated     Objective:     Vital Signs (Most Recent):  Temp: 100 °F (37.8 °C) (04/08/24 1405)  Pulse: 79 (04/08/24 1358)  Resp: (!) 32 (04/08/24 1358)  BP: 104/72 (04/08/24 1300)  SpO2: (!) 93 % (04/08/24 1358) Vital Signs (24h Range):  Temp:  [98.8 °F (37.1 °C)-100 °F (37.8 °C)] 100 °F (37.8 °C)  Pulse:  [72-89] 79  Resp:  [4-55] 32  SpO2:  [84 %-97 %] 93 %  BP: ()/(57-72) 104/72  Arterial Line BP: ()/(42-74) 100/68     Weight: 131.5 kg (289 lb 14.5 oz)  Body mass index is 42.81 kg/m².    Estimated Creatinine Clearance: 37.4 mL/min (A) (based on SCr of 2.4 mg/dL (H)).     Physical Exam  Vitals and nursing note reviewed.   Constitutional:       General: He is not in acute distress.     Appearance: He is obese. He is ill-appearing. He is not toxic-appearing or diaphoretic.   HENT:      Head: Normocephalic and atraumatic.      Right Ear: External ear normal.      Left Ear: External ear normal.   Eyes:      Pupils: Pupils are equal, round, and reactive to light.   Cardiovascular:      Rate and Rhythm: Normal rate.      Heart sounds: No murmur heard.  Abdominal:      General: There is distension.          Significant Labs: CBC:   Recent Labs   Lab 04/07/24  0316 04/07/24  0416 04/08/24  0237 04/08/24  0916 04/08/24  1130   WBC 16.74*  --  19.73*  --  13.26*   HGB 15.1  --  15.4  --  10.9*   HCT 46.8   < > 45.5 45 31.8*   PLT  242  --  159  --  104*    < > = values in this interval not displayed.     CMP:   Recent Labs   Lab 04/06/24 2029 04/06/24  2154 04/07/24  0316 04/07/24  1409 04/07/24  2148 04/08/24  0237 04/08/24  1130   *   < > 124*   < > 124* 121* 129*  129*  129*   K 6.8*   < > 5.1   < > 5.4* 5.5* 3.4*  3.4*  3.4*      < > 95   < > 94* 92* 108  108  108   CO2 18*   < > 17*   < > 16* 12* 13*  13*  13*   *   < > 223*   < > 220* 243* 250*  250*  250*   BUN 52*   < > 36*   < > 23 23 25*  25*  25*   CREATININE 4.4*   < > 3.6*   < > 3.1* 3.4* 2.4*  2.4*  2.4*   CALCIUM 7.1*   < > 7.2*   < > 7.6* 7.8* 4.3*  4.3*  4.3*   PROT 6.2  --  6.3  --   --  7.2  --    ALBUMIN 3.0*   < > 2.9*   < > 2.5* 2.5* 1.3*  1.3*   BILITOT 14.1*  --  12.1*  --   --  9.3*  --    ALKPHOS 96  --  104  --   --  111  --    *  --  421*  --   --  326*  --    *  --  251*  --   --  207*  --    ANIONGAP 10   < > 12   < > 14 17* 8  8  8    < > = values in this interval not displayed.     Microbiology Results (last 7 days)       Procedure Component Value Units Date/Time    Blood culture [[id number]] Collected: 04/08/24 1132    Order Status: Sent Specimen: Blood from Peripheral, Forearm, Left Updated: 04/08/24 1154    Blood culture [[id number]]  (Abnormal) Collected: 04/06/24 1838    Order Status: Completed Specimen: Blood from Line, Jugular, Internal Right Updated: 04/08/24 1151     Blood Culture, Routine Gram stain aer bottle: Gram positive cocci in clusters resembling Staph      Results called to and read back by:Shima Small RN 04/07/2024  14:35      COAGULASE-NEGATIVE STAPHYLOCOCCUS SPECIES  Organism is a probable contaminant      Blood culture [[id number]] Collected: 04/08/24 1132    Order Status: Sent Specimen: Blood from Peripheral, Forearm, Left Updated: 04/08/24 1143    Blood culture [[id number]] Collected: 04/06/24 1732    Order Status: Completed Specimen: Blood from Peripheral, Lower Arm, Right Updated:  04/07/24 2012     Blood Culture, Routine No Growth to date      No Growth to date    MRSA/SA Rapid ID by PCR from Blood culture [[id number]] Collected: 04/06/24 1838    Order Status: Completed Updated: 04/07/24 1609     Staph aureus ID by PCR Negative     Methicillin Resistant ID by PCR Negative            Significant Imaging: I have reviewed all pertinent imaging results/findings within the past 24 hours.

## 2024-04-08 NOTE — ASSESSMENT & PLAN NOTE
Patient with Hypercapnic and Hypoxic Respiratory failure which is Acute.  he is not on home oxygen. Supplemental oxygen was provided and noted- Vent Mode: A/C  Oxygen Concentration (%):  [] 80  Resp Rate Total:  [32 br/min] 32 br/min  Vt Set:  [450 mL] 450 mL  PEEP/CPAP:  [12 cmH20] 12 cmH20  Mean Airway Pressure:  [18 fzV07-33 cmH20] 18 cmH20    .   Signs/symptoms of respiratory failure include- tachypnea, increased work of breathing, respiratory distress, and lethargy. Contributing diagnoses includes - ARDS, Obesity Hypoventilation, and Pneumonia Labs and images were reviewed. Patient Has recent ABG, which has been reviewed. Will treat underlying causes and adjust management of respiratory failure as follows-     Transferred to [facility] [place] MICU on [facility] with worsening O2 requirements  Concern for ARDS 2/2 to acute interstitial pancreatitis  Intubated  - pt s/p ERCP with CBD stent placement  -- a-line in place   -- wean O2 as tolerated  - P/F ratio 61 this morning 4/8, satting 90% on   - Pt requiring maximum ventilator settings sating 90% with PO2 of 77 while prone, PEEP +12. Proned since AM (4/7/24) @ 9:24 am, plan to supine at 10am today.   - Will consider changes to tidal volume and PEEP for ARDSnet protocol, to discuss with staff

## 2024-04-08 NOTE — CARE UPDATE
Procedure Note  Prone Positioning Note  Critical Care Medicine       Chief Complaint: Acute biliary pancreatitis without infection or necrosis  MRN: [id number]  LOS: 2  Sex: male  Age: 73 y.o.      Last ABG:   Recent Labs   Lab 04/08/24 1502   PH 7.198*   PO2 73*   PCO2 55.8*   HCO3 21.7*   BE -6*       Vital Signs (Most Recent):   Temp: 99.3 °F (37.4 °C) (04/08/24 1502)  Pulse: (!) 117 (04/08/24 1502)  Resp: (!) 33 (04/08/24 1502)  BP: 104/72 (04/08/24 1300)  SpO2: (!) 91 % (04/08/24 1502)    Ventilator Settings:  Vent Mode: A/C  Oxygen Concentration (%):  [] 70  Resp Rate Total:  [32 br/min] 32 br/min  Vt Set:  [420 mL-450 mL] 420 mL  PEEP/CPAP:  [12 aaH92-86 cmH20] 14 cmH20  Mean Airway Pressure:  [18 xcV52-35 cmH20] 24 cmH20    At 10:00 am this morning, patient was switched back from prone to supine with team assist and HOB elevated > 30 degrees.    Indication: Severe, refractory ARDS. High risk for deterioration during proning procedure in need of direct monitoring by Critical Care personnel.     Prior to beginning the procedure, all appropriate equipment and personnel were gathered in the room. Two individuals were placed on each side of the patient and one person stood at the head of the bed and was dedicated to the management of the head of the patient, the endotracheal tube, and the ventilator lines. The person at the head of the bed  coordinated the steps of the proning procedure with team team at the direction of Critical Care team members at the bedside. The patient was first log-rolled 90 degrees onto their side towards the direction of their central venous catheter. Cardiac electrodes were then moved from the patient's anterior to the posterior. The patients knees, forehead, chest, and iliac crests were protected using adhesive pads and/or foam pads to reduce the risk of skin breakdown. Once properly positioned in the bed, another 90 degree log roll was performed placing the patient into the prone  position. The patient's arms were placed alongside the body. The patient's head should be turned alternately to the right or left every 2 hours. The patient tolerated the procedure well.     Total Critical Care time (uninterrupted not including procedures): 5

## 2024-04-08 NOTE — TREATMENT PLAN
AES Treatment Plan    Enoc Collado is a 73 y.o. male admitted to hospital 4/6/2024 (Hospital Day: 3) due to Acute biliary pancreatitis without infection or necrosis.     Interval History  Patient paralyzed and prone yesterday afternoon. SLED continued overnight. Labs today notable for stable leukocytosis (19), on-going hyperkalemia (5.5), metabolic acidosis, and improving hyperbilirubinemia (9.3 from 14 on 4/6). Blood cultures from 4/6 positive for GPC.     Objective  Temp:  [98.6 °F (37 °C)-99.3 °F (37.4 °C)] 99.1 °F (37.3 °C) (04/08 0916)  Pulse:  [73-90] 84 (04/08 0916)  Resp:  [4-33] 32 (04/08 0916)  SpO2:  [88 %-96 %] 92 % (04/08 0916)  Arterial Line BP: ()/(49-77) 89/57 (04/08 0645)    General: Intubated, sedated, and proned.     Laboratory  Lab Results   Component Value Date    WBC 19.73 (H) 04/08/2024    HGB 15.4 04/08/2024    HCT 45.5 04/08/2024    MCV 94 04/08/2024     04/08/2024       Lab Results   Component Value Date     (H) 04/08/2024     (H) 04/08/2024    ALKPHOS 111 04/08/2024    BILITOT 9.3 (H) 04/08/2024       Assessment  This is a 73 year old male with PMH significant for obesity and HTN who was admitted to [facility] on 4/6 as a transfer from [facility] for management of severe acute gallstone pancreatitis associated with STEFANIE requiring CRRT, ARDS requiring intubation, and suspected cholangitis. He is status post ERCP on 4/6 showing dilated upper third of main bile duct; sphincterotomy performed and biliary stent placed. Post-procedure, patient with on-going ARDS requiring paralyzing/proning by 4/7. LFT's slowly improving after ERCP.    Recommendations    - Continued supportive care with regards to shock, ARDS, and STEFANIE.   - Hepatic function panel daily.   - No need to trend amylase or lipase.   - Repeat ERCP in 8 weeks to remove stent.     Thank you for involving us in the care of Enoc Collado. Please call with any additional questions, concerns or changes in  the patient's clinical status.        Quinn Box MD, PGY-VI  Gastroenterology Fellow  [facility]

## 2024-04-08 NOTE — SUBJECTIVE & OBJECTIVE
Past Medical History:   Diagnosis Date    BPH (benign prostatic hyperplasia)     CKD (chronic kidney disease), stage III     Colon polyps 2015    Diarrhea in adult patient 02/10/2021    Generalized osteoarthrosis, involving multiple sites     History of gout     Hyperlipidemia     Hypertension     Hypogonadism male     IFG (impaired fasting glucose)     Lumbar disc disease with radiculopathy     Morbid obesity with BMI of 40.0-44.9, adult        Past Surgical History:   Procedure Laterality Date    COLONOSCOPY N/A 6/22/2020    Procedure: COLONOSCOPY;  Surgeon: Montserrat Conroy MD;  Location: [facility];  Service: Endoscopy;  Laterality: N/A;    COLONOSCOPY N/A 2/10/2021    Procedure: COLONOSCOPY;  Surgeon: Jones Lemus III, MD;  Location: [facility];  Service: Endoscopy;  Laterality: N/A;    ENDOSCOPIC ULTRASOUND OF UPPER GASTROINTESTINAL TRACT N/A 4/6/2024    Procedure: ULTRASOUND, UPPER GI TRACT, ENDOSCOPIC;  Surgeon: Shilo Ramirez MD;  Location: [facility] ([address] [place] [address]);  Service: Endoscopy;  Laterality: N/A;    ERCP N/A 4/6/2024    Procedure: ERCP (ENDOSCOPIC RETROGRADE CHOLANGIOPANCREATOGRAPHY);  Surgeon: Shilo Ramirez MD;  [facility] ([facility]  Service: Endoscopy;  Laterality: N/A;    ESOPHAGOGASTRODUODENOSCOPY N/A 2/10/2021    Procedure: EGD (ESOPHAGOGASTRODUODENOSCOPY);  Surgeon: Jones Lemus III, MD;  Location: [facility];  Service: Endoscopy;  Laterality: N/A;    EYE SURGERY      JOINT REPLACEMENT      KNEE SCOPE      open globe      right eye 1980    ROTATOR CUFF REPAIR      SHOULDER SURGERY Right        Review of patient's allergies indicates:   Allergen Reactions    No known drug allergies        Medications:  Medications Prior to Admission   Medication Sig    allopurinoL (ZYLOPRIM) 300 MG tablet Take 1 tablet (300 mg total) by mouth once daily.    amLODIPine (NORVASC) 10 MG tablet Take 1 tablet (10 mg total) by mouth once daily.    aspirin 325 MG tablet Take 325 mg by mouth once daily.  "   carvediloL (COREG) 25 MG tablet Take 1 tablet (25 mg total) by mouth 2 (two) times daily with meals.    empagliflozin (JARDIANCE) 10 mg tablet Take 1 tablet (10 mg total) by mouth once daily.    furosemide (LASIX) 40 MG tablet Take 1 tablet (40 mg total) by mouth once daily.    gabapentin (NEURONTIN) 300 MG capsule TAKE 1 CAPSULE THREE TIMES DAILY (Patient taking differently: Take 300 mg by mouth 3 (three) times daily.)    lisinopriL (PRINIVIL,ZESTRIL) 40 MG tablet Take 1 tablet (40 mg total) by mouth once daily.    methocarbamoL (ROBAXIN) 750 MG Tab Take 750 mg by mouth. (Patient not taking: Reported on 4/5/2024)    pravastatin (PRAVACHOL) 20 MG tablet TAKE 1 TABLET EVERY DAY    sildenafil (REVATIO) 20 mg Tab 2-4 tablets as needed one hour prior to intercourse    syringe with needle, safety 3 mL 21 gauge x 1 1/2" Syrg 1 Syringe by Misc.(Non-Drug; Combo Route) route every 21 days.    testosterone cypionate (DEPOTESTOTERONE CYPIONATE) 200 mg/mL injection Inject 1.5 mLs (300 mg total) into the muscle every 21 days.    timolol maleate 0.5% (TIMOPTIC-XE) 0.5 % SolG Place 1 drop into both eyes every morning.     Antibiotics (From admission, onward)      Start     Stop Route Frequency Ordered    04/08/24 1027  meropenem (MERREM) 2 g in sodium chloride 0.9% 100 mL IVPB         -- IV Every 8 hours (non-standard times) 04/08/24 0535    04/07/24 0035  vancomycin - pharmacy to dose  (vancomycin IVPB (PEDS and ADULTS))        See Hyperspace for full Linked Orders Report.    -- IV pharmacy to manage frequency 04/06/24 2335    04/06/24 2100  mupirocin 2 % ointment         04/11/24 2059 Nasl 2 times daily 04/06/24 1616          Antifungals (From admission, onward)      Start     Stop Route Frequency Ordered    04/08/24 1145  micafungin 100 mg in sodium chloride 0.9 % 100 mL IVPB (MB+)         -- IV Every 24 hours (non-standard times) 04/08/24 1045          Antivirals (From admission, onward)      None             Immunization " History   Administered Date(s) Administered    COVID-19 Vaccine 2021, 2021, 2021    COVID-19, MRNA, LN-S, PF (MODERNA FULL 0.5 ML DOSE) 2021, 2021, 2021    Pneumococcal Conjugate - 13 Valent 2016    Pneumococcal Polysaccharide - 23 Valent 2017    Td (ADULT) 2017    Tetanus 2017    Zoster 2015    Zoster Recombinant 01/10/2022, 03/10/2022       Family History       Problem Relation (Age of Onset)    Cancer Cousin    Cataracts Father, Mother    Heart attack Father    Heart disease Brother    Hypertension Father, Mother          Social History     Socioeconomic History    Marital status:    Occupational History    Occupation: Retired   Tobacco Use    Smoking status: Former     Current packs/day: 0.00     Average packs/day: 1 pack/day for 30.0 years (30.0 ttl pk-yrs)     Types: Cigarettes     Start date: 1970     Quit date: 2000     Years since quittin.1    Smokeless tobacco: Current     Types: Snuff   Substance and Sexual Activity    Alcohol use: No    Drug use: No    Sexual activity: Not Currently   Social History Narrative    Live w/ spouse. No pets      Social Determinants of Health     Financial Resource Strain: Low Risk  (2024)    Overall Financial Resource Strain (CARDIA)     Difficulty of Paying Living Expenses: Not hard at all   Food Insecurity: No Food Insecurity (2024)    Hunger Vital Sign     Worried About Running Out of Food in the Last Year: Never true     Ran Out of Food in the Last Year: Never true   Transportation Needs: No Transportation Needs (2024)    PRAPARE - Transportation     Lack of Transportation (Medical): No     Lack of Transportation (Non-Medical): No   Physical Activity: Sufficiently Active (2024)    Exercise Vital Sign     Days of Exercise per Week: 7 days     Minutes of Exercise per Session: 60 min   Stress: No Stress Concern Present (2024)    [employer] [place] -  Occupational Stress Questionnaire     Feeling of Stress : Not at all   Social Connections: Socially Integrated (4/7/2024)    Social Connection and Isolation Panel [NHANES]     Frequency of Communication with Friends and Family: More than three times a week     Frequency of Social Gatherings with Friends and Family: More than three times a week     Attends Hindu Services: More than 4 times per year     Active Member of Clubs or Organizations: Yes     Attends Club or Organization Meetings: More than 4 times per year     Marital Status:    Housing Stability: Low Risk  (4/7/2024)    Housing Stability Vital Sign     Unable to Pay for Housing in the Last Year: No     Number of Places Lived in the Last Year: 1     Unstable Housing in the Last Year: No     Review of Systems   Unable to perform ROS: Intubated     Objective:     Vital Signs (Most Recent):  Temp: 100 °F (37.8 °C) (04/08/24 1405)  Pulse: 79 (04/08/24 1358)  Resp: (!) 32 (04/08/24 1358)  BP: 104/72 (04/08/24 1300)  SpO2: (!) 93 % (04/08/24 1358) Vital Signs (24h Range):  Temp:  [98.8 °F (37.1 °C)-100 °F (37.8 °C)] 100 °F (37.8 °C)  Pulse:  [72-89] 79  Resp:  [4-55] 32  SpO2:  [84 %-97 %] 93 %  BP: ()/(57-72) 104/72  Arterial Line BP: ()/(42-74) 100/68     Weight: 131.5 kg (289 lb 14.5 oz)  Body mass index is 42.81 kg/m².    Estimated Creatinine Clearance: 37.4 mL/min (A) (based on SCr of 2.4 mg/dL (H)).     Physical Exam  Vitals and nursing note reviewed.   Constitutional:       General: He is not in acute distress.     Appearance: He is obese. He is ill-appearing. He is not toxic-appearing or diaphoretic.   HENT:      Head: Normocephalic and atraumatic.      Right Ear: External ear normal.      Left Ear: External ear normal.   Eyes:      Pupils: Pupils are equal, round, and reactive to light.   Cardiovascular:      Rate and Rhythm: Normal rate.      Heart sounds: No murmur heard.  Abdominal:      General: There is distension.           Significant Labs: CBC:   Recent Labs   Lab 04/07/24  0316 04/07/24  0416 04/08/24  0237 04/08/24  0916 04/08/24  1130   WBC 16.74*  --  19.73*  --  13.26*   HGB 15.1  --  15.4  --  10.9*   HCT 46.8   < > 45.5 45 31.8*     --  159  --  104*    < > = values in this interval not displayed.     CMP:   Recent Labs   Lab 04/06/24 2029 04/06/24 2154 04/07/24 0316 04/07/24  1409 04/07/24  2148 04/08/24  0237 04/08/24  1130   *   < > 124*   < > 124* 121* 129*  129*  129*   K 6.8*   < > 5.1   < > 5.4* 5.5* 3.4*  3.4*  3.4*      < > 95   < > 94* 92* 108  108  108   CO2 18*   < > 17*   < > 16* 12* 13*  13*  13*   *   < > 223*   < > 220* 243* 250*  250*  250*   BUN 52*   < > 36*   < > 23 23 25*  25*  25*   CREATININE 4.4*   < > 3.6*   < > 3.1* 3.4* 2.4*  2.4*  2.4*   CALCIUM 7.1*   < > 7.2*   < > 7.6* 7.8* 4.3*  4.3*  4.3*   PROT 6.2  --  6.3  --   --  7.2  --    ALBUMIN 3.0*   < > 2.9*   < > 2.5* 2.5* 1.3*  1.3*   BILITOT 14.1*  --  12.1*  --   --  9.3*  --    ALKPHOS 96  --  104  --   --  111  --    *  --  421*  --   --  326*  --    *  --  251*  --   --  207*  --    ANIONGAP 10   < > 12   < > 14 17* 8  8  8    < > = values in this interval not displayed.     Microbiology Results (last 7 days)       Procedure Component Value Units Date/Time    Blood culture [[id number]] Collected: 04/08/24 1132    Order Status: Sent Specimen: Blood from Peripheral, Forearm, Left Updated: 04/08/24 1154    Blood culture [[id number]]  (Abnormal) Collected: 04/06/24 1838    Order Status: Completed Specimen: Blood from Line, Jugular, Internal Right Updated: 04/08/24 1151     Blood Culture, Routine Gram stain aer bottle: Gram positive cocci in clusters resembling Staph      Results called to and read back by:Shima Small RN 04/07/2024  14:35      COAGULASE-NEGATIVE STAPHYLOCOCCUS SPECIES  Organism is a probable contaminant      Blood culture [[id number]] Collected: 04/08/24 1132     Order Status: Sent Specimen: Blood from Peripheral, Forearm, Left Updated: 04/08/24 1143    Blood culture [[id number]] Collected: 04/06/24 1732    Order Status: Completed Specimen: Blood from Peripheral, Lower Arm, Right Updated: 04/07/24 2012     Blood Culture, Routine No Growth to date      No Growth to date    MRSA/SA Rapid ID by PCR from Blood culture [[id number]] Collected: 04/06/24 1838    Order Status: Completed Updated: 04/07/24 1609     Staph aureus ID by PCR Negative     Methicillin Resistant ID by PCR Negative            Significant Imaging: I have reviewed all pertinent imaging results/findings within the past 24 hours.

## 2024-04-08 NOTE — ASSESSMENT & PLAN NOTE
As per wife, patient with no prior history of CKD, baseline creatinine around 1.0 and eGFR>60 base on labs available. He presented to outside hospital for abdominal pain, received a dose of IV contrast for a CT of the abdomen, and found to have pancreatitis. Patient subsequently became hypotensive requiring the initiation of vasopressin and levophed for pressure support.    STEFANIE 2/2 iATN from septic shock from pancreatitis    Recommendations  -will continue 24 hour SLED  -clotted this morning, now with poor catheter flows.  Will pause SLED and instill cathflo to both lumens to dwell x 2 hours.    -Labs reviewed, possible Pseudohypobicarbanemia because of his high triglycerides.  Bicarb gap of 9.  -check LA, check CPK  -Bicarb bath 32  -Na bath 130  -Avoid nephrotoxic agents (IV contrast, NSAID's, gadolinium contrast, PPI's) if able.   -Maintain MAP above 65 mmHg.   -Strict I's and O's  -Daily renal function panel  -Renally dose all medications

## 2024-04-08 NOTE — ASSESSMENT & PLAN NOTE
Mr. Kline a 74 yo man form the [facility], [place] [facility] [place]. He presented to the ED in  and was diagnosed with pancreatitis. He was started on empiric abx. Patient was transferred here yesterday and admitted tot he ICU. The patient's abx were escalated to meropenem and ID is consulted for that. History obtained from family. Underwent ERCP showed mild proximal CBD dilation, stent placed with bile flowing with no pus. Now intubated with suspected shock and ARDS. Abx escalated to meropenem.    Biliary pancreatitis, cholangitis, shock and ARDS    Recommendations  Agree with meropenem (normal dose while on CRRT) and micafungin to cover the usual suspects  Follow pending blood cultures  Trend WBC  Discussed with patient's family - all questions answered

## 2024-04-08 NOTE — ASSESSMENT & PLAN NOTE
Currently on norepi at 0.6 mcg/kg/min and vaso at 0.04 units/hr    Plan:  -- Titrate pressors for MAP > 65  -- See Acute biliary pancreatitis plan

## 2024-04-08 NOTE — CARE UPDATE
Procedure Note  Prone Positioning  Critical Care Medicine       Chief Complaint: Acute biliary pancreatitis without infection or necrosis  MRN: [id number]  LOS: 2  Sex: male  Age: 73 y.o.      Last ABG:   Recent Labs   Lab 04/08/24  1502   PH 7.198*   PO2 73*   PCO2 55.8*   HCO3 21.7*   BE -6*       Vital Signs (Most Recent):   Temp: 99.5 °F (37.5 °C) (04/08/24 1730)  Pulse: (!) 121 (04/08/24 1741)  Resp: (!) 61 (04/08/24 1741)  BP: 97/71 (04/08/24 1700)  SpO2: (!) 72 % (04/08/24 1741)    Ventilator Settings:  Vent Mode: A/C  Oxygen Concentration (%):  [] 100  Resp Rate Total:  [32 br/min] 32 br/min  Vt Set:  [420 mL-450 mL] 420 mL  PEEP/CPAP:  [12 mrN14-03 cmH20] 14 cmH20  Mean Airway Pressure:  [18 biN30-77 cmH20] 23 cmH20    Patient returned to prone position via staff assist at 1745, P/F ratio 61 earlier. Plan on proning for 16 hours prior to 8 hour break.     Indication: Severe, refractory ARDS. High risk for deterioration during proning procedure in need of direct monitoring by Critical Care personnel.     Prior to beginning the procedure, all appropriate equipment and personnel were gathered in the room. Two individuals were placed on each side of the patient and one person stood at the head of the bed and was dedicated to the management of the head of the patient, the endotracheal tube, and the ventilator lines. The person at the head of the bed  coordinated the steps of the proning procedure with team team at the direction of Critical Care team members at the bedside. The patient was first log-rolled 90 degrees onto their side towards the direction of their central venous catheter. Cardiac electrodes were then moved from the patient's anterior to the posterior. The patients knees, forehead, chest, and iliac crests were protected using adhesive pads and/or foam pads to reduce the risk of skin breakdown. Once properly positioned in the bed, another 90 degree log roll was performed placing the patient into  the prone position. The patient's arms were placed alongside the body. The patient's head should be turned alternately to the right or left every 2 hours. The patient tolerated the procedure well.     Total Critical Care time (uninterrupted not including procedures): 5

## 2024-04-08 NOTE — PROGRESS NOTES
04/08/24 1229   Treatment   Treatment Type SLED   Treatment Status Restart   Dialysis Machine Number K21   Solutions Labeled and Current  Yes   Access Temporary Cath;Right;IJ   Catheter Dressing Intact  Yes   Alarms Engaged Yes   CRRT Comments Sled tx restarted     Report given to primary nurse.

## 2024-04-08 NOTE — PLAN OF CARE
MICU DAILY GOALS     Family/Goals of care/Code Status   Code Status: Full Code    24H Vital Sign Range  Temp:  [96.3 °F (35.7 °C)-100 °F (37.8 °C)]   Pulse:  []   Resp:  [4-110]   BP: ()/(57-76)   SpO2:  [72 %-98 %]   Arterial Line BP: ()/()      Shift Events (include procedures and significant events)   Patient supinated around 10 am, Remains intubated with paralytic and propofol weaned off r/t triglyceride level. Patient allowed to wake up, 4/4 @ 4 temple returned. Patient then transitioned to ketamine. Midshift patient went into Afib RVR and became increasingly hypotensive. Levophed was increased. 2L of LR given as well as 150 Bolus of Amiodarone with a 1mg of Amiodarone started. Patient re-proned at 5 with nimbex restarted. Bladder pressure obtained >20. Will repeat Q6 -> per gen surgery.     AWAKE RASS: Goal - RASS Goal: -4-->deep sedation  Actual - RASS (Parry Agitation-Sedation Scale): deep sedation    Restraint necessity: Not necessary   BREATHE SBT: Not attempted    Coordinate A & B, analgesics/sedatives Pain: managed   SAT: Pass   Delirium CAM-ICU: Overall CAM-ICU: Positive   Early(intubated/ Progressive (non-intubated) Mobility MOVE Screen (INTUBATED ONLY): NA    Activity: Activity Management: Rolling - L1, Patient unable to perform activities   Feeding/Nutrition Diet order: Diet/Nutrition Received: NPO, tube feeding,     Thrombus DVT prophylaxis: VTE Required Core Measure: Pharmacological prophylaxis initiated/maintained   HOB Elevation Head of Bed (HOB) Positioning: HOB flat (Simultaneous filing. User may not have seen previous data.)   Ulcer Prophylaxis GI: yes   Glucose control managed     Skin Skin assessed during: Daily Assessment    Sacrum intact/not altered? Yes  Heels intact/not altered? Yes  Surgical wound? No    CHECK ONE!   (no altered skin or altered skin) and sub boxes:  [x] No Altered Skin Integrity Present    [x]Prevention Measures Documented    [] Altered Skin  Integrity Present or Discovered   [] LDA present in EPIC, daily doc completed              [] LDA added if not in EPIC (describe wound).                    When describing wound, do not stage, use descriptive words only.    [] Wound Image Taken (required on admit,                   transfer/discharge and every Tuesday)    Wound Care Consulted? Yes     4 EYES:  Attending Nurse (1st set of eyes): Wilber Samuels Rn     Second RN/Staff Member (2nd set of eyes): Miguel Angel Miles RN    Bowel Function constipation    Indwelling Catheter Necessity      Urethral Catheter 04/06/24 1000-Reason for Continuing Urinary Catheterization: Critically ill in ICU and requiring hourly monitoring of intake/output    PICC Double Lumen 04/06/24 1136 right basilic-Line Necessity Review: CRRT/HD  [REMOVED] Trialysis (Dialysis) Catheter 04/06/24 1836 right internal jugular-Line Necessity Review: Hemodynamic instability, CRRT/HD  Bladder pressures    De-escalation Antibiotics No        VS and assessment per flow sheet, patient progressing towards goals as tolerated, plan of care reviewed with family, all concerns addressed, will continue to monitor.

## 2024-04-08 NOTE — CONSULTS
[facility] - Cardiac Medical [facility]  Wound Care    Patient Name:  Enoc Collado   MRN:  [id number]  Date: 2024  Diagnosis: Acute biliary pancreatitis without infection or necrosis    History:     Past Medical History:   Diagnosis Date    BPH (benign prostatic hyperplasia)     CKD (chronic kidney disease), stage III     Colon polyps 2015    Diarrhea in adult patient 02/10/2021    Generalized osteoarthrosis, involving multiple sites     History of gout     Hyperlipidemia     Hypertension     Hypogonadism male     IFG (impaired fasting glucose)     Lumbar disc disease with radiculopathy     Morbid obesity with BMI of 40.0-44.9, adult        Social History     Socioeconomic History    Marital status:    Occupational History    Occupation: Retired   Tobacco Use    Smoking status: Former     Current packs/day: 0.00     Average packs/day: 1 pack/day for 30.0 years (30.0 ttl pk-yrs)     Types: Cigarettes     Start date: 1970     Quit date: 2000     Years since quittin.1    Smokeless tobacco: Current     Types: Snuff   Substance and Sexual Activity    Alcohol use: No    Drug use: No    Sexual activity: Not Currently   Social History Narrative    Live w/ spouse. No pets      Social Determinants of Health     Financial Resource Strain: Low Risk  (2024)    Overall Financial Resource Strain (CARDIA)     Difficulty of Paying Living Expenses: Not hard at all   Food Insecurity: No Food Insecurity (2024)    Hunger Vital Sign     Worried About Running Out of Food in the Last Year: Never true     Ran Out of Food in the Last Year: Never true   Transportation Needs: No Transportation Needs (2024)    PRAPARE - Transportation     Lack of Transportation (Medical): No     Lack of Transportation (Non-Medical): No   Physical Activity: Sufficiently Active (2024)    Exercise Vital Sign     Days of Exercise per Week: 7 days     Minutes of Exercise per Session: 60 min   Stress: No Stress Concern Present (2024)  "   [facility] of Occupational Health - Occupational Stress Questionnaire     Feeling of Stress : Not at all   Social Connections: Socially Integrated (4/7/2024)    Social Connection and Isolation Panel [NHANES]     Frequency of Communication with Friends and Family: More than three times a week     Frequency of Social Gatherings with Friends and Family: More than three times a week     Attends [employer] Services: More than 4 times per year     Active Member of Clubs or Organizations: Yes     Attends Club or Organization Meetings: More than 4 times per year     Marital Status:    Housing Stability: Low Risk  (4/7/2024)    Housing Stability Vital Sign     Unable to Pay for Housing in the Last Year: No     Number of Places Lived in the Last Year: 1     Unstable Housing in the Last Year: No       Precautions:     Allergies as of 04/06/2024 - Reviewed 04/06/2024   Allergen Reaction Noted    No known drug allergies  06/08/2012       WO Assessment Details/Treatment     Patient seen for wound care consultation.   Reviewed chart for this encounter.   See Flow Sheet for findings.      RECOMMENDATIONS: Patient is currently proned and paralyzed. Primary RN at bedside and agreeable to inpatient wound care. RN consult for lip. Spouse at bedside informed that there is a purple jose alberto on the patuient's lip that "has always been there". Not hospital related, present on admission, and does not require intervention. No other issues or concerns at this time. Inpatient wound care sign off.    Discussed POC with patient and primary nurse.   See EMR for orders & patient education.    Discussed nutrition and the role of protein in wound healing with the patient. Instructed patient to optimize protein for wound healing.    Bedside nursing to continue care & monitoring.  Bedside nursing to maintain pressure injury prevention interventions.            04/08/24 0905   WOCN Assessment   WOCN Total Time (mins) 30   Visit Date " 04/08/24   Visit Time 0905   Consult Type New   WOCN Speciality Wound   Intervention assessed;chart review   Teaching on-going       Orders placed.   Jone MAURICEN, RN  04/08/2024

## 2024-04-08 NOTE — PROGRESS NOTES
04/08/24 0331 04/08/24 0340   Treatment   Treatment Type SLED SLED   Treatment Status Order change;Daily equipment check Order change   Dialysis Machine Number [id number]   Dialyzer Time (hours) 17.2  --    BVP (Liters) 203.1 L  --    Solutions Labeled and Current  Yes  --    Access Temporary Cath;Right;IJ  --    Catheter Dressing Intact  Yes  --    Alarms Engaged Yes  --    CRRT Comments bath changed to 4K as ordered primary nurse changed bath back to 3K per MD orders     Ongoing SLED. Orders and machine settings verified. Daily equipment check and maintenance done.

## 2024-04-08 NOTE — SUBJECTIVE & OBJECTIVE
Interval History:   Ran on citrate with SLED x 21 hours, clotted this morning.  He remains on high dose pressor support with Levo0.8, vaso.  Negative 2.1L/24h.  Proned this morning, remains intubated with FiO2 80%/PEEP of 12.    Triglycerides 1314 on AM labs, iCA low, worsening HAGMA and K of 5.5 on AM labs with Na of 121.  sCO2 12 on AM labs with iSTAT bicarb of 21.    Review of patient's allergies indicates:   Allergen Reactions    No known drug allergies      Current Facility-Administered Medications   Medication Frequency    0.9%  NaCl infusion (CRRT USE ONLY) Continuous    acetaminophen tablet 650 mg Q4H PRN    dexAMETHasone (DECADRON) 20 mg in sodium chloride 0.9% 50 mL IVPB Daily    Followed by    [START ON 4/11/2024] dexAMETHasone injection 10 mg Daily    dextrose 10% bolus 125 mL 125 mL PRN    dextrose 10% bolus 125 mL 125 mL PRN    dextrose 10% bolus 250 mL 250 mL PRN    dextrose 10% bolus 250 mL 250 mL PRN    fentaNYL 2500 mcg in 0.9% sodium chloride 250 mL infusion premix (titrating) Continuous    fentanyl IV bolus from bag/infusion 50 mcg Q15 Min PRN    glucagon (human recombinant) injection 1 mg PRN    heparin 25,000 units in dextrose 5% (100 units/ml) IV bolus from bag LOW INTENSITY nomogram - [facility] Once    heparin 25,000 units in dextrose 5% (100 units/ml) IV bolus from bag LOW INTENSITY nomogram - [facility] PRN    heparin 25,000 units in dextrose 5% (100 units/ml) IV bolus from bag LOW INTENSITY nomogram - [facility] PRN    heparin 25,000 units in dextrose 5% 250 mL (100 units/mL) infusion LOW INTENSITY nomogram - [facility] Continuous    insulin aspart U-100 pen 0-10 Units Q6H PRN    ketamine 500 mg in sodium chloride 0.9% 100 mL infusion Continuous    magnesium sulfate 2g in water 50mL IVPB (premix) PRN    meropenem (MERREM) 2 g in sodium chloride 0.9% 100 mL IVPB Q8H    micafungin 100 mg in sodium chloride 0.9 % 100 mL IVPB (MB+) Q24H    mupirocin 2 % ointment BID    NORepinephrine 32 mg in dextrose 5 % (D5W) 250 mL  infusion Continuous    pantoprazole injection 40 mg BID    polyethylene glycol packet 17 g BID    sodium chloride 0.9% flush 10 mL PRN    timolol maleate 0.5% ophthalmic solution 1 drop Daily    vancomycin - pharmacy to dose pharmacy to manage frequency    vasopressin (PITRESSIN) 0.2 Units/mL in dextrose 5 % (D5W) 100 mL infusion Continuous    white petrolatum-mineral oil (SYSTANE NIGHTTIME) ophthalmic ointment Q8H       Objective:     Vital Signs (Most Recent):  Temp: 99.7 °F (37.6 °C) (04/08/24 1110)  Pulse: 79 (04/08/24 1110)  Resp: (!) 32 (04/08/24 1110)  BP: 104/71 (04/06/24 1606)  SpO2: (!) 94 % (04/08/24 1110) Vital Signs (24h Range):  Temp:  [98.6 °F (37 °C)-99.7 °F (37.6 °C)] 99.7 °F (37.6 °C)  Pulse:  [79-89] 79  Resp:  [4-39] 32  SpO2:  [85 %-96 %] 94 %  Arterial Line BP: ()/(49-74) 88/56     Weight: 131.5 kg (289 lb 14.5 oz) (04/07/24 1240)  Body mass index is 42.81 kg/m².  Body surface area is 2.53 meters squared.    I/O last 3 completed shifts:  In: 10761.8 [I.V.:7522.5; NG/GT:220; IV Piggyback:3577.3]  Out: 99706 [Urine:385; Other:06307]     Physical Exam  Vitals and nursing note reviewed.   Constitutional:       Appearance: He is obese. He is ill-appearing. He is not toxic-appearing or diaphoretic.      Interventions: He is sedated and intubated.      Comments: Proned   HENT:      Head: Atraumatic.      Nose: Nose normal.      Mouth/Throat:      Comments: Intubated   Cardiovascular:      Rate and Rhythm: Normal rate and regular rhythm.      Pulses: Normal pulses.   Pulmonary:      Effort: He is intubated.      Breath sounds: Rhonchi present. No wheezing or rales.      Comments: Mechanical breathsounds  Abdominal:      Comments: DEE   Musculoskeletal:      Right lower leg: Edema present.      Left lower leg: Edema present.          Significant Labs:  ABGs:   Recent Labs   Lab 04/08/24  0916   PH 7.230*   PCO2 50.4*   HCO3 21.1*   POCSATURATED 94   BE -6*     CBC:   Recent Labs   Lab  04/08/24 0237 04/08/24  0916   WBC 19.73*  --    RBC 4.82  --    HGB 15.4  --    HCT 45.5 45     --    MCV 94  --    MCH 32.0*  --    MCHC 33.8  --      CMP:   Recent Labs   Lab 04/08/24 0237   *   CALCIUM 7.8*   ALBUMIN 2.5*   PROT 7.2   *   K 5.5*   CO2 12*   CL 92*   BUN 23   CREATININE 3.4*   ALKPHOS 111   *   *   BILITOT 9.3*

## 2024-04-08 NOTE — ASSESSMENT & PLAN NOTE
-pause citrate while cathflo is instilling  -increase post-filter calcium to 25 ml/hr  -calcium chloride 1gm x 1

## 2024-04-08 NOTE — PROGRESS NOTES
[facility]  Nephrology  Progress Note    Patient Name: Enoc Collado  MRN: [id number]  Admission Date: 4/6/2024  Hospital Length of Stay: 2 days  Attending Provider: Mynor Perkins MD   Primary Care Physician: Frantz Mason MD  Principal Problem:Acute biliary pancreatitis without infection or necrosis    Subjective:     HPI: Mr. Collado is a 73 year old male with chronic HTN, HLD, morbid obesity. Patient is intubated on multiple pressors at the time of my evaluation. All history was obtained from chart review and by family. Patient initially presented to [facility] with acute onset abdominal pain for 1 day on 4/5 associated with n/v/d. In ED at OSH, elevated LFTs to 140-180s with T bili 3.6/ direct bilirubin of 2.0. Lipase >1000. Abdominal US with CBD of 7mm and no concerns of acute cholecystitis. CT with contrast on 4/5 is consistent with acute pancreatitis without gross evidence of biliary obstruction. Patient does not have previous episodes of pancreatitis or no new medications recently started. . Patient started on IVF. Noted to have worsening respiratory status and confused at OSH. ABG with pH of 7.2, CO2 58, O2 68. Fluids were stopped and IV Lasix was given with patient placed on Bipap. [place] [facility] [place]. with EUS/ERCP.     Nephrology has been consulted for STEFANIE, hyperkalemia, and oliguria following IV contrast, pancreatitis, and hypotension. As per the wife, she is not aware of any prior history of kidney disease, and review of his labs available to me show a eGFR above 60 prior to this hospitalization. At the outside facility, he was hyperkalemic at 6.7, and was shifted with improvement in his potassium to 5.0, creatinine on arrival was 1.6 (baseline 1.0), and trended up to 4.1 at the time of the consult. Total bilirubin continues to trend up, currently at 14.8, and LFTs worsening. Most recent ABG prior to intubation showed 7.25/47/93/21. He was  intubated in the ICU, and is currently going for emergent ERCP. Wife denies the patient taking any NSAIDs, having decreased PO intake or decreased urinary output prior to presentation. Upon my initial encounter, the patient has produced only 150 mL of urine in the macias bag over the past 12 hours.     Interval History:   Ran on citrate with SLED x 21 hours, clotted this morning.  He remains on high dose pressor support with Levo0.8, vaso.  Negative 2.1L/24h.  Proned this morning, remains intubated with FiO2 80%/PEEP of 12.    Triglycerides 1314 on AM labs, iCA low, worsening HAGMA and K of 5.5 on AM labs with Na of 121.  sCO2 12 on AM labs with iSTAT bicarb of 21.    Review of patient's allergies indicates:   Allergen Reactions    No known drug allergies      Current Facility-Administered Medications   Medication Frequency    0.9%  NaCl infusion (CRRT USE ONLY) Continuous    acetaminophen tablet 650 mg Q4H PRN    dexAMETHasone (DECADRON) 20 mg in sodium chloride 0.9% 50 mL IVPB Daily    Followed by    [START ON 4/11/2024] dexAMETHasone injection 10 mg Daily    dextrose 10% bolus 125 mL 125 mL PRN    dextrose 10% bolus 125 mL 125 mL PRN    dextrose 10% bolus 250 mL 250 mL PRN    dextrose 10% bolus 250 mL 250 mL PRN    fentaNYL 2500 mcg in 0.9% sodium chloride 250 mL infusion premix (titrating) Continuous    fentanyl IV bolus from bag/infusion 50 mcg Q15 Min PRN    glucagon (human recombinant) injection 1 mg PRN    heparin 25,000 units in dextrose 5% (100 units/ml) IV bolus from bag LOW INTENSITY nomogram - [facility] Once    heparin 25,000 units in dextrose 5% (100 units/ml) IV bolus from bag LOW INTENSITY nomogram - [facility] PRN    heparin 25,000 units in dextrose 5% (100 units/ml) IV bolus from bag LOW INTENSITY nomogram - [facility] PRN    heparin 25,000 units in dextrose 5% 250 mL (100 units/mL) infusion LOW INTENSITY nomogram - [facility] Continuous    insulin aspart U-100 pen 0-10 Units Q6H PRN    ketamine 500 mg in sodium chloride 0.9%  100 mL infusion Continuous    magnesium sulfate 2g in water 50mL IVPB (premix) PRN    meropenem (MERREM) 2 g in sodium chloride 0.9% 100 mL IVPB Q8H    micafungin 100 mg in sodium chloride 0.9 % 100 mL IVPB (MB+) Q24H    mupirocin 2 % ointment BID    NORepinephrine 32 mg in dextrose 5 % (D5W) 250 mL infusion Continuous    pantoprazole injection 40 mg BID    polyethylene glycol packet 17 g BID    sodium chloride 0.9% flush 10 mL PRN    timolol maleate 0.5% ophthalmic solution 1 drop Daily    vancomycin - pharmacy to dose pharmacy to manage frequency    vasopressin (PITRESSIN) 0.2 Units/mL in dextrose 5 % (D5W) 100 mL infusion Continuous    white petrolatum-mineral oil (SYSTANE NIGHTTIME) ophthalmic ointment Q8H       Objective:     Vital Signs (Most Recent):  Temp: 99.7 °F (37.6 °C) (04/08/24 1110)  Pulse: 79 (04/08/24 1110)  Resp: (!) 32 (04/08/24 1110)  BP: 104/71 (04/06/24 1606)  SpO2: (!) 94 % (04/08/24 1110) Vital Signs (24h Range):  Temp:  [98.6 °F (37 °C)-99.7 °F (37.6 °C)] 99.7 °F (37.6 °C)  Pulse:  [79-89] 79  Resp:  [4-39] 32  SpO2:  [85 %-96 %] 94 %  Arterial Line BP: ()/(49-74) 88/56     Weight: 131.5 kg (289 lb 14.5 oz) (04/07/24 1240)  Body mass index is 42.81 kg/m².  Body surface area is 2.53 meters squared.    I/O last 3 completed shifts:  In: 71277.8 [I.V.:7522.5; NG/GT:220; IV Piggyback:3577.3]  Out: 51561 [Urine:385; Other:03562]     Physical Exam  Vitals and nursing note reviewed.   Constitutional:       Appearance: He is obese. He is ill-appearing. He is not toxic-appearing or diaphoretic.      Interventions: He is sedated and intubated.      Comments: Proned   HENT:      Head: Atraumatic.      Nose: Nose normal.      Mouth/Throat:      Comments: Intubated   Cardiovascular:      Rate and Rhythm: Normal rate and regular rhythm.      Pulses: Normal pulses.   Pulmonary:      Effort: He is intubated.      Breath sounds: Rhonchi present. No wheezing or rales.      Comments: Mechanical  breathsounds  Abdominal:      Comments: DEE   Musculoskeletal:      Right lower leg: Edema present.      Left lower leg: Edema present.          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 He presented to outside hospital for abdominal pain, received a dose of IV contrast for a CT of the abdomen, and found to have pancreatitis. Patient subsequently became hypotensive requiring the initiation of vasopressin and levophed for pressure support.    STEFANIE 2/2 iATN from septic shock from pancreatitis    Recommendations  -will continue 24 hour SLED  -clotted this morning, now with poor catheter flows.  Will pause SLED and instill cathflo to both lumens to dwell x 2 hours.    -Labs reviewed, possible Pseudohypobicarbanemia because of his high triglycerides.  Bicarb gap of 9.  -check LA, check CPK  -Bicarb bath 32  -Na bath 130  -Avoid nephrotoxic agents (IV contrast, NSAID's, gadolinium contrast, PPI's) if able.   -Maintain MAP above 65  mmHg.   -Strict I's and O's  -Daily renal function panel  -Renally dose all medications    Hyperkalemia  -iSTAT 5.0  -continue SLED  -3K bath    Endocrine  Hyponatremia  -confirmed with iSTAT, Na 120  -130 Na bath with SLED      Rodolfo Hayward, NP  Nephrology  [facility] - [facility]

## 2024-04-08 NOTE — ASSESSMENT & PLAN NOTE
[facility] on aerobic blood culture x1/2 sets from 4/6/24    Plan:  -- continue vanc, continue meropenem for now until speciation  -- Will need TTE to assess for endocarditis  -- Repeat blood cultures at 48h this evening after 1900

## 2024-04-08 NOTE — PROGRESS NOTES
04/08/24 0910   Treatment   Treatment Type SLED   Treatment Status Restart   Dialysis Machine Number [id number]   Solutions Labeled and Current  Yes   Access Temporary Cath;Right;IJ   Catheter Dressing Intact  Yes   Alarms Engaged Yes   CRRT Comments Tx restarted     Report given to primary nurse.

## 2024-04-08 NOTE — HPI
Mr. Kline a 72 yo man form the [facility], [place] [facility] [place]. He presented to the ED in [place] and was diagnosed with pancreatitis. He was started on empiric abx. Patient was transferred here yesterday and admitted tot he ICU. The patient's abx were escalated to meropenem and ID is consulted for that. History obtained from family. Underwent ERCP showed mild proximal CBD dilation, stent placed with bile flowing with no pus. Now intubated with suspected shock and ARDS. Abx escalated to meropenem.    General surgery consulted for evaluation for abdominal compartment syndrome.

## 2024-04-08 NOTE — PLAN OF CARE
MICU DAILY GOALS     Family/Goals of care/Code Status   Code Status: Full Code    24H Vital Sign Range  Temp:  [98.6 °F (37 °C)-99.3 °F (37.4 °C)]   Pulse:  [71-90]   Resp:  [4-33]   SpO2:  [87 %-96 %]   Arterial Line BP: ()/(49-77)      Shift Events (include procedures and significant events)   No acute events throughout shift   Patient prone throughout shift. On CRRT with citrate and calcium gluconate. Patient on vaso, levo, prop, fent, nimbex gtts. Wife at bedside.   AWAKE RASS: Goal - RASS Goal: -5-->unarousable  Actual - RASS (Parry Agitation-Sedation Scale): unarousable    Restraint necessity: Not necessary   BREATHE SBT: Not attempted    Coordinate A & B, analgesics/sedatives Pain: managed   SAT: Not attempted   Delirium CAM-ICU: Overall CAM-ICU: Positive   Early(intubated/ Progressive (non-intubated) Mobility MOVE Screen (INTUBATED ONLY): NA    Activity: Activity Management: Patient unable to perform activities   Feeding/Nutrition Diet order: Diet/Nutrition Received: NPO,     Thrombus DVT prophylaxis: VTE Required Core Measure: Pharmacological prophylaxis initiated/maintained   HOB Elevation Head of Bed (HOB) Positioning: HOB flat   Ulcer Prophylaxis GI: yes   Glucose control managed     Skin Skin assessed during: Daily Assessment    Sacrum intact/not altered? Yes  Heels intact/not altered? Yes  Surgical wound? No    CHECK ONE!   (no altered skin or altered skin) and sub boxes:  [] No Altered Skin Integrity Present    []Prevention Measures Documented    [x] Altered Skin Integrity Present or Discovered   [x] LDA present in EPIC, daily doc completed              [] LDA added if not in EPIC (describe wound).                    When describing wound, do not stage, use descriptive words only.    [] Wound Image Taken (required on admit,                   transfer/discharge and every Tuesday)    Wound Care Consulted? Yes    4 EYES:  Attending Nurse (1st set of eyes): Galina Porras RN    Second RN/Staff  Member (2nd set of eyes): Felicita Crespo RN   Bowel Function constipation    Indwelling Catheter Necessity      Urethral Catheter 04/06/24 1000-Reason for Continuing Urinary Catheterization: Urinary retention, Critically ill in ICU and requiring hourly monitoring of intake/output    PICC Double Lumen 04/06/24 1136 right basilic-Line Necessity Review: Hemodynamic instability  Trialysis (Dialysis) Catheter 04/06/24 1836 right internal jugular-Line Necessity Review: Hemodynamic instability, CRRT/HD     De-escalation Antibiotics No        VS and assessment per flow sheet, patient progressing towards goals as tolerated, plan of care reviewed with family, all concerns addressed, will continue to monitor.

## 2024-04-08 NOTE — ASSESSMENT & PLAN NOTE
Patient with acute kidney injury/acute renal failure likely due to acute tubular necrosis caused by severe shock and acute infectious process.  STEFANIE is currently worsening. Baseline creatinine  1.0  - Labs reviewed- Renal function/electrolytes with Estimated Creatinine Clearance: 26.4 mL/min (A) (based on SCr of 3.4 mg/dL (H)). according to latest data. Monitor urine output and serial BMP and adjust therapy as needed. Avoid nephrotoxins and renally dose meds for GFR listed above.    Worsening renal failure during admission likely ischemic ATN s/o shock and sepsis. RF c/b hyperkalemia and acidosis. Nephrology following at OSH. Anticipate patient will likely need RRT on current admission.    -- nephrology consulted, appreciate recs. Pt received SLED, CRRT, plan for SLED next 24 hrs- clotted, will need restart  -- acute hyponatremia, unsure of mechanism; will reach out to nephro for assistance if can correct with CRRT  --  trialysis line placed  -- maintain MAP > 65  -- renally dose meds and avoid nephrotoxic meds   -- monitoring electrolytes closely on labs

## 2024-04-08 NOTE — ASSESSMENT & PLAN NOTE
HAGMA  -suspect a component of Pseudohypobicarbonemia from hypertriglyceridemia  -confirmed with iSTAT, bicarb gap > 5 (sCO2-12; iSTAT 21)  -Bicarb bath 32 when SLED restarted  -check LA

## 2024-04-08 NOTE — PROGRESS NOTES
[facility] - Cardiac Medical ICU  Critical Care Medicine  Progress Note    Patient Name: Enoc Collado  MRN: [id number]  Admission Date: 4/6/2024  Hospital Length of Stay: 2 days  Code Status: Full Code  Attending Provider: Mynor Perkins MD  Primary Care Provider: Frantz Mason MD   Principal Problem: Acute biliary pancreatitis without infection or necrosis    Subjective:     HPI:  Mr. Collado is a 73yoM with HFpEF, HTN, HLD, CKD, gout, morbid obesity who initially presented to [facility] with acute onset abdominal pain for 1 day on 4/5 associated with n/v/d.  In ED at OSH, elevated LFTs to 140-180s with T bili 3.6/ direct bilirubin of 2.0.  Lipase >1000.  Abdominal US with CBD of 7mm and no concerns of acute cholecystitis.  CT consistent with acute pancreatitis without gross evidence of biliary obstruction.  Patient does not have previous episodes of pancreatitis or no new medications recently started.  .  Patient started on IVF.  Noted to have worsening respiratory status and confused at OSH.  ABG with pH of 7.2, CO2 58, O2 68.  Fluids were stopped and IV Lasix was given with patient placed on Bipap.  Repeat blood work revealing of worsening LFTs and total bilirubin to 12.5.  Patient transferred to [facility] for GI services with EUS/ERCP.    Hospital/ICU Course:  Pt was transferred from [facility] to [facility] for emergent EUS/ERCP due to acute interstitial pancreatitis. On arrival per, pt was requiring continuous bipap and producing no urine despite receiving lasix. Was on max peripheral levophed to maintain blood pressures. Pt required right radial arterial line and right internal jugular vein trialysis line. Continued zosyn. GI performed ERCP and placed CBD stent. Now on increased levo/vaso, propofol, fentanyl; proned and paralyzed starting 4/7. Discussed with family who consented to proning patient after being told risks/benefits. Discussed possible prognosis and family is amenable but would  like to continue full code status.     Interval History/Significant Events: Patient proned and paralyzed yesterday, able to decrease FiO2 to 80% though; vent settings AC/VC+ 32/450/+12/80% with ABG 7.231/52.2/77/21.9. Triglycerides elevated 1314 this morning on propofol for sedation as well as fentanyl, s/p ERCP with CBD stent 4/6. CBG's elevated on LDSSI. LE's and T. Bili improving.     Review of Systems   Unable to perform ROS: Intubated     Objective:     Vital Signs (Most Recent):  Temp: 99 °F (37.2 °C) (04/08/24 0704)  Pulse: 82 (04/08/24 0704)  Resp: (!) 32 (04/08/24 0704)  BP: 104/71 (04/06/24 1606)  SpO2: (!) 91 % (04/08/24 0704) Vital Signs (24h Range):  Temp:  [98.6 °F (37 °C)-99.3 °F (37.4 °C)] 99 °F (37.2 °C)  Pulse:  [71-90] 82  Resp:  [4-33] 32  SpO2:  [87 %-96 %] 91 %  Arterial Line BP: ()/(49-77) 89/57   Weight: 131.5 kg (289 lb 14.5 oz)  Body mass index is 42.81 kg/m².      Intake/Output Summary (Last 24 hours) at 4/8/2024 0835  Last data filed at 4/8/2024 0705  Gross per 24 hour   Intake 6475.19 ml   Output 8490 ml   Net -2014.81 ml          Physical Exam  Vitals and nursing note reviewed.   Constitutional:       General: He is in acute distress.      Appearance: He is obese. He is ill-appearing and toxic-appearing. He is not diaphoretic.   HENT:      Head: Normocephalic and atraumatic.   Cardiovascular:      Rate and Rhythm: Normal rate and regular rhythm.      Comments: Pt prone, unable to assess heart tones  Pulmonary:      Breath sounds: Rales present. No wheezing or rhonchi.      Comments: Prone, minimal breath sounds but rales appreciated bilaterally  Abdominal:      Comments: Unable to assess- pt proned   Genitourinary:     Comments: Oneill catheter in place, anuric  Musculoskeletal:      Right lower leg: Edema (trace) present.      Left lower leg: Edema (trace) present.   Skin:     General: Skin is warm and dry.      Capillary Refill: Capillary refill takes 2 to 3 seconds.    Neurological:      Comments: Sedated and paralyzed            Vents:  Vent Mode: A/C (04/08/24 0704)  Ventilator Initiated: Yes (04/06/24 1758)  Set Rate: 32 BPM (04/08/24 0704)  Vt Set: 450 mL (04/08/24 0704)  PEEP/CPAP: 12 cmH20 (04/08/24 0704)  Oxygen Concentration (%): 80 (04/08/24 0704)  Peak Airway Pressure: 27 cmH20 (04/08/24 0704)  Plateau Pressure: 27 cmH20 (04/08/24 0704)  Total Ve: 14.4 L/m (04/08/24 0704)  Negative Inspiratory Force (cm H2O): 0 (04/08/24 0704)  F/VT Ratio<105 (RSBI): (!) 70.33 (04/08/24 0704)  Lines/Drains/Airways       Peripherally Inserted Central Catheter Line  Duration             PICC Double Lumen 04/06/24 1136 right basilic 1 day              Central Venous Catheter Line  Duration             Trialysis (Dialysis) Catheter 04/06/24 1836 right internal jugular 1 day              Drain  Duration                  NG/OG Tube 04/06/24 2300 [place] [facility] mouth 1 day         Urethral Catheter 04/06/24 1000 1 day              Airway  Duration                  Airway - Non-Surgical 04/06/24 1748 Endotracheal Tube 1 day              Arterial Line  Duration             Arterial Line 04/06/24 1647 Right Radial 1 day              Peripheral Intravenous Line  Duration                  Peripheral IV - Single Lumen 04/06/24 0041 20 G Anterior;Right Forearm 2 days         Peripheral IV - Single Lumen 04/06/24 1021 20 G Anterior;Left Shoulder 1 day                  Significant Labs:    CBC/Anemia Profile:  Recent Labs   Lab 04/06/24 2029 04/07/24 0316 04/07/24  0416 04/07/24  0815 04/08/24  0237   WBC 20.67* 16.74*  --   --  19.73*   HGB 14.9 15.1  --   --  15.4   HCT 46.8 46.8 50 49 45.5    242  --   --  159   MCV 97 98  --   --  94   RDW 15.4* 15.4*  --   --  14.7*        Chemistries:  Recent Labs   Lab 04/06/24 2029 04/06/24  2154 04/07/24  0316 04/07/24  1409 04/07/24  2148 04/08/24  0237   *   < > 124* 124* 124* 121*   K 6.8*   < > 5.1 5.2* 5.4* 5.5*      < > 95 94*  94* 92*   CO2 18*   < > 17* 17* 16* 12*   BUN 52*   < > 36* 26* 23 23   CREATININE 4.4*   < > 3.6* 3.3* 3.1* 3.4*   CALCIUM 7.1*   < > 7.2* 7.2* 7.6* 7.8*   ALBUMIN 3.0*   < > 2.9* 2.6* 2.5* 2.5*   PROT 6.2  --  6.3  --   --  7.2   BILITOT 14.1*  --  12.1*  --   --  9.3*   ALKPHOS 96  --  104  --   --  111   *  --  251*  --   --  207*   *  --  421*  --   --  326*   MG 1.6   < > 1.6 1.8 1.7  1.7 2.3   PHOS 5.0*   < > 3.5 4.3 4.6* 5.1*    < > = values in this interval not displayed.       All pertinent labs within the past 24 hours have been reviewed.    Significant Imaging:  I have reviewed all pertinent imaging results/findings within the past 24 hours.    ABG  Recent Labs   Lab 04/08/24  0320   PH 7.231*   PO2 77*   PCO2 52.2*   HCO3 21.9*   BE -6*     Assessment/Plan:     Pulmonary  Acute hypoxemic respiratory failure  Patient with Hypercapnic and Hypoxic Respiratory failure which is Acute.  he is not on home oxygen. Supplemental oxygen was provided and noted- Vent Mode: A/C  Oxygen Concentration (%):  [] 80  Resp Rate Total:  [32 br/min] 32 br/min  Vt Set:  [450 mL] 450 mL  PEEP/CPAP:  [12 cmH20] 12 cmH20  Mean Airway Pressure:  [18 rhE25-94 cmH20] 18 cmH20    .   Signs/symptoms of respiratory failure include- tachypnea, increased work of breathing, respiratory distress, and lethargy. Contributing diagnoses includes - ARDS, Obesity Hypoventilation, and Pneumonia Labs and images were reviewed. Patient Has recent ABG, which has been reviewed. Will treat underlying causes and adjust management of respiratory failure as follows-     Transferred [place] [facility] on Bipap with worsening O2 requirements  Concern for ARDS 2/2 to acute interstitial pancreatitis  Intubated  - pt s/p ERCP with CBD stent placement  -- a-line in place   -- wean O2 as tolerated  - P/F ratio 61 this morning 4/8, satting 90% on   - Pt requiring maximum ventilator settings sating 90% with PO2 of 77 while prone, PEEP +12. Proned  since AM (4/7/24) @ 9:24 am, plan to supine at 10am today.   - Will consider changes to tidal volume and PEEP for ARDSnet protocol, to discuss with staff      Cardiac/Vascular  Shock  Currently on norepi at 0.6 mcg/kg/min and vaso at 0.04 units/hr    Plan:  -- Titrate pressors for MAP > 65  -- See Acute biliary pancreatitis plan    Hyperlipidemia  most recent lipid panel on 10/2023 with . Hypertriglyceridemia today; etiology likely post-ERCP vs propofol induced    Plan:  --on home pravastatin 20mg qd  -- holding statin therapy on admission given elevated LFTs  -- On propofol for sedation- will switch to ketamine  -- Repeat triglycerides, may need insulin gtt to decrease levels    Essential hypertension  Chronic, uncontrolled at baseline, however hypotensive on pressors 2/2 illness. Latest blood pressure and vitals reviewed-     Temp:  [98.6 °F (37 °C)-99.3 °F (37.4 °C)]   Pulse:  [71-90]   Resp:  [4-33]   SpO2:  [88 %-96 %]   Arterial Line BP: ()/(49-77) .   Home meds for hypertension were reviewed and noted below.   Hypertension Medications               amLODIPine (NORVASC) 10 MG tablet Take 1 tablet (10 mg total) by mouth once daily.    carvediloL (COREG) 25 MG tablet Take 1 tablet (25 mg total) by mouth 2 (two) times daily with meals.    furosemide (LASIX) 40 MG tablet Take 1 tablet (40 mg total) by mouth once daily.    lisinopriL (PRINIVIL,ZESTRIL) 40 MG tablet Take 1 tablet (40 mg total) by mouth once daily.          Plan:  --Holding all antihypertensives while in shock        Renal/  Acute renal failure superimposed on chronic kidney disease  Patient with acute kidney injury/acute renal failure likely due to acute tubular necrosis caused by severe shock and acute infectious process.  STEFANIE is currently worsening. Baseline creatinine  1.0  - Labs reviewed- Renal function/electrolytes with Estimated Creatinine Clearance: 26.4 mL/min (A) (based on SCr of 3.4 mg/dL (H)). according to latest data.  "Monitor urine output and serial BMP and adjust therapy as needed. Avoid nephrotoxins and renally dose meds for GFR listed above.    Worsening renal failure during admission likely ischemic ATN s/o shock and sepsis. RF c/b hyperkalemia and acidosis. Nephrology following at OSH. Anticipate patient will likely need RRT on current admission.    -- nephrology consulted, appreciate recs. Pt received SLED, CRRT, plan for SLED next 24 hrs- clotted, will need restart  -- acute hyponatremia, unsure of mechanism; will reach out to nephro for assistance if can correct with CRRT  --  trialysis line placed  -- maintain MAP > 65  -- renally dose meds and avoid nephrotoxic meds   -- monitoring electrolytes closely on labs      CKD (chronic kidney disease), stage III  -- see "acute renal failure"    ID  Gram-positive bacteremia  GPCC on aerobic blood culture x1/2 sets from 4/6/24    Plan:  -- continue vanc, continue meropenem for now until speciation  -- Will need TTE to assess for endocarditis  -- Repeat blood cultures at 48h this evening after 1900    Endocrine  Morbid obesity with BMI of 40.0-44.9, adult  Body mass index is 42.81 kg/m². Morbid obesity complicates all aspects of disease management from diagnostic modalities to treatment. Weight loss encouraged and health benefits explained to patient.       GI  * Acute biliary pancreatitis without infection or necrosis  Concern for cholangitis with worsening pressor requirements and septic shock.  [place] [facility] with worsening O2 requirements on Bipap.  Lipase >1000 with worsening LFTs and bilirubin.  . Likely biliary etiology.  Imaging without clear evidence of CBD dilation or obvious stones/necrosis.  Acute pancreatitis seen on imaging. AES consulted on admission, plan to take patient for emergent ERCP (4/6/24)    - on prop/fent, nimbex, levo/vaso  - Zosyn, switched to meropenem  - AES following, performed ERCP and placed cbd duct stent  - will monitor LFTs and " amylase/lipase  - B/c showed GPCs, on vanc but awaiting ID/sensitivities, ID consulted  - NPO, continue trickle TFs  - IVF prn  - started stress dose steroids (dex)  - hyperglycemia; will switch to MDSSI as CBG's elevated likely 2/2 pancreatitis and steroid use    Orthopedic  History of gout  -- hold home allopurinol s/o acute renal failure    Palliative Care  ACP (advance care planning)    Advance Care Planning Code Status  Advance Care Planning    Date: 04/06/2024    Code Status  In light of the patients advanced and life limiting illness,I engaged the the  pt's spouse, Annel Collado  in a voluntary conversation about the patient's preferences for care  at the very end of life. The pt's spouse stated patient will be FULL CODE. Along those lines, the patient does wish to have CPR or other invasive treatments performed when his heart and/or breathing stops. I communicated to the  pt's spouse, Annel Collado  that a FULL CODE STATUS will remain on the patient's chart. I spent a total of 30 minutes engaging the patient in this advance care planning discussion.                 Critical Care Daily Checklist:    A: Awake: RASS Goal/Actual Goal: RASS Goal: -4-->deep sedation  Actual:     B: Spontaneous Breathing Trial Performed? Spon. Breathing Trial Initiated?: Not initiated (04/08/24 0704)   C: SAT & SBT Coordinated?  No; pt in ARDS; consider at prone->supine but support needs likely too high   D: Delirium: CAM-ICU Overall CAM-ICU: Positive   E: Early Mobility Performed? No   F: Feeding Goal: Goals: Meet % EEN, EPN by RD f/u date  Status: Nutrition Goal Status: new   Current Diet Order   Procedures    Diet NPO      AS: Analgesia/Sedation Fentanyl, propofol; Nimbex for paralytic   T: Thromboembolic Prophylaxis Heparin    H: HOB > 300 No- prone currently   U: Stress Ulcer Prophylaxis (if needed) Protonix BID   G: Glucose Control MDSSI   B: Bowel Function  No BM   I: Indwelling Catheter (Lines & Oneill) Necessity Oneill  catheter, vent; DREW PICC, RIJ Trialysis, PIV, R radial a-line    D: De-escalation of Antimicrobials/Pharmacotherapies Vanc and meropenem    Plan for the day/ETD Supine, wean vent settings    Code Status:  Family/Goals of Care: Full Code  Will discuss today with family       Critical secondary to Patient has a condition that poses threat to life and bodily function: Severe Respiratory Distress secondary to complicated acute pancreatitis; near max vent settings proned, paralyzed, sedated on vasopressors x2.      Critical care was time spent personally by me on the following activities: development of treatment plan with patient or surrogate and bedside caregivers, discussions with consultants, evaluation of patient's response to treatment, examination of patient, ordering and performing treatments and interventions, ordering and review of laboratory studies, ordering and review of radiographic studies, pulse oximetry, re-evaluation of patient's condition. This critical care time did not overlap with that of any other provider or involve time for any procedures.     Jie Rebolledo MD  [employer] IM PGY III  Critical Care Medicine  [facility] - Cardiac Medical ICU

## 2024-04-08 NOTE — CONSULTS
[facility] - Cardiac Medical ICU  General Surgery  Consult Note    Inpatient consult to General Surgery  Consult performed by: Liang Beltran MD  Consult ordered by: Jie Rebolledo MD        Subjective:     Chief Complaint/Reason for Admission:   Gallstone pancreatitis     History of Present Illness:   73 year old male with PMH significant for obesity, CKD, gout, and HTN who was admitted to [facility] on 4/6 as a transfer from [facility] for management of severe acute gallstone pancreatitis associated with STEFANIE requiring CRRT.     He is status post ERCP on 4/6 showing dilated upper third of main bile duct; sphincterotomy performed and biliary stent placed. Post-procedure, patient with on-going ARDS requiring paralyzing/proning by 4/7 .     Patient is supine today. He is on Levo 1.1, vaso, fentanyl, and ketamine, his bladder pressure was 28 (not paralyzed at the time of measure) so general surgery was consulted for evaluation.     At the moment no family at bedside, patient on atrial fibrillation, multiple pressors, intubated on Fio2 100 and a PEEP of 14.     Vent Type:  (4/8/2024  3:02 PM)  Vent Mode: A/C  Oxygen Concentration (%):  [] 100  Resp Rate Total:  [32 br/min] 32 br/min  Vt Set:  [420 mL-450 mL] 420 mL  PEEP/CPAP:  [12 rcN26-17 cmH20] 14 cmH20  Mean Airway Pressure:  [18 fwA79-76 cmH20] 24 cmH20    Peak pressures: 32  Plateau pressures: 24      No current facility-administered medications on file prior to encounter.     Current Outpatient Medications on File Prior to Encounter   Medication Sig    allopurinoL (ZYLOPRIM) 300 MG tablet Take 1 tablet (300 mg total) by mouth once daily.    amLODIPine (NORVASC) 10 MG tablet Take 1 tablet (10 mg total) by mouth once daily.    aspirin 325 MG tablet Take 325 mg by mouth once daily.    carvediloL (COREG) 25 MG tablet Take 1 tablet (25 mg total) by mouth 2 (two) times daily with meals.    empagliflozin (JARDIANCE) 10 mg tablet Take 1  "tablet (10 mg total) by mouth once daily.    furosemide (LASIX) 40 MG tablet Take 1 tablet (40 mg total) by mouth once daily.    gabapentin (NEURONTIN) 300 MG capsule TAKE 1 CAPSULE THREE TIMES DAILY (Patient taking differently: Take 300 mg by mouth 3 (three) times daily.)    lisinopriL (PRINIVIL,ZESTRIL) 40 MG tablet Take 1 tablet (40 mg total) by mouth once daily.    methocarbamoL (ROBAXIN) 750 MG Tab Take 750 mg by mouth. (Patient not taking: Reported on 4/5/2024)    pravastatin (PRAVACHOL) 20 MG tablet TAKE 1 TABLET EVERY DAY    sildenafil (REVATIO) 20 mg Tab 2-4 tablets as needed one hour prior to intercourse    syringe with needle, safety 3 mL 21 gauge x 1 1/2" Syrg 1 Syringe by Misc.(Non-Drug; Combo Route) route every 21 days.    testosterone cypionate (DEPOTESTOTERONE CYPIONATE) 200 mg/mL injection Inject 1.5 mLs (300 mg total) into the muscle every 21 days.    timolol maleate 0.5% (TIMOPTIC-XE) 0.5 % SolG Place 1 drop into both eyes every morning.       Review of patient's allergies indicates:   Allergen Reactions    No known drug allergies        Past Medical History:   Diagnosis Date    BPH (benign prostatic hyperplasia)     CKD (chronic kidney disease), stage III     Colon polyps 2015    Diarrhea in adult patient 02/10/2021    Generalized osteoarthrosis, involving multiple sites     History of gout     Hyperlipidemia     Hypertension     Hypogonadism male     IFG (impaired fasting glucose)     Lumbar disc disease with radiculopathy     Morbid obesity with BMI of 40.0-44.9, adult      Past Surgical History:   Procedure Laterality Date    COLONOSCOPY N/A 6/22/2020    Procedure: COLONOSCOPY;  Surgeon: Montserrat Conroy MD;  Location: [facility] ENDO;  Service: Endoscopy;  Laterality: N/A;    COLONOSCOPY N/A 2/10/2021    Procedure: COLONOSCOPY;  Surgeon: Jones Lemus III, MD;  Location: [facility] ENDO;  Service: Endoscopy;  Laterality: N/A;    ENDOSCOPIC ULTRASOUND OF UPPER GASTROINTESTINAL TRACT N/A 4/6/2024    " Procedure: ULTRASOUND, UPPER GI TRACT, ENDOSCOPIC;  Surgeon: Shilo Ramirez MD;  Location: [facility] ENDO (2ND FLR);  Service: Endoscopy;  Laterality: N/A;    ERCP N/A 2024    Procedure: ERCP (ENDOSCOPIC RETROGRADE CHOLANGIOPANCREATOGRAPHY);  Surgeon: Shilo Ramirez MD;  Location: [facility] ENDO (2ND FLR);  Service: Endoscopy;  Laterality: N/A;    ESOPHAGOGASTRODUODENOSCOPY N/A 2/10/2021    Procedure: EGD (ESOPHAGOGASTRODUODENOSCOPY);  Surgeon: Jones Lemus III, MD;  Location: [facility];  Service: Endoscopy;  Laterality: N/A;    EYE SURGERY      JOINT REPLACEMENT      KNEE SCOPE      open globe      right eye     ROTATOR CUFF REPAIR      SHOULDER SURGERY Right      Family History       Problem Relation (Age of Onset)    Cancer Cousin    Cataracts Father, Mother    Heart attack Father    Heart disease Brother    Hypertension Father, Mother          Tobacco Use    Smoking status: Former     Current packs/day: 0.00     Average packs/day: 1 pack/day for 30.0 years (30.0 ttl pk-yrs)     Types: Cigarettes     Start date: 1970     Quit date: 2000     Years since quittin.1    Smokeless tobacco: Current     Types: Snuff   Substance and Sexual Activity    Alcohol use: No    Drug use: No    Sexual activity: Not Currently     Review of Systems   Unable to perform ROS: Intubated     Objective:     Vital Signs (Most Recent):  Temp: 99.3 °F (37.4 °C) (24 1650)  Pulse: 105 (24 1650)  Resp: (!) 32 (24 1704)  BP: 92/67 (24 1616)  SpO2: (!) 92 % (24 1650) Vital Signs (24h Range):  Temp:  [96.3 °F (35.7 °C)-100 °F (37.8 °C)] 99.3 °F (37.4 °C)  Pulse:  [] 105  Resp:  [4-110] 32  SpO2:  [84 %-98 %] 92 %  BP: ()/(57-72) 92/67  Arterial Line BP: ()/() 99/69     Weight: 131.5 kg (289 lb 14.5 oz)  Body mass index is 42.81 kg/m².      Intake/Output Summary (Last 24 hours) at 2024 1714  Last data filed at 2024 1709  Gross per 24 hour   Intake 8171.8 ml   Output 5905  ml   Net 2266.8 ml       Physical Exam  Constitutional:       General: He is in acute distress.      Appearance: He is ill-appearing and toxic-appearing.      Comments: Intubated and sedated    Pulmonary:      Comments: Intuabated  Vent Mode: A/C  Oxygen Concentration (%):  [] 100  Resp Rate Total:  [32 br/min] 32 br/min  Vt Set:  [420 mL-450 mL] 420 mL  PEEP/CPAP:  [12 ajF47-83 cmH20] 14 cmH20  Mean Airway Pressure:  [18 btV42-51 cmH20] 24 cmH20      Abdominal:      Comments: Distended  Not firm   Soft  Reducible umbilical hernia    Skin:     General: Skin is warm.      Capillary Refill: Capillary refill takes less than 2 seconds.   Neurological:      Comments: Sedated         Significant Labs:  BMP:   Recent Labs   Lab 04/08/24  1130   *  250*  250*   *  129*  129*   K 3.4*  3.4*  3.4*     108  108   CO2 13*  13*  13*   BUN 25*  25*  25*   CREATININE 2.4*  2.4*  2.4*   CALCIUM 4.3*  4.3*  4.3*   MG 1.3*  1.3*     CBC:   Recent Labs   Lab 04/08/24  1130   WBC 13.26*   RBC 3.33*   HGB 10.9*   HCT 31.8*   *   MCV 96   MCH 32.7*   MCHC 34.3       Significant Diagnostics:  I have reviewed all pertinent imaging results/findings within the past 24 hours.    Assessment/Plan:     73 year old male with PMH significant for obesity, CKD, gout, and HTN who was admitted to [facility] on 4/6 as a transfer from [facility] for management of severe acute gallstone pancreatitis associated with STEFANIE requiring CRRT. Now intubated, on pressors. General surgery consulted for evaluation.     - Please obtain bladder pressure after patient is re-paralyzed.   - Abdominal exam is not consistent with compartment syndrome. His abdomen is soft, distended, and has a reducible umbilical hernia.   - He does not have elevated peak or plateau pressures.   - Has CKD and has been getting CRRT since arrival.   - Will keep doing serial abdominal exams.   - Trend bladder pressures.     Thank you for  your consult. I will follow-up with patient. Please contact us if you have any additional questions.    Liang Murphy MD  General Surgery  [facility] - Cardiac Medical ICU

## 2024-04-08 NOTE — ASSESSMENT & PLAN NOTE
Concern for cholangitis with worsening pressor requirements and septic shock.  Transferred to [facility] with worsening O2 requirements on Bipap.  Lipase >1000 with worsening LFTs and bilirubin.  . Likely biliary etiology.  Imaging without clear evidence of CBD dilation or obvious stones/necrosis.  Acute pancreatitis seen on imaging. AES consulted on admission, plan to take patient for emergent ERCP (4/6/24)    - on prop/fent, nimbex, levo/vaso  - Zosyn, switched to meropenem  - AES following, performed ERCP and placed cbd duct stent  - will monitor LFTs and amylase/lipase  - B/c showed GPCs, on vanc but awaiting ID/sensitivities, ID consulted  - NPO, continue trickle TFs  - IVF prn  - started stress dose steroids (dex)  - hyperglycemia; will switch to MDSSI as CBG's elevated likely 2/2 pancreatitis and steroid use

## 2024-04-08 NOTE — PROCEDURES
RAPID RESPONSE VASCULAR ACCESS NOTE       Single lumen 20G, 2.25IN AccuCath placed in the right brachial vein. Needle advanced into the vessel under real time ultrasound guidance.    Max dwell date: 4/18/2024   Lot number: [id number]

## 2024-04-09 PROBLEM — E87.5 HYPERKALEMIA: Status: RESOLVED | Noted: 2024-01-01 | Resolved: 2024-01-01

## 2024-04-09 PROBLEM — E87.8 ELECTROLYTE DISTURBANCE: Status: ACTIVE | Noted: 2024-01-01

## 2024-04-09 NOTE — PROGRESS NOTES
04/09/24 1819   Treatment   Treatment Type SLED   Treatment Status Restart   Dialysis Machine Number K   Dialyzer Time (hours) 21   Access Temporary Cath;Right;IJ   Catheter Dressing Intact  Yes   Alarms Engaged Yes   CRRT Comments SLED restarted   Prescription   Time (Hours) Continuous   Dialysate K + (mEq/L) 3   Dialysate CA + (mEq/L) 3   Dialysate HCO3 - (Bicarb) (mEq/L) 35   Dialysate Na + (mEq/L) 130   Cartridge Type [id number]  (R300)   Dialysate Flow Rate (mL/min) 200   UF Goal Rate 400 mL/hr   CRRT Hourly Documentation   Blood Flow (mL/min) 200   UF Rate 200 cc/hr   Arterial Pressure (mmHg) -70 mmHg   Venous Pressure (mmHg) 80 mmHg   Effluent Pressure (EP) (mmHg) 0 mmHg   Total UF (Hourly Cleared) (mL) 0     SLED restarted via right IJ CVC. Report given back to primary nurse at BS.

## 2024-04-09 NOTE — ASSESSMENT & PLAN NOTE
[facility] on aerobic blood culture x1/2 sets from 4/6/24 was coag negative staph, possible contaminant    Plan:  -- continue vanc, continue meropenem for now until speciation  -- TTE pending  -- Repeat blood cultures 4/9 NGTD

## 2024-04-09 NOTE — ASSESSMENT & PLAN NOTE
Patient with Hypercapnic and Hypoxic Respiratory failure which is Acute.  he is not on home oxygen. Supplemental oxygen was provided and noted- Vent Mode: A/C  Oxygen Concentration (%):  [] 100  Resp Rate Total:  [32 br/min] 32 br/min  Vt Set:  [420 mL-450 mL] 420 mL  PEEP/CPAP:  [12 pzK01-63 cmH20] 14 cmH20  Mean Airway Pressure:  [18 ugY22-96 cmH20] 21 cmH20    .   Signs/symptoms of respiratory failure include- tachypnea, increased work of breathing, respiratory distress, and lethargy. Contributing diagnoses includes - ARDS, Obesity Hypoventilation, and Pneumonia Labs and images were reviewed. Patient Has recent ABG, which has been reviewed. Will treat underlying causes and adjust management of respiratory failure as follows-     Transferred to [facility] MICU on [facility] with worsening O2 requirements  ARDS secondary to acute pancreatitis  Intubated    Plan:  -- P/F ratio improved from 61-> 86 this morning 4/9, satting 97% on AC/VC 32/420/+14/100%  -- Will decrease FiO2 adjust per ARDS net protocol   -- Proned since 6p, return to supine at 10a. Will likely need to continue pending repeat ABG and P/F ratio  -- Will obtain CXR following return to supine

## 2024-04-09 NOTE — SUBJECTIVE & OBJECTIVE
Interval History: Proned overnight. SLED. Bladder pressures while prone 17. Levo requirements decreased from yesterday. Abdomen remains soft     Medications:  Continuous Infusions:   amiodarone in dextrose 5% 0.5 mg/min (04/09/24 0827)    calcium gluconate 3 g in dextrose 5 % (D5W) 100 mL infusion 25 mL/hr at 04/09/24 0827    cisatracurium (NIMBEX) 200 mg in dextrose 5 % (D5W) 100 mL infusion 1.5 mcg/kg/min (04/09/24 0827)    dextrose-sod citrate-citric ac 80 mL/hr at 04/09/24 0827    fentanyl 250 mcg/hr (04/09/24 0827)    ketamine 5 mg/mL infusion (titrating) 15 mcg/kg/min (04/09/24 0827)    NORepinephrine bitartrate-D5W 0.54 mcg/kg/min (04/09/24 0827)    vasopressin 0.04 Units/min (04/09/24 0827)     Scheduled Meds:   dexamethasone (DECADRON) IVPB  20 mg Intravenous Daily    Followed by    [START ON 4/11/2024] dexAMETHasone  10 mg Intravenous Daily    fludrocortisone  100 mcg Per OG tube Daily    heparin (porcine)  7,500 Units Subcutaneous Q8H    meropenem IV (PEDS and ADULTS)  2 g Intravenous Q8H    micafungin (MYCAMINE) IVPB  100 mg Intravenous Q24H    mupirocin   Nasal BID    pantoprazole  40 mg Intravenous BID    polyethylene glycol  17 g Oral BID    timolol maleate 0.5%  1 drop Both Eyes Daily    vancomycin (VANCOCIN) IV (PEDS and ADULTS)  1,250 mg Intravenous Once    white petrolatum-mineral oiL   Both Eyes Q8H     PRN Meds:acetaminophen, dextrose 10%, dextrose 10%, fentanyl, glucagon (human recombinant), insulin aspart U-100, magnesium sulfate IVPB, sodium chloride 0.9%, sodium phosphate 20.01 mmol in dextrose 5 % (D5W) 250 mL IVPB, sodium phosphate 30 mmol in dextrose 5 % (D5W) 250 mL IVPB, sodium phosphate 39.99 mmol in dextrose 5 % (D5W) 250 mL IVPB, Pharmacy to dose Vancomycin consult **AND** vancomycin - pharmacy to dose     Review of patient's allergies indicates:   Allergen Reactions    No known drug allergies      Objective:     Vital Signs (Most Recent):  Temp: 98.4 °F (36.9 °C) (04/09/24  0815)  Pulse: (!) 131 (04/09/24 0815)  Resp: (!) 32 (04/09/24 0815)  BP: 102/76 (04/08/24 1802)  SpO2: 95 % (04/09/24 0815) Vital Signs (24h Range):  Temp:  [96.3 °F (35.7 °C)-100 °F (37.8 °C)] 98.4 °F (36.9 °C)  Pulse:  [] 131  Resp:  [] 32  SpO2:  [72 %-98 %] 95 %  BP: ()/(57-76) 102/76  Arterial Line BP: ()/() 107/81     Weight: 131.5 kg (289 lb 14.5 oz)  Body mass index is 42.81 kg/m².    Intake/Output - Last 3 Shifts         04/07 0700  04/08 0659 04/08 0700  04/09 0659 04/09 0700  04/10 0659    P.O.       I.V. (mL/kg) 4819.5 (36.7) 3816.4 (29) 631.8 (4.8)    NG/      IV Piggyback 1605.1 4023.1 495.6    Total Intake(mL/kg) 6644.6 (50.5) 7839.5 (59.6) 1127.4 (8.6)    Urine (mL/kg/hr) 35 (0) 47 (0)     Other 8455 4701 567    Total Output 8490 4748 567    Net -1845.4 +3091.5 +560.4                    Physical Exam  Vitals and nursing note reviewed.   Constitutional:       General: He is not in acute distress.     Appearance: He is ill-appearing.   HENT:      Nose: Nose normal.   Cardiovascular:      Rate and Rhythm: Tachycardia present.   Pulmonary:      Comments: Vent Mode: A/C  Oxygen Concentration (%):  [] 100  Resp Rate Total:  [32 br/min] 32 br/min  Vt Set:  [420 mL-450 mL] 420 mL  PEEP/CPAP:  [12 viW62-63 cmH20] 14 cmH20  Mean Airway Pressure:  [18 iwH52-07 cmH20] 21 cmH20  Abdominal:      Comments: Distended but soft. Not firm. No peritonitic signs. Reducible umbilical hernia with no overlying skin changes     Musculoskeletal:         General: No deformity.   Neurological:      Comments: Sedated           Significant Labs:  I have reviewed all pertinent lab results within the past 24 hours.    Significant Diagnostics:  I have reviewed all pertinent imaging results/findings within the past 24 hours.

## 2024-04-09 NOTE — ASSESSMENT & PLAN NOTE
Distributive- warm well perfused and diaphoretic yesterday on exam. Currently on norepi at 0.56 mcg/kg/min and vaso at 0.04 units/hr after increased dosing to 1.3 norepi yesterday    Plan:  -- Wean pressors for MAP > 65  -- See Acute biliary pancreatitis plan

## 2024-04-09 NOTE — PLAN OF CARE
MICU DAILY GOALS     Family/Goals of care/Code Status   Code Status: Full Code    24H Vital Sign Range  Temp:  [97 °F (36.1 °C)-99.7 °F (37.6 °C)]   Pulse:  []   Resp:  [0-70]   BP: ()/(68-86)   SpO2:  [72 %-100 %]   Arterial Line BP: ()/(52-95)      Shift Events (include procedures and significant events)   Patient remains intubated and sedated with vasopressors infusing. Patient tolerated prone and supine positions well during shift, with no acute events.  Patient family at bedside updated on plan of care by care team with acknowledgements of plan and verbalization of understanding. Patient discovered to have a blockage to OG tube, OG tube was then replaced which patient tolerated well. Patient re-started on Novasource Renal per care team, tolerating well, rate at 10 mL/hour. At time of note, patient in prone position, head turned facing right, family at bedside. No apparent signs or symptoms of acute distress to address at this time.    AWAKE RASS: Goal - RASS Goal: -4-->deep sedation  Actual - RASS (Parry Agitation-Sedation Scale): deep sedation    Restraint necessity: Not necessary   BREATHE SBT: Not attempted    Coordinate A & B, analgesics/sedatives Pain: managed   SAT: Not attempted   Delirium CAM-ICU: Overall CAM-ICU: Positive   Early(intubated/ Progressive (non-intubated) Mobility MOVE Screen (INTUBATED ONLY): NA    Activity: Activity Management: Rolling - L1, Patient unable to perform activities   Feeding/Nutrition Diet order: Diet/Nutrition Received: NPO,     Thrombus DVT prophylaxis: VTE Required Core Measure: Pharmacological prophylaxis initiated/maintained   HOB Elevation Head of Bed (HOB) Positioning: HOB at 20-30 degrees, HOB at 30-45 degrees   Ulcer Prophylaxis GI: yes   Glucose control managed     Skin Skin assessed during: Q Shift Change    Sacrum intact/not altered? Yes  Heels intact/not altered? Yes  Surgical wound? No    CHECK ONE!   (no altered skin or altered skin) and  sub boxes:  [x] No Altered Skin Integrity Present    [x]Prevention Measures Documented    [] Altered Skin Integrity Present or Discovered   [] LDA present in EPIC, daily doc completed              [] LDA added if not in EPIC (describe wound).                    When describing wound, do not stage, use descriptive words only.    [] Wound Image Taken (required on admit,                   transfer/discharge and every Tuesday)    Wound Care Consulted? Yes    4 EYES:  Attending Nurse (1st set of eyes): JERRY Michelle    Second RN/Staff Member (2nd set of eyes): JERRY Mendosa   Bowel Function constipation    Indwelling Catheter Necessity      Urethral Catheter 04/06/24 1000-Reason for Continuing Urinary Catheterization: Critically ill in ICU and requiring hourly monitoring of intake/output    PICC Double Lumen 04/06/24 1136 right basilic-Line Necessity Review: CRRT/HD  [REMOVED] Trialysis (Dialysis) Catheter 04/06/24 1836 right internal jugular-Line Necessity Review: Hemodynamic instability, CRRT/HD  Trialysis (Dialysis) Catheter 04/08/24 1618 right internal jugular-Line Necessity Review: CRRT/HD     De-escalation Antibiotics No        VS and assessment per flow sheet, patient progressing towards goals as tolerated, plan of care reviewed with family, all concerns addressed, will continue to monitor.    Problem: Adult Inpatient Plan of Care  Goal: Plan of Care Review  Outcome: Ongoing, Not Progressing  Goal: Patient-Specific Goal (Individualized)  Outcome: Ongoing, Not Progressing  Goal: Absence of Hospital-Acquired Illness or Injury  Outcome: Ongoing, Not Progressing  Goal: Optimal Comfort and Wellbeing  Outcome: Ongoing, Not Progressing  Goal: Readiness for Transition of Care  Outcome: Ongoing, Not Progressing     Problem: Fluid and Electrolyte Imbalance (Acute Kidney Injury/Impairment)  Goal: Fluid and Electrolyte Balance  Outcome: Ongoing, Not Progressing     Problem: Oral Intake Inadequate (Acute Kidney  Injury/Impairment)  Goal: Optimal Nutrition Intake  Outcome: Ongoing, Not Progressing     Problem: Renal Function Impairment (Acute Kidney Injury/Impairment)  Goal: Effective Renal Function  Outcome: Ongoing, Not Progressing     Problem: Bariatric Environmental Safety  Goal: Safety Maintained with Care  Outcome: Ongoing, Not Progressing     Problem: Infection  Goal: Absence of Infection Signs and Symptoms  Outcome: Ongoing, Not Progressing     Problem: Skin Injury Risk Increased  Goal: Skin Health and Integrity  Outcome: Ongoing, Not Progressing     Problem: Device-Related Complication Risk (CRRT (Continuous Renal Replacement Therapy))  Goal: Safe, Effective Therapy Delivery  Outcome: Ongoing, Not Progressing     Problem: Hypothermia (CRRT (Continuous Renal Replacement Therapy))  Goal: Body Temperature Maintained in Desired Range  Outcome: Ongoing, Not Progressing     Problem: Infection (CRRT (Continuous Renal Replacement Therapy))  Goal: Absence of Infection Signs and Symptoms  Outcome: Ongoing, Not Progressing     Problem: Fall Injury Risk  Goal: Absence of Fall and Fall-Related Injury  Outcome: Ongoing, Not Progressing     Problem: Impaired Wound Healing  Goal: Optimal Wound Healing  Outcome: Ongoing, Progressing

## 2024-04-09 NOTE — PROGRESS NOTES
Infectious Disease Follow up Note      Impression:   Mr. Collado is a 74 yo man from the [facility], transferred to [facility] [place] for a higher level of care/AES evaluation. He presented to the ED in  and was diagnosed with pancreatitis. He was started on empiric abx. Patient was transferred here yesterday and admitted to the ICU. The patient's abx were escalated to meropenem and ID is consulted for that. History obtained from family. Underwent ERCP showed mild proximal CBD dilation, stent placed with bile flowing with no pus. Now intubated with biliary pancreatitis, cholangitis, suspected shock and ARDS. Abx escalated to meropenem. WBC improving today.       Plan: Recommendations  Continue meropenem (normal dose while on CRRT) and vancomycin to cover the usual suspects  No evidence for micafungin at this time - may discontinue.  Follow pending blood cultures  Trend WBC     Thank you for your consult. I will follow-up with patient. Please contact us if you have any additional questions.        Please call for any questions.    Thanks,  Gerson Stephensg  Infectious Diseases        Subjective and Interval History:  No new events. WBC normal.         Review of Symptoms:  Review of Systems   Unable to perform ROS: Intubated   All other systems reviewed and are negative.          Objective:  Physical Exam  Vitals reviewed.   Eyes:      General: No scleral icterus.  Cardiovascular:      Rate and Rhythm: Regular rhythm.   Pulmonary:      Comments: Ventilator sounds  Abdominal:      General: Bowel sounds are absent.   Musculoskeletal:      Right lower leg: No edema.      Left lower leg: No edema.   Skin:     General: Skin is warm and dry.           Medications:  Antibiotics:   Antibiotics (From admission, onward)      Start     Stop Route Frequency Ordered    04/08/24 1027  meropenem (MERREM) 2 g in sodium chloride 0.9% 100 mL IVPB         -- IV Every 8 hours (non-standard times) 04/08/24 0535    04/07/24 0035  vancomycin -  pharmacy to dose  (vancomycin IVPB (PEDS and ADULTS))        See Artemio for full Linked Orders Report.    -- IV pharmacy to manage frequency 04/06/24 2335    04/06/24 2100  mupirocin 2 % ointment         04/11/24 2059 Nasl 2 times daily 04/06/24 1616            Physical Exam:  VS (24h):   Vitals:    04/09/24 1545   BP:    Pulse: (!) 120   Resp: (!) 32   Temp: 97 °F (36.1 °C)     Temp:  [97 °F (36.1 °C)-99.7 °F (37.6 °C)]   Physical Exam  Vitals reviewed.   Eyes:      General: No scleral icterus.  Cardiovascular:      Rate and Rhythm: Regular rhythm.   Pulmonary:      Comments: Ventilator sounds  Abdominal:      General: Bowel sounds are absent.   Musculoskeletal:      Right lower leg: No edema.      Left lower leg: No edema.   Skin:     General: Skin is warm and dry.         Labs:  CBC:   Lab Results   Component Value Date    WBC 20.78 (H) 04/09/2024    WBC 13.26 (H) 04/08/2024    WBC 19.73 (H) 04/08/2024    WBC 16.74 (H) 04/07/2024    WBC 20.67 (H) 04/06/2024    HCT 48 04/09/2024     (L) 04/09/2024       BMP:   Recent Labs   Lab 04/09/24  1339   *  193*   *  120*   K 4.5  4.5   CL 88*  88*   CO2 18*  18*   BUN 15  15   CREATININE 2.0*  2.0*   CALCIUM 9.2  9.2   MG 2.0  2.0       LFT:   Lab Results   Component Value Date     (H) 04/09/2024     (H) 04/09/2024    ALKPHOS 119 04/09/2024    BILITOT 7.8 (H) 04/09/2024         Microbiology x 7d:   Microbiology Results (last 7 days)       Procedure Component Value Units Date/Time    Blood culture [[id number]] Collected: 04/08/24 1132    Order Status: Completed Specimen: Blood from Peripheral, Forearm, Left Updated: 04/09/24 1412     Blood Culture, Routine No Growth to date      No Growth to date    Blood culture [[id number]] Collected: 04/08/24 1132    Order Status: Completed Specimen: Blood from Peripheral, Forearm, Left Updated: 04/09/24 1412     Blood Culture, Routine No Growth to date      No Growth to date    Blood  culture [[id number]]  (Abnormal) Collected: 04/06/24 1838    Order Status: Completed Specimen: Blood from Line, Jugular, Internal Right Updated: 04/09/24 1304     Blood Culture, Routine Gram stain aer bottle: Gram positive cocci in clusters resembling Staph      Results called to and read back by:Shima Small RN 04/07/2024  14:35      COAGULASE-NEGATIVE STAPHYLOCOCCUS SPECIES  Organism is a probable contaminant      Blood culture [[id number]] Collected: 04/06/24 1732    Order Status: Completed Specimen: Blood from Peripheral, Lower Arm, Right Updated: 04/08/24 2012     Blood Culture, Routine No Growth to date      No Growth to date      No Growth to date    MRSA/SA Rapid ID by PCR from Blood culture [[id number]] Collected: 04/06/24 1838    Order Status: Completed Updated: 04/07/24 1609     Staph aureus ID by PCR Negative     Methicillin Resistant ID by PCR Negative              Imaging:  Low lung volumes with bilobed basilar atelectatic changes right greater than left.       Assessment:    Active Hospital Problems    Diagnosis  POA    *Acute biliary pancreatitis without infection or necrosis [K85.10]  Yes    Electrolyte disturbance [E87.8]  Unknown     Transient electrolyte disturbance during HD namely hyponatremia. Mild hyperkalemia, hypocalcemia, hypomagnasemia as well    Plan:  -- Consider iSTAT during HD to confirm labs  -- replace electrolytes as needed. Goals keep K > 4, Mg > 2      Gram-positive bacteremia [R78.81]  Unknown    Hyponatremia [E87.1]  Yes    Hypocalcemia [E83.51]  Yes    Metabolic acidosis [E87.20]  Yes    Acute hypoxemic respiratory failure [J96.01]  Yes    Acute renal failure superimposed on chronic kidney disease [N17.9, N18.9]  Yes    Shock [R57.9]  Yes    ACP (advance care planning) [Z71.89]  Not Applicable    STEFANIE (acute kidney injury) [N17.9]  Yes    CKD (chronic kidney disease), stage III [N18.30]  Yes    Morbid obesity with BMI of 40.0-44.9, adult [E66.01, Z68.41]  Not Applicable     History of gout [Z87.39]  Yes    Essential hypertension [I10]  Yes    Hyperlipidemia [E78.5]  Yes      Resolved Hospital Problems    Diagnosis Date Resolved POA    Hyperkalemia [E87.5] 04/09/2024 Yes         Plan:  See top of page.

## 2024-04-09 NOTE — ASSESSMENT & PLAN NOTE
Patient with acute kidney injury/acute renal failure likely due to acute tubular necrosis caused by severe shock and acute infectious process.  STEFANIE is currently worsening. Baseline creatinine  1.0  - Labs reviewed- Renal function/electrolytes with Estimated Creatinine Clearance: 37.4 mL/min (A) (based on SCr of 2.4 mg/dL (H)). according to latest data. Monitor urine output and serial BMP and adjust therapy as needed. Avoid nephrotoxins and renally dose meds for GFR listed above.    Worsening renal failure during admission likely ischemic ATN s/o shock and sepsis. RF c/b hyperkalemia and acidosis. Nephrology following at OSH. Anticipate patient will likely need RRT on current admission.    -- minimal urine output overnight but slight improvement in PM shift 10->37 for total 47 mL last 24h  -- nephrology consulted, appreciate recs.   -- RIJ Trialysis catheter replaced 4/8; restarted SLED, pt tolerating well and line functional  -- acute hyponatremia again while on CRRT, consider iSTAT labs to confirm as labs (namely hyponatremia) return to baseline following cessation of SLED  -- maintain MAP > 65  -- renally dose meds and avoid nephrotoxic meds   -- monitoring electrolytes closely on labs; replace as needed

## 2024-04-09 NOTE — ANESTHESIA POSTPROCEDURE EVALUATION
Anesthesia Post Evaluation    Patient: Enoc Collado    Procedure(s) Performed: Procedure(s) (LRB):  ERCP (ENDOSCOPIC RETROGRADE CHOLANGIOPANCREATOGRAPHY) (N/A)  ULTRASOUND, UPPER GI TRACT, ENDOSCOPIC (N/A)    Final Anesthesia Type: general      Patient location during evaluation: ICU  Patient participation: No - Unable to Participate, Intubation  Level of consciousness: sedated  Post-procedure vital signs: reviewed and stable  Pain management: adequate  Airway patency: patent    PONV status at discharge: No PONV  Anesthetic complications: no      Cardiovascular status: blood pressure returned to baseline and hemodynamically stable  Respiratory status: intubated and ventilator  Hydration status: euvolemic  Follow-up not needed.              Vitals Value Taken Time   /77 04/09/24 0900   Temp 36.9 °C (98.42 °F) 04/09/24 1014   Pulse 125 04/09/24 1014   Resp 12 04/09/24 1014   SpO2 97 % 04/09/24 1014   Vitals shown include unvalidated device data.      No case tracking events are documented in the log.      Pain/Cecilia Score: Pain Rating Prior to Med Admin: 0 (4/9/2024  9:00 AM)  Pain Rating Post Med Admin: 0 (4/9/2024  9:00 AM)

## 2024-04-09 NOTE — PROGRESS NOTES
[facility] - Cardiac Medical ICU  Critical Care Medicine  Progress Note    Patient Name: Enoc Collado  MRN: [id number]  Admission Date: 4/6/2024  Hospital Length of Stay: 3 days  Code Status: Full Code  Attending Provider: Mynor Perkins MD  Primary Care Provider: Frantz Mason MD   Principal Problem: Acute biliary pancreatitis without infection or necrosis    Subjective:     HPI:  Mr. Collado is a 73yoM with HFpEF, HTN, HLD, CKD, gout, morbid obesity who initially presented to [facility] with acute onset abdominal pain for 1 day on 4/5 associated with n/v/d.  In ED at OSH, elevated LFTs to 140-180s with T bili 3.6/ direct bilirubin of 2.0.  Lipase >1000.  Abdominal US with CBD of 7mm and no concerns of acute cholecystitis.  CT consistent with acute pancreatitis without gross evidence of biliary obstruction.  Patient does not have previous episodes of pancreatitis or no new medications recently started.  .  Patient started on IVF.  Noted to have worsening respiratory status and confused at OSH.  ABG with pH of 7.2, CO2 58, O2 68.  Fluids were stopped and IV Lasix was given with patient placed on Bipap.  Repeat blood work revealing of worsening LFTs and total bilirubin to 12.5.  Patient transferred to [facility] for GI services with EUS/ERCP.    Hospital/ICU Course:  Pt was transferred from [facility] to [facility] for emergent EUS/ERCP due to acute interstitial pancreatitis. On arrival per, pt was requiring continuous bipap and producing no urine despite receiving lasix. Was on max peripheral levophed to maintain blood pressures. Pt required right radial arterial line and right internal jugular vein trialysis line. Continued zosyn. GI performed ERCP and placed CBD stent. Now on increased levo/vaso, propofol, fentanyl; proned and paralyzed starting 4/7. RIJ trialysis line requiring replacement 4/8, restarted CRRT; remains intubated/sedated/paralyzed/proned, showing slight improvement in P/F ratio as  well as pressor requirements. Placed OGT to LIWS. Added micafungin to antimicrobial regimen of vanc and meropenem and repeat blood cultures 4/8. Discussed possible prognosis and family is amenable but would like to continue full code status.     Interval History/Significant Events: Patient had eventful day yesterday. RIJ trialysis line replaced due to inability to run CRRT, pt tolerated well and received SLED overnight which is ongoing. Remains proned this morning, plan to supine at 10am. Amio bolus and gtt started yesterday due to AFIB RVR with worsening hypotension, on 0.5 mg/min at this time remains AFIB rate around 120, decreased pressor requirements norepi 0.56 mcg/kg/min and vaso 0.04 units/min. On Nimbex, fentanyl 250 mcg/hr, ketamine 15 mcg/kg/hr adequately sedated. Vent settings AC/VC 32/420/+14/100%, plateau pressure 26 with ABG this morning 7.292/39.7/86/19.2 and P/F ratio 86, improved from 61 yesterday. Did require 2 boluses IVF now net +3 L overnight and +3.7 L since admission.     Review of Systems   Unable to perform ROS: Intubated     Objective:     Vital Signs (Most Recent):  Temp: 98.4 °F (36.9 °C) (04/09/24 0717)  Pulse: (!) 125 (04/09/24 0717)  Resp: (!) 32 (04/08/24 1802)  BP: 102/76 (04/08/24 1802)  SpO2: 97 % (04/09/24 0717) Vital Signs (24h Range):  Temp:  [96.3 °F (35.7 °C)-100 °F (37.8 °C)] 98.4 °F (36.9 °C)  Pulse:  [] 125  Resp:  [] 32  SpO2:  [72 %-98 %] 97 %  BP: ()/(57-76) 102/76  Arterial Line BP: ()/() 103/80   Weight: 131.5 kg (289 lb 14.5 oz)  Body mass index is 42.81 kg/m².      Intake/Output Summary (Last 24 hours) at 4/9/2024 0822  Last data filed at 4/9/2024 0729  Gross per 24 hour   Intake 7611.04 ml   Output 5315 ml   Net 2296.04 ml          Physical Exam  Vitals and nursing note reviewed.   Constitutional:       General: He is in acute distress.      Appearance: He is obese. He is ill-appearing and toxic-appearing. He is not diaphoretic.   HENT:       Head: Normocephalic and atraumatic.      Mouth/Throat:      Mouth: Mucous membranes are moist.   Cardiovascular:      Rate and Rhythm: Tachycardia present. Rhythm irregular.      Pulses: Normal pulses.      Comments: Pt prone, unable to assess heart tones  Pulmonary:      Breath sounds: Rales present. No wheezing or rhonchi.      Comments: Prone, minimal breath sounds but rales appreciated bilaterally  Abdominal:      Comments: Unable to assess- pt proned   Genitourinary:     Comments: Oneill catheter in place, scant urine output dark yellow with sediment  Musculoskeletal:      Right lower leg: Edema (trace) present.      Left lower leg: Edema (trace) present.   Skin:     General: Skin is warm and dry.      Capillary Refill: Capillary refill takes 2 to 3 seconds.   Neurological:      Comments: Sedated and paralyzed            Vents:  Vent Mode: A/C (04/09/24 0717)  Ventilator Initiated: Yes (04/06/24 1758)  Set Rate: 32 BPM (04/09/24 0717)  Vt Set: 420 mL (04/09/24 0717)  PEEP/CPAP: 14 cmH20 (04/09/24 0717)  Oxygen Concentration (%): 100 (04/09/24 0717)  Peak Airway Pressure: 33 cmH20 (04/09/24 0717)  Plateau Pressure: 27 cmH20 (04/09/24 0717)  Total Ve: 13.4 L/m (04/09/24 0717)  Negative Inspiratory Force (cm H2O): 0 (04/09/24 0717)  F/VT Ratio<105 (RSBI): (!) 73.39 (04/08/24 1758)  Lines/Drains/Airways       Peripherally Inserted Central Catheter Line  Duration             PICC Double Lumen 04/06/24 1136 right basilic 2 days              Central Venous Catheter Line  Duration             Trialysis (Dialysis) Catheter 04/08/24 1618 right internal jugular <1 day              Drain  Duration                  NG/OG Tube 04/06/24 2300 [place] sump Center mouth 2 days         Urethral Catheter 04/06/24 1000 2 days              Airway  Duration                  Airway - Non-Surgical 04/06/24 1748 Endotracheal Tube 2 days              Arterial Line  Duration             Arterial Line 04/06/24 1647 Right Radial 2 days               Peripheral Intravenous Line  Duration                  Peripheral IV - Single Lumen 04/06/24 0041 20 G Anterior;Right Forearm 3 days         Peripheral IV - Single Lumen 04/06/24 1021 20 G Anterior;Left Shoulder 2 days         Peripheral IV - Single Lumen 04/08/24 1714 20 G;Other (Comments) Anterior;Left Upper Arm <1 day                  Significant Labs:    CBC/Anemia Profile:  Recent Labs   Lab 04/08/24  0237 04/08/24  0916 04/08/24  1130 04/09/24  0336   WBC 19.73*  --  13.26* 20.78*   HGB 15.4  --  10.9* 14.9   HCT 45.5 45 31.8* 42.7     --  104* 136*   MCV 94  --  96 90   RDW 14.7*  --  14.9* 14.4        Chemistries:  Recent Labs   Lab 04/08/24 0237 04/08/24  1130 04/08/24  2151 04/09/24  0336   * 129*  129*  129* 130*  130* 122*  122*  121*   K 5.5* 3.4*  3.4*  3.4* 3.1*  3.1* 5.2*  5.2*  5.2*   CL 92* 108  108  108 107  107 89*  89*  90*   CO2 12* 13*  13*  13* 14*  14* 17*  17*  16*   BUN 23 25*  25*  25* 20  20 19  19  19   CREATININE 3.4* 2.4*  2.4*  2.4* 1.9*  1.9* 2.4*  2.4*  2.4*   CALCIUM 7.8* 4.3*  4.3*  4.3* 5.2*  5.2* 8.9  8.9  8.7   ALBUMIN 2.5* 1.3*  1.3* 1.4*  1.4* 2.1*  2.1*  2.1*   PROT 7.2  --   --  6.5   BILITOT 9.3*  --   --  8.2*   ALKPHOS 111  --   --  118   *  --   --  159*   *  --   --  164*   MG 2.3 1.3*  1.3* 1.4*  1.4* 2.1  2.1   PHOS 5.1* 3.8  3.8 3.3  3.3 4.8*  4.8*  4.7*       All pertinent labs within the past 24 hours have been reviewed.    Significant Imaging:  CXR pending this morning, will obtain once patient returned to supine position.    ABG  Recent Labs   Lab 04/09/24  0525   PH 7.292*   PO2 86   PCO2 39.7   HCO3 19.2*   BE -7*     Assessment/Plan:     Pulmonary  Acute hypoxemic respiratory failure  Patient with Hypercapnic and Hypoxic Respiratory failure which is Acute.  he is not on home oxygen. Supplemental oxygen was provided and noted- Vent Mode: A/C  Oxygen Concentration (%):   [] 100  Resp Rate Total:  [32 br/min] 32 br/min  Vt Set:  [420 mL-450 mL] 420 mL  PEEP/CPAP:  [12 grM69-66 cmH20] 14 cmH20  Mean Airway Pressure:  [18 ffA00-38 cmH20] 21 cmH20    .   Signs/symptoms of respiratory failure include- tachypnea, increased work of breathing, respiratory distress, and lethargy. Contributing diagnoses includes - ARDS, Obesity Hypoventilation, and Pneumonia Labs and images were reviewed. Patient Has recent ABG, which has been reviewed. Will treat underlying causes and adjust management of respiratory failure as follows-     Transferred to [facility] on Bipap with worsening O2 requirements  ARDS secondary to acute pancreatitis  Intubated    Plan:  -- P/F ratio improved from 61-> 86 this morning 4/9, satting 97% on AC/VC 32/420/+14/100%  -- Will decrease FiO2 adjust per ARDS net protocol   -- Proned since 6p, return to supine at 10a. Will likely need to continue pending repeat ABG and P/F ratio  -- Will obtain CXR following return to supine      Cardiac/Vascular  Shock  Distributive- warm well perfused and diaphoretic yesterday on exam. Currently on norepi at 0.56 mcg/kg/min and vaso at 0.04 units/hr after increased dosing to 1.3 norepi yesterday    Plan:  -- Wean pressors for MAP > 65  -- See Acute biliary pancreatitis plan    Hyperlipidemia  most recent lipid panel on 10/2023 with . Hypertriglyceridemia today improving now off propofol; etiology likely post-ERCP vs propofol induced    Plan:  -- Triglycerides improving drastically from 1414 -> 976  -- holding home pravastatin 20mg qd given elevated LFT's which are returning to baseline, can hopefully resume tomorrow  -- Continue to avoid propofol at this time    Essential hypertension  Chronic, uncontrolled at baseline, however hypotensive on pressors 2/2 illness. Latest blood pressure and vitals reviewed-     Temp:  [98.6 °F (37 °C)-99.3 °F (37.4 °C)]   Pulse:  [71-90]   Resp:  [4-33]   SpO2:  [88 %-96 %]   Arterial Line BP:  ()/(49-77) .   Home meds for hypertension were reviewed and noted below.   Hypertension Medications               amLODIPine (NORVASC) 10 MG tablet Take 1 tablet (10 mg total) by mouth once daily.    carvediloL (COREG) 25 MG tablet Take 1 tablet (25 mg total) by mouth 2 (two) times daily with meals.    furosemide (LASIX) 40 MG tablet Take 1 tablet (40 mg total) by mouth once daily.    lisinopriL (PRINIVIL,ZESTRIL) 40 MG tablet Take 1 tablet (40 mg total) by mouth once daily.          Plan:  --Holding all antihypertensives while in shock        Renal/  Metabolic acidosis  Acidosis due to pancreatitis and acute renal failure. See STEFANIE and pancreatitis for plans.    Acute renal failure superimposed on chronic kidney disease  Patient with acute kidney injury/acute renal failure likely due to acute tubular necrosis caused by severe shock and acute infectious process.  STEFANIE is currently worsening. Baseline creatinine  1.0  - Labs reviewed- Renal function/electrolytes with Estimated Creatinine Clearance: 37.4 mL/min (A) (based on SCr of 2.4 mg/dL (H)). according to latest data. Monitor urine output and serial BMP and adjust therapy as needed. Avoid nephrotoxins and renally dose meds for GFR listed above.    Worsening renal failure during admission likely ischemic ATN s/o shock and sepsis. RF c/b hyperkalemia and acidosis. Nephrology following at OSH. Anticipate patient will likely need RRT on current admission.    -- minimal urine output overnight but slight improvement in PM shift 10->37 for total 47 mL last 24h  -- nephrology consulted, appreciate recs.   -- RIJ Trialysis catheter replaced 4/8; restarted SLED, pt tolerating well and line functional  -- acute hyponatremia again while on CRRT, consider iSTAT labs to confirm as labs (namely hyponatremia) return to baseline following cessation of SLED  -- maintain MAP > 65  -- renally dose meds and avoid nephrotoxic meds   -- monitoring electrolytes closely on labs;  "replace as needed      CKD (chronic kidney disease), stage III  -- see "acute renal failure"    ID  Gram-positive bacteremia  GPCC on aerobic blood culture x1/2 sets from 4/6/24 was coag negative staph, possible contaminant    Plan:  -- continue vanc, continue meropenem for now until speciation  -- TTE pending  -- Repeat blood cultures 4/9 NGTD    Endocrine  Morbid obesity with BMI of 40.0-44.9, adult  Body mass index is 42.81 kg/m². Morbid obesity complicates all aspects of disease management from diagnostic modalities to treatment. Weight loss encouraged and health benefits explained to patient.       GI  * Acute biliary pancreatitis without infection or necrosis  Concern for cholangitis with worsening pressor requirements and septic shock.  Transferred to [facility] with worsening O2 requirements on Bipap.  Lipase >1000 with worsening LFTs and bilirubin.  . Likely biliary etiology.  Imaging without clear evidence of CBD dilation or obvious stones/necrosis.  Acute pancreatitis seen on imaging. AES consulted on admission, s/p ERCP with CBD stent placement (4/6/24). LFT's and bilirubin showing improvement, still requiring bowel rest at this time given significant gastric secretions and air.     - on prop/fent, nimbex, levo/vaso  - Zosyn, switched to meropenem  - AES following, performed ERCP and placed cbd duct stent  - Continue to monitor LFTs and amylase/lipase  - B/c showed GPCs, on vanc but awaiting ID/sensitivities, ID consulted  - NPO, OGT to LIWS for bowel decompression  - IVF prn  - started stress dose steroids (dex)  - hyperglycemia; On high dose SSI as CBG's elevated likely 2/2 pancreatitis and steroid use  - Consider bowel regimen today vs tomorrow      Orthopedic  History of gout  -- hold home allopurinol s/o acute renal failure    Palliative Care  ACP (advance care planning)    Advance Care Planning Code Status  Advance Care Planning    Date: 04/06/2024    Code Status  In light of the patients " advanced and life limiting illness,I engaged the the  pt's spouse, Annel Collado  in a voluntary conversation about the patient's preferences for care  at the very end of life. The pt's spouse stated patient will be FULL CODE. Along those lines, the patient does wish to have CPR or other invasive treatments performed when his heart and/or breathing stops. I communicated to the  pt's spouse, Annel Collado  that a FULL CODE STATUS will remain on the patient's chart. I spent a total of 30 minutes engaging the patient in this advance care planning discussion.                 Critical Care Daily Checklist:    A: Awake: RASS Goal/Actual Goal: RASS Goal: -4-->deep sedation  Actual:     B: Spontaneous Breathing Trial Performed? Spon. Breathing Trial Initiated?: Not initiated (04/08/24 0704)   C: SAT & SBT Coordinated?  No; vent requirements too high                      D: Delirium: CAM-ICU Overall CAM-ICU: Positive   E: Early Mobility Performed? No   F: Feeding Goal: Goals: Meet % EEN, EPN by RD f/u date  Status: Nutrition Goal Status: new   Current Diet Order   Procedures    Diet NPO      AS: Analgesia/Sedation Fentanyl, ketamine; Nimbex   T: Thromboembolic Prophylaxis Heparin 7.5K TID   H: HOB > 300 Yes   U: Stress Ulcer Prophylaxis (if needed) Protonix BID   G: Glucose Control High dose SSI Q4H   B: Bowel Function  Adding trickle feeds and bowel regimen   I: Indwelling Catheter (Lines & Oneill) Necessity RIJ Trialysis, DREW PICC, PIV, R radial a-line,    D: De-escalation of Antimicrobials/Pharmacotherapies Vanc, meropenem, micafungin    Plan for the day/ETD SLED, wean vent and pressors, possible re-proning, trickle feeds and bowel regimen    Code Status:  Family/Goals of Care: Full Code  Ongoing GOC       Critical secondary to Patient has a condition that poses threat to life and bodily function: Severe Respiratory Distress, Acute Renal Failure, on ventilator and pressors as well as SLED for support. Requiring proning.  Will need to wean as tolerated.       Critical care was time spent personally by me on the following activities: development of treatment plan with patient or surrogate and bedside caregivers, discussions with consultants, evaluation of patient's response to treatment, examination of patient, ordering and performing treatments and interventions, ordering and review of laboratory studies, ordering and review of radiographic studies, pulse oximetry, re-evaluation of patient's condition. This critical care time did not overlap with that of any other provider or involve time for any procedures.     Jie Rebolledo MD  U  PGY III  Critical Care Medicine  [facility] - Cardiac Medical ICU

## 2024-04-09 NOTE — SUBJECTIVE & OBJECTIVE
Interval History/Significant Events: Patient had eventful day yesterday. RIJ trialysis line replaced due to inability to run CRRT, pt tolerated well and received SLED overnight which is ongoing. Remains proned this morning, plan to supine at 10am. Amio bolus and gtt started yesterday due to AFIB RVR with worsening hypotension, on 0.5 mg/min at this time remains AFIB rate around 120, decreased pressor requirements norepi 0.56 mcg/kg/min and vaso 0.04 units/min. On Nimbex, fentanyl 250 mcg/hr, ketamine 15 mcg/kg/hr adequately sedated. Vent settings AC/VC 32/420/+14/100%, plateau pressure 26 with ABG this morning 7.292/39.7/86/19.2 and P/F ratio 86, improved from 61 yesterday. Did require 2 boluses IVF now net +3 L overnight and +3.7 L since admission.     Review of Systems   Unable to perform ROS: Intubated     Objective:     Vital Signs (Most Recent):  Temp: 98.4 °F (36.9 °C) (04/09/24 0717)  Pulse: (!) 125 (04/09/24 0717)  Resp: (!) 32 (04/08/24 1802)  BP: 102/76 (04/08/24 1802)  SpO2: 97 % (04/09/24 0717) Vital Signs (24h Range):  Temp:  [96.3 °F (35.7 °C)-100 °F (37.8 °C)] 98.4 °F (36.9 °C)  Pulse:  [] 125  Resp:  [] 32  SpO2:  [72 %-98 %] 97 %  BP: ()/(57-76) 102/76  Arterial Line BP: ()/() 103/80   Weight: 131.5 kg (289 lb 14.5 oz)  Body mass index is 42.81 kg/m².      Intake/Output Summary (Last 24 hours) at 4/9/2024 0822  Last data filed at 4/9/2024 0729  Gross per 24 hour   Intake 7611.04 ml   Output 5315 ml   Net 2296.04 ml          Physical Exam  Vitals and nursing note reviewed.   Constitutional:       General: He is in acute distress.      Appearance: He is obese. He is ill-appearing and toxic-appearing. He is not diaphoretic.   HENT:      Head: Normocephalic and atraumatic.      Mouth/Throat:      Mouth: Mucous membranes are moist.   Cardiovascular:      Rate and Rhythm: Tachycardia present. Rhythm irregular.      Pulses: Normal pulses.      Comments: Pt prone, unable to  assess heart tones  Pulmonary:      Breath sounds: Rales present. No wheezing or rhonchi.      Comments: Prone, minimal breath sounds but rales appreciated bilaterally  Abdominal:      Comments: Unable to assess- pt proned   Genitourinary:     Comments: Oneill catheter in place, scant urine output dark yellow with sediment  Musculoskeletal:      Right lower leg: Edema (trace) present.      Left lower leg: Edema (trace) present.   Skin:     General: Skin is warm and dry.      Capillary Refill: Capillary refill takes 2 to 3 seconds.   Neurological:      Comments: Sedated and paralyzed            Vents:  Vent Mode: A/C (04/09/24 0717)  Ventilator Initiated: Yes (04/06/24 1758)  Set Rate: 32 BPM (04/09/24 0717)  Vt Set: 420 mL (04/09/24 0717)  PEEP/CPAP: 14 cmH20 (04/09/24 0717)  Oxygen Concentration (%): 100 (04/09/24 0717)  Peak Airway Pressure: 33 cmH20 (04/09/24 0717)  Plateau Pressure: 27 cmH20 (04/09/24 0717)  Total Ve: 13.4 L/m (04/09/24 0717)  Negative Inspiratory Force (cm H2O): 0 (04/09/24 0717)  F/VT Ratio<105 (RSBI): (!) 73.39 (04/08/24 1758)  Lines/Drains/Airways       Peripherally Inserted Central Catheter Line  Duration             PICC Double Lumen 04/06/24 1136 right basilic 2 days              Central Venous Catheter Line  Duration             Trialysis (Dialysis) Catheter 04/08/24 1618 right internal jugular <1 day              Drain  Duration                  NG/OG Tube 04/06/24 2300 [place] sump Center mouth 2 days         Urethral Catheter 04/06/24 1000 2 days              Airway  Duration                  Airway - Non-Surgical 04/06/24 1748 Endotracheal Tube 2 days              Arterial Line  Duration             Arterial Line 04/06/24 1647 Right Radial 2 days              Peripheral Intravenous Line  Duration                  Peripheral IV - Single Lumen 04/06/24 0041 20 G Anterior;Right Forearm 3 days         Peripheral IV - Single Lumen 04/06/24 1021 20 G Anterior;Left Shoulder 2 days          Peripheral IV - Single Lumen 04/08/24 1714 20 G;Other (Comments) Anterior;Left Upper Arm <1 day                  Significant Labs:    CBC/Anemia Profile:  Recent Labs   Lab 04/08/24 0237 04/08/24  0916 04/08/24  1130 04/09/24  0336   WBC 19.73*  --  13.26* 20.78*   HGB 15.4  --  10.9* 14.9   HCT 45.5 45 31.8* 42.7     --  104* 136*   MCV 94  --  96 90   RDW 14.7*  --  14.9* 14.4        Chemistries:  Recent Labs   Lab 04/08/24 0237 04/08/24  1130 04/08/24  2151 04/09/24  0336   * 129*  129*  129* 130*  130* 122*  122*  121*   K 5.5* 3.4*  3.4*  3.4* 3.1*  3.1* 5.2*  5.2*  5.2*   CL 92* 108  108  108 107  107 89*  89*  90*   CO2 12* 13*  13*  13* 14*  14* 17*  17*  16*   BUN 23 25*  25*  25* 20  20 19  19  19   CREATININE 3.4* 2.4*  2.4*  2.4* 1.9*  1.9* 2.4*  2.4*  2.4*   CALCIUM 7.8* 4.3*  4.3*  4.3* 5.2*  5.2* 8.9  8.9  8.7   ALBUMIN 2.5* 1.3*  1.3* 1.4*  1.4* 2.1*  2.1*  2.1*   PROT 7.2  --   --  6.5   BILITOT 9.3*  --   --  8.2*   ALKPHOS 111  --   --  118   *  --   --  159*   *  --   --  164*   MG 2.3 1.3*  1.3* 1.4*  1.4* 2.1  2.1   PHOS 5.1* 3.8  3.8 3.3  3.3 4.8*  4.8*  4.7*       All pertinent labs within the past 24 hours have been reviewed.    Significant Imaging:  CXR pending this morning, will obtain once patient returned to supine position.

## 2024-04-09 NOTE — PLAN OF CARE
MICU DAILY GOALS     Family/Goals of care/Code Status   Code Status: Full Code    24H Vital Sign Range  Temp:  [96.3 °F (35.7 °C)-100 °F (37.8 °C)]   Pulse:  []   Resp:  []   BP: ()/(57-76)   SpO2:  [72 %-98 %]   Arterial Line BP: ()/()      Shift Events (include procedures and significant events)   Patient had several episodes of bradycardia in 50s - MD aware. 12 lead EKG obtained for more in depth rhythm analysis. Electrolytes replaced.     AWAKE RASS: Goal - RASS Goal: -5-->unarousable  Actual - RASS (Parry Agitation-Sedation Scale): unarousable    Restraint necessity: Not necessary   BREATHE SBT: Not attempted   Coordinate A & B, analgesics/sedatives Pain: managed   SAT: Not attempted   Delirium CAM-ICU: Overall CAM-ICU: Positive   Early(intubated/ Progressive (non-intubated) Mobility MOVE Screen (INTUBATED ONLY): Not attempted    Activity: Activity Management: Rolling - L1   Feeding/Nutrition Diet order: Diet/Nutrition Received: NPO,     Thrombus DVT prophylaxis: VTE Required Core Measure: Pharmacological prophylaxis initiated/maintained   HOB Elevation Head of Bed (HOB) Positioning: HOB at 30 degrees   Ulcer Prophylaxis GI: yes   Glucose control managed     Skin Skin assessed during: Daily Assessment    Sacrum intact/not altered? Yes  Heels intact/not altered? Yes  Surgical wound? No    CHECK ONE!   (no altered skin or altered skin) and sub boxes:  [] No Altered Skin Integrity Present    []Prevention Measures Documented    [x] Altered Skin Integrity Present or Discovered   [x] LDA present in EPIC, daily doc completed              [] LDA added if not in EPIC (describe wound).                    When describing wound, do not stage, use descriptive words only.    [] Wound Image Taken (required on admit,                   transfer/discharge and every Tuesday)    Wound Care Consulted? No    4 EYES:  Attending Nurse (1st set of eyes): Kathy Brown RN    Second RN/Staff Member (2nd  set of eyes): JERRY Brown   Bowel Function constipation    Indwelling Catheter Necessity      Urethral Catheter 04/06/24 1000-Reason for Continuing Urinary Catheterization: Critically ill in ICU and requiring hourly monitoring of intake/output    PICC Double Lumen 04/06/24 1136 right basilic-Line Necessity Review: CRRT/HD  [REMOVED] Trialysis (Dialysis) Catheter 04/06/24 1836 right internal jugular-Line Necessity Review: Hemodynamic instability, CRRT/HD  Trialysis (Dialysis) Catheter 04/08/24 1618 right internal jugular-Line Necessity Review: CRRT/HD     De-escalation Antibiotics Yes        VS and assessment per flow sheet, patient progressing towards goals as tolerated, plan of care reviewed with family, all concerns addressed, will continue to monitor.

## 2024-04-09 NOTE — PROGRESS NOTES
04/09/24 0328   Treatment   Treatment Type SLED   Treatment Status Daily equipment check   Dialysis Machine Number [id number]   Dialyzer Time (hours) 8.02   BVP (Liters) 93.8 L   Solutions Labeled and Current  Yes   Access Temporary Cath   Catheter Dressing Intact  Yes   Alarms Engaged Yes   CRRT Comments Daily check done.

## 2024-04-09 NOTE — SUBJECTIVE & OBJECTIVE
Interval History:   New temporary dialysis line placed yesterday, improved in flows and has ran continuously since replacement without clotting.  Labs with hyperkalemia and hyponatremia, bath being adjusted on HD.  Serum labs have been confirmed with iSTAT chemistries to r/o pseudohyponatremia/hyperkalemia.  His Triglycerides remain elevated but improved from yesterday.  Net positive 3L/24h, hourly intake around 200 cc/hr including pre/post filter fluids with CRRT.  Remains on Levo/Vaso for BP support.    Review of patient's allergies indicates:   Allergen Reactions    No known drug allergies      Current Facility-Administered Medications   Medication Frequency    acetaminophen tablet 650 mg Q4H PRN    amiodarone 360 mg/200 mL (1.8 mg/mL) infusion Continuous    calcium gluconate 3 g in dextrose 5 % (D5W) 100 mL infusion Continuous    cisatracurium (NIMBEX) 200 mg in dextrose 5 % (D5W) 100 mL infusion Continuous    dexAMETHasone (DECADRON) 20 mg in sodium chloride 0.9% 50 mL IVPB Daily    Followed by    [START ON 4/11/2024] dexAMETHasone injection 10 mg Daily    dextrose 10 % infusion Continuous PRN    dextrose 10% bolus 125 mL 125 mL PRN    dextrose 10% bolus 250 mL 250 mL PRN    dextrose-sod citrate-citric ac 2.45-2.2 gram- 800 mg/100 mL Soln Continuous    docusate sodium capsule 100 mg Daily    fentaNYL 2500 mcg in 0.9% sodium chloride 250 mL infusion premix (titrating) Continuous    fentanyl IV bolus from bag/infusion 50 mcg Q15 Min PRN    fludrocortisone tablet 100 mcg Daily    glucagon (human recombinant) injection 1 mg PRN    heparin (porcine) injection 7,500 Units Q8H    insulin aspart U-100 pen 0-15 Units Q4H PRN    ketamine 500 mg in sodium chloride 0.9% 100 mL infusion Continuous    magnesium sulfate 2g in water 50mL IVPB (premix) PRN    meropenem (MERREM) 2 g in sodium chloride 0.9% 100 mL IVPB Q8H    micafungin 100 mg in sodium chloride 0.9 % 100 mL IVPB (MB+) Q24H    mupirocin 2 % ointment BID     NORepinephrine 32 mg in dextrose 5 % (D5W) 250 mL infusion Continuous    pantoprazole injection 40 mg BID    polyethylene glycol packet 17 g BID    sodium chloride 0.9% flush 10 mL PRN    sodium phosphate 20.01 mmol in dextrose 5 % (D5W) 250 mL IVPB PRN    sodium phosphate 30 mmol in dextrose 5 % (D5W) 250 mL IVPB PRN    sodium phosphate 39.99 mmol in dextrose 5 % (D5W) 250 mL IVPB PRN    timolol maleate 0.5% ophthalmic solution 1 drop Daily    vancomycin - pharmacy to dose pharmacy to manage frequency    vasopressin (PITRESSIN) 0.2 Units/mL in dextrose 5 % (D5W) 100 mL infusion Continuous    white petrolatum-mineral oil (SYSTANE NIGHTTIME) ophthalmic ointment Q8H       Objective:     Vital Signs (Most Recent):  Temp: 98.4 °F (36.9 °C) (04/09/24 0930)  Pulse: (!) 123 (04/09/24 0930)  Resp: (!) 32 (04/09/24 0930)  BP: 101/77 (04/09/24 0900)  SpO2: 95 % (04/09/24 0930) Vital Signs (24h Range):  Temp:  [96.3 °F (35.7 °C)-100 °F (37.8 °C)] 98.4 °F (36.9 °C)  Pulse:  [] 123  Resp:  [] 32  SpO2:  [72 %-98 %] 95 %  BP: ()/(61-86) 101/77  Arterial Line BP: ()/() 103/80     Weight: 131.5 kg (289 lb 14.5 oz) (04/07/24 1240)  Body mass index is 42.81 kg/m².  Body surface area is 2.53 meters squared.    I/O last 3 completed shifts:  In: 58229.9 [I.V.:5484.5; NG/GT:120; IV Piggyback:5209.4]  Out: 09071 [Urine:62; Other:41778]     Physical Exam  Vitals and nursing note reviewed.   Constitutional:       Appearance: He is obese. He is ill-appearing. He is not toxic-appearing or diaphoretic.      Interventions: He is sedated and intubated.      Comments: Proned   NURYS:      Head: Atraumatic.      Nose: Nose normal.      Mouth/Throat:      Comments: Intubated   Cardiovascular:      Rate and Rhythm: Normal rate and regular rhythm.      Pulses: Normal pulses.   Pulmonary:      Effort: He is intubated.      Breath sounds: Rhonchi present. No wheezing or rales.      Comments: Mechanical  breathsounds  Abdominal:      Comments: DEE   Musculoskeletal:      Right lower leg: Edema present.      Left lower leg: Edema present.          Significant Labs:  ABGs:   Recent Labs   Lab 04/09/24  0525   PH 7.292*   PCO2 39.7   HCO3 19.2*   POCSATURATED 95   BE -7*     CBC:   Recent Labs   Lab 04/09/24 0336 04/09/24 0923   WBC 20.78*  --    RBC 4.74  --    HGB 14.9  --    HCT 42.7 48   *  --    MCV 90  --    MCH 31.4*  --    MCHC 34.9  --      CMP:   Recent Labs   Lab 04/09/24 0336   *  197*  197*   CALCIUM 8.9  8.9  8.7   ALBUMIN 2.1*  2.1*  2.1*   PROT 6.5   *  122*  121*   K 5.2*  5.2*  5.2*   CO2 17*  17*  16*   CL 89*  89*  90*   BUN 19  19  19   CREATININE 2.4*  2.4*  2.4*   ALKPHOS 118   *   *   BILITOT 8.2*

## 2024-04-09 NOTE — PROGRESS NOTES
Pt restarted on SLED post new CVC placement. UFR started at 200c/hr. Discussed care and UFG with ICU RNs. See CRRT flowsheet for details.

## 2024-04-09 NOTE — CARE UPDATE
Procedure Note  Prone Positioning  Critical Care Medicine       Chief Complaint: Acute biliary pancreatitis without infection or necrosis  MRN: [id number]  LOS: 3  Sex: male  Age: 73 y.o.      Last ABG:   Recent Labs   Lab 04/09/24  1434   PH 7.287*   PO2 74*   PCO2 47.2*   HCO3 22.5*   BE -4*       Vital Signs (Most Recent):   Temp: 99.7 °F (37.6 °C) (04/09/24 1745)  Pulse: 103 (04/09/24 1745)  Resp: (!) 32 (04/09/24 1745)  BP: 100/70 (04/09/24 1700)  SpO2: 99 % (04/09/24 1745)    Ventilator Settings:  Vent Mode: A/C  Oxygen Concentration (%):  [] 70  Resp Rate Total:  [32 br/min] 32 br/min  Vt Set:  [420 mL] 420 mL  PEEP/CPAP:  [14 cmH20] 14 cmH20  Mean Airway Pressure:  [21 cmH20] 21 cmH20    Patient proned at 1600 with assistance of staff for P/F ratio 74.    Indication: Severe, refractory ARDS. High risk for deterioration during proning procedure in need of direct monitoring by Critical Care personnel.     Prior to beginning the procedure, all appropriate equipment and personnel were gathered in the room. Two individuals were placed on each side of the patient and one person stood at the head of the bed and was dedicated to the management of the head of the patient, the endotracheal tube, and the ventilator lines. The person at the head of the bed  coordinated the steps of the proning procedure with team team at the direction of Critical Care team members at the bedside. The patient was first log-rolled 90 degrees onto their side towards the direction of their central venous catheter. Cardiac electrodes were then moved from the patient's anterior to the posterior. The patients knees, forehead, chest, and iliac crests were protected using adhesive pads and/or foam pads to reduce the risk of skin breakdown. Once properly positioned in the bed, another 90 degree log roll was performed placing the patient into the prone position. The patient's arms were placed alongside the body. The patient's head should be  turned alternately to the right or left every 2 hours. The patient tolerated the procedure well.     Total Critical Care time (uninterrupted not including procedures): 3

## 2024-04-09 NOTE — PROGRESS NOTES
[facility]  Nephrology  Progress Note    Patient Name: Enoc Collado  MRN: [id number]  Admission Date: 4/6/2024  Hospital Length of Stay: 3 days  Attending Provider: Mynor Perkins MD   Primary Care Physician: Frantz Mason MD  Principal Problem:Acute biliary pancreatitis without infection or necrosis    Subjective:     HPI: Mr. Collado is a 73 year old male with chronic HTN, HLD, morbid obesity. Patient is intubated on multiple pressors at the time of my evaluation. All history was obtained from chart review and by family. Patient initially presented to [facility] with acute onset abdominal pain for 1 day on 4/5 associated with n/v/d. In ED at OSH, elevated LFTs to 140-180s with T bili 3.6/ direct bilirubin of 2.0. Lipase >1000. Abdominal US with CBD of 7mm and no concerns of acute cholecystitis. CT with contrast on 4/5 is consistent with acute pancreatitis without gross evidence of biliary obstruction. Patient does not have previous episodes of pancreatitis or no new medications recently started. . Patient started on IVF. Noted to have worsening respiratory status and confused at OSH. ABG with pH of 7.2, CO2 58, O2 68. Fluids were stopped and IV Lasix was given with patient placed on Bipap. Patient transferred to [facility] MICU for GI services. with EUS/ERCP.     Nephrology has been consulted for STEFANIE, hyperkalemia, and oliguria following IV contrast, pancreatitis, and hypotension. As per the wife, she is not aware of any prior history of kidney disease, and review of his labs available to me show a eGFR above 60 prior to this hospitalization. At the outside facility, he was hyperkalemic at 6.7, and was shifted with improvement in his potassium to 5.0, creatinine on arrival was 1.6 (baseline 1.0), and trended up to 4.1 at the time of the consult. Total bilirubin continues to trend up, currently at 14.8, and LFTs worsening. Most recent ABG prior to intubation showed 7.25/47/93/21. He was  intubated in the ICU, and is currently going for emergent ERCP. Wife denies the patient taking any NSAIDs, having decreased PO intake or decreased urinary output prior to presentation. Upon my initial encounter, the patient has produced only 150 mL of urine in the macias bag over the past 12 hours.     Interval History:   New temporary dialysis line placed yesterday, improved in flows and has ran continuously since replacement without clotting.  Labs with hyperkalemia and hyponatremia, bath being adjusted on HD.  Serum labs have been confirmed with iSTAT chemistries to r/o pseudohyponatremia/hyperkalemia.  His Triglycerides remain elevated but improved from yesterday.  Net positive 3L/24h, hourly intake around 200 cc/hr including pre/post filter fluids with CRRT.  Remains on Levo/Vaso for BP support.    Review of patient's allergies indicates:   Allergen Reactions    No known drug allergies      Current Facility-Administered Medications   Medication Frequency    acetaminophen tablet 650 mg Q4H PRN    amiodarone 360 mg/200 mL (1.8 mg/mL) infusion Continuous    calcium gluconate 3 g in dextrose 5 % (D5W) 100 mL infusion Continuous    cisatracurium (NIMBEX) 200 mg in dextrose 5 % (D5W) 100 mL infusion Continuous    dexAMETHasone (DECADRON) 20 mg in sodium chloride 0.9% 50 mL IVPB Daily    Followed by    [START ON 4/11/2024] dexAMETHasone injection 10 mg Daily    dextrose 10 % infusion Continuous PRN    dextrose 10% bolus 125 mL 125 mL PRN    dextrose 10% bolus 250 mL 250 mL PRN    dextrose-sod citrate-citric ac 2.45-2.2 gram- 800 mg/100 mL Soln Continuous    docusate sodium capsule 100 mg Daily    fentaNYL 2500 mcg in 0.9% sodium chloride 250 mL infusion premix (titrating) Continuous    fentanyl IV bolus from bag/infusion 50 mcg Q15 Min PRN    fludrocortisone tablet 100 mcg Daily    glucagon (human recombinant) injection 1 mg PRN    heparin (porcine) injection 7,500 Units Q8H    insulin aspart U-100 pen 0-15 Units Q4H  PRN    ketamine 500 mg in sodium chloride 0.9% 100 mL infusion Continuous    magnesium sulfate 2g in water 50mL IVPB (premix) PRN    meropenem (MERREM) 2 g in sodium chloride 0.9% 100 mL IVPB Q8H    micafungin 100 mg in sodium chloride 0.9 % 100 mL IVPB (MB+) Q24H    mupirocin 2 % ointment BID    NORepinephrine 32 mg in dextrose 5 % (D5W) 250 mL infusion Continuous    pantoprazole injection 40 mg BID    polyethylene glycol packet 17 g BID    sodium chloride 0.9% flush 10 mL PRN    sodium phosphate 20.01 mmol in dextrose 5 % (D5W) 250 mL IVPB PRN    sodium phosphate 30 mmol in dextrose 5 % (D5W) 250 mL IVPB PRN    sodium phosphate 39.99 mmol in dextrose 5 % (D5W) 250 mL IVPB PRN    timolol maleate 0.5% ophthalmic solution 1 drop Daily    vancomycin - pharmacy to dose pharmacy to manage frequency    vasopressin (PITRESSIN) 0.2 Units/mL in dextrose 5 % (D5W) 100 mL infusion Continuous    white petrolatum-mineral oil (SYSTANE NIGHTTIME) ophthalmic ointment Q8H       Objective:     Vital Signs (Most Recent):  Temp: 98.4 °F (36.9 °C) (04/09/24 0930)  Pulse: (!) 123 (04/09/24 0930)  Resp: (!) 32 (04/09/24 0930)  BP: 101/77 (04/09/24 0900)  SpO2: 95 % (04/09/24 0930) Vital Signs (24h Range):  Temp:  [96.3 °F (35.7 °C)-100 °F (37.8 °C)] 98.4 °F (36.9 °C)  Pulse:  [] 123  Resp:  [] 32  SpO2:  [72 %-98 %] 95 %  BP: ()/(61-86) 101/77  Arterial Line BP: ()/() 103/80     Weight: 131.5 kg (289 lb 14.5 oz) (04/07/24 1240)  Body mass index is 42.81 kg/m².  Body surface area is 2.53 meters squared.    I/O last 3 completed shifts:  In: 74462.9 [I.V.:5484.5; NG/GT:120; IV Piggyback:5209.4]  Out: 79229 [Urine:62; Other:80527]     Physical Exam  Vitals and nursing note reviewed.   Constitutional:       Appearance: He is obese. He is ill-appearing. He is not toxic-appearing or diaphoretic.      Interventions: He is sedated and intubated.      Comments: Proned   HENT:      Head: Atraumatic.      Nose: Nose  normal.      Mouth/Throat:      Comments: Intubated   Cardiovascular:      Rate and Rhythm: Normal rate and regular rhythm.      Pulses: Normal pulses.   Pulmonary:      Effort: He is intubated.      Breath sounds: Rhonchi present. No wheezing or rales.      Comments: Mechanical breathsounds  Abdominal:      Comments: DEE   Musculoskeletal:      Right lower leg: Edema present.      Left lower leg: Edema present.          Significant Labs:  ABGs:   Recent Labs   Lab 04/09/24  0525   PH 7.292*   PCO2 39.7   HCO3 19.2*   POCSATURATED 95   BE -7*     CBC:   Recent Labs   Lab 04/09/24 0336 04/09/24 0923   WBC 20.78*  --    RBC 4.74  --    HGB 14.9  --    HCT 42.7 48   *  --    MCV 90  --    MCH 31.4*  --    MCHC 34.9  --      CMP:   Recent Labs   Lab 04/09/24 0336   *  197*  197*   CALCIUM 8.9  8.9  8.7   ALBUMIN 2.1*  2.1*  2.1*   PROT 6.5   *  122*  121*   K 5.2*  5.2*  5.2*   CO2 17*  17*  16*   CL 89*  89*  90*   BUN 19  19  19   CREATININE 2.4*  2.4*  2.4*   ALKPHOS 118   *   *   BILITOT 8.2*        Assessment/Plan:     Pulmonary  Acute hypoxemic respiratory failure  -Remains on FiO2 of 1.0 PEEP 14    Cardiac/Vascular  Shock  -Per primary    Renal/  Metabolic acidosis  HAGMA  -LA 1.3  -Bicarb gap decreasing to < 5.  -will increase bicarb bath to 35.      Hypocalcemia  -continue post-filter calcium @ 25 cc/hr  -3 calcium bath    STEFANIE (acute kidney injury)  As per wife, patient with no prior history of CKD, baseline creatinine around 1.0 and eGFR>60 base on labs available. He presented to outside hospital for abdominal pain, received a dose of IV contrast for a CT of the abdomen, and found to have pancreatitis. Patient subsequently became hypotensive requiring the initiation of vasopressin and levophed for pressure support.    STEFANIE 2/2 iATN from septic shock from pancreatitis    Recommendations  -will continue 24 hour SLED  -Bicarb bath 35  -Na bath 130  -3k/3ca  bath  -goal UF to be negative  cc/hr as tolerated without an increase in pressor requirements.  -Avoid nephrotoxic agents (IV contrast, NSAID's, gadolinium contrast, PPI's) if able.   -Maintain MAP above 65 mmHg.   -Strict I's and O's  -Daily renal function panel  -Renally dose all medications    Endocrine  Hyponatremia  -confirmed with iSTAT, Na 119  -130 Na bath with SLED    GI  * Acute biliary pancreatitis without infection or necrosis  -Per primary      Rodolfo Hayward NP  Nephrology  [facility] - Cardiac Medical ICU

## 2024-04-09 NOTE — CARE UPDATE
Procedure Note  Prone Positioning  Critical Care Medicine       Chief Complaint: Acute biliary pancreatitis without infection or necrosis  MRN: [id number]  LOS: 3  Sex: male  Age: 73 y.o.      Last ABG:   Recent Labs   Lab 04/09/24 0525   PH 7.292*   PO2 86   PCO2 39.7   HCO3 19.2*   BE -7*       Vital Signs (Most Recent):   Temp: 98.4 °F (36.9 °C) (04/09/24 0930)  Pulse: (!) 123 (04/09/24 0930)  Resp: (!) 32 (04/09/24 0930)  BP: 101/77 (04/09/24 0900)  SpO2: 95 % (04/09/24 0930)    Ventilator Settings:  Vent Mode: A/C  Oxygen Concentration (%):  [] 100  Resp Rate Total:  [32 br/min] 32 br/min  Vt Set:  [420 mL] 420 mL  PEEP/CPAP:  [14 cwO09-15 cmH20] 14 cmH20  Mean Airway Pressure:  [21 tcF38-30 cmH20] 21 cmH20    Patient returned supine from prone positioning at 10am this morning; will obtain ABG and re-evaluate for possible re-proning this afternoon at 6pm.     Indication: Severe, refractory ARDS. High risk for deterioration during proning procedure in need of direct monitoring by Critical Care personnel.     Prior to beginning the procedure, all appropriate equipment and personnel were gathered in the room. Two individuals were placed on each side of the patient and one person stood at the head of the bed and was dedicated to the management of the head of the patient, the endotracheal tube, and the ventilator lines. The person at the head of the bed  coordinated the steps of the proning procedure with team team at the direction of Critical Care team members at the bedside. The patient was first log-rolled 90 degrees onto their side towards the direction of their central venous catheter. Cardiac electrodes were then moved from the patient's anterior to the posterior. The patients knees, forehead, chest, and iliac crests were protected using adhesive pads and/or foam pads to reduce the risk of skin breakdown. Once properly positioned in the bed, another 90 degree log roll was performed placing the patient  into the prone position. The patient's arms were placed alongside the body. The patient's head should be turned alternately to the right or left every 2 hours. The patient tolerated the procedure well.     Total Critical Care time (uninterrupted not including procedures): 5 mins    Jie Rebolledo MD  [employer] IM PGY III

## 2024-04-09 NOTE — ASSESSMENT & PLAN NOTE
Concern for cholangitis with worsening pressor requirements and septic shock.  Transferred to [facility] MICU with worsening O2 requirements on Bipap.  Lipase >1000 with worsening LFTs and bilirubin.  . Likely biliary etiology.  Imaging without clear evidence of CBD dilation or obvious stones/necrosis.  Acute pancreatitis seen on imaging. AES consulted on admission, s/p ERCP with CBD stent placement (4/6/24). LFT's and bilirubin showing improvement, still requiring bowel rest at this time given significant gastric secretions and air.     - on prop/fent, nimbex, levo/vaso  - Zosyn, switched to meropenem  - AES following, performed ERCP and placed cbd duct stent  - Continue to monitor LFTs and amylase/lipase  - B/c showed GPCs, on vanc but awaiting ID/sensitivities, ID consulted  - NPO, OGT to LIWS for bowel decompression  - IVF prn  - started stress dose steroids (dex)  - hyperglycemia; On high dose SSI as CBG's elevated likely 2/2 pancreatitis and steroid use  - Consider bowel regimen today vs tomorrow

## 2024-04-09 NOTE — ASSESSMENT & PLAN NOTE
most recent lipid panel on 10/2023 with . Hypertriglyceridemia today improving now off propofol; etiology likely post-ERCP vs propofol induced    Plan:  -- Triglycerides improving drastically from 1414 -> 976  -- holding home pravastatin 20mg qd given elevated LFT's which are returning to baseline, can hopefully resume tomorrow  -- Continue to avoid propofol at this time

## 2024-04-09 NOTE — ASSESSMENT & PLAN NOTE
As per wife, patient with no prior history of CKD, baseline creatinine around 1.0 and eGFR>60 base on labs available. He presented to outside hospital for abdominal pain, received a dose of IV contrast for a CT of the abdomen, and found to have pancreatitis. Patient subsequently became hypotensive requiring the initiation of vasopressin and levophed for pressure support.    STEFANIE 2/2 iATN from septic shock from pancreatitis    Recommendations  -will continue 24 hour SLED  -Bicarb bath 35  -Na bath 130  -3k/3ca bath  -goal UF to be negative  cc/hr as tolerated without an increase in pressor requirements.  -Avoid nephrotoxic agents (IV contrast, NSAID's, gadolinium contrast, PPI's) if able.   -Maintain MAP above 65 mmHg.   -Strict I's and O's  -Daily renal function panel  -Renally dose all medications

## 2024-04-09 NOTE — PROGRESS NOTES
[facility] - Cardiac Medical ICU  General Surgery  Progress Note    Subjective:     History of Present Illness:  Mr. Kline a 74 yo man form the [facility], transferred to [facility] for a higher level of care/AES evaluation. He presented to the ED in  and was diagnosed with pancreatitis. He was started on empiric abx. Patient was transferred here yesterday and admitted tot he ICU. The patient's abx were escalated to meropenem and ID is consulted for that. History obtained from family. Underwent ERCP showed mild proximal CBD dilation, stent placed with bile flowing with no pus. Now intubated with suspected shock and ARDS. Abx escalated to meropenem.    General surgery consulted for evaluation for abdominal compartment syndrome.    Post-Op Info:  Procedure(s) (LRB):  ERCP (ENDOSCOPIC RETROGRADE CHOLANGIOPANCREATOGRAPHY) (N/A)  ULTRASOUND, UPPER GI TRACT, ENDOSCOPIC (N/A)   3 Days Post-Op     Interval History: Proned overnight. SLED. Bladder pressures while prone 17. Levo requirements decreased from yesterday. Abdomen remains soft     Medications:  Continuous Infusions:   amiodarone in dextrose 5% 0.5 mg/min (04/09/24 0827)    calcium gluconate 3 g in dextrose 5 % (D5W) 100 mL infusion 25 mL/hr at 04/09/24 0827    cisatracurium (NIMBEX) 200 mg in dextrose 5 % (D5W) 100 mL infusion 1.5 mcg/kg/min (04/09/24 0827)    dextrose-sod citrate-citric ac 80 mL/hr at 04/09/24 0827    fentanyl 250 mcg/hr (04/09/24 0827)    ketamine 5 mg/mL infusion (titrating) 15 mcg/kg/min (04/09/24 0827)    NORepinephrine bitartrate-D5W 0.54 mcg/kg/min (04/09/24 0827)    vasopressin 0.04 Units/min (04/09/24 0827)     Scheduled Meds:   dexamethasone (DECADRON) IVPB  20 mg Intravenous Daily    Followed by    [START ON 4/11/2024] dexAMETHasone  10 mg Intravenous Daily    fludrocortisone  100 mcg Per OG tube Daily    heparin (porcine)  7,500 Units Subcutaneous Q8H    meropenem IV (PEDS and ADULTS)  2 g Intravenous Q8H    micafungin (MYCAMINE) IVPB   100 mg Intravenous Q24H    mupirocin   Nasal BID    pantoprazole  40 mg Intravenous BID    polyethylene glycol  17 g Oral BID    timolol maleate 0.5%  1 drop Both Eyes Daily    vancomycin (VANCOCIN) IV (PEDS and ADULTS)  1,250 mg Intravenous Once    white petrolatum-mineral oiL   Both Eyes Q8H     PRN Meds:acetaminophen, dextrose 10%, dextrose 10%, fentanyl, glucagon (human recombinant), insulin aspart U-100, magnesium sulfate IVPB, sodium chloride 0.9%, sodium phosphate 20.01 mmol in dextrose 5 % (D5W) 250 mL IVPB, sodium phosphate 30 mmol in dextrose 5 % (D5W) 250 mL IVPB, sodium phosphate 39.99 mmol in dextrose 5 % (D5W) 250 mL IVPB, Pharmacy to dose Vancomycin consult **AND** vancomycin - pharmacy to dose     Review of patient's allergies indicates:   Allergen Reactions    No known drug allergies      Objective:     Vital Signs (Most Recent):  Temp: 98.4 °F (36.9 °C) (04/09/24 0815)  Pulse: (!) 131 (04/09/24 0815)  Resp: (!) 32 (04/09/24 0815)  BP: 102/76 (04/08/24 1802)  SpO2: 95 % (04/09/24 0815) Vital Signs (24h Range):  Temp:  [96.3 °F (35.7 °C)-100 °F (37.8 °C)] 98.4 °F (36.9 °C)  Pulse:  [] 131  Resp:  [] 32  SpO2:  [72 %-98 %] 95 %  BP: ()/(57-76) 102/76  Arterial Line BP: ()/() 107/81     Weight: 131.5 kg (289 lb 14.5 oz)  Body mass index is 42.81 kg/m².    Intake/Output - Last 3 Shifts         04/07 0700  04/08 0659 04/08 0700  04/09 0659 04/09 0700  04/10 0659    P.O.       I.V. (mL/kg) 4819.5 (36.7) 3816.4 (29) 631.8 (4.8)    NG/      IV Piggyback 1605.1 4023.1 495.6    Total Intake(mL/kg) 6644.6 (50.5) 7839.5 (59.6) 1127.4 (8.6)    Urine (mL/kg/hr) 35 (0) 47 (0)     Other 8455 4701 567    Total Output 8490 4018 567    Net -1845.4 +3091.5 +560.4                    Physical Exam  Vitals and nursing note reviewed.   Constitutional:       General: He is not in acute distress.     Appearance: He is ill-appearing.   HENT:      Nose: Nose normal.   Cardiovascular:       Rate and Rhythm: Tachycardia present.   Pulmonary:      Comments: Vent Mode: A/C  Oxygen Concentration (%):  [] 100  Resp Rate Total:  [32 br/min] 32 br/min  Vt Set:  [420 mL-450 mL] 420 mL  PEEP/CPAP:  [12 gcN65-08 cmH20] 14 cmH20  Mean Airway Pressure:  [18 qdU54-12 cmH20] 21 cmH20  Abdominal:      Comments: Distended but soft. Not firm. No peritonitic signs. Reducible umbilical hernia with no overlying skin changes     Musculoskeletal:         General: No deformity.   Neurological:      Comments: Sedated           Significant Labs:  I have reviewed all pertinent lab results within the past 24 hours.    Significant Diagnostics:  I have reviewed all pertinent imaging results/findings within the past 24 hours.  Assessment/Plan:     * Acute biliary pancreatitis without infection or necrosis  Patient is a 73 year old male with PMHx significant for obesity, CKD, gout, and HTN who was admitted to [facility] on 4/6 as a transfer from [facility] for management of severe acute gallstone pancreatitis associated with STEFANIE requiring CRRT. Now intubated, on pressors, proned. General surgery consulted for evaluation.      - Abdominal exam is not consistent with compartment syndrome. His abdomen is soft, distended, and has a reducible umbilical hernia.   - He does not have elevated peak or plateau pressures.   - Has CKD and has been getting CRRT since arrival.   - Will keep doing serial abdominal exams.   - Trend bladder pressures.   - Remainder of care per primary team  - Please contact general surgery with any questions, concerns, or clinical status changes      Case discussed with Dr. Jarrell.      Roger Donahue PA-C  General Surgery  [facility] - Cardiac Medical ICU

## 2024-04-09 NOTE — ASSESSMENT & PLAN NOTE
Patient is a 73 year old male with PMHx significant for obesity, CKD, gout, and HTN who was admitted to [facility] on 4/6 as a transfer from [facility] for management of severe acute gallstone pancreatitis associated with STEFANIE requiring CRRT. Now intubated, on pressors, proned. General surgery consulted for evaluation.      - Abdominal exam is not consistent with compartment syndrome. His abdomen is soft, distended, and has a reducible umbilical hernia.   - He does not have elevated peak or plateau pressures.   - Has CKD and has been getting CRRT since arrival.   - Will keep doing serial abdominal exams.   - Trend bladder pressures.   - Remainder of care per primary team  - Please contact general surgery with any questions, concerns, or clinical status changes

## 2024-04-10 PROBLEM — I48.91 ATRIAL FIBRILLATION: Status: ACTIVE | Noted: 2024-01-01

## 2024-04-10 NOTE — ASSESSMENT & PLAN NOTE
New onset paroxysmal a-fib started 4/8, likely stress induced given critical illness. Was in RVR, amio bolus and gtt started 4/8, still on 0.5 mg/min    Plan:  -- HR controlled in 110-120s, appears sinus at this time but was in a-fib 4/9  -- Continue amio infusion at 0.5 mg/min, consider oral 200 mg once pt able to tolerate more nutrition with normal bowel function  -- TSH slightly low but FT4 low-normal, not consistent with hyperthyroid. Likely etiology from critical illness  -- CHADSVASC of 3 currently with acutely low EF due to sepsis, will continue DVT prophylaxis dose for now; if a-fib persistent will consider OAC

## 2024-04-10 NOTE — ASSESSMENT & PLAN NOTE
Concern for cholangitis with worsening pressor requirements and septic shock.  [facility] with worsening O2 requirements on Bipap.  Lipase >1000 with worsening LFTs and bilirubin.  . Likely biliary etiology.  Imaging without clear evidence of CBD dilation or obvious stones/necrosis.  Acute pancreatitis seen on imaging. AES consulted on admission, s/p ERCP with CBD stent placement (4/6/24). LFT's and bilirubin showing improvement, still requiring bowel rest at this time given significant gastric secretions and air.     - on prop/fent, nimbex, levo/vaso  - Continue meropenem and vanc for now  - AES following, performed ERCP and placed cbd duct stent  - Continue to monitor LFTs and amylase/lipase  - B/c showed GPCs coag negative possible contaminant, on vanc but awaiting ID/sensitivities, ID consulted. Repeat Blood cultures 4/8 NGTD x48h  - IVF prn  - started stress dose steroids (dex)  - hyperglycemia; On high dose SSI as CBG's elevated likely 2/2 pancreatitis and steroid use  - Trickle feeds and bowel regimen started yesterday; will assess once supine and adjust

## 2024-04-10 NOTE — SUBJECTIVE & OBJECTIVE
Interval History: SLED overnight. Remains proned. Tolerating trickle TF via OG. Bladder pressures 17. Levo 0.4, vaso 0.04    Medications:  Continuous Infusions:   amiodarone in dextrose 5% 0.5 mg/min (04/10/24 0701)    calcium gluconate 3 g in dextrose 5 % (D5W) 100 mL infusion 25 mL/hr at 04/10/24 0701    cisatracurium (NIMBEX) 200 mg in dextrose 5 % (D5W) 100 mL infusion 2 mcg/kg/min (04/10/24 0701)    dextrose 10 % in water (D10W)      dextrose-sod citrate-citric ac 80 mL/hr at 04/10/24 0701    fentanyl 250 mcg/hr (04/10/24 0701)    ketamine 5 mg/mL infusion (titrating) 15 mcg/kg/min (04/10/24 0701)    NORepinephrine bitartrate-D5W 0.44 mcg/kg/min (04/10/24 0701)    vasopressin 0.04 Units/min (04/10/24 0808)     Scheduled Meds:   dexamethasone (DECADRON) IVPB  20 mg Intravenous Daily    Followed by    [START ON 4/11/2024] dexAMETHasone  10 mg Intravenous Daily    docusate sodium  100 mg Per NG tube Daily    fludrocortisone  100 mcg Per OG tube Daily    heparin (porcine)  7,500 Units Subcutaneous Q8H    meropenem IV (PEDS and ADULTS)  2 g Intravenous Q8H    mupirocin   Nasal BID    pantoprazole  40 mg Intravenous BID    polyethylene glycol  17 g Oral BID    timolol maleate 0.5%  1 drop Both Eyes Daily    white petrolatum-mineral oiL   Both Eyes Q8H     PRN Meds:acetaminophen, dextrose 10 % in water (D10W), dextrose 10%, dextrose 10%, fentanyl, glucagon (human recombinant), insulin aspart U-100, magnesium sulfate IVPB, sodium chloride 0.9%, sodium phosphate 20.01 mmol in dextrose 5 % (D5W) 250 mL IVPB, sodium phosphate 30 mmol in dextrose 5 % (D5W) 250 mL IVPB, sodium phosphate 39.99 mmol in dextrose 5 % (D5W) 250 mL IVPB, Pharmacy to dose Vancomycin consult **AND** vancomycin - pharmacy to dose     Review of patient's allergies indicates:   Allergen Reactions    No known drug allergies      Objective:     Vital Signs (Most Recent):  Temp: 99.3 °F (37.4 °C) (04/10/24 0800)  Pulse: (!) 123 (04/10/24 0800)  Resp: (!)  32 (04/10/24 0800)  BP: (!) 89/64 (04/09/24 1830)  SpO2: 98 % (04/10/24 0800) Vital Signs (24h Range):  Temp:  [95.2 °F (35.1 °C)-100.2 °F (37.9 °C)] 99.3 °F (37.4 °C)  Pulse:  [] 123  Resp:  [0-32] 32  SpO2:  [79 %-100 %] 98 %  BP: ()/(64-77) 89/64  Arterial Line BP: ()/(52-95) 105/73     Weight: 131.5 kg (289 lb 14.5 oz)  Body mass index is 46.79 kg/m².    Intake/Output - Last 3 Shifts         04/08 0700  04/09 0659 04/09 0700  04/10 0659 04/10 0700  04/11 0659    I.V. (mL/kg) 3816.4 (29) 3694.1 (28.1) 139.8 (1.1)    NG/GT  400 10    IV Piggyback 4023.1 2512.5 81.4    Total Intake(mL/kg) 7839.5 (59.6) 6606.6 (50.2) 231.2 (1.8)    Urine (mL/kg/hr) 47 (0) 30 (0)     Other 4701 7687 829    Total Output 4748 7717 829    Net +3091.5 -1110.4 -597.8                    Physical Exam  Vitals and nursing note reviewed.   Constitutional:       General: He is not in acute distress.     Appearance: He is ill-appearing.   HENT:      Nose: Nose normal.   Cardiovascular:      Rate and Rhythm: Tachycardia present.   Pulmonary:      Comments: Vent Mode: A/C  Oxygen Concentration (%):  [] 70  Resp Rate Total:  [32 br/min] 32 br/min  Vt Set:  [420 mL] 420 mL  PEEP/CPAP:  [14 cmH20] 14 cmH20  Mean Airway Pressure:  [20 fbC66-68 cmH20] 21 cmH20  Abdominal:      Comments: Distended but soft. Not firm. No peritonitic signs. Reducible umbilical hernia with no overlying skin changes     Musculoskeletal:         General: No deformity.   Neurological:      Comments: Sedated           Significant Labs:  I have reviewed all pertinent lab results within the past 24 hours.    Significant Diagnostics:  I have reviewed all pertinent imaging results/findings within the past 24 hours.

## 2024-04-10 NOTE — PLAN OF CARE
MICU DAILY GOALS     Family/Goals of care/Code Status   Code Status: Full Code    24H Vital Sign Range  Temp:  [95.2 °F (35.1 °C)-100.2 °F (37.9 °C)]   Pulse:  [101-133]   Resp:  [7-34]   BP: (89)/(64)   SpO2:  [79 %-100 %]   Arterial Line BP: ()/()      Shift Events (include procedures and significant events)   Pt supinated at 1000. Paralytic vacation started at 1015. Pt had mult. Episodes of hypotension and dysrhythmias while paralytic off. BIS reading 60-80. Versed drip started to maintain BIS <60. Afternoon PF ratio 108. Pt reproned at 1645    AWAKE RASS: Goal - RASS Goal: -4-->deep sedation  Actual - RASS (Parry Agitation-Sedation Scale): deep sedation    Restraint necessity: Not necessary   BREATHE SBT: Not attempted    Coordinate A & B, analgesics/sedatives Pain: managed   SAT: Not attempted   Delirium CAM-ICU: Overall CAM-ICU: Positive   Early(intubated/ Progressive (non-intubated) Mobility MOVE Screen (INTUBATED ONLY): Not attempted    Activity: Activity Management: Patient unable to perform activities   Feeding/Nutrition Diet order: Diet/Nutrition Received: tube feeding,     Thrombus DVT prophylaxis: VTE Required Core Measure: Pharmacological prophylaxis initiated/maintained   HOB Elevation Head of Bed (HOB) Positioning: HOB at 30 degrees   Ulcer Prophylaxis GI: yes   Glucose control managed     Skin Skin assessed during: Daily Assessment    Sacrum intact/not altered? Yes  Heels intact/not altered? Yes  Surgical wound? No    CHECK ONE!   (no altered skin or altered skin) and sub boxes:  [x] No Altered Skin Integrity Present    [x]Prevention Measures Documented    [] Altered Skin Integrity Present or Discovered   [] LDA present in EPIC, daily doc completed              [] LDA added if not in EPIC (describe wound).                    When describing wound, do not stage, use descriptive words only.    [] Wound Image Taken (required on admit,                   transfer/discharge and every  Tuesday)    Wound Care Consulted? No    4 EYES:  Attending Nurse (1st set of eyes): Patricia MARIE    Second RN/Staff Member (2nd set of eyes): Mac   Bowel Function constipation    Indwelling Catheter Necessity      Urethral Catheter 04/06/24 1000-Reason for Continuing Urinary Catheterization: Critically ill in ICU and requiring hourly monitoring of intake/output    PICC Double Lumen 04/06/24 1136 right basilic-Line Necessity Review: Longterm central access required  [REMOVED] Trialysis (Dialysis) Catheter 04/06/24 1836 right internal jugular-Line Necessity Review: Hemodynamic instability, CRRT/HD  Trialysis (Dialysis) Catheter 04/08/24 1618 right internal jugular-Line Necessity Review: CRRT/HD     De-escalation Antibiotics No        VS and assessment per flow sheet, patient progressing towards goals as tolerated, plan of care reviewed with family, all concerns addressed, will continue to monitor.

## 2024-04-10 NOTE — ASSESSMENT & PLAN NOTE
Distributive- warm well perfused and diaphoretic yesterday on exam. Currently on norepi at 0.42 mcg/kg/min and vaso at 0.04 units/hr after increased dosing  Plan:  -- Wean pressors for MAP > 65  -- See Acute biliary pancreatitis plan

## 2024-04-10 NOTE — PROGRESS NOTES
04/10/24 0426   Treatment   Treatment Type SLED   Treatment Status Daily equipment check   Dialysis Machine Number [id number]   Dialyzer Time (hours) 9.58   BVP (Liters) 114.9 L   Solutions Labeled and Current  Yes   Access Temporary Cath   Catheter Dressing Intact  Yes   Alarms Engaged Yes   CRRT Comments daily check.   $ CRRT Charges   $ CRRT Daily Assessment Complete   $ CRRT Daily Maintenance Complete     Daily check completed. Orders and machine settings verified.

## 2024-04-10 NOTE — ASSESSMENT & PLAN NOTE
Patient is a 73 year old male with PMHx significant for obesity, CKD, gout, and HTN who was admitted to [facility] on 4/6 as a transfer from [facility] for management of severe acute gallstone pancreatitis associated with STEFANIE requiring CRRT. Now intubated, on pressors, proned. General surgery consulted for evaluation.      - Abdominal exam is not consistent with compartment syndrome. His abdomen is soft, distended, and has a reducible umbilical hernia.   - He does not have elevated peak or plateau pressures.   - Has CKD and has been getting CRRT since arrival.   - Serial abdominal exams.   - Trend bladder pressures. Please contact general surgery if bladder pressures >20 or he develops s/s of abdominal compartment syndrome   - Remainder of care per primary team, general surgery will sign off  - Please contact general surgery with any questions, concerns, or clinical status changes

## 2024-04-10 NOTE — PROGRESS NOTES
[facility] - Cardiac Medical ICU  Critical Care Medicine  Progress Note    Patient Name: Enoc Collado  MRN: [id number]  Admission Date: 4/6/2024  Hospital Length of Stay: 4 days  Code Status: Full Code  Attending Provider: Mynor Perkins MD  Primary Care Provider: Frantz Mason MD   Principal Problem: Acute biliary pancreatitis without infection or necrosis    Subjective:     HPI:  Mr. Collado is a 73yoM with HFpEF, HTN, HLD, CKD, gout, morbid obesity who initially presented to [facility] with acute onset abdominal pain for 1 day on 4/5 associated with n/v/d.  In ED at OSH, elevated LFTs to 140-180s with T bili 3.6/ direct bilirubin of 2.0.  Lipase >1000.  Abdominal US with CBD of 7mm and no concerns of acute cholecystitis.  CT consistent with acute pancreatitis without gross evidence of biliary obstruction.  Patient does not have previous episodes of pancreatitis or no new medications recently started.  .  Patient started on IVF.  Noted to have worsening respiratory status and confused at OSH.  ABG with pH of 7.2, CO2 58, O2 68.  Fluids were stopped and IV Lasix was given with patient placed on Bipap.  Repeat blood work revealing of worsening LFTs and total bilirubin to 12.5.  Patient transferred to [facility] for GI services with EUS/ERCP.    Hospital/ICU Course:  Pt was transferred from [facility] to [facility] for emergent EUS/ERCP due to acute interstitial pancreatitis. On arrival per, pt was requiring continuous bipap and producing no urine despite receiving lasix. Was on max peripheral levophed to maintain blood pressures. Pt required right radial arterial line and right internal jugular vein trialysis line. Continued zosyn. GI performed ERCP and placed CBD stent. Now on increased levo/vaso, propofol, fentanyl; proned and paralyzed starting 4/7. RIJ trialysis line requiring replacement 4/8, restarted CRRT; remains intubated/sedated/paralyzed/proned, showing improvement in P/F ratio to 138 this  morning, remains proned- daily assessment for re-prone. Blood cultures 4/8 NGTD. Discussed possible prognosis and family is amenable but would like to continue full code status.     Interval History/Significant Events: NAEO. Patient tolerating prone, SLED well. P/F ratio improved to 138 this morning, decreased pressor requirements levophed down to 0.42, still on vasopression 0.04 and fentanyl/ketamine for sedation, Nimbex for paralytic. Remains tachycardic in 120s, did have temperature instability last night 95.2-100.2, currently 99.5.     Review of Systems   Unable to perform ROS: Intubated     Objective:     Vital Signs (Most Recent):  Temp: 99.5 °F (37.5 °C) (04/10/24 0900)  Pulse: (!) 121 (04/10/24 0900)  Resp: (!) 32 (04/10/24 0900)  BP: (!) 89/64 (04/09/24 1830)  SpO2: 97 % (04/10/24 0900) Vital Signs (24h Range):  Temp:  [95.2 °F (35.1 °C)-100.2 °F (37.9 °C)] 99.5 °F (37.5 °C)  Pulse:  [] 121  Resp:  [0-32] 32  SpO2:  [79 %-100 %] 97 %  BP: ()/(64-76) 89/64  Arterial Line BP: ()/(52-95) 106/72   Weight: 131.5 kg (289 lb 14.5 oz)  Body mass index is 46.79 kg/m².      Intake/Output Summary (Last 24 hours) at 4/10/2024 0909  Last data filed at 4/10/2024 0902  Gross per 24 hour   Intake 5811.5 ml   Output 7836 ml   Net -2024.5 ml          Physical Exam  Vitals and nursing note reviewed.   Constitutional:       General: He is not in acute distress.     Appearance: He is obese. He is ill-appearing and toxic-appearing. He is not diaphoretic.   HENT:      Head: Normocephalic and atraumatic.      Mouth/Throat:      Mouth: Mucous membranes are moist.      Pharynx: Oropharynx is clear.   Eyes:      Comments: Pt blind, difficult assess pupillary reflex   Cardiovascular:      Rate and Rhythm: Tachycardia present. Rhythm irregular.      Pulses: Normal pulses.      Heart sounds: Normal heart sounds. No murmur heard.     No friction rub. No gallop.      Comments: Pt prone, unable to assess heart  tones  Pulmonary:      Breath sounds: Rales present. No wheezing or rhonchi.      Comments: Prone, minimal breath sounds but rales appreciated bilaterally  Abdominal:      Comments: Unable to assess- pt proned   Genitourinary:     Comments: Oneill catheter in place, scant urine output dark yellow with sediment  Musculoskeletal:      Right lower leg: Edema (trace) present.      Left lower leg: Edema (trace) present.   Skin:     General: Skin is warm and dry.      Capillary Refill: Capillary refill takes 2 to 3 seconds.   Neurological:      Mental Status: He is alert.      Comments: Sedated and paralyzed            Vents:  Vent Mode: A/C (04/10/24 0748)  Ventilator Initiated: Yes (04/06/24 1758)  Set Rate: 32 BPM (04/10/24 0748)  Vt Set: 420 mL (04/10/24 0748)  PEEP/CPAP: 14 cmH20 (04/10/24 0748)  Oxygen Concentration (%): 70 (04/10/24 0900)  Peak Airway Pressure: 30 cmH20 (04/10/24 0748)  Plateau Pressure: 24 cmH20 (04/10/24 0748)  Total Ve: 13.8 L/m (04/10/24 0748)  Negative Inspiratory Force (cm H2O): 0 (04/10/24 0748)  F/VT Ratio<105 (RSBI): (!) 74.42 (04/10/24 0748)  Lines/Drains/Airways       Peripherally Inserted Central Catheter Line  Duration             PICC Double Lumen 04/06/24 1136 right basilic 3 days              Central Venous Catheter Line  Duration             Trialysis (Dialysis) Catheter 04/08/24 1618 right internal jugular 1 day              Drain  Duration                  Urethral Catheter 04/06/24 1000 3 days         NG/OG Tube 04/09/24 1030 orogastric 18 Fr. Center mouth <1 day              Airway  Duration                  Airway - Non-Surgical 04/06/24 1748 Endotracheal Tube 3 days              Arterial Line  Duration             Arterial Line 04/06/24 1647 Right Radial 3 days              Peripheral Intravenous Line  Duration                  Peripheral IV - Single Lumen 04/06/24 0041 20 G Anterior;Right Forearm 4 days         Peripheral IV - Single Lumen 04/06/24 1021 20 G Anterior;Left  Shoulder 3 days         Peripheral IV - Single Lumen 04/08/24 1714 20 G;Other (Comments) Anterior;Left Upper Arm 1 day                  Significant Labs:    CBC/Anemia Profile:  Recent Labs   Lab 04/08/24  1130 04/09/24  0336 04/09/24  0923 04/10/24  0415   WBC 13.26* 20.78*  --  21.40*   HGB 10.9* 14.9  --  14.9   HCT 31.8* 42.7 48 41.6   * 136*  --  143*   MCV 96 90  --  87   RDW 14.9* 14.4  --  14.6*        Chemistries:  Recent Labs   Lab 04/09/24  0336 04/09/24  1033 04/09/24  1339 04/09/24  2120 04/10/24  0415   *  122*  121* 121* 120*  120* 122*  122* 120*  120*  120*   K 5.2*  5.2*  5.2* 4.6 4.5  4.5 4.2  4.2 4.3  4.3  4.3   CL 89*  89*  90* 89* 88*  88* 86*  86* 85*  85*  85*   CO2 17*  17*  16* 20* 18*  18* 23  23 19*  20*  20*   BUN 19  19  19 17 15  15 18  18 19  19  19   CREATININE 2.4*  2.4*  2.4* 2.0* 2.0*  2.0* 2.0*  2.0* 1.6*  1.6*  1.6*   CALCIUM 8.9  8.9  8.7 9.0 9.2  9.2 8.9  8.9 8.9  8.9  8.9   ALBUMIN 2.1*  2.1*  2.1* 2.1* 2.1*  2.1* 2.0*  2.0* 2.0*  2.0*  2.0*   PROT 6.5 6.5  --   --  6.4   BILITOT 8.2* 7.8*  --   --  8.2*   ALKPHOS 118 119  --   --  132   * 153*  --   --  150*   * 145*  --   --  133*   MG 2.1  2.1  --  2.0  2.0 1.9  1.9 2.2  2.2   PHOS 4.8*  4.8*  4.7*  --  5.0*  5.0* 4.2  4.2 4.1  4.1  4.1       All pertinent labs within the past 24 hours have been reviewed.    Significant Imaging:  I have reviewed all pertinent imaging results/findings within the past 24 hours.    ABG  Recent Labs   Lab 04/10/24  0317   PH 7.337*   PO2 97   PCO2 46.3*   HCO3 24.8   BE -1     Assessment/Plan:     Pulmonary  Acute hypoxemic respiratory failure  Patient with Hypercapnic and Hypoxic Respiratory failure which is Acute.  he is not on home oxygen. Supplemental oxygen was provided and noted- Vent Mode: A/C  Oxygen Concentration (%):  [] 70  Resp Rate Total:  [32 br/min] 32 br/min  Vt Set:  [420 mL] 420  mL  PEEP/CPAP:  [14 cmH20] 14 cmH20  Mean Airway Pressure:  [20 ycI17-98 cmH20] 21 cmH20    .   Signs/symptoms of respiratory failure include- tachypnea, increased work of breathing, respiratory distress, and lethargy. Contributing diagnoses includes - ARDS, Obesity Hypoventilation, and Pneumonia Labs and images were reviewed. Patient Has recent ABG, which has been reviewed. Will treat underlying causes and adjust management of respiratory failure as follows-     Transferred to [facility] on Bipap with worsening O2 requirements  ARDS secondary to acute pancreatitis, intubated on MV ARDSnet protocol, slightly improved today.    Plan:  -- P/F ratio improved from improved this morning to 138.5 on AM 4/10, satting 97% on AC/VC 32/420/+14/70%  -- Will adjust vent settings to adjust per ARDS net protocol, currently on low-peep/high FiO2 settings  -- Proned since 4p, return to supine at 10a. Will likely need to continue pending repeat ABG and P/F ratio  -- Will obtain CXR following return to supine      Cardiac/Vascular  Atrial fibrillation  New onset a-fib started 4/8, likely stress induced given critical illness. Was in RVR, amio bolus and gtt started 4/8, still on 0.5 mg/min    Plan:  -- HR controlled in 110-120s, appears sinus at this time but was in a-fib 4/9  -- Will discuss switching to oral amiodarone 200 mg daily with staff  -- Consider cardiology consult  -- Unsure in role of obtaining TSH, will discuss with staff if needed to investigate etiology    Shock  Distributive- warm well perfused and diaphoretic yesterday on exam. Currently on norepi at 0.42 mcg/kg/min and vaso at 0.04 units/hr after increased dosing  Plan:  -- Wean pressors for MAP > 65  -- See Acute biliary pancreatitis plan    Hyperlipidemia  most recent lipid panel on 10/2023 with . Hypertriglyceridemia today continuing to improve; propofol stopped 4/8; etiology likely post-ERCP vs propofol induced    Plan:  -- Triglycerides improving  drastically from 1414 --> 793  -- holding home pravastatin 20mg qd given elevated LFT's which are returning to baseline, can hopefully resume tomorrow  -- Continue to avoid propofol at this time    Essential hypertension  Chronic, uncontrolled at baseline, however hypotensive on pressors 2/2 illness. Latest blood pressure and vitals reviewed-     Temp:  [98.6 °F (37 °C)-99.3 °F (37.4 °C)]   Pulse:  [71-90]   Resp:  [4-33]   SpO2:  [88 %-96 %]   Arterial Line BP: ()/(49-77) .   Home meds for hypertension were reviewed and noted below.   Hypertension Medications               amLODIPine (NORVASC) 10 MG tablet Take 1 tablet (10 mg total) by mouth once daily.    carvediloL (COREG) 25 MG tablet Take 1 tablet (25 mg total) by mouth 2 (two) times daily with meals.    furosemide (LASIX) 40 MG tablet Take 1 tablet (40 mg total) by mouth once daily.    lisinopriL (PRINIVIL,ZESTRIL) 40 MG tablet Take 1 tablet (40 mg total) by mouth once daily.          Plan:  --Holding all antihypertensives while in shock        Renal/  Metabolic acidosis  Acidosis due to pancreatitis and acute renal failure. See STEFANIE and pancreatitis for plans.    Acute renal failure superimposed on chronic kidney disease  Patient with acute kidney injury/acute renal failure likely due to acute tubular necrosis caused by severe shock and acute infectious process.  STEFANIE is currently worsening. Baseline creatinine  1.0  - Labs reviewed- Renal function/electrolytes with Estimated Creatinine Clearance: 53.7 mL/min (A) (based on SCr of 1.6 mg/dL (H)). according to latest data. Monitor urine output and serial BMP and adjust therapy as needed. Avoid nephrotoxins and renally dose meds for GFR listed above.    Worsening renal failure during admission likely ischemic ATN s/o shock and sepsis. RF c/b hyperkalemia and acidosis. Nephrology following at OSH. Anticipate patient will likely need RRT on current admission.    -- minimal urine output overnight but slight  "improvement in PM shift 10->37 for total 47 mL last 24h  -- nephrology consulted, appreciate recs.   -- RIJ Trialysis catheter replaced 4/8; restarted SLED, pt tolerating well and line functional  -- acute hyponatremia again while on CRRT, consider iSTAT labs to confirm as labs (namely hyponatremia) return to baseline following cessation of SLED  -- maintain MAP > 65  -- renally dose meds and avoid nephrotoxic meds   -- monitoring electrolytes closely on labs; replace as needed      CKD (chronic kidney disease), stage III  -- see "acute renal failure"    ID  Gram-positive bacteremia  GPCC on aerobic blood culture x1/2 sets from 4/6/24 was coag negative staph, possible contaminant. Repeat blood cultures 4/8 NGTD x48h    Plan:  -- continue vanc, continue meropenem for now until speciation  -- TTE did not show evidence of endocarditis  -- Repeat blood cultures 4/8 NGTD x 48h    Endocrine  Morbid obesity with BMI of 40.0-44.9, adult  Body mass index is 42.81 kg/m². Morbid obesity complicates all aspects of disease management from diagnostic modalities to treatment. Weight loss encouraged and health benefits explained to patient.       GI  * Acute biliary pancreatitis without infection or necrosis  Concern for cholangitis with worsening pressor requirements and septic shock.  Transferred [place] [facility] with worsening O2 requirements on Bipap.  Lipase >1000 with worsening LFTs and bilirubin.  . Likely biliary etiology.  Imaging without clear evidence of CBD dilation or obvious stones/necrosis.  Acute pancreatitis seen on imaging. AES consulted on admission, s/p ERCP with CBD stent placement (4/6/24). LFT's and bilirubin showing improvement, still requiring bowel rest at this time given significant gastric secretions and air.     - on prop/fent, nimbex, levo/vaso  - Continue meropenem and vanc for now  - AES following, performed ERCP and placed cbd duct stent  - Continue to monitor LFTs and amylase/lipase  - B/c showed " GPCs coag negative possible contaminant, on vanc but awaiting ID/sensitivities, ID consulted. Repeat Blood cultures 4/8 NGTD x48h  - IVF prn  - started stress dose steroids (dex)  - hyperglycemia; On high dose SSI as CBG's elevated likely 2/2 pancreatitis and steroid use  - Trickle feeds and bowel regimen started yesterday; will assess once supine and adjust      Orthopedic  History of gout  -- hold home allopurinol s/o acute renal failure    Palliative Care  ACP (advance care planning)    Advance Care Planning Code Status  Advance Care Planning    Date: 04/06/2024    Code Status  In light of the patients advanced and life limiting illness,I engaged the the  pt's spouse, Annel Collado  in a voluntary conversation about the patient's preferences for care  at the very end of life. The pt's spouse stated patient will be FULL CODE. Along those lines, the patient does wish to have CPR or other invasive treatments performed when his heart and/or breathing stops. I communicated to the  pt's spouse, Annel Collado  that a FULL CODE STATUS will remain on the patient's chart. I spent a total of 30 minutes engaging the patient in this advance care planning discussion.                 Critical Care Daily Checklist:    A: Awake: RASS Goal/Actual Goal: RASS Goal: -5-->unarousable  Actual:     B: Spontaneous Breathing Trial Performed? Spon. Breathing Trial Initiated?: Not initiated (04/08/24 0704)   C: SAT & SBT Coordinated?  No, vent requirements too high                      D: Delirium: CAM-ICU Overall CAM-ICU: Positive   E: Early Mobility Performed? No   F: Feeding Goal: Goals: Meet % EEN, EPN by RD f/u date  Status: Nutrition Goal Status: new   Current Diet Order   Procedures    Diet NPO      AS: Analgesia/Sedation Fentanyl, ketamine; Nimbex   T: Thromboembolic Prophylaxis heparin   H: HOB > 300 Yes   U: Stress Ulcer Prophylaxis (if needed) Protonix BID   G: Glucose Control HDSSI q4h   B: Bowel Function     I: Indwelling  Catheter (Lines & Oneill) Necessity Oneill, PICC, Trialysis, PIV, OG   D: De-escalation of Antimicrobials/Pharmacotherapies Meropenem, vancomycin    Plan for the day/ETD De-prone, possible re-prone    Code Status:  Family/Goals of Care: Full Code  ongoing       Critical secondary to Patient has a condition that poses threat to life and bodily function: Severe Respiratory Distress, Acute Renal Failure, and remains on pressors, vent, sedated, paralyzed, proned.       Critical care was time spent personally by me on the following activities: development of treatment plan with patient or surrogate and bedside caregivers, discussions with consultants, evaluation of patient's response to treatment, examination of patient, ordering and performing treatments and interventions, ordering and review of laboratory studies, ordering and review of radiographic studies, pulse oximetry, re-evaluation of patient's condition. This critical care time did not overlap with that of any other provider or involve time for any procedures.     Jie Rebolledo MD  [employer]  PGY III  Critical Care Medicine  [facility] - Cardiac Medical ICU

## 2024-04-10 NOTE — NURSING
Versed gtt started to achieve BIS <60. Paralytic restarted. Pt proned at 1645. Pt tolerated positioning change with no issues. VSS. WCTM.

## 2024-04-10 NOTE — SUBJECTIVE & OBJECTIVE
Interval History:   Seen on SLED this morning, pressor requirements have improved over the past 24 hours, now on Levo @ 0.44 and Vaso.  FiO2 70%/14.    Na remains low at 120, K 4.3 on AM labs.  Triglycerides have trended down to 793 on AM labs.  Net negative 1.5L/24h.    Review of patient's allergies indicates:   Allergen Reactions    No known drug allergies      Current Facility-Administered Medications   Medication Frequency    acetaminophen tablet 650 mg Q4H PRN    amiodarone 360 mg/200 mL (1.8 mg/mL) infusion Continuous    calcium gluconate 3 g in dextrose 5 % (D5W) 100 mL infusion Continuous    cisatracurium (NIMBEX) 200 mg in dextrose 5 % (D5W) 100 mL infusion Continuous    dexAMETHasone (DECADRON) 20 mg in sodium chloride 0.9% 50 mL IVPB Daily    Followed by    [START ON 4/11/2024] dexAMETHasone injection 10 mg Daily    dextrose 10 % infusion Continuous PRN    dextrose 10% bolus 125 mL 125 mL PRN    dextrose 10% bolus 250 mL 250 mL PRN    dextrose-sod citrate-citric ac 2.45-2.2 gram- 800 mg/100 mL Soln Continuous    docusate sodium capsule 100 mg Daily    fentaNYL 2500 mcg in 0.9% sodium chloride 250 mL infusion premix (titrating) Continuous    fentanyl IV bolus from bag/infusion 50 mcg Q15 Min PRN    fludrocortisone tablet 100 mcg Daily    glucagon (human recombinant) injection 1 mg PRN    heparin (porcine) injection 7,500 Units Q8H    insulin aspart U-100 pen 0-15 Units Q4H PRN    ketamine 500 mg in sodium chloride 0.9% 100 mL infusion Continuous    magnesium sulfate 2g in water 50mL IVPB (premix) PRN    meropenem (MERREM) 2 g in sodium chloride 0.9% 100 mL IVPB Q8H    mupirocin 2 % ointment BID    NORepinephrine 32 mg in dextrose 5 % (D5W) 250 mL infusion Continuous    pantoprazole injection 40 mg BID    polyethylene glycol packet 17 g BID    sodium chloride 0.9% flush 10 mL PRN    sodium phosphate 20.01 mmol in dextrose 5 % (D5W) 250 mL IVPB PRN    sodium phosphate 30 mmol in dextrose 5 % (D5W) 250 mL  IVPB PRN    sodium phosphate 39.99 mmol in dextrose 5 % (D5W) 250 mL IVPB PRN    timolol maleate 0.5% ophthalmic solution 1 drop Daily    vancomycin - pharmacy to dose pharmacy to manage frequency    vasopressin (PITRESSIN) 0.2 Units/mL in dextrose 5 % (D5W) 100 mL infusion Continuous    white petrolatum-mineral oil (SYSTANE NIGHTTIME) ophthalmic ointment Q8H       Objective:     Vital Signs (Most Recent):  Temp: 99.5 °F (37.5 °C) (04/10/24 0900)  Pulse: (!) 121 (04/10/24 0900)  Resp: (!) 32 (04/10/24 0900)  BP: (!) 89/64 (04/09/24 1830)  SpO2: 97 % (04/10/24 0900) Vital Signs (24h Range):  Temp:  [95.2 °F (35.1 °C)-100.2 °F (37.9 °C)] 99.5 °F (37.5 °C)  Pulse:  [] 121  Resp:  [0-32] 32  SpO2:  [79 %-100 %] 97 %  BP: ()/(64-76) 89/64  Arterial Line BP: ()/(52-95) 106/72     Weight: 131.5 kg (289 lb 14.5 oz) (04/09/24 1030)  Body mass index is 46.79 kg/m².  Body surface area is 2.47 meters squared.    I/O last 3 completed shifts:  In: 9240.4 [I.V.:5274.9; NG/GT:400; IV Piggyback:3565.5]  Out: 27343 [Urine:67; Other:73913]     Physical Exam  Vitals and nursing note reviewed.   Constitutional:       Appearance: He is obese. He is ill-appearing. He is not toxic-appearing or diaphoretic.      Interventions: He is sedated and intubated.      Comments: Proned   HENT:      Head: Atraumatic.      Nose: Nose normal.      Mouth/Throat:      Comments: Intubated   Cardiovascular:      Rate and Rhythm: Normal rate and regular rhythm.      Pulses: Normal pulses.   Pulmonary:      Effort: He is intubated.      Breath sounds: No wheezing, rhonchi or rales.      Comments: Mechanical breathsounds  Abdominal:      Comments: DEE   Musculoskeletal:      Right lower leg: Edema present.      Left lower leg: Edema present.          Significant Labs:  ABGs:   Recent Labs   Lab 04/10/24  0317   PH 7.337*   PCO2 46.3*   HCO3 24.8   POCSATURATED 97   BE -1     CBC:   Recent Labs   Lab 04/10/24  0415   WBC 21.40*   RBC 4.81    HGB 14.9   HCT 41.6   *   MCV 87   MCH 31.0   MCHC 35.8     CMP:   Recent Labs   Lab 04/10/24  0415   *  189*  189*   CALCIUM 8.9  8.9  8.9   ALBUMIN 2.0*  2.0*  2.0*   PROT 6.4   *  120*  120*   K 4.3  4.3  4.3   CO2 19*  20*  20*   CL 85*  85*  85*   BUN 19  19  19   CREATININE 1.6*  1.6*  1.6*   ALKPHOS 132   *   *   BILITOT 8.2*

## 2024-04-10 NOTE — ASSESSMENT & PLAN NOTE
Patient with acute kidney injury/acute renal failure likely due to acute tubular necrosis caused by severe shock and acute infectious process.  STEFANIE is currently worsening. Baseline creatinine  1.0  - Labs reviewed- Renal function/electrolytes with Estimated Creatinine Clearance: 53.7 mL/min (A) (based on SCr of 1.6 mg/dL (H)). according to latest data. Monitor urine output and serial BMP and adjust therapy as needed. Avoid nephrotoxins and renally dose meds for GFR listed above.    Worsening renal failure during admission likely ischemic ATN s/o shock and sepsis. RF c/b hyperkalemia and acidosis. Nephrology following at OSH. Anticipate patient will likely need RRT on current admission.    -- minimal urine output overnight but slight improvement in PM shift 10->37 for total 47 mL last 24h  -- nephrology consulted, appreciate recs.   -- RIJ Trialysis catheter replaced 4/8; restarted SLED, pt tolerating well and line functional  -- acute hyponatremia again while on CRRT, consider iSTAT labs to confirm as labs (namely hyponatremia) return to baseline following cessation of SLED  -- maintain MAP > 65  -- renally dose meds and avoid nephrotoxic meds   -- monitoring electrolytes closely on labs; replace as needed

## 2024-04-10 NOTE — ASSESSMENT & PLAN NOTE
As per wife, patient with no prior history of CKD, baseline creatinine around 1.0 and eGFR>60 base on labs available. He presented to outside hospital for abdominal pain, received a dose of IV contrast for a CT of the abdomen, and found to have pancreatitis. Patient subsequently became hypotensive requiring the initiation of vasopressin and levophed for pressure support.    STEFANIE 2/2 iATN from septic shock from pancreatitis    Recommendations  -will continue 24 hour SLED  -Bicarb bath 35  -Na bath 132  -3k/3ca bath  -goal UF to be negative  cc/hr as tolerated without an increase in pressor requirements.  -Avoid nephrotoxic agents (IV contrast, NSAID's, gadolinium contrast, PPI's) if able.   -Maintain MAP above 65 mmHg.   -Strict I's and O's  -Daily renal function panel  -Renally dose all medications

## 2024-04-10 NOTE — PROGRESS NOTES
Pharmacokinetic Sign-off: IV Vancomycin    Therapy with vancomycin complete and/or consult discontinued by provider.  Pharmacy will sign off, please re-consult as needed.    Sandra Hermosillo, PharmD, BCPS  EXT [id number]

## 2024-04-10 NOTE — PROGRESS NOTES
04/10/24 1227   Treatment   Treatment Type SLED   Treatment Status Restart   Dialysis Machine Number [id number]   Solutions Labeled and Current  Yes   Access Temporary Cath;Right;IJ   Catheter Dressing Intact  Yes   Alarms Engaged Yes   CRRT Comments SLED restrated   Prescription   Time (Hours) Continuous   Dialysate K + (mEq/L) 3   Dialysate CA + (mEq/L) 3   Dialysate HCO3 - (Bicarb) (mEq/L) 35   Dialysate Na + (mEq/L) Other (comment)  (132)   Cartridge Type Other  (r300)   Dialysate Flow Rate (mL/min) 200   UF Goal Rate 400 mL/hr   CRRT Hourly Documentation   Blood Flow (mL/min) 200   UF Rate 200 cc/hr   Arterial Pressure (mmHg) -70 mmHg   Venous Pressure (mmHg) 80 mmHg   Effluent Pressure (EP) (mmHg) 0 mmHg   Total UF (Hourly Cleared) (mL) 0     SLED restarted via right IJ CVC. Report given back to primary nurse.

## 2024-04-10 NOTE — ASSESSMENT & PLAN NOTE
[facility] on aerobic blood culture x1/2 sets from 4/6/24 was coag negative staph, possible contaminant. Repeat blood cultures 4/8 NGTD x48h    Plan:  -- continue vanc, continue meropenem for now until speciation  -- TTE did not show evidence of endocarditis  -- Repeat blood cultures 4/8 NGTD x 48h

## 2024-04-10 NOTE — PROGRESS NOTES
[facility]  Nephrology  Progress Note    Patient Name: Enoc Collado  MRN: [id number]  Admission Date: 4/6/2024  Hospital Length of Stay: 4 days  Attending Provider: Mynor Perkins MD   Primary Care Physician: Frantz Mason MD  Principal Problem:Acute biliary pancreatitis without infection or necrosis    Subjective:     HPI: Mr. Collado is a 73 year old male with chronic HTN, HLD, morbid obesity. Patient is intubated on multiple pressors at the time of my evaluation. All history was obtained from chart review and by family. Patient initially presented to [facility] with acute onset abdominal pain for 1 day on 4/5 associated with n/v/d. In ED at OSH, elevated LFTs to 140-180s with T bili 3.6/ direct bilirubin of 2.0. Lipase >1000. Abdominal US with CBD of 7mm and no concerns of acute cholecystitis. CT with contrast on 4/5 is consistent with acute pancreatitis without gross evidence of biliary obstruction. Patient does not have previous episodes of pancreatitis or no new medications recently started. . Patient started on IVF. Noted to have worsening respiratory status and confused at OSH. ABG with pH of 7.2, CO2 58, O2 68. Fluids were stopped and IV Lasix was given with patient placed on Bipap. Patient transferred to [facility] for GI services. with EUS/ERCP.     Nephrology has been consulted for STEFANIE, hyperkalemia, and oliguria following IV contrast, pancreatitis, and hypotension. As per the wife, she is not aware of any prior history of kidney disease, and review of his labs available to me show a eGFR above 60 prior to this hospitalization. At the outside facility, he was hyperkalemic at 6.7, and was shifted with improvement in his potassium to 5.0, creatinine on arrival was 1.6 (baseline 1.0), and trended up to 4.1 at the time of the consult. Total bilirubin continues to trend up, currently at 14.8, and LFTs worsening. Most recent ABG prior to intubation showed 7.25/47/93/21. He was  intubated in the ICU, and is currently going for emergent ERCP. Wife denies the patient taking any NSAIDs, having decreased PO intake or decreased urinary output prior to presentation. Upon my initial encounter, the patient has produced only 150 mL of urine in the macias bag over the past 12 hours.     Interval History:   Seen on SLED this morning, pressor requirements have improved over the past 24 hours, now on Levo @ 0.44 and Vaso.  FiO2 70%/14.    Na remains low at 120, K 4.3 on AM labs.  Triglycerides have trended down to 793 on AM labs.  Net negative 1.5L/24h.    Review of patient's allergies indicates:   Allergen Reactions    No known drug allergies      Current Facility-Administered Medications   Medication Frequency    acetaminophen tablet 650 mg Q4H PRN    amiodarone 360 mg/200 mL (1.8 mg/mL) infusion Continuous    calcium gluconate 3 g in dextrose 5 % (D5W) 100 mL infusion Continuous    cisatracurium (NIMBEX) 200 mg in dextrose 5 % (D5W) 100 mL infusion Continuous    dexAMETHasone (DECADRON) 20 mg in sodium chloride 0.9% 50 mL IVPB Daily    Followed by    [START ON 4/11/2024] dexAMETHasone injection 10 mg Daily    dextrose 10 % infusion Continuous PRN    dextrose 10% bolus 125 mL 125 mL PRN    dextrose 10% bolus 250 mL 250 mL PRN    dextrose-sod citrate-citric ac 2.45-2.2 gram- 800 mg/100 mL Soln Continuous    docusate sodium capsule 100 mg Daily    fentaNYL 2500 mcg in 0.9% sodium chloride 250 mL infusion premix (titrating) Continuous    fentanyl IV bolus from bag/infusion 50 mcg Q15 Min PRN    fludrocortisone tablet 100 mcg Daily    glucagon (human recombinant) injection 1 mg PRN    heparin (porcine) injection 7,500 Units Q8H    insulin aspart U-100 pen 0-15 Units Q4H PRN    ketamine 500 mg in sodium chloride 0.9% 100 mL infusion Continuous    magnesium sulfate 2g in water 50mL IVPB (premix) PRN    meropenem (MERREM) 2 g in sodium chloride 0.9% 100 mL IVPB Q8H    mupirocin 2 % ointment BID     NORepinephrine 32 mg in dextrose 5 % (D5W) 250 mL infusion Continuous    pantoprazole injection 40 mg BID    polyethylene glycol packet 17 g BID    sodium chloride 0.9% flush 10 mL PRN    sodium phosphate 20.01 mmol in dextrose 5 % (D5W) 250 mL IVPB PRN    sodium phosphate 30 mmol in dextrose 5 % (D5W) 250 mL IVPB PRN    sodium phosphate 39.99 mmol in dextrose 5 % (D5W) 250 mL IVPB PRN    timolol maleate 0.5% ophthalmic solution 1 drop Daily    vancomycin - pharmacy to dose pharmacy to manage frequency    vasopressin (PITRESSIN) 0.2 Units/mL in dextrose 5 % (D5W) 100 mL infusion Continuous    white petrolatum-mineral oil (SYSTANE NIGHTTIME) ophthalmic ointment Q8H       Objective:     Vital Signs (Most Recent):  Temp: 99.5 °F (37.5 °C) (04/10/24 0900)  Pulse: (!) 121 (04/10/24 0900)  Resp: (!) 32 (04/10/24 0900)  BP: (!) 89/64 (04/09/24 1830)  SpO2: 97 % (04/10/24 0900) Vital Signs (24h Range):  Temp:  [95.2 °F (35.1 °C)-100.2 °F (37.9 °C)] 99.5 °F (37.5 °C)  Pulse:  [] 121  Resp:  [0-32] 32  SpO2:  [79 %-100 %] 97 %  BP: ()/(64-76) 89/64  Arterial Line BP: ()/(52-95) 106/72     Weight: 131.5 kg (289 lb 14.5 oz) (04/09/24 1030)  Body mass index is 46.79 kg/m².  Body surface area is 2.47 meters squared.    I/O last 3 completed shifts:  In: 9240.4 [I.V.:5274.9; NG/GT:400; IV Piggyback:3565.5]  Out: 93610 [Urine:67; Other:92573]     Physical Exam  Vitals and nursing note reviewed.   Constitutional:       Appearance: He is obese. He is ill-appearing. He is not toxic-appearing or diaphoretic.      Interventions: He is sedated and intubated.      Comments: Proned   HENT:      Head: Atraumatic.      Nose: Nose normal.      Mouth/Throat:      Comments: Intubated   Cardiovascular:      Rate and Rhythm: Normal rate and regular rhythm.      Pulses: Normal pulses.   Pulmonary:      Effort: He is intubated.      Breath sounds: No wheezing, rhonchi or rales.      Comments: Mechanical breathsounds  Abdominal:       Comments: DEE   Musculoskeletal:      Right lower leg: Edema present.      Left lower leg: Edema present.          Significant Labs:  ABGs:   Recent Labs   Lab 04/10/24  0317   PH 7.337*   PCO2 46.3*   HCO3 24.8   POCSATURATED 97   BE -1     CBC:   Recent Labs   Lab 04/10/24  0415   WBC 21.40*   RBC 4.81   HGB 14.9   HCT 41.6   *   MCV 87   MCH 31.0   MCHC 35.8     CMP:   Recent Labs   Lab 04/10/24  0415   *  189*  189*   CALCIUM 8.9  8.9  8.9   ALBUMIN 2.0*  2.0*  2.0*   PROT 6.4   *  120*  120*   K 4.3  4.3  4.3   CO2 19*  20*  20*   CL 85*  85*  85*   BUN 19  19  19   CREATININE 1.6*  1.6*  1.6*   ALKPHOS 132   *   *   BILITOT 8.2*        Assessment/Plan:     Pulmonary  Acute hypoxemic respiratory failure  -improved; on FiO2 of 70% PEEP 14    Cardiac/Vascular  Shock  -Per primary    Renal/  Metabolic acidosis  Bicarb bath 35  -stable      Hypocalcemia  -continue post-filter calcium @ 25 cc/hr  -3 calcium bath  -improving    STEFANIE (acute kidney injury)  As per wife, patient with no prior history of CKD, baseline creatinine around 1.0 and eGFR>60 base on labs available. He presented to outside hospital for abdominal pain, received a dose of IV contrast for a CT of the abdomen, and found to have pancreatitis. Patient subsequently became hypotensive requiring the initiation of vasopressin and levophed for pressure support.    STEFANIE 2/2 iATN from septic shock from pancreatitis    Recommendations  -will continue 24 hour SLED  -Bicarb bath 35  -Na bath 132  -3k/3ca bath  -goal UF to be negative  cc/hr as tolerated without an increase in pressor requirements.  -Avoid nephrotoxic agents (IV contrast, NSAID's, gadolinium contrast, PPI's) if able.   -Maintain MAP above 65 mmHg.   -Strict I's and O's  -Daily renal function panel  -Renally dose all medications    Endocrine  Hyponatremia  -132 Na bath with SLED    GI  * Acute biliary pancreatitis without infection or  necrosis  -Per primary      Rodolfo Hayward, NP  Nephrology  [facility]

## 2024-04-10 NOTE — ASSESSMENT & PLAN NOTE
most recent lipid panel on 10/2023 with . Hypertriglyceridemia today continuing to improve; propofol stopped 4/8; etiology likely post-ERCP vs propofol induced    Plan:  -- Triglycerides improving drastically from 1414 --> 793  -- holding home pravastatin 20mg qd given elevated LFT's which are returning to baseline, can hopefully resume tomorrow  -- Continue to avoid propofol at this time

## 2024-04-10 NOTE — PROGRESS NOTES
[facility]  General Surgery  Progress Note    Subjective:     History of Present Illness:  Mr. Kline a 72 yo man form the [facility], transferred to [facility] for a higher level of care/AES evaluation. He presented to the ED in  and was diagnosed with pancreatitis. He was started on empiric abx. Patient was transferred here yesterday and admitted tot he ICU. The patient's abx were escalated to meropenem and ID is consulted for that. History obtained from family. Underwent ERCP showed mild proximal CBD dilation, stent placed with bile flowing with no pus. Now intubated with suspected shock and ARDS. Abx escalated to meropenem.    General surgery consulted for evaluation for abdominal compartment syndrome.    Post-Op Info:  Procedure(s) (LRB):  ERCP (ENDOSCOPIC RETROGRADE CHOLANGIOPANCREATOGRAPHY) (N/A)  ULTRASOUND, UPPER GI TRACT, ENDOSCOPIC (N/A)   4 Days Post-Op     Interval History: SLED overnight. Remains proned. Tolerating trickle TF via OG. Bladder pressures 17. Levo 0.4, vaso 0.04    Medications:  Continuous Infusions:   amiodarone in dextrose 5% 0.5 mg/min (04/10/24 0701)    calcium gluconate 3 g in dextrose 5 % (D5W) 100 mL infusion 25 mL/hr at 04/10/24 0701    cisatracurium (NIMBEX) 200 mg in dextrose 5 % (D5W) 100 mL infusion 2 mcg/kg/min (04/10/24 0701)    dextrose 10 % in water (D10W)      dextrose-sod citrate-citric ac 80 mL/hr at 04/10/24 0701    fentanyl 250 mcg/hr (04/10/24 0701)    ketamine 5 mg/mL infusion (titrating) 15 mcg/kg/min (04/10/24 0701)    NORepinephrine bitartrate-D5W 0.44 mcg/kg/min (04/10/24 0701)    vasopressin 0.04 Units/min (04/10/24 0808)     Scheduled Meds:   dexamethasone (DECADRON) IVPB  20 mg Intravenous Daily    Followed by    [START ON 4/11/2024] dexAMETHasone  10 mg Intravenous Daily    docusate sodium  100 mg Per NG tube Daily    fludrocortisone  100 mcg Per OG tube Daily    heparin (porcine)  7,500 Units Subcutaneous Q8H    meropenem IV (PEDS and  ADULTS)  2 g Intravenous Q8H    mupirocin   Nasal BID    pantoprazole  40 mg Intravenous BID    polyethylene glycol  17 g Oral BID    timolol maleate 0.5%  1 drop Both Eyes Daily    white petrolatum-mineral oiL   Both Eyes Q8H     PRN Meds:acetaminophen, dextrose 10 % in water (D10W), dextrose 10%, dextrose 10%, fentanyl, glucagon (human recombinant), insulin aspart U-100, magnesium sulfate IVPB, sodium chloride 0.9%, sodium phosphate 20.01 mmol in dextrose 5 % (D5W) 250 mL IVPB, sodium phosphate 30 mmol in dextrose 5 % (D5W) 250 mL IVPB, sodium phosphate 39.99 mmol in dextrose 5 % (D5W) 250 mL IVPB, Pharmacy to dose Vancomycin consult **AND** vancomycin - pharmacy to dose     Review of patient's allergies indicates:   Allergen Reactions    No known drug allergies      Objective:     Vital Signs (Most Recent):  Temp: 99.3 °F (37.4 °C) (04/10/24 0800)  Pulse: (!) 123 (04/10/24 0800)  Resp: (!) 32 (04/10/24 0800)  BP: (!) 89/64 (04/09/24 1830)  SpO2: 98 % (04/10/24 0800) Vital Signs (24h Range):  Temp:  [95.2 °F (35.1 °C)-100.2 °F (37.9 °C)] 99.3 °F (37.4 °C)  Pulse:  [] 123  Resp:  [0-32] 32  SpO2:  [79 %-100 %] 98 %  BP: ()/(64-77) 89/64  Arterial Line BP: ()/(52-95) 105/73     Weight: 131.5 kg (289 lb 14.5 oz)  Body mass index is 46.79 kg/m².    Intake/Output - Last 3 Shifts         04/08 0700  04/09 0659 04/09 0700  04/10 0659 04/10 0700  04/11 0659    I.V. (mL/kg) 3816.4 (29) 3694.1 (28.1) 139.8 (1.1)    NG/GT  400 10    IV Piggyback 4023.1 2512.5 81.4    Total Intake(mL/kg) 7839.5 (59.6) 6606.6 (50.2) 231.2 (1.8)    Urine (mL/kg/hr) 47 (0) 30 (0)     Other 4701 7687 829    Total Output 1268 7717 829    Net +3091.5 -1110.4 -597.8                   Physical Exam  Vitals and nursing note reviewed.   Constitutional:       General: He is not in acute distress.     Appearance: He is ill-appearing.   HENT:      Nose: Nose normal.   Cardiovascular:      Rate and Rhythm: Tachycardia present.    Pulmonary:      Comments: Vent Mode: A/C  Oxygen Concentration (%):  [] 70  Resp Rate Total:  [32 br/min] 32 br/min  Vt Set:  [420 mL] 420 mL  PEEP/CPAP:  [14 cmH20] 14 cmH20  Mean Airway Pressure:  [20 dtC69-41 cmH20] 21 cmH20  Abdominal:      Comments: Distended but soft. Not firm. No peritonitic signs. Reducible umbilical hernia with no overlying skin changes     Musculoskeletal:         General: No deformity.   Neurological:      Comments: Sedated           Significant Labs:  I have reviewed all pertinent lab results within the past 24 hours.    Significant Diagnostics:  I have reviewed all pertinent imaging results/findings within the past 24 hours.  Assessment/Plan:     * Acute biliary pancreatitis without infection or necrosis  Patient is a 73 year old male with PMHx significant for obesity, CKD, gout, and HTN who was admitted to [facility] on 4/6 as a transfer from [facility] for management of severe acute gallstone pancreatitis associated with STEFANIE requiring CRRT. Now intubated, on pressors, proned. General surgery consulted for evaluation.      - Abdominal exam is not consistent with compartment syndrome. His abdomen is soft, distended, and has a reducible umbilical hernia.   - He does not have elevated peak or plateau pressures.   - Has CKD and has been getting CRRT since arrival.   - Serial abdominal exams.   - Trend bladder pressures. Please contact general surgery if bladder pressures >20 or he develops s/s of abdominal compartment syndrome   - Remainder of care per primary team, general surgery will sign off  - Please contact general surgery with any questions, concerns, or clinical status changes      Case discussed with Dr. Jarrell.      Roger Donahue PA-C  General Surgery  [facility]

## 2024-04-10 NOTE — SUBJECTIVE & OBJECTIVE
Interval History/Significant Events: NAEO. Patient tolerating prone, SLED well. P/F ratio improved to 138 this morning, decreased pressor requirements levophed down to 0.42, still on vasopression 0.04 and fentanyl/ketamine for sedation, Nimbex for paralytic. Remains tachycardic in 120s, did have temperature instability last night 95.2-100.2, currently 99.5.     Review of Systems   Unable to perform ROS: Intubated     Objective:     Vital Signs (Most Recent):  Temp: 99.5 °F (37.5 °C) (04/10/24 0900)  Pulse: (!) 121 (04/10/24 0900)  Resp: (!) 32 (04/10/24 0900)  BP: (!) 89/64 (04/09/24 1830)  SpO2: 97 % (04/10/24 0900) Vital Signs (24h Range):  Temp:  [95.2 °F (35.1 °C)-100.2 °F (37.9 °C)] 99.5 °F (37.5 °C)  Pulse:  [] 121  Resp:  [0-32] 32  SpO2:  [79 %-100 %] 97 %  BP: ()/(64-76) 89/64  Arterial Line BP: ()/(52-95) 106/72   Weight: 131.5 kg (289 lb 14.5 oz)  Body mass index is 46.79 kg/m².      Intake/Output Summary (Last 24 hours) at 4/10/2024 0909  Last data filed at 4/10/2024 0902  Gross per 24 hour   Intake 5811.5 ml   Output 7836 ml   Net -2024.5 ml          Physical Exam  Vitals and nursing note reviewed.   Constitutional:       General: He is not in acute distress.     Appearance: He is obese. He is ill-appearing and toxic-appearing. He is not diaphoretic.   HENT:      Head: Normocephalic and atraumatic.      Mouth/Throat:      Mouth: Mucous membranes are moist.      Pharynx: Oropharynx is clear.   Eyes:      Comments: Pt blind, difficult assess pupillary reflex   Cardiovascular:      Rate and Rhythm: Tachycardia present. Rhythm irregular.      Pulses: Normal pulses.      Heart sounds: Normal heart sounds. No murmur heard.     No friction rub. No gallop.      Comments: Pt prone, unable to assess heart tones  Pulmonary:      Breath sounds: Rales present. No wheezing or rhonchi.      Comments: Prone, minimal breath sounds but rales appreciated bilaterally  Abdominal:      Comments: Unable to  assess- pt proned   Genitourinary:     Comments: Oneill catheter in place, scant urine output dark yellow with sediment  Musculoskeletal:      Right lower leg: Edema (trace) present.      Left lower leg: Edema (trace) present.   Skin:     General: Skin is warm and dry.      Capillary Refill: Capillary refill takes 2 to 3 seconds.   Neurological:      Mental Status: He is alert.      Comments: Sedated and paralyzed            Vents:  Vent Mode: A/C (04/10/24 0748)  Ventilator Initiated: Yes (04/06/24 1758)  Set Rate: 32 BPM (04/10/24 0748)  Vt Set: 420 mL (04/10/24 0748)  PEEP/CPAP: 14 cmH20 (04/10/24 0748)  Oxygen Concentration (%): 70 (04/10/24 0900)  Peak Airway Pressure: 30 cmH20 (04/10/24 0748)  Plateau Pressure: 24 cmH20 (04/10/24 0748)  Total Ve: 13.8 L/m (04/10/24 0748)  Negative Inspiratory Force (cm H2O): 0 (04/10/24 0748)  F/VT Ratio<105 (RSBI): (!) 74.42 (04/10/24 0748)  Lines/Drains/Airways       Peripherally Inserted Central Catheter Line  Duration             PICC Double Lumen 04/06/24 1136 right basilic 3 days              Central Venous Catheter Line  Duration             Trialysis (Dialysis) Catheter 04/08/24 1618 right internal jugular 1 day              Drain  Duration                  Urethral Catheter 04/06/24 1000 3 days         NG/OG Tube 04/09/24 1030 orogastric 18 Fr. Center mouth <1 day              Airway  Duration                  Airway - Non-Surgical 04/06/24 1748 Endotracheal Tube 3 days              Arterial Line  Duration             Arterial Line 04/06/24 1647 Right Radial 3 days              Peripheral Intravenous Line  Duration                  Peripheral IV - Single Lumen 04/06/24 0041 20 G Anterior;Right Forearm 4 days         Peripheral IV - Single Lumen 04/06/24 1021 20 G Anterior;Left Shoulder 3 days         Peripheral IV - Single Lumen 04/08/24 1714 20 G;Other (Comments) Anterior;Left Upper Arm 1 day                  Significant Labs:    CBC/Anemia Profile:  Recent Labs   Lab  04/08/24  1130 04/09/24  0336 04/09/24  0923 04/10/24  0415   WBC 13.26* 20.78*  --  21.40*   HGB 10.9* 14.9  --  14.9   HCT 31.8* 42.7 48 41.6   * 136*  --  143*   MCV 96 90  --  87   RDW 14.9* 14.4  --  14.6*        Chemistries:  Recent Labs   Lab 04/09/24  0336 04/09/24  1033 04/09/24  1339 04/09/24 2120 04/10/24  0415   *  122*  121* 121* 120*  120* 122*  122* 120*  120*  120*   K 5.2*  5.2*  5.2* 4.6 4.5  4.5 4.2  4.2 4.3  4.3  4.3   CL 89*  89*  90* 89* 88*  88* 86*  86* 85*  85*  85*   CO2 17*  17*  16* 20* 18*  18* 23  23 19*  20*  20*   BUN 19  19  19 17 15  15 18  18 19  19  19   CREATININE 2.4*  2.4*  2.4* 2.0* 2.0*  2.0* 2.0*  2.0* 1.6*  1.6*  1.6*   CALCIUM 8.9  8.9  8.7 9.0 9.2  9.2 8.9  8.9 8.9  8.9  8.9   ALBUMIN 2.1*  2.1*  2.1* 2.1* 2.1*  2.1* 2.0*  2.0* 2.0*  2.0*  2.0*   PROT 6.5 6.5  --   --  6.4   BILITOT 8.2* 7.8*  --   --  8.2*   ALKPHOS 118 119  --   --  132   * 153*  --   --  150*   * 145*  --   --  133*   MG 2.1  2.1  --  2.0  2.0 1.9  1.9 2.2  2.2   PHOS 4.8*  4.8*  4.7*  --  5.0*  5.0* 4.2  4.2 4.1  4.1  4.1       All pertinent labs within the past 24 hours have been reviewed.    Significant Imaging:  I have reviewed all pertinent imaging results/findings within the past 24 hours.

## 2024-04-10 NOTE — ASSESSMENT & PLAN NOTE
Patient with Hypercapnic and Hypoxic Respiratory failure which is Acute.  he is not on home oxygen. Supplemental oxygen was provided and noted- Vent Mode: A/C  Oxygen Concentration (%):  [] 70  Resp Rate Total:  [32 br/min] 32 br/min  Vt Set:  [420 mL] 420 mL  PEEP/CPAP:  [14 cmH20] 14 cmH20  Mean Airway Pressure:  [20 qlX12-97 cmH20] 21 cmH20    .   Signs/symptoms of respiratory failure include- tachypnea, increased work of breathing, respiratory distress, and lethargy. Contributing diagnoses includes - ARDS, Obesity Hypoventilation, and Pneumonia Labs and images were reviewed. Patient Has recent ABG, which has been reviewed. Will treat underlying causes and adjust management of respiratory failure as follows-     Transferred to [facility] on Bipap with worsening O2 requirements  ARDS secondary to acute pancreatitis, intubated on MV ARDSnet protocol, slightly improved today.    Plan:  -- P/F ratio improved from improved this morning to 138.5 on AM 4/10, satting 97% on AC/VC 32/420/+14/70%  -- Will adjust vent settings to adjust per ARDS net protocol, currently on low-peep/high FiO2 settings  -- Proned since 4p, return to supine at 10a. Will likely need to continue pending repeat ABG and P/F ratio  -- Will obtain CXR following return to supine

## 2024-04-10 NOTE — ASSESSMENT & PLAN NOTE
New onset a-fib started 4/8, likely stress induced given critical illness. Was in RVR, amio bolus and gtt started 4/8, still on 0.5 mg/min    Plan:  -- HR controlled in 110-120s, appears sinus at this time but was in a-fib 4/9  -- Will discuss switching to oral amiodarone 200 mg daily with staff  -- Consider cardiology consult  -- Unsure in role of obtaining TSH, will discuss with staff if needed to investigate etiology

## 2024-04-10 NOTE — PLAN OF CARE
MICU DAILY GOALS     Family/Goals of care/Code Status   Code Status: Full Code    24H Vital Sign Range  Temp:  [97 °F (36.1 °C)-100.2 °F (37.9 °C)]   Pulse:  []   Resp:  [0-35]   BP: ()/(64-86)   SpO2:  [86 %-100 %]   Arterial Line BP: ()/(52-95)      Shift Events (include procedures and significant events)   No acute events this shift. Electrolytes replaced, PRN tylenol given for temp max of 100.2 F.     AWAKE RASS: Goal - RASS Goal: -5-->unarousable  Actual - RASS (Parry Agitation-Sedation Scale): unarousable    Restraint necessity: Not necessary   BREATHE SBT: Not attempted    Coordinate A & B, analgesics/sedatives Pain: managed   SAT: Not attempted   Delirium CAM-ICU: Overall CAM-ICU: Positive   Early(intubated/ Progressive (non-intubated) Mobility MOVE Screen (INTUBATED ONLY): Not attempted    Activity: Activity Management: Rolling - L1, Patient unable to perform activities   Feeding/Nutrition Diet order: Diet/Nutrition Received: tube feeding,     Thrombus DVT prophylaxis: VTE Required Core Measure: Pharmacological prophylaxis initiated/maintained   HOB Elevation Head of Bed (HOB) Positioning: HOB at 30 degrees   Ulcer Prophylaxis GI: yes   Glucose control managed     Skin Skin assessed during: Daily Assessment    Sacrum intact/not altered? Yes  Heels intact/not altered? Yes  Surgical wound? No    CHECK ONE!   (no altered skin or altered skin) and sub boxes:  [] No Altered Skin Integrity Present    []Prevention Measures Documented    [x] Altered Skin Integrity Present or Discovered   [x] LDA present in EPIC, daily doc completed              [] LDA added if not in EPIC (describe wound).                    When describing wound, do not stage, use descriptive words only.    [] Wound Image Taken (required on admit,                   transfer/discharge and every Tuesday)    Wound Care Consulted? Yes    4 EYES:  Attending Nurse (1st set of eyes): Kathy Brown RN    Second RN/Staff Member (2nd set  of eyes): JERRY Brown   Bowel Function constipation    Indwelling Catheter Necessity      Urethral Catheter 04/06/24 1000-Reason for Continuing Urinary Catheterization: Critically ill in ICU and requiring hourly monitoring of intake/output    PICC Double Lumen 04/06/24 1136 right basilic-Line Necessity Review: CRRT/HD  [REMOVED] Trialysis (Dialysis) Catheter 04/06/24 1836 right internal jugular-Line Necessity Review: Hemodynamic instability, CRRT/HD  Trialysis (Dialysis) Catheter 04/08/24 1618 right internal jugular-Line Necessity Review: CRRT/HD   De-escalation Antibiotics Yes        VS and assessment per flow sheet, patient progressing towards goals as tolerated, plan of care reviewed with family, all concerns addressed, will continue to monitor.

## 2024-04-10 NOTE — PROGRESS NOTES
Infectious Disease Follow up Note      Impression:     Mr. Collado is a 74 yo man from the [facility], transferred to [facility] for a higher level of care/AES evaluation. He presented to the ED in  and was diagnosed with pancreatitis. He was started on empiric abx. Patient was transferred here yesterday and admitted to the ICU. The patient's abx were escalated to meropenem and ID is consulted for that. History obtained from family. Underwent ERCP showed mild proximal CBD dilation, stent placed with bile flowing with no pus. Now intubated with biliary pancreatitis, cholangitis, suspected shock and ARDS. Abx escalated to meropenem. WBC stable today.    Blood culture from 4/6 is growing coagulase negative staphylococcus in one bottle.  blood culture from 4/6 and repeat blood cultures are negative.        Plan:     Continue meropenem (normal dose while on CRRT) to cover the usual suspects - no need to treat coagulase negative staphylococcus.  Follow pending blood cultures  Trend WBC      Please reconsult for any questions or problems. Will sign off for now.     Thanks,  Gerson Thomas  Infectious Diseases        Subjective and Interval History:        Review of Symptoms:  Review of Systems   Unable to perform ROS: Intubated           Objective:    Medications:  Antibiotics:   Antibiotics (From admission, onward)      Start     Stop Route Frequency Ordered    04/08/24 1027  meropenem (MERREM) 2 g in sodium chloride 0.9% 100 mL IVPB         -- IV Every 8 hours (non-standard times) 04/08/24 0535    04/06/24 2100  mupirocin 2 % ointment         04/11/24 2059 Nasl 2 times daily 04/06/24 1616            Physical Exam:  VS (24h):   Vitals:    04/10/24 0800   BP:    Pulse: (!) 123   Resp: (!) 32   Temp: 99.3 °F (37.4 °C)     Temp:  [95.2 °F (35.1 °C)-100.2 °F (37.9 °C)]   Physical Exam  Vitals reviewed.   Eyes:      General: No scleral icterus.  Cardiovascular:      Rate and Rhythm: Regular rhythm.   Pulmonary:       Comments: Ventilator sounds  Abdominal:      General: Bowel sounds are absent.   Musculoskeletal:      Right lower leg: No edema.      Left lower leg: No edema.   Skin:     General: Skin is warm and dry.         Labs:  CBC:   Lab Results   Component Value Date    WBC 21.40 (H) 04/10/2024    WBC 20.78 (H) 04/09/2024    WBC 13.26 (H) 04/08/2024    WBC 19.73 (H) 04/08/2024    WBC 16.74 (H) 04/07/2024    HCT 41.6 04/10/2024     (L) 04/10/2024       BMP:   Recent Labs   Lab 04/10/24  0415   *  189*  189*   *  120*  120*   K 4.3  4.3  4.3   CL 85*  85*  85*   CO2 19*  20*  20*   BUN 19  19 19   CREATININE 1.6*  1.6*  1.6*   CALCIUM 8.9  8.9  8.9   MG 2.2  2.2       LFT:   Lab Results   Component Value Date     (H) 04/10/2024     (H) 04/10/2024    ALKPHOS 132 04/10/2024    BILITOT 8.2 (H) 04/10/2024         Microbiology x 7d:   Microbiology Results (last 7 days)       Procedure Component Value Units Date/Time    Blood culture [[id number]] Collected: 04/06/24 1732    Order Status: Completed Specimen: Blood from Peripheral, Lower Arm, Right Updated: 04/09/24 2012     Blood Culture, Routine No Growth to date      No Growth to date      No Growth to date      No Growth to date    Blood culture [[id number]] Collected: 04/08/24 1132    Order Status: Completed Specimen: Blood from Peripheral, Forearm, Left Updated: 04/09/24 1412     Blood Culture, Routine No Growth to date      No Growth to date    Blood culture [[id number]] Collected: 04/08/24 1132    Order Status: Completed Specimen: Blood from Peripheral, Forearm, Left Updated: 04/09/24 1412     Blood Culture, Routine No Growth to date      No Growth to date    Blood culture [[id number]]  (Abnormal) Collected: 04/06/24 1838    Order Status: Completed Specimen: Blood from Line, Jugular, Internal Right Updated: 04/09/24 1304     Blood Culture, Routine Gram stain aer bottle: Gram positive cocci in clusters resembling Staph       Results called to and read back by:Shima Small RN 04/07/2024  14:35      COAGULASE-NEGATIVE STAPHYLOCOCCUS SPECIES  Organism is a probable contaminant      MRSA/SA Rapid ID by PCR from Blood culture [[id number]] Collected: 04/06/24 1278    Order Status: Completed Updated: 04/07/24 1609     Staph aureus ID by PCR Negative     Methicillin Resistant ID by PCR Negative              Assessment:    Active Hospital Problems    Diagnosis  POA    *Acute biliary pancreatitis without infection or necrosis [K85.10]  Yes    Electrolyte disturbance [E87.8]  Unknown     Transient electrolyte disturbance during HD namely hyponatremia. Mild hyperkalemia, hypocalcemia, hypomagnasemia as well    Plan:  -- Consider iSTAT during HD to confirm labs  -- replace electrolytes as needed. Goals keep K > 4, Mg > 2      Gram-positive bacteremia [R78.81]  Unknown    Hyponatremia [E87.1]  Yes    Hypocalcemia [E83.51]  Yes    Metabolic acidosis [E87.20]  Yes    Acute hypoxemic respiratory failure [J96.01]  Yes    Acute renal failure superimposed on chronic kidney disease [N17.9, N18.9]  Yes    Shock [R57.9]  Yes    ACP (advance care planning) [Z71.89]  Not Applicable    STEFANIE (acute kidney injury) [N17.9]  Yes    CKD (chronic kidney disease), stage III [N18.30]  Yes    Morbid obesity with BMI of 40.0-44.9, adult [E66.01, Z68.41]  Not Applicable    History of gout [Z87.39]  Yes    Essential hypertension [I10]  Yes    Hyperlipidemia [E78.5]  Yes      Resolved Hospital Problems    Diagnosis Date Resolved POA    Hyperkalemia [E87.5] 04/09/2024 Yes         Plan:  See top of page.

## 2024-04-11 PROBLEM — R78.81 GRAM-POSITIVE BACTEREMIA: Status: RESOLVED | Noted: 2024-01-01 | Resolved: 2024-01-01

## 2024-04-11 NOTE — PLAN OF CARE
MICU DAILY GOALS     Family/Goals of care/Code Status   Code Status: Full Code    24H Vital Sign Range  Temp:  [99.1 °F (37.3 °C)-99.8 °F (37.7 °C)]   Pulse:  [118-132]   Resp:  [24-32]   SpO2:  [96 %-100 %]   Arterial Line BP: ()/(69-93)      Shift Events (include procedures and significant events)   Pt supinated at 1000. Vent settings weaned as tolerated. FiO2 70->40% PEEP 14->5. Nimbex and Versed gtt weaned off. Vasopressors titrated to maintain MAP>65. PF ratio 145 in the afternoon. Pt reproned at 1645. CRRT SLED continued. Oneill catheter removed per order. WCTM.     AWAKE RASS: Goal - RASS Goal: -4-->deep sedation  Actual - RASS (Parry Agitation-Sedation Scale): deep sedation    Restraint necessity: Treatment interference   BREATHE SBT: Not attempted    Coordinate A & B, analgesics/sedatives Pain: managed   SAT: NA   Delirium CAM-ICU: Overall CAM-ICU: Positive   Early(intubated/ Progressive (non-intubated) Mobility MOVE Screen (INTUBATED ONLY): Not attempted    Activity: Activity Management: Patient unable to perform activities   Feeding/Nutrition Diet order: Diet/Nutrition Received: tube feeding,     Thrombus DVT prophylaxis: VTE Required Core Measure: Pharmacological prophylaxis initiated/maintained   HOB Elevation Head of Bed (HOB) Positioning: HOB flat   Ulcer Prophylaxis GI: yes   Glucose control managed     Skin Skin assessed during: Daily Assessment    Sacrum intact/not altered? No  Heels intact/not altered? Yes  Surgical wound? No    CHECK ONE!   (no altered skin or altered skin) and sub boxes:  [] No Altered Skin Integrity Present    []Prevention Measures Documented    [] Altered Skin Integrity Present or Discovered   [] LDA present in EPIC, daily doc completed              [] LDA added if not in EPIC (describe wound).                    When describing wound, do not stage, use descriptive words only.    [] Wound Image Taken (required on admit,                   transfer/discharge and every  Tuesday)    Wound Care Consulted? No    4 EYES:  Attending Nurse (1st set of eyes): NATA Rivas RN    Second RN/Staff Member (2nd set of eyes): Sukh CLARK RN   Bowel Function constipation    Indwelling Catheter Necessity [REMOVED]      Urethral Catheter 04/06/24 1000-Reason for Continuing Urinary Catheterization: Critically ill in ICU and requiring hourly monitoring of intake/output    PICC Double Lumen 04/06/24 1136 right basilic-Line Necessity Review: Hemodynamic instability  [REMOVED] Trialysis (Dialysis) Catheter 04/06/24 1836 right internal jugular-Line Necessity Review: Hemodynamic instability, CRRT/HD  Trialysis (Dialysis) Catheter 04/08/24 1618 right internal jugular-Line Necessity Review: CRRT/HD     De-escalation Antibiotics No        VS and assessment per flow sheet, patient progressing towards goals as tolerated, plan of care reviewed with family, all concerns addressed, will continue to monitor.

## 2024-04-11 NOTE — PROGRESS NOTES
04/11/24 1748   Treatment   Treatment Type SLED   Treatment Status Order change   Prescription   Dialysate Na + (mEq/L) 135     Sodium changed to 135 mEq/L as ordered

## 2024-04-11 NOTE — NURSING
CRRT rinsed back at shift change. TMP >500 despite troubleshooting. Rinsed back and dialysis RN notified.

## 2024-04-11 NOTE — CARE UPDATE
Brief Prone Note    Pt returned from prone to supine at 10am this morning, tolerated well. Likely re-prone pending P/F ratio on afternoon ABG.    Jie Rebolledo MD  [employer] IM PGY III

## 2024-04-11 NOTE — PROGRESS NOTES
04/11/24 1125   Treatment   Treatment Type SLED   Treatment Status Restart   Dialysis Machine Number [id number]   Dialyzer Time (hours) 0   BVP (Liters) 0 L   Solutions Labeled and Current  Yes   Access Temporary Cath;Right;IJ   Catheter Dressing Intact  Yes   Alarms Engaged Yes   CRRT Comments sled restarted as ordered   Prescription   Time (Hours) Continuous   Dialysate K + (mEq/L) 4   Dialysate CA + (mEq/L) 3   Dialysate HCO3 - (Bicarb) (mEq/L) 35   Dialysate Na + (mEq/L) Other (comment)  (132)   Cartridge Type Other  (r300)   Dialysate Flow Rate (mL/min) 200   UF Goal Rate 400 mL/hr   CRRT Hourly Documentation   Blood Flow (mL/min) 200   UF Rate 200 cc/hr   Arterial Pressure (mmHg) -80 mmHg   Venous Pressure (mmHg) 90 mmHg   Effluent Pressure (EP) (mmHg) 0 mmHg   Total UF (Hourly Cleared) (mL) 0     SLED restarted as ordered after pt moved to a new room and has been supinated. RIJ CVC aspirated, flushed, and accessed using aseptic technique. Lines connected and secured.

## 2024-04-11 NOTE — ASSESSMENT & PLAN NOTE
Patient with acute kidney injury/acute renal failure likely due to acute tubular necrosis caused by severe shock and acute infectious process.  STEFANIE is currently worsening. Baseline creatinine  1.0  - Labs reviewed- Renal function/electrolytes with Estimated Creatinine Clearance: 71.5 mL/min (based on SCr of 1.2 mg/dL). according to latest data. Monitor urine output and serial BMP and adjust therapy as needed. Avoid nephrotoxins and renally dose meds for GFR listed above.    Worsening renal failure during admission likely ischemic ATN s/o shock and sepsis. RF c/b hyperkalemia and acidosis. Nephrology following at OSH. Anticipate patient will likely need RRT on current admission.    -- minimal urine output, pt remains oliguric  -- nephrology consulted, appreciate recs.   -- RIJ Trialysis catheter replaced 4/8; Continuous SLED per nephro; clotted twice in last 24h as line appears to be positional, possibly due to prone/supine rotations  -- maintain MAP > 65  -- renally dose meds and avoid nephrotoxic meds   -- monitoring electrolytes closely on labs; replace as needed

## 2024-04-11 NOTE — ASSESSMENT & PLAN NOTE
Distributive- warm well perfused and diaphoretic yesterday on exam. Currently on norepi at 0.4 mcg/kg/min and vaso at 0.04 units/hr after increased dosing  Plan:  -- Wean pressors for MAP > 65  -- See Acute biliary pancreatitis plan

## 2024-04-11 NOTE — SUBJECTIVE & OBJECTIVE
Interval History:   Continues on SLED, negative 1.6L/24h.  Na remains low, IVPBs being adjusted today.  Vent settings have improved, now on 50%/10. Remains on Levo/Vaso.      Review of patient's allergies indicates:   Allergen Reactions    No known drug allergies      Current Facility-Administered Medications   Medication Frequency    acetaminophen tablet 650 mg Q4H PRN    amiodarone 360 mg/200 mL (1.8 mg/mL) infusion Continuous    cisatracurium (NIMBEX) 200 mg in dextrose 5 % (D5W) 100 mL infusion Continuous    dexAMETHasone injection 10 mg Daily    dextrose 10 % infusion Continuous PRN    dextrose 10% bolus 125 mL 125 mL PRN    dextrose 10% bolus 250 mL 250 mL PRN    fentaNYL 2500 mcg in 0.9% sodium chloride 250 mL infusion premix (titrating) Continuous    fentanyl IV bolus from bag/infusion 50 mcg Q15 Min PRN    fludrocortisone tablet 100 mcg Daily    glucagon (human recombinant) injection 1 mg PRN    heparin (porcine) injection 7,500 Units Q8H    insulin aspart U-100 pen 0-15 Units Q4H PRN    insulin aspart U-100 pen 5 Units 6 times per day    ketamine 500 mg in sodium chloride 0.9% 100 mL infusion Continuous    lactulose 10 gram/15 mL enema solution (inpatient use) 200 g TID    meropenem (MERREM) 2 g in sodium chloride 0.9% 100 mL IVPB Q8H    methylnaltrexone 12 mg/0.6 mL subcutaneous injection 12 mg Once    midazolam (PF) in 0.9 % NaCl 1 mg/mL infusion Continuous    mupirocin 2 % ointment BID    NORepinephrine 32 mg in sodium chloride 0.9% 250 mL infusion Continuous    pantoprazole injection 40 mg Daily    polyethylene glycol packet 17 g BID    senna-docusate 8.6-50 mg per tablet 1 tablet BID    sodium chloride 0.9% flush 10 mL PRN    timolol maleate 0.5% ophthalmic solution 1 drop Daily    vasopressin (PITRESSIN) 0.2 Units/mL in dextrose 5 % (D5W) 100 mL infusion Continuous    white petrolatum-mineral oil (SYSTANE NIGHTTIME) ophthalmic ointment Q8H       Objective:     Vital Signs (Most Recent):  Temp: 99.7  °F (37.6 °C) (04/11/24 0900)  Pulse: (!) 127 (04/11/24 1026)  Resp: (!) 32 (04/11/24 0200)  BP: (!) 89/64 (04/09/24 1830)  SpO2: 100 % (04/11/24 1026) Vital Signs (24h Range):  Temp:  [99.1 °F (37.3 °C)-99.8 °F (37.7 °C)] 99.7 °F (37.6 °C)  Pulse:  [118-129] 127  Resp:  [32-34] 32  SpO2:  [97 %-100 %] 100 %  Arterial Line BP: ()/() 101/71     Weight: 131.5 kg (289 lb 14.5 oz) (04/09/24 1030)  Body mass index is 46.79 kg/m².  Body surface area is 2.47 meters squared.    I/O last 3 completed shifts:  In: 9119.2 [I.V.:5256.1; NG/GT:610; IV Piggyback:3253.1]  Out: 70249 [Other:41136]     Physical Exam  Vitals and nursing note reviewed.   Constitutional:       Appearance: He is obese. He is ill-appearing. He is not toxic-appearing or diaphoretic.      Interventions: He is sedated and intubated.   HENT:      Head: Atraumatic.      Nose: Nose normal.      Mouth/Throat:      Comments: Intubated   Cardiovascular:      Rate and Rhythm: Normal rate and regular rhythm.      Pulses: Normal pulses.   Pulmonary:      Effort: He is intubated.      Breath sounds: No wheezing, rhonchi or rales.      Comments: Mechanical breathsounds  Abdominal:      General: There is distension.   Musculoskeletal:      Right lower leg: Edema present.      Left lower leg: Edema present.          Significant Labs:  CBC:   Recent Labs   Lab 04/11/24  0510   WBC 24.34*   RBC 4.65   HGB 14.6   HCT 41.4   *   MCV 89   MCH 31.4*   MCHC 35.3     CMP:   Recent Labs   Lab 04/11/24  0510   *   CALCIUM 9.0   ALBUMIN 1.9*   PROT 6.4   *   K 4.3   CO2 18*   CL 87*   BUN 19   CREATININE 1.2   ALKPHOS 165*   *   *   BILITOT 9.1*

## 2024-04-11 NOTE — ASSESSMENT & PLAN NOTE
As per wife, patient with no prior history of CKD, baseline creatinine around 1.0 and eGFR>60 base on labs available. He presented to outside hospital for abdominal pain, received a dose of IV contrast for a CT of the abdomen, and found to have pancreatitis. Patient subsequently became hypotensive requiring the initiation of vasopressin and levophed for pressure support.    STEFANIE 2/2 iATN from septic shock from pancreatitis    Recommendations  -will continue 24 hour SLED  -Bicarb bath 35  -Na bath 135  -4k/3ca bath  -goal UF to be negative  cc/hr as tolerated without an increase in pressor requirements.  -Avoid nephrotoxic agents (IV contrast, NSAID's, gadolinium contrast, PPI's) if able.   -Maintain MAP above 65 mmHg.   -Strict I's and O's  -Daily renal function panel  -Renally dose all medications

## 2024-04-11 NOTE — ASSESSMENT & PLAN NOTE
[facility] on aerobic blood culture x1/2 sets from 4/6/24 was coag negative staph, determined to be contaminant Repeat blood cultures 4/8 NGTD x72h    Plan:  -- Stopped vancomycin per ID recs  -- TTE did not show evidence of endocarditis  -- Repeat blood cultures 4/8 NGTD x 72h

## 2024-04-11 NOTE — ASSESSMENT & PLAN NOTE
Patient with Hypercapnic and Hypoxic Respiratory failure which is Acute.  he is not on home oxygen. Supplemental oxygen was provided and noted- Vent Mode: A/C  Oxygen Concentration (%):  [] 70  Resp Rate Total:  [32 br/min-34 br/min] 32 br/min  Vt Set:  [420 mL] 420 mL  PEEP/CPAP:  [14 cmH20] 14 cmH20  Mean Airway Pressure:  [20 hxU92-05 cmH20] 20 cmH20    .   Signs/symptoms of respiratory failure include- tachypnea, increased work of breathing, respiratory distress, and lethargy. Contributing diagnoses includes - ARDS, Obesity Hypoventilation, and Pneumonia Labs and images were reviewed. Patient Has recent ABG, which has been reviewed. Will treat underlying causes and adjust management of respiratory failure as follows-     Transferred to [facility] on [facility] with worsening O2 requirements  ARDS secondary to acute pancreatitis, intubated on MV ARDSnet protocol, slightly improved today.    Plan:  -- P/F ratio improved from improved this morning to 263 this morning on AM 4/11, satting 100% on AC/VC 32/420/+14/70; will discuss decreasing PEEP/O2 with staff this morning vs after supine  -- Will adjust vent settings to adjust per ARDS net protocol, currently on low-peep/high FiO2 settings  -- Proned since 5p, return to supine at 10a. Will likely need to continue pending repeat ABG and P/F ratio  -- Pt on fentanyl 250 mcg/hr, ketamine 20 mg/hr, and added versed 5 mg last night now adequately sedated and paralyzed with Nimbex  -- May need methadone for fentanyl wean once appropriate given prolonged use at high dosing for sedation

## 2024-04-11 NOTE — PROGRESS NOTES
[facility] - Cardiac Medical ICU  Critical Care Medicine  Progress Note    Patient Name: Enoc Collado  MRN: [id number]  Admission Date: 4/6/2024  Hospital Length of Stay: 5 days  Code Status: Full Code  Attending Provider: Mynor Perkins MD  Primary Care Provider: Frantz Mason MD   Principal Problem: Acute biliary pancreatitis without infection or necrosis    Subjective:     HPI:  Mr. Collado is a 73yoM with HFpEF, HTN, HLD, CKD, gout, morbid obesity who initially presented to [facility] with acute onset abdominal pain for 1 day on 4/5 associated with n/v/d.  In ED at OSH, elevated LFTs to 140-180s with T bili 3.6/ direct bilirubin of 2.0.  Lipase >1000.  Abdominal US with CBD of 7mm and no concerns of acute cholecystitis.  CT consistent with acute pancreatitis without gross evidence of biliary obstruction.  Patient does not have previous episodes of pancreatitis or no new medications recently started.  .  Patient started on IVF.  Noted to have worsening respiratory status and confused at OSH.  ABG with pH of 7.2, CO2 58, O2 68.  Fluids were stopped and IV Lasix was given with patient placed on Bipap.  Repeat blood work revealing of worsening LFTs and total bilirubin to 12.5.  [place] [facility] [place] services with EUS/ERCP.    Hospital/ICU Course:  Pt was transferred from [facility] [place] [facility] [place] emergent EUS/ERCP due to acute interstitial pancreatitis. On arrival per, pt was requiring continuous bipap and producing no urine despite receiving lasix. Was on max peripheral levophed to maintain blood pressures. Pt required right radial arterial line and right internal jugular vein trialysis line. Continued zosyn. GI performed ERCP and placed CBD stent. Now on increased levo/vaso, propofol, fentanyl; proned and paralyzed starting 4/7. RIJ trialysis line requiring replacement 4/8, restarted CRRT; remains intubated/sedated/paralyzed/proned, showing improvement in P/F ratio to 138 this  morning, remains proned- daily assessment for re-prone. Blood cultures 4/8 NGTD. Discussed possible prognosis and family is amenable but would like to continue full code status.     Interval History/Significant Events: Patient proned again overnight, tolerated well with P/F ratio 263 this morning, satting 100% on vent with Ppress 31 and Pplat 24 settings AC/VC 32/420/+14/70 with ABG 7.333/50.6/184/26.9, started on versed at 5 mg/hr now as pt max dosed on ketamine and fentanyl with BIS still 80s, BIS currently 43, still on Nimbex. CRRT clotted off this morning, catheter positional likely secondary to prone/supine flips. Lower pressor requirements norepi 0.4 and vaso 0.04 this morning. Scant u/o, net -1685 I/O last 24h.     Review of Systems   Unable to perform ROS: Intubated     Objective:     Vital Signs (Most Recent):  Temp: 99.3 °F (37.4 °C) (04/11/24 0729)  Pulse: (!) 126 (04/11/24 0729)  Resp: (!) 32 (04/11/24 0200)  BP: (!) 89/64 (04/09/24 1830)  SpO2: 100 % (04/11/24 0729) Vital Signs (24h Range):  Temp:  [99.1 °F (37.3 °C)-99.8 °F (37.7 °C)] 99.3 °F (37.4 °C)  Pulse:  [114-129] 126  Resp:  [32-34] 32  SpO2:  [97 %-100 %] 100 %  Arterial Line BP: ()/() 105/73   Weight: 131.5 kg (289 lb 14.5 oz)  Body mass index is 46.79 kg/m².      Intake/Output Summary (Last 24 hours) at 4/11/2024 0741  Last data filed at 4/11/2024 0730  Gross per 24 hour   Intake 5720.45 ml   Output 8313 ml   Net -2592.55 ml          Physical Exam  Vitals and nursing note reviewed.   Constitutional:       General: He is not in acute distress.     Appearance: He is obese. He is ill-appearing and toxic-appearing. He is not diaphoretic.   HENT:      Head: Normocephalic and atraumatic.      Mouth/Throat:      Mouth: Mucous membranes are moist.      Pharynx: Oropharynx is clear.   Eyes:      Comments: Pt blind, difficult assess pupillary reflex   Cardiovascular:      Rate and Rhythm: Regular rhythm. Tachycardia present.      Pulses:  Normal pulses.      Heart sounds: Normal heart sounds. No murmur heard.     No friction rub. No gallop.      Comments: Pt prone, unable to assess heart tones  Pulmonary:      Breath sounds: No wheezing, rhonchi or rales.      Comments: Intubated  Prone, improving aeration though still diminished bilaterally w/o adventitious breath sounds  Abdominal:      Comments: Unable to assess- pt proned   Genitourinary:     Comments: Oneill catheter in place, scant urine output dark yellow with sediment  Skin:     General: Skin is warm and dry.      Capillary Refill: Capillary refill takes 2 to 3 seconds.   Neurological:      Mental Status: He is alert.      Comments: Sedated and paralyzed            Vents:  Vent Mode: A/C (04/11/24 0729)  Ventilator Initiated: Yes (04/06/24 1758)  Set Rate: 32 BPM (04/11/24 0729)  Vt Set: 420 mL (04/11/24 0729)  PEEP/CPAP: 14 cmH20 (04/11/24 0729)  Oxygen Concentration (%): 70 (04/11/24 0729)  Peak Airway Pressure: 31 cmH20 (04/11/24 0729)  Plateau Pressure: 24 cmH20 (04/11/24 0729)  Total Ve: 13.3 L/m (04/11/24 0729)  Negative Inspiratory Force (cm H2O): 0 (04/11/24 0729)  F/VT Ratio<105 (RSBI): (!) 73.39 (04/11/24 0101)  Lines/Drains/Airways       Peripherally Inserted Central Catheter Line  Duration             PICC Double Lumen 04/06/24 1136 right basilic 4 days              Central Venous Catheter Line  Duration             Trialysis (Dialysis) Catheter 04/08/24 1618 right internal jugular 2 days              Drain  Duration                  Urethral Catheter 04/06/24 1000 4 days         NG/OG Tube 04/09/24 1030 orogastric 18 Fr. Center mouth 1 day              Airway  Duration                  Airway - Non-Surgical 04/06/24 1748 Endotracheal Tube 4 days              Arterial Line  Duration             Arterial Line 04/06/24 1647 Right Radial 4 days              Peripheral Intravenous Line  Duration                  Peripheral IV - Single Lumen 04/06/24 0041 20 G Anterior;Right Forearm 5  days         Peripheral IV - Single Lumen 04/06/24 1021 20 G Anterior;Left Shoulder 4 days         Peripheral IV - Single Lumen 04/08/24 1714 20 G;Other (Comments) Anterior;Left Upper Arm 2 days                  Significant Labs:    CBC/Anemia Profile:  Recent Labs   Lab 04/10/24  0415 04/10/24  1510 04/11/24  0510   WBC 21.40*  --  24.34*   HGB 14.9  --  14.6   HCT 41.6 47 41.4   *  --  144*   MCV 87  --  89   RDW 14.6*  --  15.7*        Chemistries:  Recent Labs   Lab 04/09/24  1033 04/09/24  1339 04/10/24  0415 04/10/24  1334 04/10/24  2125 04/11/24  0510   *   < > 120*  120*  120* 123* 122*  122* 121*   K 4.6   < > 4.3  4.3  4.3 4.0 3.9  3.9 4.3   CL 89*   < > 85*  85*  85* 86* 87*  87* 87*   CO2 20*   < > 19*  20*  20* 22* 21*  21* 18*   BUN 17   < > 19  19  19 19 19  19 19   CREATININE 2.0*   < > 1.6*  1.6*  1.6* 1.5* 1.3  1.3 1.2   CALCIUM 9.0   < > 8.9  8.9  8.9 9.0 9.1  9.1 9.0   ALBUMIN 2.1*   < > 2.0*  2.0*  2.0* 1.9* 2.0*  2.0* 1.9*   PROT 6.5  --  6.4  --   --  6.4   BILITOT 7.8*  --  8.2*  --   --  9.1*   ALKPHOS 119  --  132  --   --  165*   *  --  150*  --   --  158*   *  --  133*  --   --  141*   MG  --    < > 2.2  2.2 2.0 1.9  1.9  --    PHOS  --    < > 4.1  4.1  4.1 4.5 3.4  3.4 3.6    < > = values in this interval not displayed.       All pertinent labs within the past 24 hours have been reviewed.    Significant Imaging:  I have reviewed all pertinent imaging results/findings within the past 24 hours.  CXR pending this morning; CXR yesterday afternoon with consolidation in RLL which appears to be atelectasis; ETT appears about 3.5 cm above juan, trialysis in place in SVC.     ABG  Recent Labs   Lab 04/11/24  0516   PH 7.333*   PO2 184*   PCO2 50.6*   HCO3 26.9   BE 1     Assessment/Plan:     Pulmonary  Acute hypoxemic respiratory failure  Patient with Hypercapnic and Hypoxic Respiratory failure which is Acute.  he is not on home oxygen.  Supplemental oxygen was provided and noted- Vent Mode: A/C  Oxygen Concentration (%):  [] 70  Resp Rate Total:  [32 br/min-34 br/min] 32 br/min  Vt Set:  [420 mL] 420 mL  PEEP/CPAP:  [14 cmH20] 14 cmH20  Mean Airway Pressure:  [20 qhV92-15 cmH20] 20 cmH20    .   Signs/symptoms of respiratory failure include- tachypnea, increased work of breathing, respiratory distress, and lethargy. Contributing diagnoses includes - ARDS, Obesity Hypoventilation, and Pneumonia Labs and images were reviewed. Patient Has recent ABG, which has been reviewed. Will treat underlying causes and adjust management of respiratory failure as follows-     [place] [facility] [place] Bipap with worsening O2 requirements  ARDS secondary to acute pancreatitis, intubated on MV ARDSnet protocol, slightly improved today.    Plan:  -- P/F ratio improved from improved this morning to 263 this morning on AM 4/11, satting 100% on AC/VC 32/420/+14/70; will discuss decreasing PEEP/O2 with staff this morning vs after supine  -- Will adjust vent settings to adjust per ARDS net protocol, currently on low-peep/high FiO2 settings  -- Proned since 5p, return to supine at 10a. Will likely need to continue pending repeat ABG and P/F ratio  -- Pt on fentanyl 250 mcg/hr, ketamine 20 mg/hr, and added versed 5 mg last night now adequately sedated and paralyzed with Nimbex  -- May need methadone for fentanyl wean once appropriate given prolonged use at high dosing for sedation      Cardiac/Vascular  Atrial fibrillation  New onset paroxysmal a-fib started 4/8, likely stress induced given critical illness. Was in RVR, amio bolus and gtt started 4/8, still on 0.5 mg/min    Plan:  -- HR controlled in 110-120s, appears sinus at this time but was in a-fib 4/9  -- Continue amio infusion at 0.5 mg/min, consider oral 200 mg once pt able to tolerate more nutrition with normal bowel function  -- TSH slightly low but FT4 low-normal, not consistent with hyperthyroid. Likely  etiology from critical illness  -- CHADSVASC of 3 currently with acutely low EF due to sepsis, will continue DVT prophylaxis dose for now; if a-fib persistent will consider OAC    Shock  Distributive- warm well perfused and diaphoretic yesterday on exam. Currently on norepi at 0.4 mcg/kg/min and vaso at 0.04 units/hr after increased dosing  Plan:  -- Wean pressors for MAP > 65  -- See Acute biliary pancreatitis plan    Hyperlipidemia  most recent lipid panel on 10/2023 with . Hypertriglyceridemia today continuing to improve; propofol stopped 4/8; etiology likely post-ERCP vs propofol induced    Plan:  -- Triglycerides improving drastically from 1414 --> 684, can likely trend every other day  -- holding home pravastatin 20mg qd given elevated LFT's which are returning to baseline, can hopefully resume tomorrow  -- Continue to avoid propofol at this time    Essential hypertension  Chronic, uncontrolled at baseline, however hypotensive on pressors 2/2 illness. Latest blood pressure and vitals reviewed-     Temp:  [98.6 °F (37 °C)-99.3 °F (37.4 °C)]   Pulse:  [71-90]   Resp:  [4-33]   SpO2:  [88 %-96 %]   Arterial Line BP: ()/(49-77) .   Home meds for hypertension were reviewed and noted below.   Hypertension Medications               amLODIPine (NORVASC) 10 MG tablet Take 1 tablet (10 mg total) by mouth once daily.    carvediloL (COREG) 25 MG tablet Take 1 tablet (25 mg total) by mouth 2 (two) times daily with meals.    furosemide (LASIX) 40 MG tablet Take 1 tablet (40 mg total) by mouth once daily.    lisinopriL (PRINIVIL,ZESTRIL) 40 MG tablet Take 1 tablet (40 mg total) by mouth once daily.          Plan:  --Holding all antihypertensives while in shock        Renal/  Metabolic acidosis  Acidosis due to pancreatitis and acute renal failure. See STEFANIE and pancreatitis for plans.    Acute renal failure superimposed on chronic kidney disease  Patient with acute kidney injury/acute renal failure likely due to  "acute tubular necrosis caused by severe shock and acute infectious process.  STEFANIE is currently worsening. Baseline creatinine  1.0  - Labs reviewed- Renal function/electrolytes with Estimated Creatinine Clearance: 71.5 mL/min (based on SCr of 1.2 mg/dL). according to latest data. Monitor urine output and serial BMP and adjust therapy as needed. Avoid nephrotoxins and renally dose meds for GFR listed above.    Worsening renal failure during admission likely ischemic ATN s/o shock and sepsis. RF c/b hyperkalemia and acidosis. Nephrology following at OSH. Anticipate patient will likely need RRT on current admission.    -- minimal urine output, pt remains oliguric  -- nephrology consulted, appreciate recs.   -- RIJ Trialysis catheter replaced 4/8; Continuous SLED per nephro; clotted twice in last 24h as line appears to be positional, possibly due to prone/supine rotations  -- maintain MAP > 65  -- renally dose meds and avoid nephrotoxic meds   -- monitoring electrolytes closely on labs; replace as needed      CKD (chronic kidney disease), stage III  -- see "acute renal failure"    Endocrine  Morbid obesity with BMI of 40.0-44.9, adult  Body mass index is 42.81 kg/m². Morbid obesity complicates all aspects of disease management from diagnostic modalities to treatment. Weight loss encouraged and health benefits explained to patient.       GI  * Acute biliary pancreatitis without infection or necrosis  Concern for cholangitis with worsening pressor requirements and septic shock.  Transferred to [facility] with worsening O2 requirements on Bipap.  Lipase >1000 with worsening LFTs and bilirubin.  . Likely biliary etiology.  Imaging without clear evidence of CBD dilation or obvious stones/necrosis.  Acute pancreatitis seen on imaging. AES consulted on admission, s/p ERCP with CBD stent placement (4/6/24). LFT's and bilirubin showing improvement, still requiring bowel rest at this time given significant gastric secretions " and air.     - on prop/fent, nimbex, levo/vaso  - Continue meropenem and vanc for now  - AES following, performed ERCP and placed cbd duct stent  - Continue to monitor LFTs and amylase/lipase  - B/c showed GPCs coag negative determined to be contaminant,ID consulted recommended stopping vanc. Repeat Blood cultures 4/8 NGTD x72h  - IVF prn  - started stress dose steroids (dex)  - hyperglycemia; On high dose SSI as CBG's elevated likely 2/2 pancreatitis and steroid use  - Trickle feeds and bowel regimen started; abd soft/nondistended/absent bowel sounds yesterday but residuals okay. Likely continue today.  - Bowel regimen BID miralax started yesterday, absent BS and no BM; consider Relistor given long use of fentanyl gtt      Orthopedic  History of gout  -- hold home allopurinol s/o acute renal failure    Palliative Care  ACP (advance care planning)    Advance Care Planning Code Status  Advance Care Planning    Date: 04/06/2024    Code Status  In light of the patients advanced and life limiting illness,I engaged the the  pt's spouse, Annel Collado  in a voluntary conversation about the patient's preferences for care  at the very end of life. The pt's spouse stated patient will be FULL CODE. Along those lines, the patient does wish to have CPR or other invasive treatments performed when his heart and/or breathing stops. I communicated to the  pt's spouse, Annel Collado  that a FULL CODE STATUS will remain on the patient's chart. I spent a total of 30 minutes engaging the patient in this advance care planning discussion.                 Critical Care Daily Checklist:    A: Awake: RASS Goal/Actual Goal: RASS Goal: -5-->unarousable  Actual:     B: Spontaneous Breathing Trial Performed? Spon. Breathing Trial Initiated?: Not initiated (04/08/24 0704)   C: SAT & SBT Coordinated?  Will attempt again today; pt did not tolerate yesterday                      D: Delirium: CAM-ICU Overall CAM-ICU: Positive   E: Early Mobility  Performed? No   F: Feeding Goal: Goals: Meet % EEN, EPN by RD f/u date  Status: Nutrition Goal Status: new   Current Diet Order   Procedures    Diet NPO      AS: Analgesia/Sedation Fentanyl, ketamine, versed; Nimbex   T: Thromboembolic Prophylaxis Heparin 7.5K TID   H: HOB > 300 Yes   U: Stress Ulcer Prophylaxis (if needed) Daily protonix   G: Glucose Control Scheduled short acting, SSI   B: Bowel Function  Trickle feeds, BID miralax   I: Indwelling Catheter (Lines & Oneill) Necessity Oneill, PICC, Trialysis, ETT, OGT, PIV   D: De-escalation of Antimicrobials/Pharmacotherapies meropenem    Plan for the day/ETD Supine; ABG to determine prone, SAT    Code Status:  Family/Goals of Care: Full Code  ongoing       Critical secondary to Patient has a condition that poses threat to life and bodily function: Severe Respiratory Distress on ventilator ARDS net protocol, remains on pressors and SLED.       Critical care was time spent personally by me on the following activities: development of treatment plan with patient or surrogate and bedside caregivers, discussions with consultants, evaluation of patient's response to treatment, examination of patient, ordering and performing treatments and interventions, ordering and review of laboratory studies, ordering and review of radiographic studies, pulse oximetry, re-evaluation of patient's condition. This critical care time did not overlap with that of any other provider or involve time for any procedures.     Jie Rebolledo MD  [employer] IM PGY III  Critical Care Medicine  [facility] - Cardiac Medical ICU

## 2024-04-11 NOTE — PLAN OF CARE
MICU DAILY GOALS     Family/Goals of care/Code Status   Code Status: Full Code    24H Vital Sign Range  Temp:  [99.1 °F (37.3 °C)-99.8 °F (37.7 °C)]   Pulse:  [114-129]   Resp:  [13-34]   SpO2:  [97 %-100 %]   Arterial Line BP: ()/()      Shift Events (include procedures and significant events)   No acute events throughout shift    AWAKE RASS: Goal - RASS Goal: -5-->unarousable  Actual - RASS (Parry Agitation-Sedation Scale): unarousable    Restraint necessity: Not necessary   BREATHE SBT: Not attempted    Coordinate A & B, analgesics/sedatives Pain: managed   SAT: Not attempted   Delirium CAM-ICU: Overall CAM-ICU: Positive   Early(intubated/ Progressive (non-intubated) Mobility MOVE Screen (INTUBATED ONLY): Not attempted    Activity: Activity Management: Patient unable to perform activities   Feeding/Nutrition Diet order: Diet/Nutrition Received: tube feeding,     Thrombus DVT prophylaxis: VTE Required Core Measure: Pharmacological prophylaxis initiated/maintained   HOB Elevation Head of Bed (HOB) Positioning: HOB at 20-30 degrees   Ulcer Prophylaxis GI: yes   Glucose control managed     Skin Skin assessed during: Daily Assessment    Sacrum intact/not altered? Yes  Heels intact/not altered? No  Surgical wound? Yes    CHECK ONE!   (no altered skin or altered skin) and sub boxes:  [] No Altered Skin Integrity Present    []Prevention Measures Documented    [] Altered Skin Integrity Present or Discovered   [] LDA present in EPIC, daily doc completed              [] LDA added if not in EPIC (describe wound).                    When describing wound, do not stage, use descriptive words only.    [] Wound Image Taken (required on admit,                   transfer/discharge and every Tuesday)    Wound Care Consulted? Yes    4 EYES:  Attending Nurse (1st set of eyes): JERRY Mims    Second RN/Staff Member (2nd set of eyes): JERRY Sanders   Bowel Function diarrhea    Indwelling Catheter Necessity      Urethral  Catheter 04/06/24 1000-Reason for Continuing Urinary Catheterization: Critically ill in ICU and requiring hourly monitoring of intake/output    PICC Double Lumen 04/06/24 1136 right basilic-Line Necessity Review: Hemodynamic instability, Medication caustic to vasculature  [REMOVED] Trialysis (Dialysis) Catheter 04/06/24 1836 right internal jugular-Line Necessity Review: Hemodynamic instability, CRRT/HD  Trialysis (Dialysis) Catheter 04/08/24 1618 right internal jugular-Line Necessity Review: CRRT/HD     De-escalation Antibiotics Yes        VS and assessment per flow sheet, patient progressing towards goals as tolerated, plan of care reviewed with family, all concerns addressed, will continue to monitor.

## 2024-04-11 NOTE — PROGRESS NOTES
04/11/24 0117   Treatment   Treatment Type SLED   Treatment Status Daily equipment check   Dialysis Machine Number [id number]   Dialyzer Time (hours) 12.5   BVP (Liters) 147.8 L   Solutions Labeled and Current  Yes   Access Temporary Cath;Right;IJ   Catheter Dressing Intact  Yes   Alarms Engaged Yes   CRRT Comments Daily check done.

## 2024-04-11 NOTE — ASSESSMENT & PLAN NOTE
most recent lipid panel on 10/2023 with . Hypertriglyceridemia today continuing to improve; propofol stopped 4/8; etiology likely post-ERCP vs propofol induced    Plan:  -- Triglycerides improving drastically from 1414 --> 684, can likely trend every other day  -- holding home pravastatin 20mg qd given elevated LFT's which are returning to baseline, can hopefully resume tomorrow  -- Continue to avoid propofol at this time

## 2024-04-11 NOTE — ASSESSMENT & PLAN NOTE
Concern for cholangitis with worsening pressor requirements and septic shock.  [place] [facility] [place] O2 requirements on Bipap.  Lipase >1000 with worsening LFTs and bilirubin.  . Likely biliary etiology.  Imaging without clear evidence of CBD dilation or obvious stones/necrosis.  Acute pancreatitis seen on imaging. AES consulted on admission, s/p ERCP with CBD stent placement (4/6/24). LFT's and bilirubin showing improvement, still requiring bowel rest at this time given significant gastric secretions and air.     - on prop/fent, nimbex, levo/vaso  - Continue meropenem and vanc for now  - AES following, performed ERCP and placed cbd duct stent  - Continue to monitor LFTs and amylase/lipase  - B/c showed GPCs coag negative determined to be contaminant,ID consulted recommended stopping vanc. Repeat Blood cultures 4/8 NGTD x72h  - IVF prn  - started stress dose steroids (dex)  - hyperglycemia; On high dose SSI as CBG's elevated likely 2/2 pancreatitis and steroid use  - Trickle feeds and bowel regimen started; abd soft/nondistended/absent bowel sounds yesterday but residuals okay. Likely continue today.  - Bowel regimen BID miralax started yesterday, absent BS and no BM; consider Relistor given long use of fentanyl gtt

## 2024-04-11 NOTE — SUBJECTIVE & OBJECTIVE
Interval History/Significant Events: Patient proned again overnight, tolerated well with P/F ratio 263 this morning, satting 100% on vent with Ppress 31 and Pplat 24 settings AC/VC 32/420/+14/70 with ABG 7.333/50.6/184/26.9, started on versed at 5 mg/hr now as pt max dosed on ketamine and fentanyl with BIS still 80s, BIS currently 43, still on Nimbex. CRRT clotted off this morning, catheter positional likely secondary to prone/supine flips. Lower pressor requirements norepi 0.4 and vaso 0.04 this morning. Scant u/o, net -1685 I/O last 24h.     Review of Systems   Unable to perform ROS: Intubated     Objective:     Vital Signs (Most Recent):  Temp: 99.3 °F (37.4 °C) (04/11/24 0729)  Pulse: (!) 126 (04/11/24 0729)  Resp: (!) 32 (04/11/24 0200)  BP: (!) 89/64 (04/09/24 1830)  SpO2: 100 % (04/11/24 0729) Vital Signs (24h Range):  Temp:  [99.1 °F (37.3 °C)-99.8 °F (37.7 °C)] 99.3 °F (37.4 °C)  Pulse:  [114-129] 126  Resp:  [32-34] 32  SpO2:  [97 %-100 %] 100 %  Arterial Line BP: ()/() 105/73   Weight: 131.5 kg (289 lb 14.5 oz)  Body mass index is 46.79 kg/m².      Intake/Output Summary (Last 24 hours) at 4/11/2024 0741  Last data filed at 4/11/2024 0730  Gross per 24 hour   Intake 5720.45 ml   Output 8313 ml   Net -2592.55 ml          Physical Exam  Vitals and nursing note reviewed.   Constitutional:       General: He is not in acute distress.     Appearance: He is obese. He is ill-appearing and toxic-appearing. He is not diaphoretic.   HENT:      Head: Normocephalic and atraumatic.      Mouth/Throat:      Mouth: Mucous membranes are moist.      Pharynx: Oropharynx is clear.   Eyes:      Comments: Pt blind, difficult assess pupillary reflex   Cardiovascular:      Rate and Rhythm: Regular rhythm. Tachycardia present.      Pulses: Normal pulses.      Heart sounds: Normal heart sounds. No murmur heard.     No friction rub. No gallop.      Comments: Pt prone, unable to assess heart tones  Pulmonary:      Breath  sounds: No wheezing, rhonchi or rales.      Comments: Intubated  Prone, improving aeration though still diminished bilaterally w/o adventitious breath sounds  Abdominal:      Comments: Unable to assess- pt proned   Genitourinary:     Comments: Oneill catheter in place, scant urine output dark yellow with sediment  Skin:     General: Skin is warm and dry.      Capillary Refill: Capillary refill takes 2 to 3 seconds.   Neurological:      Mental Status: He is alert.      Comments: Sedated and paralyzed            Vents:  Vent Mode: A/C (04/11/24 0729)  Ventilator Initiated: Yes (04/06/24 1758)  Set Rate: 32 BPM (04/11/24 0729)  Vt Set: 420 mL (04/11/24 0729)  PEEP/CPAP: 14 cmH20 (04/11/24 0729)  Oxygen Concentration (%): 70 (04/11/24 0729)  Peak Airway Pressure: 31 cmH20 (04/11/24 0729)  Plateau Pressure: 24 cmH20 (04/11/24 0729)  Total Ve: 13.3 L/m (04/11/24 0729)  Negative Inspiratory Force (cm H2O): 0 (04/11/24 0729)  F/VT Ratio<105 (RSBI): (!) 73.39 (04/11/24 0101)  Lines/Drains/Airways       Peripherally Inserted Central Catheter Line  Duration             PICC Double Lumen 04/06/24 1136 right basilic 4 days              Central Venous Catheter Line  Duration             Trialysis (Dialysis) Catheter 04/08/24 1618 right internal jugular 2 days              Drain  Duration                  Urethral Catheter 04/06/24 1000 4 days         NG/OG Tube 04/09/24 1030 orogastric 18 Fr. Center mouth 1 day              Airway  Duration                  Airway - Non-Surgical 04/06/24 1748 Endotracheal Tube 4 days              Arterial Line  Duration             Arterial Line 04/06/24 1647 Right Radial 4 days              Peripheral Intravenous Line  Duration                  Peripheral IV - Single Lumen 04/06/24 0041 20 G Anterior;Right Forearm 5 days         Peripheral IV - Single Lumen 04/06/24 1021 20 G Anterior;Left Shoulder 4 days         Peripheral IV - Single Lumen 04/08/24 1714 20 G;Other (Comments) Anterior;Left Upper  Arm 2 days                  Significant Labs:    CBC/Anemia Profile:  Recent Labs   Lab 04/10/24  0415 04/10/24  1510 04/11/24  0510   WBC 21.40*  --  24.34*   HGB 14.9  --  14.6   HCT 41.6 47 41.4   *  --  144*   MCV 87  --  89   RDW 14.6*  --  15.7*        Chemistries:  Recent Labs   Lab 04/09/24  1033 04/09/24  1339 04/10/24  0415 04/10/24  1334 04/10/24  2125 04/11/24  0510   *   < > 120*  120*  120* 123* 122*  122* 121*   K 4.6   < > 4.3  4.3  4.3 4.0 3.9  3.9 4.3   CL 89*   < > 85*  85*  85* 86* 87*  87* 87*   CO2 20*   < > 19*  20*  20* 22* 21*  21* 18*   BUN 17   < > 19  19  19 19 19  19 19   CREATININE 2.0*   < > 1.6*  1.6*  1.6* 1.5* 1.3  1.3 1.2   CALCIUM 9.0   < > 8.9  8.9  8.9 9.0 9.1  9.1 9.0   ALBUMIN 2.1*   < > 2.0*  2.0*  2.0* 1.9* 2.0*  2.0* 1.9*   PROT 6.5  --  6.4  --   --  6.4   BILITOT 7.8*  --  8.2*  --   --  9.1*   ALKPHOS 119  --  132  --   --  165*   *  --  150*  --   --  158*   *  --  133*  --   --  141*   MG  --    < > 2.2  2.2 2.0 1.9  1.9  --    PHOS  --    < > 4.1  4.1  4.1 4.5 3.4  3.4 3.6    < > = values in this interval not displayed.       All pertinent labs within the past 24 hours have been reviewed.    Significant Imaging:  I have reviewed all pertinent imaging results/findings within the past 24 hours.  CXR pending this morning; CXR yesterday afternoon with consolidation in RLL which appears to be atelectasis; ETT appears about 3.5 cm above juan, trialysis in place in SVC.

## 2024-04-11 NOTE — PROGRESS NOTES
[facility]  Nephrology  Progress Note    Patient Name: Enoc Collado  MRN: [id number]  Admission Date: 4/6/2024  Hospital Length of Stay: 5 days  Attending Provider: Mynor Perkins MD   Primary Care Physician: Frantz Mason MD  Principal Problem:Acute biliary pancreatitis without infection or necrosis    Subjective:     HPI: Mr. Collado is a 73 year old male with chronic HTN, HLD, morbid obesity. Patient is intubated on multiple pressors at the time of my evaluation. All history was obtained from chart review and by family. Patient initially presented to [facility] with acute onset abdominal pain for 1 day on 4/5 associated with n/v/d. In ED at OSH, elevated LFTs to 140-180s with T bili 3.6/ direct bilirubin of 2.0. Lipase >1000. Abdominal US with CBD of 7mm and no concerns of acute cholecystitis. CT with contrast on 4/5 is consistent with acute pancreatitis without gross evidence of biliary obstruction. Patient does not have previous episodes of pancreatitis or no new medications recently started. . Patient started on IVF. Noted to have worsening respiratory status and confused at OSH. ABG with pH of 7.2, CO2 58, O2 68. Fluids were stopped and IV Lasix was given with patient placed on Bipap. Patient transferred to [facility] MICU for GI services. with EUS/ERCP.     Nephrology has been consulted for STEFANIE, hyperkalemia, and oliguria following IV contrast, pancreatitis, and hypotension. As per the wife, she is not aware of any prior history of kidney disease, and review of his labs available to me show a eGFR above 60 prior to this hospitalization. At the outside facility, he was hyperkalemic at 6.7, and was shifted with improvement in his potassium to 5.0, creatinine on arrival was 1.6 (baseline 1.0), and trended up to 4.1 at the time of the consult. Total bilirubin continues to trend up, currently at 14.8, and LFTs worsening. Most recent ABG prior to intubation showed 7.25/47/93/21. He was  intubated in the ICU, and is currently going for emergent ERCP. Wife denies the patient taking any NSAIDs, having decreased PO intake or decreased urinary output prior to presentation. Upon my initial encounter, the patient has produced only 150 mL of urine in the macias bag over the past 12 hours.     Interval History:   Continues on SLED, negative 1.6L/24h.  Na remains low, IVPBs being adjusted today.  Vent settings have improved, now on 50%/10. Remains on Levo/Vaso.      Review of patient's allergies indicates:   Allergen Reactions    No known drug allergies      Current Facility-Administered Medications   Medication Frequency    acetaminophen tablet 650 mg Q4H PRN    amiodarone 360 mg/200 mL (1.8 mg/mL) infusion Continuous    cisatracurium (NIMBEX) 200 mg in dextrose 5 % (D5W) 100 mL infusion Continuous    dexAMETHasone injection 10 mg Daily    dextrose 10 % infusion Continuous PRN    dextrose 10% bolus 125 mL 125 mL PRN    dextrose 10% bolus 250 mL 250 mL PRN    fentaNYL 2500 mcg in 0.9% sodium chloride 250 mL infusion premix (titrating) Continuous    fentanyl IV bolus from bag/infusion 50 mcg Q15 Min PRN    fludrocortisone tablet 100 mcg Daily    glucagon (human recombinant) injection 1 mg PRN    heparin (porcine) injection 7,500 Units Q8H    insulin aspart U-100 pen 0-15 Units Q4H PRN    insulin aspart U-100 pen 5 Units 6 times per day    ketamine 500 mg in sodium chloride 0.9% 100 mL infusion Continuous    lactulose 10 gram/15 mL enema solution (inpatient use) 200 g TID    meropenem (MERREM) 2 g in sodium chloride 0.9% 100 mL IVPB Q8H    methylnaltrexone 12 mg/0.6 mL subcutaneous injection 12 mg Once    midazolam (PF) in 0.9 % NaCl 1 mg/mL infusion Continuous    mupirocin 2 % ointment BID    NORepinephrine 32 mg in sodium chloride 0.9% 250 mL infusion Continuous    pantoprazole injection 40 mg Daily    polyethylene glycol packet 17 g BID    senna-docusate 8.6-50 mg per tablet 1 tablet BID    sodium chloride  0.9% flush 10 mL PRN    timolol maleate 0.5% ophthalmic solution 1 drop Daily    vasopressin (PITRESSIN) 0.2 Units/mL in dextrose 5 % (D5W) 100 mL infusion Continuous    white petrolatum-mineral oil (SYSTANE NIGHTTIME) ophthalmic ointment Q8H       Objective:     Vital Signs (Most Recent):  Temp: 99.7 °F (37.6 °C) (04/11/24 0900)  Pulse: (!) 127 (04/11/24 1026)  Resp: (!) 32 (04/11/24 0200)  BP: (!) 89/64 (04/09/24 1830)  SpO2: 100 % (04/11/24 1026) Vital Signs (24h Range):  Temp:  [99.1 °F (37.3 °C)-99.8 °F (37.7 °C)] 99.7 °F (37.6 °C)  Pulse:  [118-129] 127  Resp:  [32-34] 32  SpO2:  [97 %-100 %] 100 %  Arterial Line BP: ()/() 101/71     Weight: 131.5 kg (289 lb 14.5 oz) (04/09/24 1030)  Body mass index is 46.79 kg/m².  Body surface area is 2.47 meters squared.    I/O last 3 completed shifts:  In: 9119.2 [I.V.:5256.1; NG/GT:610; IV Piggyback:3253.1]  Out: 07495 [Other:25279]     Physical Exam  Vitals and nursing note reviewed.   Constitutional:       Appearance: He is obese. He is ill-appearing. He is not toxic-appearing or diaphoretic.      Interventions: He is sedated and intubated.   HENT:      Head: Atraumatic.      Nose: Nose normal.      Mouth/Throat:      Comments: Intubated   Cardiovascular:      Rate and Rhythm: Normal rate and regular rhythm.      Pulses: Normal pulses.   Pulmonary:      Effort: He is intubated.      Breath sounds: No wheezing, rhonchi or rales.      Comments: Mechanical breathsounds  Abdominal:      General: There is distension.   Musculoskeletal:      Right lower leg: Edema present.      Left lower leg: Edema present.          Significant Labs:  CBC:   Recent Labs   Lab 04/11/24  0510   WBC 24.34*   RBC 4.65   HGB 14.6   HCT 41.4   *   MCV 89   MCH 31.4*   MCHC 35.3     CMP:   Recent Labs   Lab 04/11/24  0510   *   CALCIUM 9.0   ALBUMIN 1.9*   PROT 6.4   *   K 4.3   CO2 18*   CL 87*   BUN 19   CREATININE 1.2   ALKPHOS 165*   *   *   BILITOT  9.1*        Assessment/Plan:     Pulmonary  Acute hypoxemic respiratory failure  -improved; on FiO2 of 50% PEEP 10    Renal/  Metabolic acidosis  Bicarb bath 35  -stable      Hypocalcemia  -continue post-filter calcium @ 25 cc/hr  -3 calcium bath  -stable    STEFANIE (acute kidney injury)  As per wife, patient with no prior history of CKD, baseline creatinine around 1.0 and eGFR>60 base on labs available. He presented to outside hospital for abdominal pain, received a dose of IV contrast for a CT of the abdomen, and found to have pancreatitis. Patient subsequently became hypotensive requiring the initiation of vasopressin and levophed for pressure support.    STEFANIE 2/2 iATN from septic shock from pancreatitis    Recommendations  -will continue 24 hour SLED  -Bicarb bath 35  -Na bath 135  -4k/3ca bath  -goal UF to be negative  cc/hr as tolerated without an increase in pressor requirements.  -Avoid nephrotoxic agents (IV contrast, NSAID's, gadolinium contrast, PPI's) if able.   -Maintain MAP above 65 mmHg.   -Strict I's and O's  -Daily renal function panel  -Renally dose all medications    Endocrine  Hyponatremia  -135 Na bath with SLED  -IV drips being adjusted to be placed in NS    GI  * Acute biliary pancreatitis without infection or necrosis  -Per primary      Rodolfo Hayward NP  Nephrology  [facility]

## 2024-04-12 PROBLEM — E87.20 LACTIC ACIDOSIS: Status: RESOLVED | Noted: 2024-01-01 | Resolved: 2024-01-01

## 2024-04-12 PROBLEM — K59.03 DRUG-INDUCED CONSTIPATION: Status: ACTIVE | Noted: 2024-01-01

## 2024-04-12 NOTE — PROGRESS NOTES
[facility]  Nephrology  Progress Note    Patient Name: Enoc Collado  MRN: [id number]  Admission Date: 4/6/2024  Hospital Length of Stay: 6 days  Attending Provider: Mynor Perkins MD   Primary Care Physician: Frantz Mason MD  Principal Problem:Acute biliary pancreatitis without infection or necrosis    Subjective:     HPI: Mr. Collado is a 73 year old male with chronic HTN, HLD, morbid obesity. Patient is intubated on multiple pressors at the time of my evaluation. All history was obtained from chart review and by family. Patient initially presented to [facility] with acute onset abdominal pain for 1 day on 4/5 associated with n/v/d. In ED at OSH, elevated LFTs to 140-180s with T bili 3.6/ direct bilirubin of 2.0. Lipase >1000. Abdominal US with CBD of 7mm and no concerns of acute cholecystitis. CT with contrast on 4/5 is consistent with acute pancreatitis without gross evidence of biliary obstruction. Patient does not have previous episodes of pancreatitis or no new medications recently started. . Patient started on IVF. Noted to have worsening respiratory status and confused at OSH. ABG with pH of 7.2, CO2 58, O2 68. Fluids were stopped and IV Lasix was given with patient placed on Bipap. Patient transferred to [facility] MICU for GI services. with EUS/ERCP.     Nephrology has been consulted for STEFANIE, hyperkalemia, and oliguria following IV contrast, pancreatitis, and hypotension. As per the wife, she is not aware of any prior history of kidney disease, and review of his labs available to me show a eGFR above 60 prior to this hospitalization. At the outside facility, he was hyperkalemic at 6.7, and was shifted with improvement in his potassium to 5.0, creatinine on arrival was 1.6 (baseline 1.0), and trended up to 4.1 at the time of the consult. Total bilirubin continues to trend up, currently at 14.8, and LFTs worsening. Most recent ABG prior to intubation showed 7.25/47/93/21. He was  intubated in the ICU, and is currently going for emergent ERCP. Wife denies the patient taking any NSAIDs, having decreased PO intake or decreased urinary output prior to presentation. Upon my initial encounter, the patient has produced only 150 mL of urine in the macias bag over the past 12 hours.     Interval History:   Seen on SLED this morning, clotting events overnight and citrate was started for regional anticoagulation.  Net even over the past 24 hours.  Remains on levo/Vaso.  Vent settings improved this morning to 40%/8         Review of patient's allergies indicates:   Allergen Reactions    No known drug allergies      Current Facility-Administered Medications   Medication Frequency    acetaminophen tablet 650 mg Q4H PRN    amiodarone 360 mg/200 mL (1.8 mg/mL) infusion Continuous    calcium gluconate 3 g in dextrose 5 % (D5W) 100 mL infusion Continuous    cisatracurium (NIMBEX) 200 mg in dextrose 5 % (D5W) 100 mL infusion Continuous    dexAMETHasone injection 10 mg Daily    dextrose 10 % infusion Continuous PRN    dextrose 10% bolus 125 mL 125 mL PRN    dextrose 10% bolus 250 mL 250 mL PRN    dextrose-sod citrate-citric ac 2.45-2.2 gram- 800 mg/100 mL Soln Continuous    fentaNYL 2500 mcg in 0.9% sodium chloride 250 mL infusion premix (titrating) Continuous    fentanyl IV bolus from bag/infusion 50 mcg Q15 Min PRN    fludrocortisone tablet 100 mcg Daily    glucagon (human recombinant) injection 1 mg PRN    heparin (porcine) injection 7,500 Units Q8H    insulin aspart U-100 pen 0-15 Units Q4H PRN    insulin aspart U-100 pen 5 Units 6 times per day    ketamine 1,250 mg in sodium chloride 0.9% SolP 250 mL infusion (titrating) Continuous    magnesium sulfate 2g in water 50mL IVPB (premix) PRN    meropenem (MERREM) 2 g in sodium chloride 0.9% 100 mL IVPB Q8H    NORepinephrine 32 mg in sodium chloride 0.9% 250 mL infusion Continuous    pantoprazole injection 40 mg Daily    polyethylene glycol packet 17 g BID     senna-docusate 8.6-50 mg per tablet 1 tablet BID    sodium chloride 0.9% flush 10 mL PRN    sodium phosphate 20.01 mmol in dextrose 5 % (D5W) 250 mL IVPB PRN    sodium phosphate 30 mmol in dextrose 5 % (D5W) 250 mL IVPB PRN    sodium phosphate 39.99 mmol in dextrose 5 % (D5W) 250 mL IVPB PRN    timolol maleate 0.5% ophthalmic solution 1 drop Daily    vasopressin (PITRESSIN) 0.2 Units/mL in dextrose 5 % (D5W) 100 mL infusion Continuous    white petrolatum-mineral oil (SYSTANE NIGHTTIME) ophthalmic ointment Q8H       Objective:     Vital Signs (Most Recent):  Temp: 97.7 °F (36.5 °C) (04/12/24 0700)  Pulse: (!) 130 (04/12/24 0900)  Resp: (!) 26 (04/12/24 0900)  BP: (!) 89/64 (04/09/24 1830)  SpO2: 98 % (04/12/24 0900) Vital Signs (24h Range):  Temp:  [97.7 °F (36.5 °C)-99.4 °F (37.4 °C)] 97.7 °F (36.5 °C)  Pulse:  [117-133] 130  Resp:  [21-32] 26  SpO2:  [91 %-100 %] 98 %  Arterial Line BP: ()/(64-89) 98/70     Weight: 131.5 kg (289 lb 14.5 oz) (04/09/24 1030)  Body mass index is 46.79 kg/m².  Body surface area is 2.47 meters squared.    I/O last 3 completed shifts:  In: 8709.8 [I.V.:4928; NG/GT:450; IV Piggyback:3331.7]  Out: 9702 [Other:9702]     Physical Exam  Vitals and nursing note reviewed.   Constitutional:       Appearance: He is obese. He is ill-appearing. He is not toxic-appearing or diaphoretic.      Interventions: He is sedated and intubated.      Comments: prone   HENT:      Head: Atraumatic.      Nose: Nose normal.      Mouth/Throat:      Comments: Intubated   Cardiovascular:      Rate and Rhythm: Normal rate and regular rhythm.      Pulses: Normal pulses.   Pulmonary:      Effort: He is intubated.      Breath sounds: No wheezing, rhonchi or rales.      Comments: Mechanical breathsounds  Abdominal:      General: There is distension.   Musculoskeletal:      Right lower leg: Edema present.      Left lower leg: Edema present.          Significant Labs:  ABGs:   Recent Labs   Lab 04/12/24  0422   PH  7.355   PCO2 45.4*   HCO3 25.3   POCSATURATED 94   BE 0     CBC:   Recent Labs   Lab 04/12/24  0307   WBC 28.89*   RBC 4.48*   HGB 13.8*   HCT 38.8*      MCV 87   MCH 30.8   MCHC 35.6     CMP:   Recent Labs   Lab 04/12/24  0307   *   CALCIUM 8.0*   ALBUMIN 1.8*   PROT 5.9*   *   K 4.3   CO2 24   CL 90*   BUN 21   CREATININE 1.2   ALKPHOS 179*   *   *   BILITOT 7.8*     All labs within the past 24 hours have been reviewed.     Assessment/Plan:     Pulmonary  Acute hypoxemic respiratory failure  -improved; on FiO2 of 40% PEEP 8    Renal/  Metabolic acidosis  Bicarb bath 30  -stable      Hypocalcemia  ICa q 8 hours  Post-filter calcium @ 30 cc/hr    STEFANIE (acute kidney injury)  As per wife, patient with no prior history of CKD, baseline creatinine around 1.0 and eGFR>60 base on labs available. He presented to outside hospital for abdominal pain, received a dose of IV contrast for a CT of the abdomen, and found to have pancreatitis. Patient subsequently became hypotensive requiring the initiation of vasopressin and levophed for pressure support.    STEFANIE 2/2 iATN from septic shock from pancreatitis    Recommendations  -will continue 24 hour SLED  -Bicarb bath 30  -Na bath 135  -citrate/calcium with iCA q 8 hours  -goal UF to be negative  cc/hr as tolerated without an increase in pressor requirements.  -Avoid nephrotoxic agents (IV contrast, NSAID's, gadolinium contrast, PPI's) if able.   -Maintain MAP above 65 mmHg.   -Strict I's and O's  -Daily renal function panel  -Renally dose all medications    Endocrine  Hyponatremia  -135 Na bath with SLED  -IV drips being adjusted to be placed in NS  -Na improving    GI  * Acute biliary pancreatitis without infection or necrosis  -Per primary      Rodolfo Hayward NP  Nephrology  [facility]

## 2024-04-12 NOTE — PLAN OF CARE
[facility]  Discharge Reassessment    Primary Care Provider: Frantz Mason MD    Expected Discharge Date: 4/15/2024    Reassessment (most recent)       Discharge Reassessment - 04/12/24 1120          Discharge Reassessment    Assessment Type Discharge Planning Reassessment     Did the patient's condition or plan change since previous assessment? No     Discharge Plan discussed with: Spouse/sig other     Name(s) and Number(s) Annel Collado,spouse/cp# [phone]     Communicated GRICELDA with patient/caregiver Date not available/Unable to determine     Discharge Plan A Comfort care/withdrawal     Discharge Plan B Inpatient Hospice     DME Needed Upon Discharge  other (see comments)   TBD    Transition of Care Barriers None     Why the patient remains in the hospital Requires continued medical care        Post-Acute Status    Discharge Delays None known at this time                     Advance care planning consulted.  Patient remains a full code at this time by wife (Annel Collado).  Patient on SLED, vented, and on gtt.  No movement of patient at this time.      Discharge Plan A and Plan B have been determined by review of patient's clinical status, future medical and therapeutic needs, and coverage/benefits for post-acute care in coordination with multidisciplinary team members.    Daniel Cabrera, JUNG  [facility]  [phone]

## 2024-04-12 NOTE — CARE UPDATE
Dialysis RN called that machine is clotted, 2nd time ,since morning  Changed SLED settings;  , Calcium bath to 2.5-->3, Citrate to 150ml-->80, Calcium gluconate to 30ml/h-->25ml/h  Changed ionized calcium to 4 hourly check-->8 hourly      Discussed with Dr Varghese Shah  Nephrology Fellow

## 2024-04-12 NOTE — PLAN OF CARE
MICU DAILY GOALS     Family/Goals of care/Code Status   Code Status: Full Code    24H Vital Sign Range  Temp:  [97.7 °F (36.5 °C)-98.6 °F (37 °C)]   Pulse:  []   Resp:  [13-28]   SpO2:  [90 %-100 %]   Arterial Line BP: ()/(59-89)      Shift Events (include procedures and significant events)   Supinated pt at 1000. SAT/SBT for ~2 hrs after supination, tolerated well, now back on previous vent settings. Pt still off of all sedation, not responding to stimuli. Noted to be making rhythmic jerking motions of his head this AM; 24 hour EEG in place. Pt still tachycardic to the 130s. Removed PICC d/t rising WBC count. Continuous CRRT running, dropped UF from 400 to 250 per MD d/t concern that pt's tachycardia may be resulting from removing too much fluid. TF goal now 40 ml/hr. Levo, vaso, amio, citrate, calcium gluconate gtts infusing.       AWAKE RASS: Goal - RASS Goal: 0-->alert and calm  Actual - RASS (Parry Agitation-Sedation Scale): unarousable    Restraint necessity: Not necessary   BREATHE SBT: Not intubated    Coordinate A & B, analgesics/sedatives Pain: managed   SAT: Not intubated   Delirium CAM-ICU: Overall CAM-ICU: Positive   Early(intubated/ Progressive (non-intubated) Mobility MOVE Screen (INTUBATED ONLY): Not intubated    Activity: Activity Management: Patient unable to perform activities   Feeding/Nutrition Diet order: Diet/Nutrition Received: NPO, tube feeding,     Thrombus DVT prophylaxis: VTE Required Core Measure: Pharmacological prophylaxis initiated/maintained   HOB Elevation Head of Bed (HOB) Positioning: HOB elevated   Ulcer Prophylaxis GI: yes   Glucose control managed     Skin Skin assessed during: Daily Assessment    Sacrum intact/not altered? Yes  Heels intact/not altered? Yes  Surgical wound? No    CHECK ONE!   (no altered skin or altered skin) and sub boxes:  [] No Altered Skin Integrity Present    []Prevention Measures Documented    [x] Altered Skin Integrity Present or  Discovered   [x] LDA present in EPIC, daily doc completed              [] LDA added if not in EPIC (describe wound).                    When describing wound, do not stage, use descriptive words only.    [x] Wound Image Taken (required on admit,                   transfer/discharge and every Tuesday)    Wound Care Consulted? Yes    4 EYES:   Attending Nurse (1st set of eyes): JERRY Easton    Second RN/Staff Member (2nd set of eyes): Kendra RN & Paul RN   Bowel Function constipation    Indwelling Catheter Necessity [REMOVED]      Urethral Catheter 04/06/24 1000-Reason for Continuing Urinary Catheterization: Critically ill in ICU and requiring hourly monitoring of intake/output    [REMOVED] PICC Double Lumen 04/06/24 1136 right basilic-Line Necessity Review: Hemodynamic instability, Medication caustic to vasculature  [REMOVED] Trialysis (Dialysis) Catheter 04/06/24 1836 right internal jugular-Line Necessity Review: Hemodynamic instability, CRRT/HD  Trialysis (Dialysis) Catheter 04/08/24 1618 right internal jugular-Line Necessity Review: CRRT/HD     De-escalation Antibiotics Yes        VS and assessment per flow sheet, patient progressing towards goals as tolerated, plan of care reviewed with  pt Zackary Collado and family , all concerns addressed, will continue to monitor.

## 2024-04-12 NOTE — ASSESSMENT & PLAN NOTE
New onset paroxysmal a-fib started 4/8, likely stress induced given critical illness. Was in RVR, amio bolus and gtt started 4/8, still on 0.5 mg/min. Given regularity and rate 120s, possibly a-flutter.    Plan:  -- HR controlled in 110-120s, appears sinus at this time but was in a-fib 4/9  -- Continue amio infusion at 0.5 mg/min, consider oral 200 mg once pt able to tolerate more nutrition with normal bowel function  -- TSH slightly low but FT4 low-normal, not consistent with hyperthyroid. Likely etiology from critical illness  -- CHADSVASC of 3 currently with acutely low EF due to sepsis, will continue DVT prophylaxis dose for now; if a-fib persistent will consider OAC  -- Will obtain EKG this morning once prone, remains on pressors may be candidate for cardioversion given improved vent requirements, will discuss possible EP consult with staff this morning

## 2024-04-12 NOTE — ASSESSMENT & PLAN NOTE
Patient with Hypercapnic and Hypoxic Respiratory failure which is Acute.  he is not on home oxygen. Supplemental oxygen was provided and noted- Vent Mode: A/C  Oxygen Concentration (%):  [40-70] 40  Resp Rate Total:  [24 br/min-32 br/min] 24 br/min  Vt Set:  [420 mL-450 mL] 450 mL  PEEP/CPAP:  [5 cmH20-10 cmH20] 8 cmH20  Mean Airway Pressure:  [11 xzE26-30 cmH20] 14 cmH20    .   Signs/symptoms of respiratory failure include- tachypnea, increased work of breathing, respiratory distress, and lethargy. Contributing diagnoses includes - ARDS, Obesity Hypoventilation, and Pneumonia Labs and images were reviewed. Patient Has recent ABG, which has been reviewed. Will treat underlying causes and adjust management of respiratory failure as follows-     [place] [facility] [place] with worsening [place]2 requirements  ARDS secondary to acute pancreatitis, intubated on MV ARDSnet protocol, slightly improved today.    Plan:  -- P/F ratio improved from 186 this morning on AM 4/12 from afternoon ABG (145), satting 100% on AC/VC 24/450/+8/40; PEEP/FiO2 dropped from +14/70% yesterday. Will hopefully wean more once supine pending ABG  -- Continue ARDS net protocol, currently on low-peep/high FiO2 settings  -- Proned since 4p 4/11, return to supine at 10a. Pending repeat ABG and P/F ratio may need proning again this evening  -- Pt on fentanyl 250 mcg/hr, ketamine 20 mg/hr; no longer paralyzed and off Versed.   -- Will try SAT/SBT trial once supine

## 2024-04-12 NOTE — ASSESSMENT & PLAN NOTE
As per wife, patient with no prior history of CKD, baseline creatinine around 1.0 and eGFR>60 base on labs available. He presented to outside hospital for abdominal pain, received a dose of IV contrast for a CT of the abdomen, and found to have pancreatitis. Patient subsequently became hypotensive requiring the initiation of vasopressin and levophed for pressure support.    STEFANIE 2/2 iATN from septic shock from pancreatitis    Recommendations  -will continue 24 hour SLED  -Bicarb bath 30  -Na bath 135  -citrate/calcium with iCA q 8 hours  -goal UF to be negative  cc/hr as tolerated without an increase in pressor requirements.  -Avoid nephrotoxic agents (IV contrast, NSAID's, gadolinium contrast, PPI's) if able.   -Maintain MAP above 65 mmHg.   -Strict I's and O's  -Daily renal function panel  -Renally dose all medications

## 2024-04-12 NOTE — PROGRESS NOTES
04/11/24 1947 04/11/24 2233   Treatment   Treatment Type SLED SLED   Treatment Status Clotting;Blood lost Restart   Dialysis Machine Number  --  [id number]   Dialyzer Time (hours) 7.35 0   BVP (Liters) 87.2 L 0 L   Solutions Labeled and Current   --  Yes   Access  --  Temporary Cath;IJ;Right   Catheter Dressing Intact   --  Yes   Alarms Engaged  --  Yes   CRRT Comments High venous pressure; unable to return venous side; dialyzer and venous chamber clotted SLED restarted with new orders

## 2024-04-12 NOTE — SUBJECTIVE & OBJECTIVE
Interval History:   Seen on SLED this morning, clotting events overnight and citrate was started for regional anticoagulation.  Net even over the past 24 hours.  Remains on levo/Vaso.  Vent settings improved this morning to 40%/8         Review of patient's allergies indicates:   Allergen Reactions    No known drug allergies      Current Facility-Administered Medications   Medication Frequency    acetaminophen tablet 650 mg Q4H PRN    amiodarone 360 mg/200 mL (1.8 mg/mL) infusion Continuous    calcium gluconate 3 g in dextrose 5 % (D5W) 100 mL infusion Continuous    cisatracurium (NIMBEX) 200 mg in dextrose 5 % (D5W) 100 mL infusion Continuous    dexAMETHasone injection 10 mg Daily    dextrose 10 % infusion Continuous PRN    dextrose 10% bolus 125 mL 125 mL PRN    dextrose 10% bolus 250 mL 250 mL PRN    dextrose-sod citrate-citric ac 2.45-2.2 gram- 800 mg/100 mL Soln Continuous    fentaNYL 2500 mcg in 0.9% sodium chloride 250 mL infusion premix (titrating) Continuous    fentanyl IV bolus from bag/infusion 50 mcg Q15 Min PRN    fludrocortisone tablet 100 mcg Daily    glucagon (human recombinant) injection 1 mg PRN    heparin (porcine) injection 7,500 Units Q8H    insulin aspart U-100 pen 0-15 Units Q4H PRN    insulin aspart U-100 pen 5 Units 6 times per day    ketamine 1,250 mg in sodium chloride 0.9% SolP 250 mL infusion (titrating) Continuous    magnesium sulfate 2g in water 50mL IVPB (premix) PRN    meropenem (MERREM) 2 g in sodium chloride 0.9% 100 mL IVPB Q8H    NORepinephrine 32 mg in sodium chloride 0.9% 250 mL infusion Continuous    pantoprazole injection 40 mg Daily    polyethylene glycol packet 17 g BID    senna-docusate 8.6-50 mg per tablet 1 tablet BID    sodium chloride 0.9% flush 10 mL PRN    sodium phosphate 20.01 mmol in dextrose 5 % (D5W) 250 mL IVPB PRN    sodium phosphate 30 mmol in dextrose 5 % (D5W) 250 mL IVPB PRN    sodium phosphate 39.99 mmol in dextrose 5 % (D5W) 250 mL IVPB PRN    timolol  maleate 0.5% ophthalmic solution 1 drop Daily    vasopressin (PITRESSIN) 0.2 Units/mL in dextrose 5 % (D5W) 100 mL infusion Continuous    white petrolatum-mineral oil (SYSTANE NIGHTTIME) ophthalmic ointment Q8H       Objective:     Vital Signs (Most Recent):  Temp: 97.7 °F (36.5 °C) (04/12/24 0700)  Pulse: (!) 130 (04/12/24 0900)  Resp: (!) 26 (04/12/24 0900)  BP: (!) 89/64 (04/09/24 1830)  SpO2: 98 % (04/12/24 0900) Vital Signs (24h Range):  Temp:  [97.7 °F (36.5 °C)-99.4 °F (37.4 °C)] 97.7 °F (36.5 °C)  Pulse:  [117-133] 130  Resp:  [21-32] 26  SpO2:  [91 %-100 %] 98 %  Arterial Line BP: ()/(64-89) 98/70     Weight: 131.5 kg (289 lb 14.5 oz) (04/09/24 1030)  Body mass index is 46.79 kg/m².  Body surface area is 2.47 meters squared.    I/O last 3 completed shifts:  In: 8709.8 [I.V.:4928; NG/GT:450; IV Piggyback:3331.7]  Out: 9702 [Other:9702]     Physical Exam  Vitals and nursing note reviewed.   Constitutional:       Appearance: He is obese. He is ill-appearing. He is not toxic-appearing or diaphoretic.      Interventions: He is sedated and intubated.      Comments: prone   HENT:      Head: Atraumatic.      Nose: Nose normal.      Mouth/Throat:      Comments: Intubated   Cardiovascular:      Rate and Rhythm: Normal rate and regular rhythm.      Pulses: Normal pulses.   Pulmonary:      Effort: He is intubated.      Breath sounds: No wheezing, rhonchi or rales.      Comments: Mechanical breathsounds  Abdominal:      General: There is distension.   Musculoskeletal:      Right lower leg: Edema present.      Left lower leg: Edema present.          Significant Labs:  ABGs:   Recent Labs   Lab 04/12/24  0422   PH 7.355   PCO2 45.4*   HCO3 25.3   POCSATURATED 94   BE 0     CBC:   Recent Labs   Lab 04/12/24 0307   WBC 28.89*   RBC 4.48*   HGB 13.8*   HCT 38.8*      MCV 87   MCH 30.8   MCHC 35.6     CMP:   Recent Labs   Lab 04/12/24 0307   *   CALCIUM 8.0*   ALBUMIN 1.8*   PROT 5.9*   *   K 4.3    CO2 24   CL 90*   BUN 21   CREATININE 1.2   ALKPHOS 179*   *   *   BILITOT 7.8*     All labs within the past 24 hours have been reviewed.

## 2024-04-12 NOTE — PROGRESS NOTES
[facility] - Cardiac Medical ICU  Critical Care Medicine  Progress Note    Patient Name: Enoc Collado  MRN: [id number]  Admission Date: 4/6/2024  Hospital Length of Stay: 6 days  Code Status: Full Code  Attending Provider: Mynor Perkins MD  Primary Care Provider: Frantz Mason MD   Principal Problem: Acute biliary pancreatitis without infection or necrosis    Subjective:     HPI:  Mr. Collado is a 73yoM with HFpEF, HTN, HLD, CKD, gout, morbid obesity who initially presented to [facility] with acute onset abdominal pain for 1 day on 4/5 associated with n/v/d.  In ED at OSH, elevated LFTs to 140-180s with T bili 3.6/ direct bilirubin of 2.0.  Lipase >1000.  Abdominal US with CBD of 7mm and no concerns of acute cholecystitis.  CT consistent with acute pancreatitis without gross evidence of biliary obstruction.  Patient does not have previous episodes of pancreatitis or no new medications recently started.  .  Patient started on IVF.  Noted to have worsening respiratory status and confused at OSH.  ABG with pH of 7.2, CO2 58, O2 68.  Fluids were stopped and IV Lasix was given with patient placed on Bipap.  Repeat blood work revealing of worsening LFTs and total bilirubin to 12.5.  Patient transferred to [facility] for GI services with EUS/ERCP.    Hospital/ICU Course:  Pt was transferred from [facility] to [facility] for emergent EUS/ERCP due to acute interstitial pancreatitis. On arrival per, pt was requiring continuous bipap and producing no urine despite receiving lasix. Was on max peripheral levophed to maintain blood pressures. Pt required right radial arterial line and right internal jugular vein trialysis line. Continued zosyn. GI performed ERCP and placed CBD stent. Now on increased levo/vaso, propofol, fentanyl; proned and paralyzed starting 4/7. RIJ trialysis line requiring replacement 4/8, restarted CRRT; remains intubated/sedated/paralyzed/proned, showing improvement in P/F ratio to 138 this  morning, remains proned- daily assessment for re-prone. Blood cultures 4/8 NGTD. Proned again 4/11 but no longer paralyzed. P/F continues to improve, pending supine and ABG to determine if prone 4/12.  Discussed possible prognosis and family is amenable but would like to continue full code status.     Interval History/Significant Events: NAEO. Patient had significant improvement from respiratory standpoint yesterday, able to wean FiO2 to 40% and PEEP to +8 per ARDS net protocol; proned again at 1600 yesterday. P/F ratio this morning 185. Paralytic off since yesterday as well as Versed gtt, remains on fentanyl 250 mcg/hr and ketamine 20 mg/hr for sedation, breathing about 2-5x over the vent pulling volumes of 500-700. ABG this morning 7.355/45.4/74/25.3. Continuous SLED clotted off once last night and restarted. Large BM overnight after lactulose enema and methyl-naltrexone. On 0.32 norepi and 0.04 vasopressin, remains tachycardic 120s, WBC up to 28.9 this morning.     Review of Systems   Unable to perform ROS: Intubated     Objective:     Vital Signs (Most Recent):  Temp: 97.7 °F (36.5 °C) (04/12/24 0700)  Pulse: (!) 129 (04/12/24 0800)  Resp: (!) 27 (04/12/24 0800)  BP: (!) 89/64 (04/09/24 1830)  SpO2: 97 % (04/12/24 0800) Vital Signs (24h Range):  Temp:  [97.7 °F (36.5 °C)-99.7 °F (37.6 °C)] 97.7 °F (36.5 °C)  Pulse:  [117-133] 129  Resp:  [21-32] 27  SpO2:  [91 %-100 %] 97 %  Arterial Line BP: ()/(64-89) 90/66   Weight: 131.5 kg (289 lb 14.5 oz)  Body mass index is 46.79 kg/m².      Intake/Output Summary (Last 24 hours) at 4/12/2024 0822  Last data filed at 4/12/2024 0800  Gross per 24 hour   Intake 5405.74 ml   Output 5326 ml   Net 79.74 ml          Physical Exam  Vitals and nursing note reviewed.   Constitutional:       General: He is not in acute distress.     Appearance: He is obese. He is ill-appearing and toxic-appearing. He is not diaphoretic.   HENT:      Head: Normocephalic and atraumatic.       Mouth/Throat:      Mouth: Mucous membranes are moist.      Pharynx: Oropharynx is clear.   Eyes:      Comments: Pt blind, difficult assess pupillary reflex   Cardiovascular:      Rate and Rhythm: Regular rhythm. Tachycardia present.      Pulses: Normal pulses.      Heart sounds: Normal heart sounds. No murmur heard.     No friction rub. No gallop.      Comments: Pt prone, unable to assess heart tones  Pulmonary:      Breath sounds: No wheezing, rhonchi or rales.      Comments: Intubated  Prone, improving aeration though still diminished bilaterally w/o adventitious breath sounds  Abdominal:      General: Abdomen is flat.      Palpations: Abdomen is soft.   Genitourinary:     Comments: Onelil catheter in place, scant urine output dark yellow with sediment  Skin:     General: Skin is warm and dry.      Capillary Refill: Capillary refill takes 2 to 3 seconds.   Neurological:      Mental Status: He is alert.      Comments: Remains heavily sedated, no longer paralyzed  Breathing over vent 3-4 breaths per minute  Head twitch noted, 4-5x per minute              Vents:  Vent Mode: A/C (04/12/24 0738)  Ventilator Initiated: Yes (04/06/24 1758)  Set Rate: 24 BPM (04/12/24 0738)  Vt Set: 450 mL (04/12/24 0738)  PEEP/CPAP: 8 cmH20 (04/12/24 0738)  Oxygen Concentration (%): 40 (04/12/24 0800)  Peak Airway Pressure: 29 cmH20 (04/12/24 0738)  Plateau Pressure: 18 cmH20 (04/12/24 0738)  Total Ve: 12.2 L/m (04/12/24 0738)  Negative Inspiratory Force (cm H2O): 0 (04/12/24 0738)  F/VT Ratio<105 (RSBI): (!) 38.46 (04/12/24 0738)  Lines/Drains/Airways       Peripherally Inserted Central Catheter Line  Duration             PICC Double Lumen 04/06/24 1136 right basilic 5 days              Central Venous Catheter Line  Duration             Trialysis (Dialysis) Catheter 04/08/24 1618 right internal jugular 3 days              Drain  Duration                  NG/OG Tube 04/09/24 1030 orogastric 18 Fr. Center mouth 2 days              Airway   Duration                  Airway - Non-Surgical 04/06/24 1748 Endotracheal Tube 5 days              Arterial Line  Duration             Arterial Line 04/06/24 1647 Right Radial 5 days              Peripheral Intravenous Line  Duration                  Peripheral IV - Single Lumen 04/06/24 1021 20 G Anterior;Left Shoulder 5 days         Peripheral IV - Single Lumen 04/08/24 1714 20 G;Other (Comments) Anterior;Left Upper Arm 3 days         Peripheral IV - Single Lumen 04/11/24 1416 18 G;3/4 in Anterior;Proximal;Right Forearm <1 day                  Significant Labs:    CBC/Anemia Profile:  Recent Labs   Lab 04/10/24  1510 04/11/24  0510 04/12/24  0307   WBC  --  24.34* 28.89*   HGB  --  14.6 13.8*   HCT 47 41.4 38.8*   PLT  --  144* 162   MCV  --  89 87   RDW  --  15.7* 15.8*        Chemistries:  Recent Labs   Lab 04/11/24  0510 04/11/24  1307 04/11/24  2135 04/12/24  0307   * 123* 124*  124* 128*   K 4.3 4.2 4.6  4.6 4.3   CL 87* 87* 89*  89* 90*   CO2 18* 24 21*  21* 24   BUN 19 27* 32*  32* 21   CREATININE 1.2 1.7* 1.8*  1.8* 1.2   CALCIUM 9.0 8.2* 8.1*  8.1* 8.0*   ALBUMIN 1.9* 1.8* 1.8*  1.8* 1.8*   PROT 6.4  --   --  5.9*   BILITOT 9.1*  --   --  7.8*   ALKPHOS 165*  --   --  179*   *  --   --  133*   *  --   --  106*   MG  --  2.2 2.0  2.0 1.9   PHOS 3.6 3.7 4.1  4.1 2.6*       All pertinent labs within the past 24 hours have been reviewed.    Significant Imaging:  I have reviewed all pertinent imaging results/findings within the past 24 hours.  No new imaging today    ABG  Recent Labs   Lab 04/12/24  0422   PH 7.355   PO2 74*   PCO2 45.4*   HCO3 25.3   BE 0     Assessment/Plan:     Pulmonary  Acute hypoxemic respiratory failure  Patient with Hypercapnic and Hypoxic Respiratory failure which is Acute.  he is not on home oxygen. Supplemental oxygen was provided and noted- Vent Mode: A/C  Oxygen Concentration (%):  [40-70] 40  Resp Rate Total:  [24 br/min-32 br/min] 24 br/min  Vt  Set:  [420 mL-450 mL] 450 mL  PEEP/CPAP:  [5 cmH20-10 cmH20] 8 cmH20  Mean Airway Pressure:  [11 kdE36-77 cmH20] 14 cmH20    .   Signs/symptoms of respiratory failure include- tachypnea, increased work of breathing, respiratory distress, and lethargy. Contributing diagnoses includes - ARDS, Obesity Hypoventilation, and Pneumonia Labs and images were reviewed. Patient Has recent ABG, which has been reviewed. Will treat underlying causes and adjust management of respiratory failure as follows-     [place] [facility] [place] with worsening O2 requirements  ARDS secondary to acute pancreatitis, intubated on MV ARDSnet protocol, slightly improved today.    Plan:  -- P/F ratio improved from 186 this morning on AM 4/12 from afternoon ABG (145), satting 100% on AC/VC 24/450/+8/40; PEEP/FiO2 dropped from +14/70% yesterday. Will hopefully wean more once supine pending ABG  -- Continue ARDS net protocol, currently on low-peep/high FiO2 settings  -- Proned since 4p 4/11, return to supine at 10a. Pending repeat ABG and P/F ratio may need proning again this evening  -- Pt on fentanyl 250 mcg/hr, ketamine 20 mg/hr; no longer paralyzed and off Versed.   -- Will try SAT/SBT trial once supine        Cardiac/Vascular  Atrial fibrillation  New onset paroxysmal a-fib started 4/8, likely stress induced given critical illness. Was in RVR, amio bolus and gtt started 4/8, still on 0.5 mg/min. Given regularity and rate 120s, possibly a-flutter.    Plan:  -- HR controlled in 110-120s, appears sinus at this time but was in a-fib 4/9  -- Continue amio infusion at 0.5 mg/min, consider oral 200 mg once pt able to tolerate more nutrition with normal bowel function  -- TSH slightly low but FT4 low-normal, not consistent with hyperthyroid. Likely etiology from critical illness  -- CHADSVASC of 3 currently with acutely low EF due to sepsis, will continue DVT prophylaxis dose for now; if a-fib persistent will consider OAC  -- Will obtain EKG  this morning once prone, remains on pressors may be candidate for cardioversion given improved vent requirements, will discuss possible EP consult with staff this morning    Shock  Distributive- warm well perfused and diaphoretic yesterday on exam. Currently on norepi at 0.32 mcg/kg/min and vaso at 0.04 units/hr after increased dosing  Plan  -- Wean pressors for MAP > 65  -- See Acute biliary pancreatitis plan    Hyperlipidemia  most recent lipid panel on 10/2023 with . Hypertriglyceridemia today continuing to improve; propofol stopped 4/8; etiology likely post-ERCP vs propofol induced    Plan:  -- Triglycerides improving drastically from 1414 --> 588, will check every other day  -- holding home pravastatin 20mg qd given elevated LFT's which are returning to baseline, can hopefully resume tomorrow  -- Continue to avoid propofol at this time    Essential hypertension  Chronic, uncontrolled at baseline, however hypotensive on pressors 2/2 illness. Latest blood pressure and vitals reviewed-     Temp:  [98.6 °F (37 °C)-99.3 °F (37.4 °C)]   Pulse:  [71-90]   Resp:  [4-33]   SpO2:  [88 %-96 %]   Arterial Line BP: ()/(49-77) .   Home meds for hypertension were reviewed and noted below.   Hypertension Medications               amLODIPine (NORVASC) 10 MG tablet Take 1 tablet (10 mg total) by mouth once daily.    carvediloL (COREG) 25 MG tablet Take 1 tablet (25 mg total) by mouth 2 (two) times daily with meals.    furosemide (LASIX) 40 MG tablet Take 1 tablet (40 mg total) by mouth once daily.    lisinopriL (PRINIVIL,ZESTRIL) 40 MG tablet Take 1 tablet (40 mg total) by mouth once daily.          Plan:  --Holding all antihypertensives while in shock        Renal/  Metabolic acidosis  Acidosis due to pancreatitis and acute renal failure. See STEFANIE and pancreatitis for plans.    Acute renal failure superimposed on chronic kidney disease  Patient with acute kidney injury/acute renal failure likely due to acute  "tubular necrosis caused by severe shock and acute infectious process.  STEFANIE is currently worsening. Baseline creatinine  1.0  - Labs reviewed- Renal function/electrolytes with Estimated Creatinine Clearance: 71.5 mL/min (based on SCr of 1.2 mg/dL). according to latest data. Monitor urine output and serial BMP and adjust therapy as needed. Avoid nephrotoxins and renally dose meds for GFR listed above.    Worsening renal failure during admission likely ischemic ATN s/o shock and sepsis. RF c/b hyperkalemia and acidosis. Nephrology following.    Plan:  -- minimal urine output, pt remains oliguric  -- nephrology consulted, appreciate recs.   -- RIJ Trialysis catheter replaced 4/8; Continuous SLED per nephro; clotted twice in last 24h as line appears to be positional, possibly due to prone/supine rotations  -- maintain MAP > 65  -- renally dose meds and avoid nephrotoxic meds   -- monitoring electrolytes closely on labs; replace as needed      CKD (chronic kidney disease), stage III  -- see "acute renal failure"    Endocrine  Morbid obesity with BMI of 40.0-44.9, adult  Body mass index is 42.81 kg/m². Morbid obesity complicates all aspects of disease management from diagnostic modalities to treatment. Weight loss encouraged and health benefits explained to patient.       GI  * Acute biliary pancreatitis without infection or necrosis  Concern for cholangitis with worsening pressor requirements and septic shock.  [place] [facility] with worsening O2 requirements on Bipap.  Lipase >1000 with worsening LFTs and bilirubin.  . Likely biliary etiology.  Imaging without clear evidence of CBD dilation or obvious stones/necrosis.  Acute pancreatitis seen on imaging. AES consulted on admission, s/p ERCP with CBD stent placement (4/6/24). LFT's and bilirubin showing improvement, still requiring bowel rest at this time given significant gastric secretions and air.     - on prop/fent, nimbex, levo/vaso  - Continue meropenem " and vanc for now  - AES following, performed ERCP and placed cbd duct stent  - Continue to monitor LFTs and amylase/lipase  - B/c showed GPCs coag negative determined to be contaminant,ID consulted recommended stopping vanc. Repeat Blood cultures 4/8 NGTD x72h  - IVF prn  - started stress dose steroids (dex)  - hyperglycemia; On high dose SSI as CBG's elevated likely 2/2 pancreatitis and steroid use  - Trickle feeds and bowel regimen started; abd soft/nondistended/absent bowel sounds yesterday but residuals okay. Likely continue today.  - Bowel regimen BID miralax started yesterday, absent BS and no BM; consider Relistor given long use of fentanyl gtt      Orthopedic  History of gout  -- hold home allopurinol s/o acute renal failure    Palliative Care  ACP (advance care planning)    Advance Care Planning Code Status  Advance Care Planning    Date: 04/06/2024    Code Status  In light of the patients advanced and life limiting illness,I engaged the the  pt's spouse, Annel Collado  in a voluntary conversation about the patient's preferences for care  at the very end of life. The pt's spouse stated patient will be FULL CODE. Along those lines, the patient does wish to have CPR or other invasive treatments performed when his heart and/or breathing stops. I communicated to the  pt's spouse, Annel Collado  that a FULL CODE STATUS will remain on the patient's chart. I spent a total of 30 minutes engaging the patient in this advance care planning discussion.                 Critical Care Daily Checklist:    A: Awake: RASS Goal/Actual Goal: RASS Goal: -5-->unarousable  Actual:     B: Spontaneous Breathing Trial Performed? Spon. Breathing Trial Initiated?: Not initiated (04/08/24 0704)   C: SAT & SBT Coordinated?  Will attempt once supine                      D: Delirium: CAM-ICU Overall CAM-ICU: Positive   E: Early Mobility Performed? No   F: Feeding Goal: Goals: Meet % EEN, EPN by RD f/u date  Status: Nutrition Goal Status:  new   Current Diet Order   Procedures    Diet NPO      AS: Analgesia/Sedation Fentanyl, Ketamine   T: Thromboembolic Prophylaxis Heparin DVT ppx   H: HOB > 300 Yes   U: Stress Ulcer Prophylaxis (if needed) Protonix daily   G: Glucose Control Scheduled q4h, SSI   B: Bowel Function Stool Occurrence: 1   I: Indwelling Catheter (Lines & Oneill) Necessity PICC, Trialysis, PIV, a-line, ETT, OG   D: De-escalation of Antimicrobials/Pharmacotherapies meropenem    Plan for the day/ETD Supine, SAT/SBT, wean vent, start feeds    Code Status:  Family/Goals of Care: Full Code  Ongoing       Critical secondary to Patient has a condition that poses threat to life and bodily function: ARDS on mechanical vent and sedation, Acute Renal Failure on continous SLED, and remains on pressors for hypotension.      Critical care was time spent personally by me on the following activities: development of treatment plan with patient or surrogate and bedside caregivers, discussions with consultants, evaluation of patient's response to treatment, examination of patient, ordering and performing treatments and interventions, ordering and review of laboratory studies, ordering and review of radiographic studies, pulse oximetry, re-evaluation of patient's condition. This critical care time did not overlap with that of any other provider or involve time for any procedures.     Jie Rebolledo MD  [employer] IM PGY III  Critical Care Medicine  [facility] - Cardiac Medical ICU

## 2024-04-12 NOTE — PLAN OF CARE
MICU DAILY GOALS     Family/Goals of care/Code Status   Code Status: Full Code    24H Vital Sign Range  Temp:  [98.3 °F (36.8 °C)-99.7 °F (37.6 °C)]   Pulse:  [117-132]   Resp:  [21-32]   SpO2:  [91 %-100 %]   Arterial Line BP: ()/(68-89)      Shift Events (include procedures and significant events)   CRRT, vent, amio, levo, vaso, ketamine, fentanyl, bm passed    AWAKE RASS: Goal - RASS Goal: -5-->unarousable  Actual - RASS (Parry Agitation-Sedation Scale): unarousable    Restraint necessity: Not necessary   BREATHE SBT: Not attempted    Coordinate A & B, analgesics/sedatives Pain: managed   SAT: Not attempted   Delirium CAM-ICU: Overall CAM-ICU: Positive   Early(intubated/ Progressive (non-intubated) Mobility MOVE Screen (INTUBATED ONLY): Not attempted    Activity: Activity Management: Patient unable to perform activities   Feeding/Nutrition Diet order: Diet/Nutrition Received: tube feeding,     Thrombus DVT prophylaxis: VTE Required Core Measure: Pharmacological prophylaxis initiated/maintained   HOB Elevation Head of Bed (HOB) Positioning: HOB flat   Ulcer Prophylaxis GI: yes   Glucose control managed     Skin Skin assessed during: Q Shift Change    Sacrum intact/not altered? Yes  Heels intact/not altered? Yes  Surgical wound? No    CHECK ONE!   (no altered skin or altered skin) and sub boxes:  [] No Altered Skin Integrity Present    []Prevention Measures Documented    [x] Altered Skin Integrity Present or Discovered   [x] LDA present in EPIC, daily doc completed              [] LDA added if not in EPIC (describe wound).                    When describing wound, do not stage, use descriptive words only.    [] Wound Image Taken (required on admit,                   transfer/discharge and every Tuesday)    Wound Care Consulted? No    4 EYES:  Attending Nurse (1st set of eyes): JERRY Miller    Second RN/Staff Member (2nd set of eyes): JERRY Gardner   Bowel Function constipation    Indwelling Catheter Necessity  [REMOVED]      Urethral Catheter 04/06/24 1000-Reason for Continuing Urinary Catheterization: Critically ill in ICU and requiring hourly monitoring of intake/output    PICC Double Lumen 04/06/24 1136 right basilic-Line Necessity Review: Hemodynamic instability  [REMOVED] Trialysis (Dialysis) Catheter 04/06/24 1836 right internal jugular-Line Necessity Review: Hemodynamic instability, CRRT/HD  Trialysis (Dialysis) Catheter 04/08/24 1618 right internal jugular-Line Necessity Review: CRRT/HD  CRRT, hemodynamic instability   De-escalation Antibiotics No        VS and assessment per flow sheet, patient progressing towards goals as tolerated, plan of care reviewed with family, all concerns addressed, will continue to monitor.

## 2024-04-12 NOTE — ASSESSMENT & PLAN NOTE
Distributive- warm well perfused and diaphoretic yesterday on exam. Currently on norepi at 0.32 mcg/kg/min and vaso at 0.04 units/hr after increased dosing  Plan  -- Wean pressors for MAP > 65  -- See Acute biliary pancreatitis plan

## 2024-04-12 NOTE — ASSESSMENT & PLAN NOTE
most recent lipid panel on 10/2023 with . Hypertriglyceridemia today continuing to improve; propofol stopped 4/8; etiology likely post-ERCP vs propofol induced    Plan:  -- Triglycerides improving drastically from 1414 --> 588, will check every other day  -- holding home pravastatin 20mg qd given elevated LFT's which are returning to baseline, can hopefully resume tomorrow  -- Continue to avoid propofol at this time

## 2024-04-12 NOTE — ASSESSMENT & PLAN NOTE
Patient with acute kidney injury/acute renal failure likely due to acute tubular necrosis caused by severe shock and acute infectious process.  STEFANIE is currently worsening. Baseline creatinine  1.0  - Labs reviewed- Renal function/electrolytes with Estimated Creatinine Clearance: 71.5 mL/min (based on SCr of 1.2 mg/dL). according to latest data. Monitor urine output and serial BMP and adjust therapy as needed. Avoid nephrotoxins and renally dose meds for GFR listed above.    Worsening renal failure during admission likely ischemic ATN s/o shock and sepsis. RF c/b hyperkalemia and acidosis. Nephrology following.    Plan:  -- minimal urine output, pt remains oliguric  -- nephrology consulted, appreciate recs.   -- RIJ Trialysis catheter replaced 4/8; Continuous SLED per nephro; clotted twice in last 24h as line appears to be positional, possibly due to prone/supine rotations  -- maintain MAP > 65  -- renally dose meds and avoid nephrotoxic meds   -- monitoring electrolytes closely on labs; replace as needed

## 2024-04-12 NOTE — SUBJECTIVE & OBJECTIVE
Interval History/Significant Events: NAEO. Patient had significant improvement from respiratory standpoint yesterday, able to wean FiO2 to 40% and PEEP to +8 per ARDS net protocol; proned again at 1600 yesterday. P/F ratio this morning 185. Paralytic off since yesterday as well as Versed gtt, remains on fentanyl 250 mcg/hr and ketamine 20 mg/hr for sedation, breathing about 2-5x over the vent pulling volumes of 500-700. ABG this morning 7.355/45.4/74/25.3. Continuous SLED clotted off once last night and restarted. Large BM overnight after lactulose enema and methyl-naltrexone. On 0.32 norepi and 0.04 vasopressin, remains tachycardic 120s, WBC up to 28.9 this morning.     Review of Systems   Unable to perform ROS: Intubated     Objective:     Vital Signs (Most Recent):  Temp: 97.7 °F (36.5 °C) (04/12/24 0700)  Pulse: (!) 129 (04/12/24 0800)  Resp: (!) 27 (04/12/24 0800)  BP: (!) 89/64 (04/09/24 1830)  SpO2: 97 % (04/12/24 0800) Vital Signs (24h Range):  Temp:  [97.7 °F (36.5 °C)-99.7 °F (37.6 °C)] 97.7 °F (36.5 °C)  Pulse:  [117-133] 129  Resp:  [21-32] 27  SpO2:  [91 %-100 %] 97 %  Arterial Line BP: ()/(64-89) 90/66   Weight: 131.5 kg (289 lb 14.5 oz)  Body mass index is 46.79 kg/m².      Intake/Output Summary (Last 24 hours) at 4/12/2024 0822  Last data filed at 4/12/2024 0800  Gross per 24 hour   Intake 5405.74 ml   Output 5326 ml   Net 79.74 ml          Physical Exam  Vitals and nursing note reviewed.   Constitutional:       General: He is not in acute distress.     Appearance: He is obese. He is ill-appearing and toxic-appearing. He is not diaphoretic.   HENT:      Head: Normocephalic and atraumatic.      Mouth/Throat:      Mouth: Mucous membranes are moist.      Pharynx: Oropharynx is clear.   Eyes:      Comments: Pt blind, difficult assess pupillary reflex   Cardiovascular:      Rate and Rhythm: Regular rhythm. Tachycardia present.      Pulses: Normal pulses.      Heart sounds: Normal heart sounds. No  murmur heard.     No friction rub. No gallop.      Comments: Pt prone, unable to assess heart tones  Pulmonary:      Breath sounds: No wheezing, rhonchi or rales.      Comments: Intubated  Prone, improving aeration though still diminished bilaterally w/o adventitious breath sounds  Abdominal:      General: Abdomen is flat.      Palpations: Abdomen is soft.   Genitourinary:     Comments: Oneill catheter in place, scant urine output dark yellow with sediment  Skin:     General: Skin is warm and dry.      Capillary Refill: Capillary refill takes 2 to 3 seconds.   Neurological:      Mental Status: He is alert.      Comments: Remains heavily sedated, no longer paralyzed  Breathing over vent 3-4 breaths per minute  Head twitch noted, 4-5x per minute              Vents:  Vent Mode: A/C (04/12/24 0738)  Ventilator Initiated: Yes (04/06/24 1758)  Set Rate: 24 BPM (04/12/24 0738)  Vt Set: 450 mL (04/12/24 0738)  PEEP/CPAP: 8 cmH20 (04/12/24 0738)  Oxygen Concentration (%): 40 (04/12/24 0800)  Peak Airway Pressure: 29 cmH20 (04/12/24 0738)  Plateau Pressure: 18 cmH20 (04/12/24 0738)  Total Ve: 12.2 L/m (04/12/24 0738)  Negative Inspiratory Force (cm H2O): 0 (04/12/24 0738)  F/VT Ratio<105 (RSBI): (!) 38.46 (04/12/24 0738)  Lines/Drains/Airways       Peripherally Inserted Central Catheter Line  Duration             PICC Double Lumen 04/06/24 1136 right basilic 5 days              Central Venous Catheter Line  Duration             Trialysis (Dialysis) Catheter 04/08/24 1618 right internal jugular 3 days              Drain  Duration                  NG/OG Tube 04/09/24 1030 orogastric 18 Fr. Center mouth 2 days              Airway  Duration                  Airway - Non-Surgical 04/06/24 1748 Endotracheal Tube 5 days              Arterial Line  Duration             Arterial Line 04/06/24 1647 Right Radial 5 days              Peripheral Intravenous Line  Duration                  Peripheral IV - Single Lumen 04/06/24 1021 20 G  Anterior;Left Shoulder 5 days         Peripheral IV - Single Lumen 04/08/24 1714 20 G;Other (Comments) Anterior;Left Upper Arm 3 days         Peripheral IV - Single Lumen 04/11/24 1416 18 G;3/4 in Anterior;Proximal;Right Forearm <1 day                  Significant Labs:    CBC/Anemia Profile:  Recent Labs   Lab 04/10/24  1510 04/11/24  0510 04/12/24  0307   WBC  --  24.34* 28.89*   HGB  --  14.6 13.8*   HCT 47 41.4 38.8*   PLT  --  144* 162   MCV  --  89 87   RDW  --  15.7* 15.8*        Chemistries:  Recent Labs   Lab 04/11/24  0510 04/11/24  1307 04/11/24  2135 04/12/24  0307   * 123* 124*  124* 128*   K 4.3 4.2 4.6  4.6 4.3   CL 87* 87* 89*  89* 90*   CO2 18* 24 21*  21* 24   BUN 19 27* 32*  32* 21   CREATININE 1.2 1.7* 1.8*  1.8* 1.2   CALCIUM 9.0 8.2* 8.1*  8.1* 8.0*   ALBUMIN 1.9* 1.8* 1.8*  1.8* 1.8*   PROT 6.4  --   --  5.9*   BILITOT 9.1*  --   --  7.8*   ALKPHOS 165*  --   --  179*   *  --   --  133*   *  --   --  106*   MG  --  2.2 2.0  2.0 1.9   PHOS 3.6 3.7 4.1  4.1 2.6*       All pertinent labs within the past 24 hours have been reviewed.    Significant Imaging:  I have reviewed all pertinent imaging results/findings within the past 24 hours.  No new imaging today

## 2024-04-12 NOTE — PROGRESS NOTES
04/12/24 1145   Treatment   Treatment Type SLED   Treatment Status Order change   Prescription   Dialysate HCO3 - (Bicarb) (mEq/L) 30

## 2024-04-12 NOTE — PROGRESS NOTES
04/12/24 0026   Treatment   Treatment Type SLED   Treatment Status Daily equipment check   Dialysis Machine Number [id number]   Dialyzer Time (hours) 1.55   BVP (Liters) 28.1 L   Solutions Labeled and Current  Yes   Access Temporary Cath;Right;IJ   Catheter Dressing Intact  Yes   Alarms Engaged Yes   CRRT Comments daily check done

## 2024-04-13 PROBLEM — Z51.5 COMFORT MEASURES ONLY STATUS: Status: ACTIVE | Noted: 2024-01-01

## 2024-04-13 NOTE — PLAN OF CARE
Overnight Mr. To was noted to have agonal breathing and sedation with fentanyl, versed, and ketamine needed to be added. EKG was concerning for aflutter vs afib, and amio gtt was started. His pressor requirements doubled, leukocytosis increased, lactate was at 6.4, with ABG at 7.23/42.2/60. On exam the patient was noted to have new abdominal distention. Bedside echo was inadequate due to abdominal distention. Sputum culture was ordered along with CT Chest Abd Pelvis. The patient was noted to have focal pneumatosis in the stomach, duodenum, and jejunum. General surgery was paged. The findings were communicated to Mr. Collado's wife, Mrs. Up. Per general surgery's bedside evaluation, the patient would not be a surgical candidate. The patient was switched to DNR status. His vasopressin was switched to epi in the setting of GI ischemia. Mr. Collado then became unresponsive to pressors. This information was communicated to family members. The patient was switched to comfort care.     Advance Care Planning     Date: 04/13/2024    Code Status  In light of the patients advanced and life limiting illness and new pneumatosis Dr. Snow, the critical care fellow, engaged the the family in a voluntary conversation about the patient's preferences for care at the very end of life. The patient wishes to have a natural, peaceful death.  Along those lines, the patient does not wish to have CPR or other invasive treatments performed when his heart and/or breathing stops. Dr. Snow communicated to the family that a DNR order would be placed in his medical record to reflect this preference.  Dr. Snow spent a total of >35 minutes engaging the patient in this advance care planning discussion.       Deysi Douglas,   Internal Medicine PGY 2

## 2024-04-13 NOTE — CARE UPDATE
Pt in Aflutter with 2:1 block on EKG vs Atrial fibrillation with RVR.      Has not been on amiodarone infusion since 4/8. Does not appear that he has converted to the oral 200 mg amiodarone per plans.     Amiodarone loading dose and infusion ordered to be given continuous 4/12 overnight shift. Will recheck EKG.

## 2024-04-13 NOTE — DISCHARGE SUMMARY
[facility] - Cardiac Medical ICU  Critical Care Medicine  Discharge Summary      Patient Name: Enoc Collado  MRN: [id number]  Admission Date: 4/6/2024  Hospital Length of Stay: 7 days  Discharge Date and Time:  04/13/2024 3:19 PM  Attending Physician: No att. providers found   Discharging Provider: Oracio Edward DO  Primary Care Provider: Frantz Mason MD  Reason for Admission: pancreatitis and acute respiratory failure    HPI:   Mr. Collado is a 73yoM with HFpEF, HTN, HLD, CKD, gout, morbid obesity who initially presented to [facility] with acute onset abdominal pain for 1 day on 4/5 associated with n/v/d.  In ED at OSH, elevated LFTs to 140-180s with T bili 3.6/ direct bilirubin of 2.0.  Lipase >1000.  Abdominal US with CBD of 7mm and no concerns of acute cholecystitis.  CT consistent with acute pancreatitis without gross evidence of biliary obstruction.  Patient does not have previous episodes of pancreatitis or no new medications recently started.  .  Patient started on IVF.  Noted to have worsening respiratory status and confused at OSH.  ABG with pH of 7.2, CO2 58, O2 68.  Fluids were stopped and IV Lasix was given with patient placed on Bipap.  Repeat blood work revealing of worsening LFTs and total bilirubin to 12.5.  Patient transferred to [facility] MICU for GI services with EUS/ERCP.    Procedure(s) (LRB):  ERCP (ENDOSCOPIC RETROGRADE CHOLANGIOPANCREATOGRAPHY) (N/A)  ULTRASOUND, UPPER GI TRACT, ENDOSCOPIC (N/A)    Indwelling Lines/Drains at Time of Discharge:   Lines/Drains/Airways       None                 Hospital Course:   Pt was transferred from [facility] to [facility] MICU for emergent EUS/ERCP due to acute interstitial pancreatitis. On arrival per, pt was requiring continuous bipap and producing no urine despite receiving lasix. Was on max peripheral levophed to maintain blood pressures. Pt required right radial arterial line and right internal jugular vein trialysis line. Continued zosyn.  GI performed ERCP and placed CBD stent. Now on increased levo/vaso, propofol, fentanyl; proned and paralyzed starting 4/7. RIJ trialysis line requiring replacement 4/8, restarted CRRT; remains intubated/sedated/paralyzed/proned, showing improvement in P/F ratio to 138 this morning, remains proned- daily assessment for re-prone. Blood cultures 4/8 NGTD. Proned again 4/11 but no longer paralyzed. P/F continues to improve, pending supine and ABG to determine if prone 4/12.  Discussed possible prognosis and family is amenable but would like to continue full code status.     Overnight on 4/12/24, Mr. Collado's was noted to have agonal breathing and sedation with fentanyl, versed, and ketamine needed to be added. EKG was concerning for aflutter vs afib, and amio gtt was started. His pressor requirements doubled, leukocytosis increased, lactate was at 6.4, with ABG at 7.23/42.2/60. On exam the patient was noted to have new abdominal distention. Bedside echo was inadequate due to abdominal distention. Sputum culture was ordered along with CT Chest Abd Pelvis. The patient was noted to have focal pneumatosis in the stomach, duodenum, and jejunum. General surgery was paged. The findings were communicated to Mr. Collado's wife, Mrs. Up. Per general surgery's bedside evaluation, the patient would not be a surgical candidate. The patient was switched to DNR status. His vasopressin was switched to epi in the setting of GI ischemia. Mr. Collado then became unresponsive to pressors. This information was communicated to family members. The patient was switched to comfort care.     Enoc Collado passed away peacefully with family at bedside at 8:15 AM on 4/13/24.      Consults (From admission, onward)          Status Ordering Provider     Inpatient consult to General Surgery  Once        Provider:  Jenny Teixeira MD    Completed NATASHA SOLIS     Inpatient consult to Infectious Diseases  Once        Provider:  (Not yet assigned)     Completed AMANDA FAULKNER     Inpatient consult to Registered Dietitian/Nutritionist  Once        Provider:  (Not yet assigned)    Completed REY MERCEDES     Inpatient consult to Nephrology  Once        Provider:  (Not yet assigned)    Completed BERNADETTE ONEILL     Inpatient consult to [facility] ([facility]  Once        Provider:  (Not yet assigned)    Completed BERNADETTE ONEILL          Significant Labs:  ABGs:   Recent Labs   Lab 04/13/24  0351   PH 7.223*   PCO2 38.0   HCO3 15.7*   POCSATURATED 84   BE -12*     CMP:   Recent Labs   Lab 04/12/24  0307 04/12/24  1556 04/12/24  2206 04/13/24  0347   * 126* 127* 127*  126*   K 4.3 4.9 4.8 5.3*  5.2*   CL 90* 96 93* 97  97   CO2 24 18* 18* 13*  14*   * 176* 106 70  70   BUN 21 16 12 12  12   CREATININE 1.2 1.0 1.0 1.1  1.2   CALCIUM 8.0* 8.2* 8.5* 8.3*  8.2*   PROT 5.9*  --   --  5.6*   ALBUMIN 1.8* 1.9* 1.9* 1.7*  1.7*   BILITOT 7.8*  --   --  12.6*   ALKPHOS 179*  --   --  288*   *  --   --  10,431*   *  --   --  3,818*   ANIONGAP 14 12 16 17*  15     All pertinent labs within the past 24 hours have been reviewed.    Significant Imaging:  I have reviewed all pertinent imaging results/findings within the past 24 hours.    Pending Diagnostic Studies:       Procedure Component Value Units Date/Time    Calcium, ionized [[id number]] Collected: 04/12/24 0307    Order Status: Sent Lab Status: No result     Specimen: Blood     Magnesium [[id number]] Collected: 04/07/24 1409    Order Status: Sent Lab Status: No result     Specimen: Blood     Rhythm strip [[id number]]     Order Status: Sent Lab Status: No result           Final Active Diagnoses:    Diagnosis Date Noted POA    PRINCIPAL PROBLEM:  Acute biliary pancreatitis without infection or necrosis [K85.10] 04/05/2024 Yes    Comfort measures only status [Z51.5] 04/13/2024 Not Applicable    Drug-induced constipation [K59.03] 04/12/2024 No    Atrial fibrillation [I48.91]  04/10/2024 Yes    Electrolyte disturbance [E87.8] 2024 Yes    Hyponatremia [E87.1] 2024 Yes    Hypocalcemia [E83.51] 2024 Yes    Metabolic acidosis [E87.20] 2024 Yes    Acute hypoxemic respiratory failure [J96.01] 2024 Yes    Acute renal failure superimposed on chronic kidney disease [N17.9, N18.9] 2024 Yes    Shock [R57.9] 2024 Yes    ACP (advance care planning) [Z71.89] 2024 Not Applicable    STEFANIE (acute kidney injury) [N17.9] 2024 Yes    CKD (chronic kidney disease), stage III [N18.30]  Yes    Morbid obesity with BMI of 40.0-44.9, adult [E66.01, Z68.41]  Not Applicable    History of gout [Z87.39]  Yes    Essential hypertension [I10]  Yes    Hyperlipidemia [E78.5]  Yes      Problems Resolved During this Admission:    Diagnosis Date Noted Date Resolved POA    Gram-positive bacteremia [R78.81] 2024 Clinically Undetermined    Hyperkalemia [E87.5] 2024 Yes     No new Assessment & Plan notes have been filed under this hospital service since the last note was generated.  Service: Critical Care Medicine    Discharged Condition:     Disposition:       Patient Instructions:   No discharge procedures on file.  Medications:  Reconciled Home Medications:      Medication List        STOP taking these medications      allopurinoL 300 MG tablet  Commonly known as: ZYLOPRIM     amLODIPine 10 MG tablet  Commonly known as: NORVASC     aspirin 325 MG tablet     carvediloL 25 MG tablet  Commonly known as: COREG     empagliflozin 10 mg tablet  Commonly known as: JARDIANCE     furosemide 40 MG tablet  Commonly known as: LASIX     gabapentin 300 MG capsule  Commonly known as: NEURONTIN     lisinopriL 40 MG tablet  Commonly known as: PRINIVIL,ZESTRIL     methocarbamoL 750 MG Tab  Commonly known as: ROBAXIN     pravastatin 20 MG tablet  Commonly known as: PRAVACHOL     sildenafil 20 mg Tab  Commonly known as: REVATIO     syringe with  "needle, safety 3 mL 21 gauge x 1 1/2" Syrg     testosterone cypionate 200 mg/mL injection  Commonly known as: DEPOTESTOTERONE CYPIONATE     timolol maleate 0.5% 0.5 % Solg  Commonly known as: TIMOPTIC-XE               Oracio Edward, DO  Critical Care Medicine  [facility] - Card[facility]  "

## 2024-04-13 NOTE — PROGRESS NOTES
04/12/24 2300 04/13/24 0107   Treatment   Treatment Type SLED SLED   Treatment Status Blood returned Restart   Dialysis Machine Number K21 [id number]   Dialyzer Time (hours) 24.57 0   BVP (Liters) 359.6 L 0 L   Solutions Labeled and Current  Yes Yes   Access Temporary Cath;Right;IJ Temporary Cath;Right;IJ   Catheter Dressing Intact  Yes Yes   Alarms Engaged Yes Yes   CRRT Comments blood returned by primary nurse to facilitate CT scan SLED restarted post CT scan     SLED restarted per MD orders post ct scan and machine disinfection. VS stable to start tx. Starting UF set at 200 with a goal of 400. Report given to primary nurse.

## 2024-04-13 NOTE — SIGNIFICANT EVENT
Death Note    Called to bedside by patient's nurse. Nursing supervisor notified. Family at bedside.  has been called and is also at bedside.    Patient is not responding to verbal or tactile stimuli. No heart or breath sounds on auscultation. No respirations. No palpable pulses.     Time of death:  04/13/2024 8:15 AM     Cause of Death:  ARDS, pancreatitis, acute renal failure    Email: [email or web address]

## 2024-04-13 NOTE — PROCEDURES
EEG REPORT      Enoc Collado  [id number]  1950    DATE OF SERVICE: 4/12/2024     -1    METHODOLOGY      Extended electroencephalographic recording is made while the patient is ambulatory and continuing normal daily activities.  Electrodes are placed according to the International 10-20 placement system and included T1 and T2 electrode placement.  Twenty four (24) channels of digital signal (sampling rate of 512/sec) was simultaneously recorded from the scalp including EKG and eye monitors.  Recording band pass was 0.1 to 100 hz and all data was stored digitally on the recorder.  The patient is instructed to press an event button when clinical symptoms occur and write the symptoms into a diary. Activation procedures which include photic stimulation, hyperventilation and instructing patients to perform simple task are done in selected patients.        The EEG is displayed on a monitor screen and can be reformatted into different montages for evaluation.  The entire recoding is submitted for computer assisted analysis to detect spike and electrographic seizure activity.  The entire recording is visually reviewed and the times identified by computer analysis as being spikes or seizures are reviewed again.  Compresses spectral analysis (CSA) is also performed on the activity recorded from each individual channel.  This is displayed as a power display of frequencies from 0 to 30 Hz over time.   The CSA analysis is done and displayed continuously.  This is reviewed for asymmetries in power between homologous areas of the scalp and for presence of changes in power which canbe seen when seizures occur.  Sections of suspected abnormalities on the CSA is then compared with the original EEG recording.  .     Brightkit software was also utilized in the review of this study.  This software suite analyzes the EEG recording in multiple domains.  Coherence and rhythmicity is computed to identify EEG sections which may  Per , WBC are increased, could be mild infection infection. Hemoglobin and hematocrit are elevated. Could be related to smoking. Stay hydrated. Will recheck CBC in 3 months. TSH improved but not normal. Is he taking levothyroxine regularly in the morning before meals? Taking regularly. If so, increase to levothyroxine fro 100 to 125 mcg. Vit D is improved. Some protein in urine, will monitor and check next time.   contain organized seizures.  Each channel undergoes analysis to detect presence of spike and sharp waves which have special and morphological characteristic of epileptic activity.  The routine EEG recording is converted from spacial into frequency domain.  This is then displayed comparing homologous areas to identify areas of significant asymmetry.  Algorithm to identify non-cortically generated artifact is used to separate eye movement, EMG and other artifact from the EEG     Recording Times    A total of 13:09:38 and xx hours of EEG was recorded.      EEG FINDINGS:  Background activity:   The background rhythm was characterized by low voltage polymorphic delta activity.   Symmetry and continuity: the background was continuous and symmetric     Sleep:   No sleep transients or state change noted.    Activation procedures:   Preserved reactivity in EEG    Abnormal activity:   No epileptiform discharges, periodic discharges, lateralized rhythmic delta activity or electrographic seizures were seen.    IMPRESSION:   Abnormal EEG due to a moderate to severe generalized cerebral dysfunction with no electrographic seizures or indications of seizure tendency.      Abel Bradley MD  Neurology-Epilepsy.  [facility].

## 2024-04-13 NOTE — PLAN OF CARE
General Surgery Note:    In brief, Enoc Collado is a 73yoM who is admitted with distributive shock secondary to pancreatitis. The general surgery team was consulted earlier in his admission for concerns of abdominal compartment syndrome.     I was called by the MICU pulmonary critical care fellow this morning for assess of Mr. Collado. He has had a worsening acidosis, increasing pressor requirements despite continue aggressive resuscitation. A CT scan was obtained around midnight, which demonstrates pneumatosis of the stomach and proximal small intestine. There is no significant gastric or bowel wall edema. I cannot appreciate any portal venous gas, although he does have pneumobilia from his common duct stent.     Currently his is on 0.9 levophed, 0.04 of vasopressin with MAPs in the 50s. He is anuric and CRRT dependent. He remains on the ventilator, although he is on minimal ventilatory settings.     His abdomen is distended, but soft. I do not have concerns abdominal compartment syndrome. Additionally, I do not feel that any surgical intervention would alter the trajectory of this man's outcome. In fact, it may only hasten it. I discussed with the primary team and the patient's family at bedside that an operation is not indicated at this time. I agree with the DNR status.     We are happy to help in whatever capacity necessary. Please feel free to call.     Nigel James MD   General Surgery, PGY4  [phone]

## 2024-04-13 NOTE — PLAN OF CARE
Nephrology Chart Review    Intake/Output Summary (Last 24 hours) at 4/13/2024 0728  Last data filed at 4/13/2024 0601  Gross per 24 hour   Intake 6382.96 ml   Output 5784 ml   Net 598.96 ml     Vitals:    04/13/24 0600 04/13/24 0640 04/13/24 0704 04/13/24 0714   BP:       BP Location:       Patient Position:       Pulse: 86   (!) 112   Resp: (!) 34 (!) 26 (!) 28 (!) 27   Temp:       TempSrc:       SpO2:       Weight:       Height:         Recent Labs   Lab 04/12/24  1556 04/12/24  2206 04/13/24  0347   * 127* 127*  126*   K 4.9 4.8 5.3*  5.2*   CL 96 93* 97  97   CO2 18* 18* 13*  14*   BUN 16 12 12  12   CREATININE 1.0 1.0 1.1  1.2   CALCIUM 8.2* 8.5* 8.3*  8.2*   PHOS 3.4 3.4 3.8  3.9     A1c - 10/26/2023 5.7     Patient's chart reviewed and after discussion with primary team patient has been transitioned to comfort care measures only moving forward. No other related issues identified. Please call nephrology as needed. We will sign off at this time.    Mason Ramos MD  Nephrology

## 2024-04-13 NOTE — PLAN OF CARE
MICU DAILY GOALS     Family/Goals of care/Code Status   Code Status: DNR    24H Vital Sign Range  Temp:  [97.4 °F (36.3 °C)-97.9 °F (36.6 °C)]   Pulse:  []   Resp:  [13-36]   BP: ()/(62-64)   SpO2:  [86 %-100 %]   Arterial Line BP: ()/(51-80)      Shift Events (include procedures and significant events)   Increased presser requirements, increased respiratory support, abnormal labs declining, ct scan completed, surgery consulted, comfort care measures initiated    AWAKE RASS: Goal - RASS Goal: -5-->unarousable  Actual - RASS (Parry Agitation-Sedation Scale): unarousable    Restraint necessity: Not necessary   BREATHE SBT: Not attempted    Coordinate A & B, analgesics/sedatives Pain: managed   SAT: Not attempted   Delirium CAM-ICU: Overall CAM-ICU: Positive   Early(intubated/ Progressive (non-intubated) Mobility MOVE Screen (INTUBATED ONLY): Not attempted    Activity: Activity Management: Patient unable to perform activities   Feeding/Nutrition Diet order: Diet/Nutrition Received: NPO, tube feeding,     Thrombus DVT prophylaxis: VTE Required Core Measure: Pharmacological prophylaxis initiated/maintained   HOB Elevation Head of Bed (HOB) Positioning: HOB at 30 degrees   Ulcer Prophylaxis GI: yes   Glucose control managed     Skin Skin assessed during: Q Shift Change    Sacrum intact/not altered? Yes  Heels intact/not altered? Yes  Surgical wound? Yes    CHECK ONE!   (no altered skin or altered skin) and sub boxes:  [] No Altered Skin Integrity Present    []Prevention Measures Documented    [x] Altered Skin Integrity Present or Discovered   [x] LDA present in EPIC, daily doc completed              [] LDA added if not in EPIC (describe wound).                    When describing wound, do not stage, use descriptive words only.    [] Wound Image Taken (required on admit,                   transfer/discharge and every Tuesday)    Wound Care Consulted? No    4 EYES:  Attending Nurse (1st set of eyes):  Paul rn    Second RN/Staff Member (2nd set of eyes): nate Gardner   Bowel Function constipation    Indwelling Catheter Necessity [REMOVED]      Urethral Catheter 04/06/24 1000-Reason for Continuing Urinary Catheterization: Critically ill in ICU and requiring hourly monitoring of intake/output    [REMOVED] PICC Double Lumen 04/06/24 1136 right basilic-Line Necessity Review: Hemodynamic instability, Medication caustic to vasculature  [REMOVED] Trialysis (Dialysis) Catheter 04/06/24 1836 right internal jugular-Line Necessity Review: Hemodynamic instability, CRRT/HD  Trialysis (Dialysis) Catheter 04/08/24 1618 right internal jugular-Line Necessity Review: CRRT/HD  CRRT, hemodynamic instability   De-escalation Antibiotics No        VS and assessment per flow sheet, patient progressing towards goals as tolerated, plan of care reviewed with family, all concerns addressed, will continue to monitor.

## 2024-04-15 NOTE — PLAN OF CARE
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

## 2024-04-17 LAB
BACTERIA BLD CULT: NORMAL
BACTERIA BLD CULT: NORMAL

## 2024-06-10 NOTE — PROGRESS NOTES
04/11/24 0730   Treatment   Treatment Status Clotting;Blood returned;Blood lost   Dialyzer Time (hours) 18.47   BVP (Liters) 214.2 L   CRRT Comments clotting, blood returned as able by primary rn   CRRT Hourly Documentation   Total UF (Hourly Cleared) (mL) 343     Clotting, blood returned as able by primary RN. Machine due for change or disinfect today. Machine disinfected at this time. Will restart post disinfection.   No